# Patient Record
Sex: MALE | Race: WHITE | NOT HISPANIC OR LATINO
[De-identification: names, ages, dates, MRNs, and addresses within clinical notes are randomized per-mention and may not be internally consistent; named-entity substitution may affect disease eponyms.]

---

## 2020-10-20 ENCOUNTER — APPOINTMENT (OUTPATIENT)
Dept: UROLOGY | Facility: CLINIC | Age: 60
End: 2020-10-20

## 2020-10-20 PROBLEM — Z00.00 ENCOUNTER FOR PREVENTIVE HEALTH EXAMINATION: Status: ACTIVE | Noted: 2020-10-20

## 2020-10-21 ENCOUNTER — APPOINTMENT (OUTPATIENT)
Dept: UROLOGY | Facility: CLINIC | Age: 60
End: 2020-10-21
Payer: COMMERCIAL

## 2020-10-21 VITALS — HEIGHT: 68 IN | TEMPERATURE: 97.7 F | WEIGHT: 215 LBS | BODY MASS INDEX: 32.58 KG/M2

## 2020-10-21 DIAGNOSIS — Z78.9 OTHER SPECIFIED HEALTH STATUS: ICD-10-CM

## 2020-10-21 PROCEDURE — 99205 OFFICE O/P NEW HI 60 MIN: CPT

## 2020-10-21 PROCEDURE — 99072 ADDL SUPL MATRL&STAF TM PHE: CPT

## 2020-10-21 RX ORDER — ASPIRIN 325 MG/1
TABLET, FILM COATED ORAL
Refills: 0 | Status: ACTIVE | COMMUNITY

## 2020-10-21 NOTE — ASSESSMENT
[FreeTextEntry1] : Very pleasant 60 year old gentleman who presents for evaluation of large left kidney stone\par -CT images from 2019 as of anteroposteriorly reviewed with the patient\par -UA and urine culture\par -CT stone hunt\par -Fu in 2 weeks\par -We had an extensive discussion regarding potential complications from a large left kidney stone, including renal failure, recurrent urinary tract infections,.  Patient reports that he is still very reluctant to undergo evaluation and treatment for this kidney stone, however he reports that he will do so.  We had an extensive discussion guarding the importance of follow-up.  Patient verbalized understanding and reports that he will follow-up as advised\par -We briefly discussed treatment options for a very large left kidney stone with hydronephrosis.  We will further discuss this after CT scan

## 2020-10-21 NOTE — REVIEW OF SYSTEMS
[Heart Rate Is Fast] : fast heart rate [see HPI] : see HPI [both] : pain during and after intercourse [denies] : denies pain with orgasm [base] : pain in base of penis [Negative] : Heme/Lymph

## 2020-10-21 NOTE — HISTORY OF PRESENT ILLNESS
[None] : no symptoms [FreeTextEntry1] : Very pleasant 60 year old gentleman who presents for evaluation of large left kidney stone. One year ago he was febrile and underwent CT imaging, which showed a large left partial staghorn calculus with additional lower pole stones. Reports no prior history of kidney stones before this. Denies hematuria, dysuria, flank pain, suprapubic pain, fevers. Denies urinary urgency, frequency, hesitancy, straining, difficulty emptying. Denies history of nephrolithiasis, frequent UTIs,  malignancy. No known family history of nephrolithiasis or  malignancy.  No specific timing to his symptoms.  No aggravating or alleviating factors that he knows of.\par \par Patient reports that he presented for evaluation today at the urging of his wife.  He reports that he is very reluctant to see physicians and therefore did not seek evaluation for known large left kidney stone until now.

## 2020-10-26 DIAGNOSIS — N39.0 URINARY TRACT INFECTION, SITE NOT SPECIFIED: ICD-10-CM

## 2020-10-26 LAB
APPEARANCE: ABNORMAL
BACTERIA UR CULT: NORMAL
BACTERIA: NEGATIVE
BILIRUBIN URINE: NEGATIVE
BLOOD URINE: ABNORMAL
COLOR: YELLOW
GLUCOSE QUALITATIVE U: NEGATIVE
HYALINE CASTS: 0 /LPF
KETONES URINE: NEGATIVE
LEUKOCYTE ESTERASE URINE: ABNORMAL
MICROSCOPIC-UA: NORMAL
NITRITE URINE: NEGATIVE
PH URINE: 6.5
PROTEIN URINE: ABNORMAL
RED BLOOD CELLS URINE: 9 /HPF
SPECIFIC GRAVITY URINE: 1.02
SQUAMOUS EPITHELIAL CELLS: 1 /HPF
UROBILINOGEN URINE: NORMAL
WHITE BLOOD CELLS URINE: 528 /HPF

## 2020-10-26 RX ORDER — CIPROFLOXACIN HYDROCHLORIDE 500 MG/1
500 TABLET, FILM COATED ORAL TWICE DAILY
Qty: 14 | Refills: 0 | Status: DISCONTINUED | COMMUNITY
Start: 2020-10-26 | End: 2020-10-26

## 2020-10-26 RX ORDER — AMOXICILLIN AND CLAVULANATE POTASSIUM 875; 125 MG/1; MG/1
875-125 TABLET, COATED ORAL TWICE DAILY
Qty: 14 | Refills: 0 | Status: ACTIVE | COMMUNITY
Start: 2020-10-26 | End: 1900-01-01

## 2020-11-11 ENCOUNTER — NON-APPOINTMENT (OUTPATIENT)
Age: 60
End: 2020-11-11

## 2020-11-11 ENCOUNTER — APPOINTMENT (OUTPATIENT)
Dept: UROLOGY | Facility: CLINIC | Age: 60
End: 2020-11-11

## 2020-11-12 ENCOUNTER — APPOINTMENT (OUTPATIENT)
Dept: UROLOGY | Facility: CLINIC | Age: 60
End: 2020-11-12
Payer: COMMERCIAL

## 2020-11-12 VITALS
BODY MASS INDEX: 32.58 KG/M2 | DIASTOLIC BLOOD PRESSURE: 99 MMHG | TEMPERATURE: 98 F | SYSTOLIC BLOOD PRESSURE: 131 MMHG | WEIGHT: 215 LBS | HEIGHT: 68 IN | OXYGEN SATURATION: 98 % | HEART RATE: 122 BPM

## 2020-11-12 DIAGNOSIS — N20.0 CALCULUS OF KIDNEY: ICD-10-CM

## 2020-11-12 DIAGNOSIS — Z82.49 FAMILY HISTORY OF ISCHEMIC HEART DISEASE AND OTHER DISEASES OF THE CIRCULATORY SYSTEM: ICD-10-CM

## 2020-11-12 DIAGNOSIS — Z87.891 PERSONAL HISTORY OF NICOTINE DEPENDENCE: ICD-10-CM

## 2020-11-12 DIAGNOSIS — Z83.3 FAMILY HISTORY OF DIABETES MELLITUS: ICD-10-CM

## 2020-11-12 DIAGNOSIS — N13.30 UNSPECIFIED HYDRONEPHROSIS: ICD-10-CM

## 2020-11-12 PROCEDURE — 99072 ADDL SUPL MATRL&STAF TM PHE: CPT

## 2020-11-12 PROCEDURE — 99214 OFFICE O/P EST MOD 30 MIN: CPT

## 2020-11-12 NOTE — REVIEW OF SYSTEMS
[Urine Infection (bladder/kidney)] : bladder/kidney infection [Wake up at night to urinate  How many times?  ___] : wakes up to urinate [unfilled] times during the night [Negative] : Heme/Lymph

## 2020-11-12 NOTE — HISTORY OF PRESENT ILLNESS
[FreeTextEntry1] : 60 year old gentleman who presents for evaluation of large left kidney stone. One year ago he was febrile and underwent CT imaging, which showed a large left partial staghorn calculus with additional lower pole stones. Did not pursue treatment; returns after reimaging, seen by Dr. Jones, and referred for definitive stone management.\par \par No current pain, fever, hematuria.

## 2020-11-12 NOTE — PHYSICAL EXAM
[General Appearance - Well Developed] : well developed [General Appearance - Well Nourished] : well nourished [Normal Appearance] : normal appearance [Well Groomed] : well groomed [General Appearance - In No Acute Distress] : no acute distress [] : no respiratory distress [Respiration, Rhythm And Depth] : normal respiratory rhythm and effort [Exaggerated Use Of Accessory Muscles For Inspiration] : no accessory muscle use [Normal Station and Gait] : the gait and station were normal for the patient's age [No Focal Deficits] : no focal deficits [Oriented To Time, Place, And Person] : oriented to person, place, and time [Affect] : the affect was normal [Mood] : the mood was normal [Not Anxious] : not anxious

## 2020-11-26 ENCOUNTER — TRANSCRIPTION ENCOUNTER (OUTPATIENT)
Age: 60
End: 2020-11-26

## 2020-12-02 ENCOUNTER — OUTPATIENT (OUTPATIENT)
Dept: OUTPATIENT SERVICES | Facility: HOSPITAL | Age: 60
LOS: 1 days | End: 2020-12-02
Payer: COMMERCIAL

## 2020-12-02 VITALS
WEIGHT: 216.93 LBS | DIASTOLIC BLOOD PRESSURE: 86 MMHG | HEART RATE: 100 BPM | OXYGEN SATURATION: 100 % | HEIGHT: 68 IN | SYSTOLIC BLOOD PRESSURE: 128 MMHG | TEMPERATURE: 98 F | RESPIRATION RATE: 18 BRPM

## 2020-12-02 DIAGNOSIS — N20.0 CALCULUS OF KIDNEY: ICD-10-CM

## 2020-12-02 DIAGNOSIS — Z01.818 ENCOUNTER FOR OTHER PREPROCEDURAL EXAMINATION: ICD-10-CM

## 2020-12-02 LAB
ANION GAP SERPL CALC-SCNC: 16 MMOL/L — SIGNIFICANT CHANGE UP (ref 5–17)
BLD GP AB SCN SERPL QL: NEGATIVE — SIGNIFICANT CHANGE UP
BUN SERPL-MCNC: 13 MG/DL — SIGNIFICANT CHANGE UP (ref 7–23)
CALCIUM SERPL-MCNC: 9.9 MG/DL — SIGNIFICANT CHANGE UP (ref 8.4–10.5)
CHLORIDE SERPL-SCNC: 102 MMOL/L — SIGNIFICANT CHANGE UP (ref 96–108)
CO2 SERPL-SCNC: 19 MMOL/L — LOW (ref 22–31)
CREAT SERPL-MCNC: 0.93 MG/DL — SIGNIFICANT CHANGE UP (ref 0.5–1.3)
GLUCOSE SERPL-MCNC: 130 MG/DL — HIGH (ref 70–99)
HCT VFR BLD CALC: 50.6 % — HIGH (ref 39–50)
HGB BLD-MCNC: 17.8 G/DL — HIGH (ref 13–17)
MCHC RBC-ENTMCNC: 30.6 PG — SIGNIFICANT CHANGE UP (ref 27–34)
MCHC RBC-ENTMCNC: 35.2 GM/DL — SIGNIFICANT CHANGE UP (ref 32–36)
MCV RBC AUTO: 87.1 FL — SIGNIFICANT CHANGE UP (ref 80–100)
NRBC # BLD: 0 /100 WBCS — SIGNIFICANT CHANGE UP (ref 0–0)
PLATELET # BLD AUTO: 303 K/UL — SIGNIFICANT CHANGE UP (ref 150–400)
POTASSIUM SERPL-MCNC: 4 MMOL/L — SIGNIFICANT CHANGE UP (ref 3.5–5.3)
POTASSIUM SERPL-SCNC: 4 MMOL/L — SIGNIFICANT CHANGE UP (ref 3.5–5.3)
RBC # BLD: 5.81 M/UL — HIGH (ref 4.2–5.8)
RBC # FLD: 12.7 % — SIGNIFICANT CHANGE UP (ref 10.3–14.5)
RH IG SCN BLD-IMP: NEGATIVE — SIGNIFICANT CHANGE UP
SODIUM SERPL-SCNC: 137 MMOL/L — SIGNIFICANT CHANGE UP (ref 135–145)
WBC # BLD: 9.26 K/UL — SIGNIFICANT CHANGE UP (ref 3.8–10.5)
WBC # FLD AUTO: 9.26 K/UL — SIGNIFICANT CHANGE UP (ref 3.8–10.5)

## 2020-12-02 RX ORDER — CHLORHEXIDINE GLUCONATE 213 G/1000ML
1 SOLUTION TOPICAL ONCE
Refills: 0 | Status: DISCONTINUED | OUTPATIENT
Start: 2020-12-11 | End: 2020-12-11

## 2020-12-02 RX ORDER — SODIUM CHLORIDE 9 MG/ML
3 INJECTION INTRAMUSCULAR; INTRAVENOUS; SUBCUTANEOUS EVERY 8 HOURS
Refills: 0 | Status: DISCONTINUED | OUTPATIENT
Start: 2020-12-11 | End: 2020-12-11

## 2020-12-02 RX ORDER — CEFAZOLIN SODIUM 1 G
2000 VIAL (EA) INJECTION ONCE
Refills: 0 | Status: COMPLETED | OUTPATIENT
Start: 2020-12-11 | End: 2020-12-11

## 2020-12-02 RX ORDER — LIDOCAINE HCL 20 MG/ML
0.2 VIAL (ML) INJECTION ONCE
Refills: 0 | Status: DISCONTINUED | OUTPATIENT
Start: 2020-12-11 | End: 2020-12-11

## 2020-12-02 NOTE — H&P PST ADULT - NSICDXPROBLEM_GEN_ALL_CORE_FT
PROBLEM DIAGNOSES  Problem: Calculus of kidney  Assessment and Plan: cystoscopy  left retrograde  left percutaneous stone removal

## 2020-12-02 NOTE — H&P PST ADULT - HISTORY OF PRESENT ILLNESS
This s a 61 y/o male had pneumonia last year, a CT revealed + calculus of kidney , he presents today for left retrograde, left percutaneous stone removal  COVID 19 PCR to be done 12/8 at [facility] This s a 61 y/o male upon a work up for  pneumonia last year, a CT revealed + calculus of kidney , he presents today for left retrograde, left percutaneous stone removal  COVID 19 PCR to be done 12/8 at [facility]

## 2020-12-07 DIAGNOSIS — Z01.818 ENCOUNTER FOR OTHER PREPROCEDURAL EXAMINATION: ICD-10-CM

## 2020-12-07 RX ORDER — AMOXICILLIN AND CLAVULANATE POTASSIUM 875; 125 MG/1; MG/1
875-125 TABLET, COATED ORAL
Qty: 18 | Refills: 0 | Status: ACTIVE | COMMUNITY
Start: 2020-12-07 | End: 1900-01-01

## 2020-12-08 ENCOUNTER — APPOINTMENT (OUTPATIENT)
Dept: DISASTER EMERGENCY | Facility: CLINIC | Age: 60
End: 2020-12-08

## 2020-12-10 ENCOUNTER — TRANSCRIPTION ENCOUNTER (OUTPATIENT)
Age: 60
End: 2020-12-10

## 2020-12-10 LAB — SARS-COV-2 N GENE NPH QL NAA+PROBE: NOT DETECTED

## 2020-12-11 ENCOUNTER — RESULT REVIEW (OUTPATIENT)
Age: 60
End: 2020-12-11

## 2020-12-11 ENCOUNTER — APPOINTMENT (OUTPATIENT)
Dept: UROLOGY | Facility: HOSPITAL | Age: 60
End: 2020-12-11

## 2020-12-11 ENCOUNTER — INPATIENT (INPATIENT)
Facility: HOSPITAL | Age: 60
LOS: 3 days | Discharge: HOME | DRG: 659 | End: 2020-12-15
Attending: UROLOGY | Admitting: UROLOGY
Payer: COMMERCIAL

## 2020-12-11 VITALS
RESPIRATION RATE: 16 BRPM | HEIGHT: 68 IN | TEMPERATURE: 98 F | HEART RATE: 101 BPM | WEIGHT: 216.93 LBS | DIASTOLIC BLOOD PRESSURE: 87 MMHG | SYSTOLIC BLOOD PRESSURE: 117 MMHG

## 2020-12-11 DIAGNOSIS — Z01.818 ENCOUNTER FOR OTHER PREPROCEDURAL EXAMINATION: ICD-10-CM

## 2020-12-11 DIAGNOSIS — N20.0 CALCULUS OF KIDNEY: ICD-10-CM

## 2020-12-11 LAB
ANION GAP SERPL CALC-SCNC: 14 MMOL/L — SIGNIFICANT CHANGE UP (ref 5–17)
BASOPHILS # BLD AUTO: 0.02 K/UL — SIGNIFICANT CHANGE UP (ref 0–0.2)
BASOPHILS NFR BLD AUTO: 0.2 % — SIGNIFICANT CHANGE UP (ref 0–2)
BUN SERPL-MCNC: 11 MG/DL — SIGNIFICANT CHANGE UP (ref 7–23)
CALCIUM SERPL-MCNC: 8.8 MG/DL — SIGNIFICANT CHANGE UP (ref 8.4–10.5)
CHLORIDE SERPL-SCNC: 103 MMOL/L — SIGNIFICANT CHANGE UP (ref 96–108)
CO2 SERPL-SCNC: 19 MMOL/L — LOW (ref 22–31)
CREAT SERPL-MCNC: 0.97 MG/DL — SIGNIFICANT CHANGE UP (ref 0.5–1.3)
EOSINOPHIL # BLD AUTO: 0.04 K/UL — SIGNIFICANT CHANGE UP (ref 0–0.5)
EOSINOPHIL NFR BLD AUTO: 0.4 % — SIGNIFICANT CHANGE UP (ref 0–6)
GLUCOSE SERPL-MCNC: 169 MG/DL — HIGH (ref 70–99)
HCT VFR BLD CALC: 48.3 % — SIGNIFICANT CHANGE UP (ref 39–50)
HGB BLD-MCNC: 16.8 G/DL — SIGNIFICANT CHANGE UP (ref 13–17)
IMM GRANULOCYTES NFR BLD AUTO: 0.2 % — SIGNIFICANT CHANGE UP (ref 0–1.5)
LYMPHOCYTES # BLD AUTO: 1.16 K/UL — SIGNIFICANT CHANGE UP (ref 1–3.3)
LYMPHOCYTES # BLD AUTO: 12.5 % — LOW (ref 13–44)
MCHC RBC-ENTMCNC: 30.4 PG — SIGNIFICANT CHANGE UP (ref 27–34)
MCHC RBC-ENTMCNC: 34.8 GM/DL — SIGNIFICANT CHANGE UP (ref 32–36)
MCV RBC AUTO: 87.5 FL — SIGNIFICANT CHANGE UP (ref 80–100)
MONOCYTES # BLD AUTO: 0.12 K/UL — SIGNIFICANT CHANGE UP (ref 0–0.9)
MONOCYTES NFR BLD AUTO: 1.3 % — LOW (ref 2–14)
NEUTROPHILS # BLD AUTO: 7.95 K/UL — HIGH (ref 1.8–7.4)
NEUTROPHILS NFR BLD AUTO: 85.4 % — HIGH (ref 43–77)
NRBC # BLD: 0 /100 WBCS — SIGNIFICANT CHANGE UP (ref 0–0)
PLATELET # BLD AUTO: 369 K/UL — SIGNIFICANT CHANGE UP (ref 150–400)
POTASSIUM SERPL-MCNC: 4.2 MMOL/L — SIGNIFICANT CHANGE UP (ref 3.5–5.3)
POTASSIUM SERPL-SCNC: 4.2 MMOL/L — SIGNIFICANT CHANGE UP (ref 3.5–5.3)
RBC # BLD: 5.52 M/UL — SIGNIFICANT CHANGE UP (ref 4.2–5.8)
RBC # FLD: 12.6 % — SIGNIFICANT CHANGE UP (ref 10.3–14.5)
RH IG SCN BLD-IMP: NEGATIVE — SIGNIFICANT CHANGE UP
SODIUM SERPL-SCNC: 136 MMOL/L — SIGNIFICANT CHANGE UP (ref 135–145)
WBC # BLD: 9.31 K/UL — SIGNIFICANT CHANGE UP (ref 3.8–10.5)
WBC # FLD AUTO: 9.31 K/UL — SIGNIFICANT CHANGE UP (ref 3.8–10.5)

## 2020-12-11 PROCEDURE — 86901 BLOOD TYPING SEROLOGIC RH(D): CPT

## 2020-12-11 PROCEDURE — G0463: CPT

## 2020-12-11 PROCEDURE — 86900 BLOOD TYPING SEROLOGIC ABO: CPT

## 2020-12-11 PROCEDURE — 71045 X-RAY EXAM CHEST 1 VIEW: CPT | Mod: 26

## 2020-12-11 PROCEDURE — 80048 BASIC METABOLIC PNL TOTAL CA: CPT

## 2020-12-11 PROCEDURE — 76000 FLUOROSCOPY <1 HR PHYS/QHP: CPT

## 2020-12-11 PROCEDURE — 88300 SURGICAL PATH GROSS: CPT | Mod: 26

## 2020-12-11 PROCEDURE — 85027 COMPLETE CBC AUTOMATED: CPT

## 2020-12-11 PROCEDURE — 87086 URINE CULTURE/COLONY COUNT: CPT

## 2020-12-11 PROCEDURE — 87186 SC STD MICRODIL/AGAR DIL: CPT

## 2020-12-11 PROCEDURE — 86850 RBC ANTIBODY SCREEN: CPT

## 2020-12-11 RX ORDER — OXYCODONE HYDROCHLORIDE 5 MG/1
5 TABLET ORAL EVERY 6 HOURS
Refills: 0 | Status: DISCONTINUED | OUTPATIENT
Start: 2020-12-11 | End: 2020-12-15

## 2020-12-11 RX ORDER — CEFTRIAXONE 500 MG/1
1000 INJECTION, POWDER, FOR SOLUTION INTRAMUSCULAR; INTRAVENOUS EVERY 24 HOURS
Refills: 0 | Status: DISCONTINUED | OUTPATIENT
Start: 2020-12-11 | End: 2020-12-12

## 2020-12-11 RX ORDER — ONDANSETRON 8 MG/1
4 TABLET, FILM COATED ORAL ONCE
Refills: 0 | Status: COMPLETED | OUTPATIENT
Start: 2020-12-11 | End: 2020-12-11

## 2020-12-11 RX ORDER — LIDOCAINE 4 G/100G
1 CREAM TOPICAL EVERY 8 HOURS
Refills: 0 | Status: DISCONTINUED | OUTPATIENT
Start: 2020-12-11 | End: 2020-12-15

## 2020-12-11 RX ORDER — HEPARIN SODIUM 5000 [USP'U]/ML
5000 INJECTION INTRAVENOUS; SUBCUTANEOUS EVERY 8 HOURS
Refills: 0 | Status: DISCONTINUED | OUTPATIENT
Start: 2020-12-11 | End: 2020-12-15

## 2020-12-11 RX ORDER — SODIUM CHLORIDE 9 MG/ML
1000 INJECTION, SOLUTION INTRAVENOUS
Refills: 0 | Status: DISCONTINUED | OUTPATIENT
Start: 2020-12-11 | End: 2020-12-13

## 2020-12-11 RX ORDER — AMPICILLIN TRIHYDRATE 250 MG
500 CAPSULE ORAL EVERY 6 HOURS
Refills: 0 | Status: DISCONTINUED | OUTPATIENT
Start: 2020-12-11 | End: 2020-12-12

## 2020-12-11 RX ORDER — SENNA PLUS 8.6 MG/1
2 TABLET ORAL AT BEDTIME
Refills: 0 | Status: DISCONTINUED | OUTPATIENT
Start: 2020-12-11 | End: 2020-12-15

## 2020-12-11 RX ORDER — FENTANYL CITRATE 50 UG/ML
25 INJECTION INTRAVENOUS
Refills: 0 | Status: DISCONTINUED | OUTPATIENT
Start: 2020-12-11 | End: 2020-12-11

## 2020-12-11 RX ORDER — OXYCODONE HYDROCHLORIDE 5 MG/1
10 TABLET ORAL EVERY 6 HOURS
Refills: 0 | Status: DISCONTINUED | OUTPATIENT
Start: 2020-12-11 | End: 2020-12-15

## 2020-12-11 RX ORDER — ACETAMINOPHEN 500 MG
650 TABLET ORAL EVERY 6 HOURS
Refills: 0 | Status: DISCONTINUED | OUTPATIENT
Start: 2020-12-11 | End: 2020-12-15

## 2020-12-11 RX ORDER — FENTANYL CITRATE 50 UG/ML
50 INJECTION INTRAVENOUS ONCE
Refills: 0 | Status: DISCONTINUED | OUTPATIENT
Start: 2020-12-11 | End: 2020-12-11

## 2020-12-11 RX ADMIN — Medication 100 MILLIGRAM(S): at 22:48

## 2020-12-11 RX ADMIN — ONDANSETRON 4 MILLIGRAM(S): 8 TABLET, FILM COATED ORAL at 18:15

## 2020-12-11 RX ADMIN — HEPARIN SODIUM 5000 UNIT(S): 5000 INJECTION INTRAVENOUS; SUBCUTANEOUS at 21:56

## 2020-12-11 RX ADMIN — CEFTRIAXONE 100 MILLIGRAM(S): 500 INJECTION, POWDER, FOR SOLUTION INTRAMUSCULAR; INTRAVENOUS at 21:56

## 2020-12-11 RX ADMIN — SODIUM CHLORIDE 125 MILLILITER(S): 9 INJECTION, SOLUTION INTRAVENOUS at 18:58

## 2020-12-11 NOTE — PROGRESS NOTE ADULT - ASSESSMENT
A/P: 60y Male s/p L PCNL     f/u CXR  DVT prophylaxis/OOB  Incentive spirometry  Strict I&O's  Analgesia and antiemetics as needed  regular Diet  AM labs

## 2020-12-11 NOTE — PROGRESS NOTE ADULT - SUBJECTIVE AND OBJECTIVE BOX
Post op Check    Pt 61y/o M hx of kidney stone s/p L PCNL. seen and examined. c/o discomfort from kim . Pain is controlled. Denies SOB/CP/N/V. post op lab stable.    Vital Signs Last 24 Hrs  T(C): 36.9 (11 Dec 2020 20:55), Max: 36.9 (11 Dec 2020 12:00)  T(F): 98.4 (11 Dec 2020 20:55), Max: 98.4 (11 Dec 2020 12:00)  HR: 102 (11 Dec 2020 20:55) (88 - 103)  BP: 116/79 (11 Dec 2020 20:55) (115/72 - 135/73)  BP(mean): 87 (11 Dec 2020 19:30) (87 - 103)  RR: 18 (11 Dec 2020 20:55) (11 - 18)  SpO2: 95% (11 Dec 2020 20:55) (95% - 100%)    I&O's Summary    11 Dec 2020 07:01  -  11 Dec 2020 20:58  --------------------------------------------------------  IN: 125 mL / OUT: 550 mL / NET: -425 mL        Physical Exam  Gen: NAD, A&Ox3  Pulm: No respiratory distress, no subcostal retractions  CV: RRR, no JVD  Abd: Soft, NT, ND  Back: L NT in place, draining light red urine  : kim in place, draining yellow urine                           16.8   9.31  )-----------( 369      ( 11 Dec 2020 18:31 )             48.3       12-11    136  |  103  |  11  ----------------------------<  169<H>  4.2   |  19<L>  |  0.97    Ca    8.8      11 Dec 2020 18:31

## 2020-12-12 LAB
ANION GAP SERPL CALC-SCNC: 15 MMOL/L — SIGNIFICANT CHANGE UP (ref 5–17)
BUN SERPL-MCNC: 15 MG/DL — SIGNIFICANT CHANGE UP (ref 7–23)
CALCIUM SERPL-MCNC: 9 MG/DL — SIGNIFICANT CHANGE UP (ref 8.4–10.5)
CHLORIDE SERPL-SCNC: 101 MMOL/L — SIGNIFICANT CHANGE UP (ref 96–108)
CO2 SERPL-SCNC: 20 MMOL/L — LOW (ref 22–31)
CREAT SERPL-MCNC: 0.96 MG/DL — SIGNIFICANT CHANGE UP (ref 0.5–1.3)
GLUCOSE SERPL-MCNC: 151 MG/DL — HIGH (ref 70–99)
HCT VFR BLD CALC: 43.2 % — SIGNIFICANT CHANGE UP (ref 39–50)
HGB BLD-MCNC: 14.9 G/DL — SIGNIFICANT CHANGE UP (ref 13–17)
MCHC RBC-ENTMCNC: 30.3 PG — SIGNIFICANT CHANGE UP (ref 27–34)
MCHC RBC-ENTMCNC: 34.5 GM/DL — SIGNIFICANT CHANGE UP (ref 32–36)
MCV RBC AUTO: 88 FL — SIGNIFICANT CHANGE UP (ref 80–100)
NRBC # BLD: 0 /100 WBCS — SIGNIFICANT CHANGE UP (ref 0–0)
PLATELET # BLD AUTO: 334 K/UL — SIGNIFICANT CHANGE UP (ref 150–400)
POTASSIUM SERPL-MCNC: 4.3 MMOL/L — SIGNIFICANT CHANGE UP (ref 3.5–5.3)
POTASSIUM SERPL-SCNC: 4.3 MMOL/L — SIGNIFICANT CHANGE UP (ref 3.5–5.3)
RBC # BLD: 4.91 M/UL — SIGNIFICANT CHANGE UP (ref 4.2–5.8)
RBC # FLD: 12.8 % — SIGNIFICANT CHANGE UP (ref 10.3–14.5)
SODIUM SERPL-SCNC: 136 MMOL/L — SIGNIFICANT CHANGE UP (ref 135–145)
WBC # BLD: 12.25 K/UL — HIGH (ref 3.8–10.5)
WBC # FLD AUTO: 12.25 K/UL — HIGH (ref 3.8–10.5)

## 2020-12-12 PROCEDURE — 74176 CT ABD & PELVIS W/O CONTRAST: CPT | Mod: 26

## 2020-12-12 PROCEDURE — 93010 ELECTROCARDIOGRAM REPORT: CPT

## 2020-12-12 PROCEDURE — 99024 POSTOP FOLLOW-UP VISIT: CPT

## 2020-12-12 RX ORDER — PIPERACILLIN AND TAZOBACTAM 4; .5 G/20ML; G/20ML
3.38 INJECTION, POWDER, LYOPHILIZED, FOR SOLUTION INTRAVENOUS ONCE
Refills: 0 | Status: COMPLETED | OUTPATIENT
Start: 2020-12-12 | End: 2020-12-12

## 2020-12-12 RX ORDER — PIPERACILLIN AND TAZOBACTAM 4; .5 G/20ML; G/20ML
3.38 INJECTION, POWDER, LYOPHILIZED, FOR SOLUTION INTRAVENOUS EVERY 8 HOURS
Refills: 0 | Status: COMPLETED | OUTPATIENT
Start: 2020-12-12 | End: 2020-12-15

## 2020-12-12 RX ADMIN — Medication 650 MILLIGRAM(S): at 02:43

## 2020-12-12 RX ADMIN — Medication 104 MILLIGRAM(S): at 05:32

## 2020-12-12 RX ADMIN — Medication 104 MILLIGRAM(S): at 00:45

## 2020-12-12 RX ADMIN — Medication 650 MILLIGRAM(S): at 02:13

## 2020-12-12 RX ADMIN — PIPERACILLIN AND TAZOBACTAM 200 GRAM(S): 4; .5 INJECTION, POWDER, LYOPHILIZED, FOR SOLUTION INTRAVENOUS at 14:41

## 2020-12-12 RX ADMIN — HEPARIN SODIUM 5000 UNIT(S): 5000 INJECTION INTRAVENOUS; SUBCUTANEOUS at 21:06

## 2020-12-12 RX ADMIN — HEPARIN SODIUM 5000 UNIT(S): 5000 INJECTION INTRAVENOUS; SUBCUTANEOUS at 05:32

## 2020-12-12 RX ADMIN — SODIUM CHLORIDE 125 MILLILITER(S): 9 INJECTION, SOLUTION INTRAVENOUS at 14:41

## 2020-12-12 RX ADMIN — HEPARIN SODIUM 5000 UNIT(S): 5000 INJECTION INTRAVENOUS; SUBCUTANEOUS at 14:42

## 2020-12-12 RX ADMIN — Medication 650 MILLIGRAM(S): at 23:39

## 2020-12-12 RX ADMIN — Medication 650 MILLIGRAM(S): at 17:13

## 2020-12-12 RX ADMIN — PIPERACILLIN AND TAZOBACTAM 25 GRAM(S): 4; .5 INJECTION, POWDER, LYOPHILIZED, FOR SOLUTION INTRAVENOUS at 21:06

## 2020-12-12 RX ADMIN — SODIUM CHLORIDE 125 MILLILITER(S): 9 INJECTION, SOLUTION INTRAVENOUS at 22:56

## 2020-12-12 RX ADMIN — Medication 650 MILLIGRAM(S): at 10:54

## 2020-12-12 NOTE — PROGRESS NOTE ADULT - ASSESSMENT
60 year old male s/p R PCNL, with fevers on POD#1    -f/u urine, blood, stone, kidney cultures  -continue antibiotics  -AM labs  -monitor I's and O's  -analgesia as needed  -AM CT scan  -trend fevers  -OOB/DVT prophylaxis

## 2020-12-12 NOTE — PROGRESS NOTE ADULT - SUBJECTIVE AND OBJECTIVE BOX
The patient was seen and examined at bedside.  Denies complaints of chest pain, shortness of breath, nausea, acute pain.  Pt with fevers up to 102.1F this AM.    T(C): 38.9 (12-12-20 @ 07:04), Max: 38.9 (12-12-20 @ 07:04)  HR: 123 (12-12-20 @ 05:37) (88 - 123)  BP: 104/61 (12-12-20 @ 05:37) (103/70 - 135/73)  RR: 18 (12-12-20 @ 05:37) (11 - 18)  SpO2: 95% (12-12-20 @ 05:37) (94% - 100%)  Wt(kg): --    Physical Exam:    General: NAD, A+Ox3  Abdomen: soft, non-tender, non-distended  Back: dressing with serosanguinous drainage and was changed      12-11 @ 07:01  -  12-12 @ 07:00  --------------------------------------------------------  IN: 305 mL / OUT: 1415 mL / NET: -1110 mL      F - 375cc    left NT - 490cc blood tinged

## 2020-12-12 NOTE — PROVIDER CONTACT NOTE (OTHER) - ASSESSMENT
Pt resting in bed, denies having chills. Pt with temp of 101.3 Pt resting in bed, denies having chills. Pt with temp of 102.8

## 2020-12-13 LAB
-  AMPICILLIN: SIGNIFICANT CHANGE UP
-  AMPICILLIN: SIGNIFICANT CHANGE UP
-  CIPROFLOXACIN: SIGNIFICANT CHANGE UP
-  CIPROFLOXACIN: SIGNIFICANT CHANGE UP
-  LEVOFLOXACIN: SIGNIFICANT CHANGE UP
-  LEVOFLOXACIN: SIGNIFICANT CHANGE UP
-  TETRACYCLINE: SIGNIFICANT CHANGE UP
-  TETRACYCLINE: SIGNIFICANT CHANGE UP
-  VANCOMYCIN: SIGNIFICANT CHANGE UP
-  VANCOMYCIN: SIGNIFICANT CHANGE UP
ANION GAP SERPL CALC-SCNC: 12 MMOL/L — SIGNIFICANT CHANGE UP (ref 5–17)
BUN SERPL-MCNC: 9 MG/DL — SIGNIFICANT CHANGE UP (ref 7–23)
CALCIUM SERPL-MCNC: 8.8 MG/DL — SIGNIFICANT CHANGE UP (ref 8.4–10.5)
CHLORIDE SERPL-SCNC: 102 MMOL/L — SIGNIFICANT CHANGE UP (ref 96–108)
CO2 SERPL-SCNC: 20 MMOL/L — LOW (ref 22–31)
CREAT SERPL-MCNC: 0.93 MG/DL — SIGNIFICANT CHANGE UP (ref 0.5–1.3)
CULTURE RESULTS: SIGNIFICANT CHANGE UP
CULTURE RESULTS: SIGNIFICANT CHANGE UP
GLUCOSE SERPL-MCNC: 99 MG/DL — SIGNIFICANT CHANGE UP (ref 70–99)
HCT VFR BLD CALC: 43.5 % — SIGNIFICANT CHANGE UP (ref 39–50)
HGB BLD-MCNC: 15 G/DL — SIGNIFICANT CHANGE UP (ref 13–17)
MCHC RBC-ENTMCNC: 29.8 PG — SIGNIFICANT CHANGE UP (ref 27–34)
MCHC RBC-ENTMCNC: 34.5 GM/DL — SIGNIFICANT CHANGE UP (ref 32–36)
MCV RBC AUTO: 86.3 FL — SIGNIFICANT CHANGE UP (ref 80–100)
METHOD TYPE: SIGNIFICANT CHANGE UP
METHOD TYPE: SIGNIFICANT CHANGE UP
NRBC # BLD: 0 /100 WBCS — SIGNIFICANT CHANGE UP (ref 0–0)
ORGANISM # SPEC MICROSCOPIC CNT: SIGNIFICANT CHANGE UP
PLATELET # BLD AUTO: 253 K/UL — SIGNIFICANT CHANGE UP (ref 150–400)
POTASSIUM SERPL-MCNC: 3.9 MMOL/L — SIGNIFICANT CHANGE UP (ref 3.5–5.3)
POTASSIUM SERPL-SCNC: 3.9 MMOL/L — SIGNIFICANT CHANGE UP (ref 3.5–5.3)
RBC # BLD: 5.04 M/UL — SIGNIFICANT CHANGE UP (ref 4.2–5.8)
RBC # FLD: 12.7 % — SIGNIFICANT CHANGE UP (ref 10.3–14.5)
SODIUM SERPL-SCNC: 134 MMOL/L — LOW (ref 135–145)
SPECIMEN SOURCE: SIGNIFICANT CHANGE UP
SPECIMEN SOURCE: SIGNIFICANT CHANGE UP
WBC # BLD: 8.99 K/UL — SIGNIFICANT CHANGE UP (ref 3.8–10.5)
WBC # FLD AUTO: 8.99 K/UL — SIGNIFICANT CHANGE UP (ref 3.8–10.5)

## 2020-12-13 RX ORDER — VANCOMYCIN HCL 1 G
1750 VIAL (EA) INTRAVENOUS EVERY 12 HOURS
Refills: 0 | Status: DISCONTINUED | OUTPATIENT
Start: 2020-12-13 | End: 2020-12-15

## 2020-12-13 RX ADMIN — PIPERACILLIN AND TAZOBACTAM 25 GRAM(S): 4; .5 INJECTION, POWDER, LYOPHILIZED, FOR SOLUTION INTRAVENOUS at 13:30

## 2020-12-13 RX ADMIN — SODIUM CHLORIDE 125 MILLILITER(S): 9 INJECTION, SOLUTION INTRAVENOUS at 13:30

## 2020-12-13 RX ADMIN — Medication 650 MILLIGRAM(S): at 16:47

## 2020-12-13 RX ADMIN — Medication 650 MILLIGRAM(S): at 17:17

## 2020-12-13 RX ADMIN — HEPARIN SODIUM 5000 UNIT(S): 5000 INJECTION INTRAVENOUS; SUBCUTANEOUS at 23:06

## 2020-12-13 RX ADMIN — PIPERACILLIN AND TAZOBACTAM 25 GRAM(S): 4; .5 INJECTION, POWDER, LYOPHILIZED, FOR SOLUTION INTRAVENOUS at 23:05

## 2020-12-13 RX ADMIN — Medication 250 MILLIGRAM(S): at 18:39

## 2020-12-13 RX ADMIN — Medication 650 MILLIGRAM(S): at 06:15

## 2020-12-13 RX ADMIN — PIPERACILLIN AND TAZOBACTAM 25 GRAM(S): 4; .5 INJECTION, POWDER, LYOPHILIZED, FOR SOLUTION INTRAVENOUS at 05:01

## 2020-12-13 RX ADMIN — HEPARIN SODIUM 5000 UNIT(S): 5000 INJECTION INTRAVENOUS; SUBCUTANEOUS at 05:01

## 2020-12-13 RX ADMIN — Medication 650 MILLIGRAM(S): at 00:09

## 2020-12-13 RX ADMIN — SODIUM CHLORIDE 125 MILLILITER(S): 9 INJECTION, SOLUTION INTRAVENOUS at 05:50

## 2020-12-13 RX ADMIN — Medication 650 MILLIGRAM(S): at 05:45

## 2020-12-13 RX ADMIN — HEPARIN SODIUM 5000 UNIT(S): 5000 INJECTION INTRAVENOUS; SUBCUTANEOUS at 13:30

## 2020-12-13 NOTE — PROGRESS NOTE ADULT - ASSESSMENT
60 year old male s/p R PCNL, with fevers on POD#1, afebrile since 1650 yesterday    -f/u urine, blood, stone, kidney cultures  -continue antibiotics  -AM labs  -monitor I's and O's  -analgesia as needed  -trend fevers  -OOB/DVT prophylaxis

## 2020-12-13 NOTE — PATIENT PROFILE ADULT - NSASFALLNEEDSASSIST_GEN_A_NUR
The [facility] 
yes

## 2020-12-13 NOTE — PROGRESS NOTE ADULT - SUBJECTIVE AND OBJECTIVE BOX
Subjective  Pt seen and examined, no overnight events, pt feels well, denies f/c/n/v.     Objective    Vital signs  T(F): , Max: 102.8 (12-12-20 @ 16:46)  HR: 102 (12-13-20 @ 05:28)  BP: 138/86 (12-13-20 @ 05:28)  SpO2: 92% (12-13-20 @ 05:28)  Wt(kg): --    Output     OUT:    Indwelling Catheter - Urethral (mL): 2675 mL    Nephrostomy Tube (mL): 2800 mL    Voided (mL): 0 mL  Total OUT: 5475 mL    Total NET: -5475 mL          Gen: NAD  Abd: SNN  : kim clear yellow, NT pink     Labs      12-13 @ 07:01    WBC 8.99  / Hct 43.5  / SCr --       12-13 @ 07:00    WBC --    / Hct --    / SCr 0.93     Specimen Source: Kidney Kidney (12.12.20 @ 01:00)    Culture Results:   Moderate Gram Positive Cocci in Pairs and Chains (12.12.20 @ 01:00)        Imaging  < from: CT Abdomen and Pelvis No Cont (12.12.20 @ 11:08) >    EXAM:  CT ABDOMEN AND PELVIS                            PROCEDURE DATE:  12/12/2020            INTERPRETATION:  CLINICAL INFORMATION: Status post left PCNL. Assess for residual stones.    COMPARISON: None.    PROCEDURE:  CT of the Abdomen and Pelvis was performed without intravenous contrast.  Intravenous contrast: None.  Oral contrast: None.  Sagittal and coronal reformats were performed.    FINDINGS:  LOWER CHEST: Bibasilar atelectasis.    LIVER: Within normal limits.  BILE DUCTS: Normal caliber.  GALLBLADDER: Within normal limits.  SPLEEN: Within normal limits.  PANCREAS: Within normal limits.  ADRENALS: Within normal limits.  KIDNEYS/URETERS: Punctate nonobstructing right renal calculus (3, 71). No right hydronephrosis. Left nephroureterostomy tube with distal tip in the distal left ureter. Mild left hydronephrosis. Lleft perinephric fat stranding, with small amount of high attenuation paranephric and perinephric fluid consistent with hemorrhage. No residual left renal calculus. Multiple foci of air within the left collecting system.    BLADDER: Collapsed with indwelling Kim balloon catheter. Foci of air, likely secondary to recent instrumentation.  REPRODUCTIVE ORGANS: Prostate within normal limits.    BOWEL: No bowel obstruction. Appendix is normal.  PERITONEUM: No ascites.  VESSELS: Within normal limits.  RETROPERITONEUM/LYMPH NODES: No lymphadenopathy.  ABDOMINAL WALL: Fat-containing ventral hernia.  BONES: Degenerative changes. Grade 2 anterolisthesis of L5 on S1.    IMPRESSION:  Left nephroureterostomy tube in place, as above.    Mild left hydronephrosis. No residual left renal calculus.    Left perinephric fat stranding with small amount of perinephric and paranephric hemorrhage.                YUMIKO MARISCAL MD; Resident Radiology  This document has been electronically signed.  LAKSHMI MAHER MD; Attending Radiologist  This document has been electronically signed. Dec 12 2020 12:20PM    < end of copied text >

## 2020-12-14 LAB
-  AMPICILLIN: SIGNIFICANT CHANGE UP
-  TETRACYCLINE: SIGNIFICANT CHANGE UP
-  VANCOMYCIN: SIGNIFICANT CHANGE UP
CULTURE RESULTS: SIGNIFICANT CHANGE UP
HCT VFR BLD CALC: 44.2 % — SIGNIFICANT CHANGE UP (ref 39–50)
HGB BLD-MCNC: 15.5 G/DL — SIGNIFICANT CHANGE UP (ref 13–17)
MCHC RBC-ENTMCNC: 30.2 PG — SIGNIFICANT CHANGE UP (ref 27–34)
MCHC RBC-ENTMCNC: 35.1 GM/DL — SIGNIFICANT CHANGE UP (ref 32–36)
MCV RBC AUTO: 86 FL — SIGNIFICANT CHANGE UP (ref 80–100)
METHOD TYPE: SIGNIFICANT CHANGE UP
NRBC # BLD: 0 /100 WBCS — SIGNIFICANT CHANGE UP (ref 0–0)
ORGANISM # SPEC MICROSCOPIC CNT: SIGNIFICANT CHANGE UP
ORGANISM # SPEC MICROSCOPIC CNT: SIGNIFICANT CHANGE UP
PLATELET # BLD AUTO: 269 K/UL — SIGNIFICANT CHANGE UP (ref 150–400)
RBC # BLD: 5.14 M/UL — SIGNIFICANT CHANGE UP (ref 4.2–5.8)
RBC # FLD: 12.8 % — SIGNIFICANT CHANGE UP (ref 10.3–14.5)
SPECIMEN SOURCE: SIGNIFICANT CHANGE UP
WBC # BLD: 7.8 K/UL — SIGNIFICANT CHANGE UP (ref 3.8–10.5)
WBC # FLD AUTO: 7.8 K/UL — SIGNIFICANT CHANGE UP (ref 3.8–10.5)

## 2020-12-14 PROCEDURE — 71045 X-RAY EXAM CHEST 1 VIEW: CPT | Mod: 26

## 2020-12-14 PROCEDURE — 99024 POSTOP FOLLOW-UP VISIT: CPT

## 2020-12-14 RX ADMIN — PIPERACILLIN AND TAZOBACTAM 25 GRAM(S): 4; .5 INJECTION, POWDER, LYOPHILIZED, FOR SOLUTION INTRAVENOUS at 13:45

## 2020-12-14 RX ADMIN — PIPERACILLIN AND TAZOBACTAM 25 GRAM(S): 4; .5 INJECTION, POWDER, LYOPHILIZED, FOR SOLUTION INTRAVENOUS at 06:20

## 2020-12-14 RX ADMIN — HEPARIN SODIUM 5000 UNIT(S): 5000 INJECTION INTRAVENOUS; SUBCUTANEOUS at 21:19

## 2020-12-14 RX ADMIN — Medication 650 MILLIGRAM(S): at 18:38

## 2020-12-14 RX ADMIN — Medication 650 MILLIGRAM(S): at 11:29

## 2020-12-14 RX ADMIN — Medication 650 MILLIGRAM(S): at 18:08

## 2020-12-14 RX ADMIN — Medication 650 MILLIGRAM(S): at 11:59

## 2020-12-14 RX ADMIN — HEPARIN SODIUM 5000 UNIT(S): 5000 INJECTION INTRAVENOUS; SUBCUTANEOUS at 13:45

## 2020-12-14 RX ADMIN — Medication 250 MILLIGRAM(S): at 18:08

## 2020-12-14 RX ADMIN — Medication 250 MILLIGRAM(S): at 06:09

## 2020-12-14 RX ADMIN — HEPARIN SODIUM 5000 UNIT(S): 5000 INJECTION INTRAVENOUS; SUBCUTANEOUS at 06:20

## 2020-12-14 RX ADMIN — PIPERACILLIN AND TAZOBACTAM 25 GRAM(S): 4; .5 INJECTION, POWDER, LYOPHILIZED, FOR SOLUTION INTRAVENOUS at 21:19

## 2020-12-14 NOTE — PROGRESS NOTE ADULT - ASSESSMENT
60 year old male s/p R PCNL, with fevers on POD#3, last fever 102.5F 12/13    -f/u urine, blood, stone, kidney cultures  -continue antibiotics  -vanco trough tomorrow morning  -AM labs  -monitor I's and O's  -analgesia as needed  -trend fevers  -OOB/DVT prophylaxis   60 year old male s/p R PCNL, with fevers on POD#3, last fever 102.5F 12/13    -CXR this AM  -f/u urine, blood, stone, kidney cultures  -continue antibiotics  -vanco trough tomorrow morning  -AM labs  -monitor I's and O's  -analgesia as needed  -trend fevers  -OOB/DVT prophylaxis

## 2020-12-14 NOTE — PROGRESS NOTE ADULT - SUBJECTIVE AND OBJECTIVE BOX
The patient was seen and examined at bedside.  Denies complaints of chest pain, shortness of breath, nausea, acute pain.    T(C): 37.6 (12-14-20 @ 05:18), Max: 39.2 (12-13-20 @ 16:35)  HR: 92 (12-14-20 @ 05:18) (90 - 104)  BP: 138/94 (12-14-20 @ 05:18) (116/75 - 142/90)  RR: 18 (12-14-20 @ 05:18) (18 - 18)  SpO2: 95% (12-14-20 @ 05:18) (94% - 96%)  Wt(kg): --    Physical Exam:    General: NAD, A+Ox3  Abdomen: soft, non-tender, non-distended  Back: dressing with serous drainage and was changed.  No hematoma, ecchymosis, or swelling      12-13 @ 07:01  -  12-14 @ 07:00  --------------------------------------------------------  IN: 4288 mL / OUT: 6150 mL / NET: -1862 mL      F - 1100cc clear    L NT - 1050cc clear

## 2020-12-14 NOTE — PROVIDER CONTACT NOTE (OTHER) - ASSESSMENT
Pt refusing SCDs overnight. Pt educated on the importance of SCDs in the prevention of blood clots - patient understands and continues to refuse

## 2020-12-15 ENCOUNTER — TRANSCRIPTION ENCOUNTER (OUTPATIENT)
Age: 60
End: 2020-12-15

## 2020-12-15 VITALS
TEMPERATURE: 99 F | HEART RATE: 82 BPM | SYSTOLIC BLOOD PRESSURE: 116 MMHG | OXYGEN SATURATION: 96 % | RESPIRATION RATE: 18 BRPM | DIASTOLIC BLOOD PRESSURE: 83 MMHG

## 2020-12-15 LAB
SURGICAL PATHOLOGY STUDY: SIGNIFICANT CHANGE UP
VANCOMYCIN TROUGH SERPL-MCNC: 11.1 UG/ML — SIGNIFICANT CHANGE UP (ref 10–20)

## 2020-12-15 PROCEDURE — 99238 HOSP IP/OBS DSCHRG MGMT 30/<: CPT

## 2020-12-15 PROCEDURE — 71045 X-RAY EXAM CHEST 1 VIEW: CPT

## 2020-12-15 PROCEDURE — 88300 SURGICAL PATH GROSS: CPT

## 2020-12-15 PROCEDURE — C1769: CPT

## 2020-12-15 PROCEDURE — 87186 SC STD MICRODIL/AGAR DIL: CPT

## 2020-12-15 PROCEDURE — C1889: CPT

## 2020-12-15 PROCEDURE — 87070 CULTURE OTHR SPECIMN AEROBIC: CPT

## 2020-12-15 PROCEDURE — C1726: CPT

## 2020-12-15 PROCEDURE — 85025 COMPLETE CBC W/AUTO DIFF WBC: CPT

## 2020-12-15 PROCEDURE — C9399: CPT

## 2020-12-15 PROCEDURE — 87040 BLOOD CULTURE FOR BACTERIA: CPT

## 2020-12-15 PROCEDURE — 85027 COMPLETE CBC AUTOMATED: CPT

## 2020-12-15 PROCEDURE — 80202 ASSAY OF VANCOMYCIN: CPT

## 2020-12-15 PROCEDURE — 93005 ELECTROCARDIOGRAM TRACING: CPT

## 2020-12-15 PROCEDURE — 99024 POSTOP FOLLOW-UP VISIT: CPT

## 2020-12-15 PROCEDURE — 74176 CT ABD & PELVIS W/O CONTRAST: CPT

## 2020-12-15 PROCEDURE — 87086 URINE CULTURE/COLONY COUNT: CPT

## 2020-12-15 PROCEDURE — C1758: CPT

## 2020-12-15 PROCEDURE — 80048 BASIC METABOLIC PNL TOTAL CA: CPT

## 2020-12-15 PROCEDURE — 82365 CALCULUS SPECTROSCOPY: CPT

## 2020-12-15 PROCEDURE — C1887: CPT

## 2020-12-15 RX ORDER — ONDANSETRON 8 MG/1
4 TABLET, FILM COATED ORAL EVERY 6 HOURS
Refills: 0 | Status: DISCONTINUED | OUTPATIENT
Start: 2020-12-15 | End: 2020-12-15

## 2020-12-15 RX ORDER — SENNA PLUS 8.6 MG/1
2 TABLET ORAL
Qty: 0 | Refills: 0 | DISCHARGE
Start: 2020-12-15

## 2020-12-15 RX ORDER — OXYCODONE HYDROCHLORIDE 5 MG/1
1 TABLET ORAL
Qty: 10 | Refills: 0
Start: 2020-12-15

## 2020-12-15 RX ADMIN — OXYCODONE HYDROCHLORIDE 5 MILLIGRAM(S): 5 TABLET ORAL at 10:27

## 2020-12-15 RX ADMIN — Medication 250 MILLIGRAM(S): at 06:41

## 2020-12-15 RX ADMIN — OXYCODONE HYDROCHLORIDE 5 MILLIGRAM(S): 5 TABLET ORAL at 10:53

## 2020-12-15 RX ADMIN — PIPERACILLIN AND TAZOBACTAM 25 GRAM(S): 4; .5 INJECTION, POWDER, LYOPHILIZED, FOR SOLUTION INTRAVENOUS at 06:28

## 2020-12-15 RX ADMIN — HEPARIN SODIUM 5000 UNIT(S): 5000 INJECTION INTRAVENOUS; SUBCUTANEOUS at 13:14

## 2020-12-15 RX ADMIN — HEPARIN SODIUM 5000 UNIT(S): 5000 INJECTION INTRAVENOUS; SUBCUTANEOUS at 06:28

## 2020-12-15 RX ADMIN — Medication 650 MILLIGRAM(S): at 07:10

## 2020-12-15 RX ADMIN — ONDANSETRON 4 MILLIGRAM(S): 8 TABLET, FILM COATED ORAL at 00:42

## 2020-12-15 RX ADMIN — PIPERACILLIN AND TAZOBACTAM 25 GRAM(S): 4; .5 INJECTION, POWDER, LYOPHILIZED, FOR SOLUTION INTRAVENOUS at 13:14

## 2020-12-15 RX ADMIN — Medication 650 MILLIGRAM(S): at 06:43

## 2020-12-15 NOTE — PROGRESS NOTE ADULT - SUBJECTIVE AND OBJECTIVE BOX
Subjective  feeling much improved over last few days; remains afebrile > 24 hrs   Objective    Vital signs  T(F): , Max: 99.1 (12-15-20 @ 00:53)  HR: 79 (12-15-20 @ 05:40)  BP: 124/85 (12-15-20 @ 05:40)  SpO2: 95% (12-15-20 @ 05:40)  Wt(kg): --    Output     12-14 @ 07:01  -  12-15 @ 07:00  --------------------------------------------------------  IN: 2340 mL / OUT: 5325 mL / NET: -2985 mL        Gen awake alert nad axox3  Abd obese soft ntnd   Back tube in place no hematoma/ ecchymosis     urine yellow     Labs      12-14 @ 08:02    WBC 7.80  / Hct 44.2  / SCr --         Urine Cx: Culture - Blood (12.12.20 @ 10:52)    Specimen Source: .Blood Blood    Culture Results:   No growth to date.    Culture - Other (12.12.20 @ 01:15)    -  Ampicillin: S <=2 Predicts results to ampicillin/sulbactam, amoxacillin-clavulanate and  piperacillin-tazobactam.    -  Tetra/Doxy: R >8    -  Vancomycin: S 2    Specimen Source: Skin left kidney stone culture    Culture Results:   Rare Enterococcus faecalis    Organism Identification: Enterococcus faecalis    Organism: Enterococcus faecalis    Method Type: OMAR    Imaging  < from: CT Abdomen and Pelvis No Cont (12.12.20 @ 11:08) >  FINDINGS:  LOWER CHEST: Bibasilar atelectasis.    LIVER: Within normal limits.  BILE DUCTS: Normal caliber.  GALLBLADDER: Within normal limits.  SPLEEN: Within normal limits.  PANCREAS: Within normal limits.  ADRENALS: Within normal limits.  KIDNEYS/URETERS: Punctate nonobstructing right renal calculus (3, 71). No right hydronephrosis. Left nephroureterostomy tube with distal tip in the distal left ureter. Mild left hydronephrosis. Lleft perinephric fat stranding, with small amount of high attenuation paranephric and perinephric fluid consistent with hemorrhage. No residual left renal calculus. Multiple foci of air within the left collecting system.    BLADDER: Collapsed with indwelling Arizmendi balloon catheter. Foci of air, likely secondary to recent instrumentation.  REPRODUCTIVE ORGANS: Prostate within normal limits.    BOWEL: No bowel obstruction. Appendix is normal.  PERITONEUM: No ascites.  VESSELS: Within normal limits.  RETROPERITONEUM/LYMPH NODES: No lymphadenopathy.  ABDOMINAL WALL: Fat-containing ventral hernia.  BONES: Degenerative changes. Grade 2 anterolisthesis of L5 on S1.    IMPRESSION:  Left nephroureterostomy tube in place, as above.    Mild left hydronephrosis. No residual left renal calculus.    Left perinephric fat stranding with small amount of perinephric and paranephric hemorrhage.      < end of copied text >

## 2020-12-15 NOTE — DISCHARGE NOTE PROVIDER - CARE PROVIDERS DIRECT ADDRESSES
[email or web address]

## 2020-12-15 NOTE — PROVIDER CONTACT NOTE (OTHER) - ACTION/TREATMENT ORDERED:
AS per PA will look into it
Tylenol and CBC
EKG
MD Roque aware, as per md, give po PRN tylenol for temp. no further intervention required, will continue to monitor.
MD Roque aware, as per md, give po PRN tylenol for temp. no further intervention required, will continue to monitor.
No intervention required. Cont to monitor
None at this time
Pending IVP Zofran order. Will continue to monitor.

## 2020-12-15 NOTE — PROVIDER CONTACT NOTE (OTHER) - RECOMMENDATIONS
Tylenol
EKG MD Colon aware
None at this time
PA aware
PRN PO Tylenol
PRN PO Tylenol
TRINI Flor notified.

## 2020-12-15 NOTE — DISCHARGE NOTE PROVIDER - HOSPITAL COURSE
Pt is a 61 yo m without past medical hx arrived at [facility] on 12/11/2020 for a left perc nephrolithotomy. Please see operative report  for further details.  postop the pt developed fevers. Stone cx grew enterococcus. His CT showed no stone burden. On POD he remained afebrile for 24 hrs and his   tov was successful. NT was successfully removed and he was dcd home with 10 days of augmentin Pt is a 61 yo m without past medical hx arrived at [facility] on 12/11/2020 for a left perc nephrolithotomy. Please see operative report  for further details.  postop the pt developed fevers. Stone cx grew enterococcus. His CT showed no stone burden. On POD he remained afebrile for 24 hrs and his   tov was successful. NT was successfully removed and he was dcd home with 10 days of augmentin     post discharge addendum:    pt suffered sepsis related to procedure post operatively.  he also had bibasilar atelectasis that was not clinically significant

## 2020-12-15 NOTE — PROGRESS NOTE ADULT - ASSESSMENT
60 year old male s/p R PCNL, with fevers on POD#3, last fever 102.5F 12/13    - tov today  - will remove nt today   -f/u urine, blood, stone, kidney cultures  -continue antibiotics  -vanco trough tomorrow morning  -AM labs  -monitor I's and O's  -analgesia as needed  -trend fevers  -OOB/DVT prophylaxis

## 2020-12-15 NOTE — PROVIDER CONTACT NOTE (OTHER) - ASSESSMENT
Pt c/o nausea which he believes to be r/t to the Zosyn infusing. Denies other c/o pain or discomfort at this time. Most recent VSS.

## 2020-12-15 NOTE — DISCHARGE NOTE PROVIDER - CARE PROVIDER_API CALL
Hoenig, David M  UROLOGY  [facility] Dept of Urology, [address] [place] [address]  Phone: [phone]  Fax: [phone]  Follow Up Time:

## 2020-12-15 NOTE — PROVIDER CONTACT NOTE (OTHER) - BACKGROUND
L nephrostomy tube placed
12/11 renal calculi
12/11 renal calculi
12/11/20 Renal calculi. Left nephrostomy tube placed
Patient received from PACU/ Percutaneous nephrostomy tube placed
Pt s/p cystoscopy w/ percutaneous nephrolithotomy on 12/11.
s/p Left nephrostomy tube placement on 12/11

## 2020-12-15 NOTE — DISCHARGE NOTE PROVIDER - NSDCCPCAREPLAN_GEN_ALL_CORE_FT
PRINCIPAL DISCHARGE DIAGNOSIS  Diagnosis: Left nephrolithiasis  Assessment and Plan of Treatment: Call the office if you experience fever, chills, uncontrolled pain, the inability to tolerate liquids, or the urine does not flow   to promote wound healing do not take a bath, continue to walk frequently, return to daily living activities slowly, no heavy lifting greater than 10lbs for 4-6 weeks, follow up Dr Hoenig in two weeks   please finish your course of antibiotics as prescribed

## 2020-12-15 NOTE — DISCHARGE NOTE NURSING/CASE MANAGEMENT/SOCIAL WORK - PATIENT PORTAL LINK FT
You can access the FollowMyHealth Patient Portal offered by Health system by registering at the following website: [email or web address]. By joining [employer]’s FollowMyHealth portal, you will also be able to view your health information using other applications (apps) compatible with our system.

## 2020-12-15 NOTE — DISCHARGE NOTE PROVIDER - NSDCMRMEDTOKEN_GEN_ALL_CORE_FT
amoxicillin-clavulanate 875 mg-125 mg oral tablet: 1 tab(s) orally every 12 hours  oxyCODONE 5 mg oral tablet: 1 tab(s) orally every 6 hours, As needed, Moderate Pain (4 - 6) MDD:4  senna oral tablet: 2 tab(s) orally once a day (at bedtime), As needed, Constipation

## 2020-12-15 NOTE — PROVIDER CONTACT NOTE (OTHER) - SITUATION
Pt running tachycardic overnight MD aware. AM vitals show Temp 100.6  
Pt c/o nausea.
Pt refusing SCDs overnight
Pt with 101.3 temp.
Pt with 102.8 temp. Pt received IV abx as prescribed.
T 102.5/

## 2020-12-17 PROBLEM — E66.9 OBESITY, UNSPECIFIED: Chronic | Status: ACTIVE | Noted: 2020-12-02

## 2020-12-17 LAB
CULTURE RESULTS: SIGNIFICANT CHANGE UP
CULTURE RESULTS: SIGNIFICANT CHANGE UP
NIDUS STONE QN: SIGNIFICANT CHANGE UP
SPECIMEN SOURCE: SIGNIFICANT CHANGE UP
SPECIMEN SOURCE: SIGNIFICANT CHANGE UP

## 2020-12-23 PROBLEM — N39.0 ACUTE UTI: Status: RESOLVED | Noted: 2020-10-26 | Resolved: 2020-12-23

## 2021-01-01 ENCOUNTER — APPOINTMENT (OUTPATIENT)
Dept: UROLOGY | Facility: CLINIC | Age: 61
End: 2021-01-01

## 2021-01-01 ENCOUNTER — NON-APPOINTMENT (OUTPATIENT)
Age: 61
End: 2021-01-01

## 2021-01-01 ENCOUNTER — APPOINTMENT (OUTPATIENT)
Dept: UROLOGY | Facility: CLINIC | Age: 61
End: 2021-01-01
Payer: COMMERCIAL

## 2021-01-01 ENCOUNTER — INPATIENT (INPATIENT)
Facility: HOSPITAL | Age: 61
LOS: 23 days | End: 2022-01-21
Attending: INTERNAL MEDICINE | Admitting: GENERAL PRACTICE
Payer: COMMERCIAL

## 2021-01-01 VITALS
RESPIRATION RATE: 24 BRPM | HEIGHT: 68 IN | SYSTOLIC BLOOD PRESSURE: 117 MMHG | TEMPERATURE: 99 F | OXYGEN SATURATION: 85 % | DIASTOLIC BLOOD PRESSURE: 67 MMHG | HEART RATE: 93 BPM

## 2021-01-01 DIAGNOSIS — J96.01 ACUTE RESPIRATORY FAILURE WITH HYPOXIA: ICD-10-CM

## 2021-01-01 DIAGNOSIS — B34.9 VIRAL INFECTION, UNSPECIFIED: ICD-10-CM

## 2021-01-01 DIAGNOSIS — A41.9 SEPSIS, UNSPECIFIED ORGANISM: ICD-10-CM

## 2021-01-01 DIAGNOSIS — E83.50 UNSPECIFIED DISORDER OF CALCIUM METABOLISM: ICD-10-CM

## 2021-01-01 DIAGNOSIS — U07.1 COVID-19: ICD-10-CM

## 2021-01-01 DIAGNOSIS — Z87.442 PERSONAL HISTORY OF URINARY CALCULI: ICD-10-CM

## 2021-01-01 DIAGNOSIS — R82.994 HYPERCALCIURIA: ICD-10-CM

## 2021-01-01 LAB
A1C WITH ESTIMATED AVERAGE GLUCOSE RESULT: 6.4 % — HIGH (ref 4–5.6)
ALBUMIN SERPL ELPH-MCNC: 3.2 G/DL — LOW (ref 3.3–5)
ALBUMIN SERPL ELPH-MCNC: 3.3 G/DL — SIGNIFICANT CHANGE UP (ref 3.3–5)
ALBUMIN SERPL ELPH-MCNC: 3.4 G/DL — SIGNIFICANT CHANGE UP (ref 3.3–5)
ALBUMIN SERPL ELPH-MCNC: 3.5 G/DL — SIGNIFICANT CHANGE UP (ref 3.3–5)
ALP SERPL-CCNC: 47 U/L — SIGNIFICANT CHANGE UP (ref 40–120)
ALP SERPL-CCNC: 49 U/L — SIGNIFICANT CHANGE UP (ref 40–120)
ALP SERPL-CCNC: 51 U/L — SIGNIFICANT CHANGE UP (ref 40–120)
ALP SERPL-CCNC: 52 U/L — SIGNIFICANT CHANGE UP (ref 40–120)
ALT FLD-CCNC: 44 U/L — HIGH (ref 4–41)
ALT FLD-CCNC: 59 U/L — HIGH (ref 4–41)
ALT FLD-CCNC: 62 U/L — HIGH (ref 4–41)
ALT FLD-CCNC: 71 U/L — HIGH (ref 4–41)
ANION GAP SERPL CALC-SCNC: 12 MMOL/L — SIGNIFICANT CHANGE UP (ref 7–14)
ANION GAP SERPL CALC-SCNC: 13 MMOL/L — SIGNIFICANT CHANGE UP (ref 7–14)
AST SERPL-CCNC: 73 U/L — HIGH (ref 4–40)
AST SERPL-CCNC: 84 U/L — HIGH (ref 4–40)
AST SERPL-CCNC: 91 U/L — HIGH (ref 4–40)
AST SERPL-CCNC: 95 U/L — HIGH (ref 4–40)
B PERT DNA SPEC QL NAA+PROBE: SIGNIFICANT CHANGE UP
B PERT+PARAPERT DNA PNL SPEC NAA+PROBE: SIGNIFICANT CHANGE UP
BASE EXCESS BLDV CALC-SCNC: 1.6 MMOL/L — SIGNIFICANT CHANGE UP (ref -2–3)
BASOPHILS # BLD AUTO: 0.01 K/UL — SIGNIFICANT CHANGE UP (ref 0–0.2)
BASOPHILS # BLD AUTO: 0.01 K/UL — SIGNIFICANT CHANGE UP (ref 0–0.2)
BASOPHILS NFR BLD AUTO: 0.2 % — SIGNIFICANT CHANGE UP (ref 0–2)
BASOPHILS NFR BLD AUTO: 0.2 % — SIGNIFICANT CHANGE UP (ref 0–2)
BILIRUB DIRECT SERPL-MCNC: <0.2 MG/DL — SIGNIFICANT CHANGE UP (ref 0–0.3)
BILIRUB DIRECT SERPL-MCNC: <0.2 MG/DL — SIGNIFICANT CHANGE UP (ref 0–0.3)
BILIRUB INDIRECT FLD-MCNC: >0.2 MG/DL — SIGNIFICANT CHANGE UP (ref 0–1)
BILIRUB INDIRECT FLD-MCNC: >0.2 MG/DL — SIGNIFICANT CHANGE UP (ref 0–1)
BILIRUB SERPL-MCNC: 0.4 MG/DL — SIGNIFICANT CHANGE UP (ref 0.2–1.2)
BILIRUB SERPL-MCNC: 0.5 MG/DL — SIGNIFICANT CHANGE UP (ref 0.2–1.2)
BLOOD GAS VENOUS COMPREHENSIVE RESULT: SIGNIFICANT CHANGE UP
BORDETELLA PARAPERTUSSIS (RAPRVP): SIGNIFICANT CHANGE UP
BUN SERPL-MCNC: 11 MG/DL — SIGNIFICANT CHANGE UP (ref 7–23)
BUN SERPL-MCNC: 16 MG/DL — SIGNIFICANT CHANGE UP (ref 7–23)
BUN SERPL-MCNC: 19 MG/DL — SIGNIFICANT CHANGE UP (ref 7–23)
BUN SERPL-MCNC: 21 MG/DL — SIGNIFICANT CHANGE UP (ref 7–23)
C PNEUM DNA SPEC QL NAA+PROBE: SIGNIFICANT CHANGE UP
CALCIUM SERPL-MCNC: 8.7 MG/DL — SIGNIFICANT CHANGE UP (ref 8.4–10.5)
CALCIUM SERPL-MCNC: 9 MG/DL — SIGNIFICANT CHANGE UP (ref 8.4–10.5)
CALCIUM SERPL-MCNC: 9.1 MG/DL — SIGNIFICANT CHANGE UP (ref 8.4–10.5)
CALCIUM SERPL-MCNC: 9.3 MG/DL — SIGNIFICANT CHANGE UP (ref 8.4–10.5)
CHLORIDE BLDV-SCNC: 95 MMOL/L — LOW (ref 96–108)
CHLORIDE SERPL-SCNC: 100 MMOL/L — SIGNIFICANT CHANGE UP (ref 98–107)
CHLORIDE SERPL-SCNC: 101 MMOL/L — SIGNIFICANT CHANGE UP (ref 98–107)
CHLORIDE SERPL-SCNC: 93 MMOL/L — LOW (ref 98–107)
CHLORIDE SERPL-SCNC: 98 MMOL/L — SIGNIFICANT CHANGE UP (ref 98–107)
CHOLEST SERPL-MCNC: 119 MG/DL — SIGNIFICANT CHANGE UP
CO2 BLDV-SCNC: 27.9 MMOL/L — HIGH (ref 22–26)
CO2 SERPL-SCNC: 22 MMOL/L — SIGNIFICANT CHANGE UP (ref 22–31)
CO2 SERPL-SCNC: 22 MMOL/L — SIGNIFICANT CHANGE UP (ref 22–31)
CO2 SERPL-SCNC: 23 MMOL/L — SIGNIFICANT CHANGE UP (ref 22–31)
CO2 SERPL-SCNC: 23 MMOL/L — SIGNIFICANT CHANGE UP (ref 22–31)
CREAT SERPL-MCNC: 0.77 MG/DL — SIGNIFICANT CHANGE UP (ref 0.5–1.3)
CREAT SERPL-MCNC: 0.78 MG/DL — SIGNIFICANT CHANGE UP (ref 0.5–1.3)
CREAT SERPL-MCNC: 0.79 MG/DL — SIGNIFICANT CHANGE UP (ref 0.5–1.3)
CREAT SERPL-MCNC: 0.81 MG/DL — SIGNIFICANT CHANGE UP (ref 0.5–1.3)
CRP SERPL-MCNC: 31.8 MG/L — HIGH
CRP SERPL-MCNC: 82.2 MG/L — HIGH
D DIMER BLD IA.RAPID-MCNC: 235 NG/ML DDU — HIGH
D DIMER BLD IA.RAPID-MCNC: 331 NG/ML DDU — HIGH
EOSINOPHIL # BLD AUTO: 0 K/UL — SIGNIFICANT CHANGE UP (ref 0–0.5)
EOSINOPHIL # BLD AUTO: 0 K/UL — SIGNIFICANT CHANGE UP (ref 0–0.5)
EOSINOPHIL NFR BLD AUTO: 0 % — SIGNIFICANT CHANGE UP (ref 0–6)
EOSINOPHIL NFR BLD AUTO: 0 % — SIGNIFICANT CHANGE UP (ref 0–6)
ESTIMATED AVERAGE GLUCOSE: 137 — SIGNIFICANT CHANGE UP
FERRITIN SERPL-MCNC: 885 NG/ML — HIGH (ref 30–400)
FLUAV SUBTYP SPEC NAA+PROBE: SIGNIFICANT CHANGE UP
FLUBV RNA SPEC QL NAA+PROBE: SIGNIFICANT CHANGE UP
GAS PNL BLDV: 126 MMOL/L — LOW (ref 136–145)
GLUCOSE BLDV-MCNC: 148 MG/DL — HIGH (ref 70–99)
GLUCOSE SERPL-MCNC: 116 MG/DL — HIGH (ref 70–99)
GLUCOSE SERPL-MCNC: 118 MG/DL — HIGH (ref 70–99)
GLUCOSE SERPL-MCNC: 121 MG/DL — HIGH (ref 70–99)
GLUCOSE SERPL-MCNC: 132 MG/DL — HIGH (ref 70–99)
HADV DNA SPEC QL NAA+PROBE: SIGNIFICANT CHANGE UP
HCO3 BLDV-SCNC: 27 MMOL/L — SIGNIFICANT CHANGE UP (ref 22–29)
HCOV 229E RNA SPEC QL NAA+PROBE: SIGNIFICANT CHANGE UP
HCOV HKU1 RNA SPEC QL NAA+PROBE: SIGNIFICANT CHANGE UP
HCOV NL63 RNA SPEC QL NAA+PROBE: SIGNIFICANT CHANGE UP
HCOV OC43 RNA SPEC QL NAA+PROBE: SIGNIFICANT CHANGE UP
HCT VFR BLD CALC: 48.5 % — SIGNIFICANT CHANGE UP (ref 39–50)
HCT VFR BLD CALC: 49.3 % — SIGNIFICANT CHANGE UP (ref 39–50)
HCT VFR BLD CALC: 49.8 % — SIGNIFICANT CHANGE UP (ref 39–50)
HCT VFR BLD CALC: 53.7 % — HIGH (ref 39–50)
HCT VFR BLDA CALC: 51 % — SIGNIFICANT CHANGE UP (ref 39–51)
HCV AB S/CO SERPL IA: 0.13 S/CO — SIGNIFICANT CHANGE UP (ref 0–0.99)
HCV AB SERPL-IMP: SIGNIFICANT CHANGE UP
HDLC SERPL-MCNC: 19 MG/DL — LOW
HGB BLD CALC-MCNC: 17 G/DL — SIGNIFICANT CHANGE UP (ref 13–17)
HGB BLD-MCNC: 16.8 G/DL — SIGNIFICANT CHANGE UP (ref 13–17)
HGB BLD-MCNC: 17.1 G/DL — HIGH (ref 13–17)
HGB BLD-MCNC: 17.1 G/DL — HIGH (ref 13–17)
HGB BLD-MCNC: 18.1 G/DL — HIGH (ref 13–17)
HMPV RNA SPEC QL NAA+PROBE: SIGNIFICANT CHANGE UP
HPIV1 RNA SPEC QL NAA+PROBE: SIGNIFICANT CHANGE UP
HPIV2 RNA SPEC QL NAA+PROBE: SIGNIFICANT CHANGE UP
HPIV3 RNA SPEC QL NAA+PROBE: SIGNIFICANT CHANGE UP
HPIV4 RNA SPEC QL NAA+PROBE: SIGNIFICANT CHANGE UP
IANC: 3.97 K/UL — SIGNIFICANT CHANGE UP (ref 1.5–8.5)
IANC: 4.5 K/UL — SIGNIFICANT CHANGE UP (ref 1.5–8.5)
IMM GRANULOCYTES NFR BLD AUTO: 0.8 % — SIGNIFICANT CHANGE UP (ref 0–1.5)
IMM GRANULOCYTES NFR BLD AUTO: 0.9 % — SIGNIFICANT CHANGE UP (ref 0–1.5)
LACTATE BLDV-MCNC: 2.1 MMOL/L — HIGH (ref 0.5–2)
LIPID PNL WITH DIRECT LDL SERPL: 75 MG/DL — SIGNIFICANT CHANGE UP
LYMPHOCYTES # BLD AUTO: 0.5 K/UL — LOW (ref 1–3.3)
LYMPHOCYTES # BLD AUTO: 0.55 K/UL — LOW (ref 1–3.3)
LYMPHOCYTES # BLD AUTO: 10.3 % — LOW (ref 13–44)
LYMPHOCYTES # BLD AUTO: 9.8 % — LOW (ref 13–44)
M PNEUMO DNA SPEC QL NAA+PROBE: SIGNIFICANT CHANGE UP
MAGNESIUM SERPL-MCNC: 2.1 MG/DL — SIGNIFICANT CHANGE UP (ref 1.6–2.6)
MAGNESIUM SERPL-MCNC: 2.2 MG/DL — SIGNIFICANT CHANGE UP (ref 1.6–2.6)
MCHC RBC-ENTMCNC: 29.7 PG — SIGNIFICANT CHANGE UP (ref 27–34)
MCHC RBC-ENTMCNC: 29.9 PG — SIGNIFICANT CHANGE UP (ref 27–34)
MCHC RBC-ENTMCNC: 29.9 PG — SIGNIFICANT CHANGE UP (ref 27–34)
MCHC RBC-ENTMCNC: 30.1 PG — SIGNIFICANT CHANGE UP (ref 27–34)
MCHC RBC-ENTMCNC: 33.7 GM/DL — SIGNIFICANT CHANGE UP (ref 32–36)
MCHC RBC-ENTMCNC: 34.3 GM/DL — SIGNIFICANT CHANGE UP (ref 32–36)
MCHC RBC-ENTMCNC: 34.6 GM/DL — SIGNIFICANT CHANGE UP (ref 32–36)
MCHC RBC-ENTMCNC: 34.7 GM/DL — SIGNIFICANT CHANGE UP (ref 32–36)
MCV RBC AUTO: 85.6 FL — SIGNIFICANT CHANGE UP (ref 80–100)
MCV RBC AUTO: 86.5 FL — SIGNIFICANT CHANGE UP (ref 80–100)
MCV RBC AUTO: 87.2 FL — SIGNIFICANT CHANGE UP (ref 80–100)
MCV RBC AUTO: 89.4 FL — SIGNIFICANT CHANGE UP (ref 80–100)
MONOCYTES # BLD AUTO: 0.32 K/UL — SIGNIFICANT CHANGE UP (ref 0–0.9)
MONOCYTES # BLD AUTO: 0.48 K/UL — SIGNIFICANT CHANGE UP (ref 0–0.9)
MONOCYTES NFR BLD AUTO: 6.6 % — SIGNIFICANT CHANGE UP (ref 2–14)
MONOCYTES NFR BLD AUTO: 8.6 % — SIGNIFICANT CHANGE UP (ref 2–14)
NEUTROPHILS # BLD AUTO: 3.97 K/UL — SIGNIFICANT CHANGE UP (ref 1.8–7.4)
NEUTROPHILS # BLD AUTO: 4.5 K/UL — SIGNIFICANT CHANGE UP (ref 1.8–7.4)
NEUTROPHILS NFR BLD AUTO: 80.5 % — HIGH (ref 43–77)
NEUTROPHILS NFR BLD AUTO: 82.1 % — HIGH (ref 43–77)
NON HDL CHOLESTEROL: 100 MG/DL — SIGNIFICANT CHANGE UP
NRBC # BLD: 0 /100 WBCS — SIGNIFICANT CHANGE UP
NRBC # FLD: 0 K/UL — SIGNIFICANT CHANGE UP
PCO2 BLDV: 42 MMHG — SIGNIFICANT CHANGE UP (ref 42–55)
PH BLDV: 7.41 — SIGNIFICANT CHANGE UP (ref 7.32–7.43)
PHOSPHATE SERPL-MCNC: 2.9 MG/DL — SIGNIFICANT CHANGE UP (ref 2.5–4.5)
PHOSPHATE SERPL-MCNC: 3.4 MG/DL — SIGNIFICANT CHANGE UP (ref 2.5–4.5)
PLATELET # BLD AUTO: 188 K/UL — SIGNIFICANT CHANGE UP (ref 150–400)
PLATELET # BLD AUTO: 217 K/UL — SIGNIFICANT CHANGE UP (ref 150–400)
PLATELET # BLD AUTO: 300 K/UL — SIGNIFICANT CHANGE UP (ref 150–400)
PLATELET # BLD AUTO: 341 K/UL — SIGNIFICANT CHANGE UP (ref 150–400)
PO2 BLDV: 38 MMHG — SIGNIFICANT CHANGE UP
POTASSIUM BLDV-SCNC: 4.2 MMOL/L — SIGNIFICANT CHANGE UP (ref 3.5–5.1)
POTASSIUM SERPL-MCNC: 4.2 MMOL/L — SIGNIFICANT CHANGE UP (ref 3.5–5.3)
POTASSIUM SERPL-MCNC: 4.4 MMOL/L — SIGNIFICANT CHANGE UP (ref 3.5–5.3)
POTASSIUM SERPL-MCNC: 4.7 MMOL/L — SIGNIFICANT CHANGE UP (ref 3.5–5.3)
POTASSIUM SERPL-MCNC: 5.1 MMOL/L — SIGNIFICANT CHANGE UP (ref 3.5–5.3)
POTASSIUM SERPL-SCNC: 4.2 MMOL/L — SIGNIFICANT CHANGE UP (ref 3.5–5.3)
POTASSIUM SERPL-SCNC: 4.4 MMOL/L — SIGNIFICANT CHANGE UP (ref 3.5–5.3)
POTASSIUM SERPL-SCNC: 4.7 MMOL/L — SIGNIFICANT CHANGE UP (ref 3.5–5.3)
POTASSIUM SERPL-SCNC: 5.1 MMOL/L — SIGNIFICANT CHANGE UP (ref 3.5–5.3)
PROCALCITONIN SERPL-MCNC: 0.11 NG/ML — HIGH (ref 0.02–0.1)
PROCALCITONIN SERPL-MCNC: 0.16 NG/ML — HIGH (ref 0.02–0.1)
PROT SERPL-MCNC: 6.6 G/DL — SIGNIFICANT CHANGE UP (ref 6–8.3)
PROT SERPL-MCNC: 6.6 G/DL — SIGNIFICANT CHANGE UP (ref 6–8.3)
PROT SERPL-MCNC: 6.9 G/DL — SIGNIFICANT CHANGE UP (ref 6–8.3)
PROT SERPL-MCNC: 7.2 G/DL — SIGNIFICANT CHANGE UP (ref 6–8.3)
RAPID RVP RESULT: DETECTED
RBC # BLD: 5.61 M/UL — SIGNIFICANT CHANGE UP (ref 4.2–5.8)
RBC # BLD: 5.71 M/UL — SIGNIFICANT CHANGE UP (ref 4.2–5.8)
RBC # BLD: 5.76 M/UL — SIGNIFICANT CHANGE UP (ref 4.2–5.8)
RBC # BLD: 6.01 M/UL — HIGH (ref 4.2–5.8)
RBC # FLD: 12.9 % — SIGNIFICANT CHANGE UP (ref 10.3–14.5)
RBC # FLD: 13 % — SIGNIFICANT CHANGE UP (ref 10.3–14.5)
RBC # FLD: 13.3 % — SIGNIFICANT CHANGE UP (ref 10.3–14.5)
RBC # FLD: 13.4 % — SIGNIFICANT CHANGE UP (ref 10.3–14.5)
RSV RNA SPEC QL NAA+PROBE: SIGNIFICANT CHANGE UP
RV+EV RNA SPEC QL NAA+PROBE: SIGNIFICANT CHANGE UP
SAO2 % BLDV: 68.3 % — SIGNIFICANT CHANGE UP
SARS-COV-2 RNA SPEC QL NAA+PROBE: DETECTED
SODIUM SERPL-SCNC: 127 MMOL/L — LOW (ref 135–145)
SODIUM SERPL-SCNC: 133 MMOL/L — LOW (ref 135–145)
SODIUM SERPL-SCNC: 135 MMOL/L — SIGNIFICANT CHANGE UP (ref 135–145)
SODIUM SERPL-SCNC: 136 MMOL/L — SIGNIFICANT CHANGE UP (ref 135–145)
TRIGL SERPL-MCNC: 126 MG/DL — SIGNIFICANT CHANGE UP
WBC # BLD: 4.84 K/UL — SIGNIFICANT CHANGE UP (ref 3.8–10.5)
WBC # BLD: 5.59 K/UL — SIGNIFICANT CHANGE UP (ref 3.8–10.5)
WBC # BLD: 9.71 K/UL — SIGNIFICANT CHANGE UP (ref 3.8–10.5)
WBC # BLD: 9.84 K/UL — SIGNIFICANT CHANGE UP (ref 3.8–10.5)
WBC # FLD AUTO: 4.84 K/UL — SIGNIFICANT CHANGE UP (ref 3.8–10.5)
WBC # FLD AUTO: 5.59 K/UL — SIGNIFICANT CHANGE UP (ref 3.8–10.5)
WBC # FLD AUTO: 9.71 K/UL — SIGNIFICANT CHANGE UP (ref 3.8–10.5)
WBC # FLD AUTO: 9.84 K/UL — SIGNIFICANT CHANGE UP (ref 3.8–10.5)

## 2021-01-01 PROCEDURE — 99285 EMERGENCY DEPT VISIT HI MDM: CPT

## 2021-01-01 PROCEDURE — 99072 ADDL SUPL MATRL&STAF TM PHE: CPT

## 2021-01-01 PROCEDURE — 76775 US EXAM ABDO BACK WALL LIM: CPT

## 2021-01-01 PROCEDURE — 71045 X-RAY EXAM CHEST 1 VIEW: CPT | Mod: 26

## 2021-01-01 PROCEDURE — 99214 OFFICE O/P EST MOD 30 MIN: CPT | Mod: 24,25

## 2021-01-01 RX ORDER — FENOFIBRATE,MICRONIZED 130 MG
145 CAPSULE ORAL AT BEDTIME
Refills: 0 | Status: DISCONTINUED | OUTPATIENT
Start: 2021-01-01 | End: 2022-01-01

## 2021-01-01 RX ORDER — CHOLECALCIFEROL (VITAMIN D3) 125 MCG
2000 CAPSULE ORAL DAILY
Refills: 0 | Status: DISCONTINUED | OUTPATIENT
Start: 2021-01-01 | End: 2022-01-01

## 2021-01-01 RX ORDER — REMDESIVIR 5 MG/ML
200 INJECTION INTRAVENOUS EVERY 24 HOURS
Refills: 0 | Status: COMPLETED | OUTPATIENT
Start: 2021-01-01 | End: 2021-01-01

## 2021-01-01 RX ORDER — REMDESIVIR 5 MG/ML
INJECTION INTRAVENOUS
Refills: 0 | Status: COMPLETED | OUTPATIENT
Start: 2021-01-01 | End: 2022-01-01

## 2021-01-01 RX ORDER — DEXAMETHASONE 0.5 MG/5ML
6 ELIXIR ORAL DAILY
Refills: 0 | Status: DISCONTINUED | OUTPATIENT
Start: 2021-01-01 | End: 2022-01-01

## 2021-01-01 RX ORDER — ZINC SULFATE TAB 220 MG (50 MG ZINC EQUIVALENT) 220 (50 ZN) MG
220 TAB ORAL DAILY
Refills: 0 | Status: COMPLETED | OUTPATIENT
Start: 2021-01-01 | End: 2021-01-01

## 2021-01-01 RX ORDER — IBUPROFEN 200 MG
600 TABLET ORAL ONCE
Refills: 0 | Status: COMPLETED | OUTPATIENT
Start: 2021-01-01 | End: 2021-01-01

## 2021-01-01 RX ORDER — ACETAMINOPHEN 500 MG
975 TABLET ORAL ONCE
Refills: 0 | Status: COMPLETED | OUTPATIENT
Start: 2021-01-01 | End: 2021-01-01

## 2021-01-01 RX ORDER — ACETAMINOPHEN 500 MG
650 TABLET ORAL EVERY 6 HOURS
Refills: 0 | Status: DISCONTINUED | OUTPATIENT
Start: 2021-01-01 | End: 2022-01-01

## 2021-01-01 RX ORDER — DEXAMETHASONE 0.5 MG/5ML
6 ELIXIR ORAL ONCE
Refills: 0 | Status: COMPLETED | OUTPATIENT
Start: 2021-01-01 | End: 2021-01-01

## 2021-01-01 RX ORDER — REMDESIVIR 5 MG/ML
100 INJECTION INTRAVENOUS EVERY 24 HOURS
Refills: 0 | Status: COMPLETED | OUTPATIENT
Start: 2021-01-01 | End: 2022-01-01

## 2021-01-01 RX ORDER — FAMOTIDINE 10 MG/ML
20 INJECTION INTRAVENOUS
Refills: 0 | Status: DISCONTINUED | OUTPATIENT
Start: 2021-01-01 | End: 2022-01-01

## 2021-01-01 RX ORDER — ENOXAPARIN SODIUM 100 MG/ML
40 INJECTION SUBCUTANEOUS EVERY 12 HOURS
Refills: 0 | Status: DISCONTINUED | OUTPATIENT
Start: 2021-01-01 | End: 2022-01-01

## 2021-01-01 RX ORDER — ALBUTEROL 90 UG/1
2 AEROSOL, METERED ORAL EVERY 6 HOURS
Refills: 0 | Status: DISCONTINUED | OUTPATIENT
Start: 2021-01-01 | End: 2022-01-01

## 2021-01-01 RX ORDER — ONDANSETRON 8 MG/1
4 TABLET, FILM COATED ORAL EVERY 8 HOURS
Refills: 0 | Status: DISCONTINUED | OUTPATIENT
Start: 2021-01-01 | End: 2022-01-01

## 2021-01-01 RX ORDER — ASCORBIC ACID 60 MG
500 TABLET,CHEWABLE ORAL
Refills: 0 | Status: COMPLETED | OUTPATIENT
Start: 2021-01-01 | End: 2022-01-01

## 2021-01-01 RX ORDER — LANOLIN ALCOHOL/MO/W.PET/CERES
3 CREAM (GRAM) TOPICAL AT BEDTIME
Refills: 0 | Status: DISCONTINUED | OUTPATIENT
Start: 2021-01-01 | End: 2022-01-01

## 2021-01-01 RX ORDER — COLCHICINE 0.6 MG
1.2 TABLET ORAL ONCE
Refills: 0 | Status: COMPLETED | OUTPATIENT
Start: 2021-01-01 | End: 2021-01-01

## 2021-01-01 RX ADMIN — Medication 1 TABLET(S): at 12:08

## 2021-01-01 RX ADMIN — Medication 600 MILLIGRAM(S): at 10:28

## 2021-01-01 RX ADMIN — ENOXAPARIN SODIUM 40 MILLIGRAM(S): 100 INJECTION SUBCUTANEOUS at 17:22

## 2021-01-01 RX ADMIN — Medication 500 MILLIGRAM(S): at 23:35

## 2021-01-01 RX ADMIN — Medication 1.2 MILLIGRAM(S): at 19:12

## 2021-01-01 RX ADMIN — FAMOTIDINE 20 MILLIGRAM(S): 10 INJECTION INTRAVENOUS at 18:45

## 2021-01-01 RX ADMIN — ENOXAPARIN SODIUM 40 MILLIGRAM(S): 100 INJECTION SUBCUTANEOUS at 05:38

## 2021-01-01 RX ADMIN — Medication 600 MILLIGRAM(S): at 09:17

## 2021-01-01 RX ADMIN — Medication 500 MILLIGRAM(S): at 05:33

## 2021-01-01 RX ADMIN — Medication 6 MILLIGRAM(S): at 05:34

## 2021-01-01 RX ADMIN — Medication 2000 UNIT(S): at 11:57

## 2021-01-01 RX ADMIN — FAMOTIDINE 20 MILLIGRAM(S): 10 INJECTION INTRAVENOUS at 05:34

## 2021-01-01 RX ADMIN — Medication 500 MILLIGRAM(S): at 18:21

## 2021-01-01 RX ADMIN — FAMOTIDINE 20 MILLIGRAM(S): 10 INJECTION INTRAVENOUS at 05:49

## 2021-01-01 RX ADMIN — ENOXAPARIN SODIUM 40 MILLIGRAM(S): 100 INJECTION SUBCUTANEOUS at 05:33

## 2021-01-01 RX ADMIN — ZINC SULFATE TAB 220 MG (50 MG ZINC EQUIVALENT) 220 MILLIGRAM(S): 220 (50 ZN) TAB at 13:10

## 2021-01-01 RX ADMIN — Medication 6 MILLIGRAM(S): at 05:49

## 2021-01-01 RX ADMIN — Medication 1 TABLET(S): at 11:55

## 2021-01-01 RX ADMIN — Medication 2000 UNIT(S): at 13:10

## 2021-01-01 RX ADMIN — ZINC SULFATE TAB 220 MG (50 MG ZINC EQUIVALENT) 220 MILLIGRAM(S): 220 (50 ZN) TAB at 11:57

## 2021-01-01 RX ADMIN — Medication 145 MILLIGRAM(S): at 23:09

## 2021-01-01 RX ADMIN — Medication 1 TABLET(S): at 11:57

## 2021-01-01 RX ADMIN — Medication 6 MILLIGRAM(S): at 07:38

## 2021-01-01 RX ADMIN — Medication 500 MILLIGRAM(S): at 05:49

## 2021-01-01 RX ADMIN — Medication 975 MILLIGRAM(S): at 09:00

## 2021-01-01 RX ADMIN — Medication 2000 UNIT(S): at 12:08

## 2021-01-01 RX ADMIN — FAMOTIDINE 20 MILLIGRAM(S): 10 INJECTION INTRAVENOUS at 05:37

## 2021-01-01 RX ADMIN — REMDESIVIR 500 MILLIGRAM(S): 5 INJECTION INTRAVENOUS at 18:45

## 2021-01-01 RX ADMIN — Medication 145 MILLIGRAM(S): at 21:41

## 2021-01-01 RX ADMIN — Medication 500 MILLIGRAM(S): at 05:37

## 2021-01-01 RX ADMIN — ZINC SULFATE TAB 220 MG (50 MG ZINC EQUIVALENT) 220 MILLIGRAM(S): 220 (50 ZN) TAB at 17:02

## 2021-01-01 RX ADMIN — Medication 975 MILLIGRAM(S): at 07:37

## 2021-01-01 RX ADMIN — Medication 500 MILLIGRAM(S): at 17:22

## 2021-01-01 RX ADMIN — Medication 2000 UNIT(S): at 11:55

## 2021-01-01 RX ADMIN — REMDESIVIR 500 MILLIGRAM(S): 5 INJECTION INTRAVENOUS at 17:13

## 2021-01-01 RX ADMIN — ENOXAPARIN SODIUM 40 MILLIGRAM(S): 100 INJECTION SUBCUTANEOUS at 18:21

## 2021-01-01 RX ADMIN — Medication 145 MILLIGRAM(S): at 21:45

## 2021-01-01 RX ADMIN — ENOXAPARIN SODIUM 40 MILLIGRAM(S): 100 INJECTION SUBCUTANEOUS at 18:46

## 2021-01-01 RX ADMIN — REMDESIVIR 500 MILLIGRAM(S): 5 INJECTION INTRAVENOUS at 18:13

## 2021-01-01 RX ADMIN — Medication 500 MILLIGRAM(S): at 12:14

## 2021-01-01 RX ADMIN — Medication 1 TABLET(S): at 13:11

## 2021-01-01 RX ADMIN — ZINC SULFATE TAB 220 MG (50 MG ZINC EQUIVALENT) 220 MILLIGRAM(S): 220 (50 ZN) TAB at 11:55

## 2021-01-01 RX ADMIN — FAMOTIDINE 20 MILLIGRAM(S): 10 INJECTION INTRAVENOUS at 17:22

## 2021-01-01 RX ADMIN — Medication 6 MILLIGRAM(S): at 05:38

## 2021-01-01 RX ADMIN — Medication 650 MILLIGRAM(S): at 08:06

## 2021-01-01 RX ADMIN — ENOXAPARIN SODIUM 40 MILLIGRAM(S): 100 INJECTION SUBCUTANEOUS at 17:01

## 2021-01-01 RX ADMIN — FAMOTIDINE 20 MILLIGRAM(S): 10 INJECTION INTRAVENOUS at 18:21

## 2021-01-01 RX ADMIN — FAMOTIDINE 20 MILLIGRAM(S): 10 INJECTION INTRAVENOUS at 17:02

## 2021-01-01 RX ADMIN — ENOXAPARIN SODIUM 40 MILLIGRAM(S): 100 INJECTION SUBCUTANEOUS at 05:49

## 2021-01-01 RX ADMIN — Medication 650 MILLIGRAM(S): at 07:36

## 2021-01-01 RX ADMIN — REMDESIVIR 500 MILLIGRAM(S): 5 INJECTION INTRAVENOUS at 19:07

## 2021-01-01 RX ADMIN — Medication 500 MILLIGRAM(S): at 18:45

## 2021-01-07 ENCOUNTER — APPOINTMENT (OUTPATIENT)
Dept: UROLOGY | Facility: CLINIC | Age: 61
End: 2021-01-07
Payer: COMMERCIAL

## 2021-01-07 VITALS — TEMPERATURE: 96.8 F

## 2021-01-07 PROCEDURE — 99214 OFFICE O/P EST MOD 30 MIN: CPT | Mod: 24

## 2021-01-07 PROCEDURE — 99072 ADDL SUPL MATRL&STAF TM PHE: CPT

## 2021-01-07 NOTE — PHYSICAL EXAM
[General Appearance - Well Developed] : well developed [General Appearance - Well Nourished] : well nourished [Normal Appearance] : normal appearance [Well Groomed] : well groomed [General Appearance - In No Acute Distress] : no acute distress [Abdomen Soft] : soft [Abdomen Tenderness] : non-tender [Costovertebral Angle Tenderness] : no ~M costovertebral angle tenderness [FreeTextEntry1] : well healed left pcnl site, residual small suture material (pt states he'll remove it [Edema] : no peripheral edema [] : no respiratory distress [Respiration, Rhythm And Depth] : normal respiratory rhythm and effort [Exaggerated Use Of Accessory Muscles For Inspiration] : no accessory muscle use [Oriented To Time, Place, And Person] : oriented to person, place, and time [Affect] : the affect was normal [Mood] : the mood was normal [Not Anxious] : not anxious [Normal Station and Gait] : the gait and station were normal for the patient's age [No Focal Deficits] : no focal deficits

## 2021-01-07 NOTE — HISTORY OF PRESENT ILLNESS
[FreeTextEntry1] : Pt returns for metabolic evaluation and prevention of future stone disease following recent surgery for stone.  At issue today is review of the stone analysis, risk factors for future stone episodes and establishing whether they have pure dietary, metabolic, or mixed causes for their stone risks which is unrelated to the surgical management of their stone disease.\par \par They underwent left PCNL on 12/11/20.\par \par Stone analysis: 100% Calcium oxalate monohydrate\par \par Had NU removed post op.\par \par \par

## 2021-03-11 PROBLEM — Z87.442 HISTORY OF KIDNEY STONES: Status: ACTIVE | Noted: 2021-01-07

## 2021-03-11 PROBLEM — R82.994 HYPERCALCIURIA: Status: ACTIVE | Noted: 2021-01-01

## 2021-03-11 PROBLEM — E83.50 DISORDER OF CALCIUM METABOLISM: Noted: 2021-01-07

## 2021-03-11 NOTE — HISTORY OF PRESENT ILLNESS
[FreeTextEntry1] : Pt returns for metabolic evaluation and prevention of future stone disease following recent surgery for stone.  At issue today is review of the stone analysis, risk factors for future stone episodes and establishing whether they have pure dietary, metabolic, or mixed causes for their stone risks which is unrelated to the surgical management of their stone disease.\par \par They underwent left PCNL on 12/11/20.\par \par Stone analysis: 100% Calcium oxalate monohydrate\par \par 24 hr urine for review\par renal US \par \par

## 2021-03-11 NOTE — ASSESSMENT
[FreeTextEntry1] : 24 hour urine reviewed- increase volume, decrease animal flesh protein intake, and hypercalciuria eval (not yet done- pt prefers to do in a couple of weeks)\par \par Hypercalciuria on 24 hour urine.\par \par Recommend:\par \par 1. BMP\par 2. PTH\par 3. fasting urine calcium to creatinine ratio\par 4. will review by phone\par 5. RTC 6 months with renal US\par likely will do 24 hour urine repeat after any treatment for the hypercalciuria\par

## 2021-12-28 NOTE — H&P ADULT - NSRESEARCHGRANTASSESS_GEN_A_CORE
Thank you for allowing us to care for you here at clinic  You were seen for a post-hospital/Surgery visit  You are recovering well, your scar looks like its healing fine and does not look infected  Please continue your aspirin 81 mg once a day for Blood clot prophylaxis until you see Dr Crews    Please call if you any questions or concerns     Thank you     
<<--- Click to launch IMPROVE-DD VTE Assessment

## 2021-12-28 NOTE — ED ADULT NURSE NOTE - CHIEF COMPLAINT QUOTE
Pt c/o sob and fevers. O2 sat 85% on RA at home and TMAX 103F at home. In triage pt O2 85% on RA placed on 6L NC O2 sat 95%. Pt reports not vaccinated for covid. PMHX Obesity. Charge aware taken to rm #11. ~ Son Cade [phone]

## 2021-12-28 NOTE — ED PROVIDER NOTE - ATTENDING CONTRIBUTION TO CARE
Dr. Addison: 60 yo male with PMH, not vaccinated for Covid, in ED with 3-4 days of SOB, known Covid+ sick contact.  Pt found to be hypoxic to 92% on room air without exertion, improves to 99% with O2 4 liters.  On exam pt overall well appearing, but in mild respiratory distress, heart RRR, lungs with crackles at left base, abd NTND, extremities without swelling, strength 5/5 in all extremities and skin without rash. Dr. Addison: 60 yo male with PMH nephrolithiasis, not vaccinated for Covid, in ED with 3-4 days of SOB, known Covid+ sick contact.  Pt found to be hypoxic to 92% on room air without exertion, improves to 99% with O2 4 liters.  On exam pt overall well appearing, but in mild respiratory distress, heart RRR, lungs with crackles at left base, abd NTND, extremities without swelling, strength 5/5 in all extremities and skin without rash.

## 2021-12-28 NOTE — PATIENT PROFILE ADULT - FALL HARM RISK - RISK INTERVENTIONS

## 2021-12-28 NOTE — ED ADULT TRIAGE NOTE - CHIEF COMPLAINT QUOTE
Pt c/o sob and fevers. O2 sat 85% on RA at home and TMAX 103F at home. In triage pt O2 85% on RA placed on 6L NC O2 sat 95%. Charge aware. Pt reports not vaccinated for covid. PMHX Obesity. Pt c/o sob and fevers. O2 sat 85% on RA at home and TMAX 103F at home. In triage pt O2 85% on RA placed on 6L NC O2 sat 95%. Charge aware. Pt reports not vaccinated for covid. PMHX Obesity.  Son Cade [phone] Pt c/o sob and fevers. O2 sat 85% on RA at home and TMAX 103F at home. In triage pt O2 85% on RA placed on 6L NC O2 sat 95%. Pt reports not vaccinated for covid. PMHX Obesity. Charge aware taken to rm #11. ~ Son Cade [phone]

## 2021-12-28 NOTE — ED PROVIDER NOTE - OBJECTIVE STATEMENT
61 year old male with history of kidney stone presenting with fever, cough, SOB x 2-3d. States having positive sick contact in son, is currently NOT vaccinated. Fever to 103, taking mucinex. Noticed worsening cough/SOB in past day, "couldn't take it anymore", so presenting to ED. Per son, patient was 84% on pulse ox at home. Denies N/V/D, abdominal pain, chest pain, LE swelling. 61 year old male with history of kidney stone presenting with fever, cough, SOB x 2-3d. States having positive sick contact in son, is currently NOT vaccinated. Fever to 103, taking mucinex. Noticed worsening cough/SOB in past day, "couldn't take it anymore", so presenting to ED. Per son, patient was 84% on pulse ox at home. Denies N/V/D, abdominal pain, chest pain, LE swelling.    PMD: Dr. Monzon

## 2021-12-28 NOTE — H&P ADULT - NSHPLABSRESULTS_GEN_ALL_CORE
^^^ Inflammatory markers :  ^^^  C R P :          82.2 (12-28-21)  <<--  P C T :         0.16 (12-28-21) <<--    Ferritin :         885 (12-28-21) <<--    D-Dimer :          331 (12-28-21) <<--

## 2021-12-28 NOTE — H&P ADULT - PROBLEM SELECTOR PLAN 1
treat underlying condition  antipyretics as needed  nutrition / hydration ( pt starting to gain back appetite)   IVH as needed   supportive care   monitor

## 2021-12-28 NOTE — ED PROVIDER NOTE - PHYSICAL EXAMINATION
Gen - NAD; nontoxic; A+Ox3   HEENT - NCAT, EOMI, moist mucous membranes  Neck - supple  Resp - crackles b/l  CV -  RRR, no m/r/g  Abd - soft, NT, ND; no guarding or rebound  MSK - 5/5 strength and FROM b/l UE and LE  Extrem - no LE edema/erythema/tenderness  Neuro - no focal motor or sensation deficits

## 2021-12-28 NOTE — ED PROVIDER NOTE - CLINICAL SUMMARY MEDICAL DECISION MAKING FREE TEXT BOX
61 year old male with history of kidney stone presenting with fever, cough, SOB x 2-3d. Appears nontoxic here but hypoxic requiring 4L NC, o2 sat stable now 95%. Will require admission. Labs, covid inflam markers, CXR, decadron.

## 2021-12-28 NOTE — H&P ADULT - HISTORY OF PRESENT ILLNESS
>>>>>>Medical Attending Initial / Admission note<<<<<<  -------------------------------------------------------------------------------  CHIEF COMPLAINT & HISTORY OF PRESENT ILLNESS:      Pt is a 61 year old man with history of kidney stone presenting with fever, cough, SOB x 2-3d.   States having positive sick contact in son with COVID-19, is currently NOT vaccinated.   Fever to 103 at home, taking mucinex. Noticed worsening cough/SOB in past day, "couldn't take it anymore", so presenting to ED.   reportedly at home O2 sat was 84%.   Denies N/V/D, abdominal pain, chest pain, LE swelling.  elevated inflammatory markers  COVID pending : assumed positive     --------------------------------------------------------------------------------  PAST MEDICAL HISTORY:    kidney stone     --------------------------------------------------------------------------------  PAST SURGICAL HISTORY:    kidney stone Sx    --------------------------------------------------------------------------------  FAMILY HISTORY:    Father : Leukemia  otherwise negative     --------------------------------------------------------------------------------  SOCIAL HISTORY:  Alcohol: socially   Smoking: past smoker, Quit > 20 years ago  recently retired      --------------------------------------------------------------------------------  ALLERGIES:    [As cris, reviewed]    --------------------------------------------------------------------------------  MEDICATIONS:   [As cris, reviewed]    --------------------------------------------------------------------------------  REVIEW OF SYSTEM:    GEN: + fever, no chills, no pain, generalized weakness   RESP: + SOB > better with O2 , some cough, no sputum  CVS: no chest pain, no palpitations, no edema  GI: no abdominal pain, no nausea, no vomiting, no constipation, no diarrhea  : no dysuria, no frequency, no hematuria  Neuro: no headache, no dizziness  PSYCH: no anxiety, no depression  Derm : no itching, no rash    --------------------------------------------------------------------------------  VITAL SIGNS:     Height (cm): 172.7  Vital Signs Last 24 HrsT(C): 38.1 (12-28-21 @ 08:45)  T(F): 100.5 (12-28-21 @ 08:45), Max: 100.6 (12-28-21 @ 07:22)  HR: 93 (12-28-21 @ 08:45) (93 - 100)  BP: 100/68 (12-28-21 @ 08:45)  RR: 23 (12-28-21 @ 08:45) (23 - 24)  SpO2: 96% (12-28-21 @ 08:45) (85% - 96%)      --------------------------------------------------------------------------------  PHYSICAL EXAM:    GEN: A&O X 3 , NAD , comfortable, pleasant, calm  HEENT: NCAT, PERRL, MMM, hearing intact  Neck: supple , no JVD  CVS: S1S2 , regular , No M/R/G appreciated  PULM: CTA B/L,  limited exam due to body habitus.   ABD.: soft. non tender, non distended,  bowel sounds present, abd obesity   Extrem: intact pulses , no edema   Derm: No rash , no ecchymoses  PSYCH : normal mood,  no delusion not anxious    --------------------------------------------------------------------------------  LAB AND IMAGING:   [As cris, reviewed]    --------------------------------------------------------------------------------  ASSESSMENT AND PLAN:   [As bellow]    --------------------  Case discussed with pt..   ___________________________  H. ABNER Knowles.  Pager: [phone]

## 2021-12-28 NOTE — ED ADULT NURSE NOTE - OBJECTIVE STATEMENT
DARY RN: Pt received to room 11 A&Ox4, ambulatory at baseline. Pt denies PMHx. Pt brought directly from triage area for low O2 sat. Pt 85% on RA, placed on 6LNC with resolve to 95%. Pt appears tachypneic at the present. Pt states he is not vaccinated against COVID, states son is COVID+ at home. Endorsing weakness, fevers (tmax 103), chills, cough, SOB. Pt denies CP, dizziness, HA. 18G IV placed to RAC and labs drawn as ordered. NSR on cardiac monitor. Satting WNL with supplemental O2. MD at bedside, evaluation in progress. Endorsed to primary RN.

## 2021-12-28 NOTE — H&P ADULT - ASSESSMENT
sepsis  hypoxia 61 year old man with history of kidney stone presenting with fever, cough, SOB x 2-3d.   States having positive sick contact in son with COVID-19  Fever to 103 at home, taking mucinex. Noticed worsening cough/SOB in past day, "couldn't take it anymore", so presenting to ED.   reportedly at home O2 sat was 84%.   elevated inflammatory markers  COVID pending : assumed positive

## 2021-12-28 NOTE — H&P ADULT - PROBLEM SELECTOR PLAN 2
Acute hypoxemic respiratory failure due to COVID-19 with Viral syndrome.  Remdesivir per hospital protocol  Decadron per hospital protocol  trend inflammatory markers   O2 and wean as able  supportive care  nutrition, hydration  MVI, Vitamin C,D, Zinc, melatonin  deep breathing  OOB to chair  DVT prophylaxis   Pepcid, fenofibrate Acute hypoxemic respiratory failure due to COVID-19 with Viral syndrome.  Remdesivir per hospital protocol  Decadron per hospital protocol  trend inflammatory markers   O2 and wean as able  supportive care  nutrition, hydration  MVI, Vitamin C,D, Zinc, melatonin  deep breathing discussed > IS  OOB to chair  DVT prophylaxis   Pepcid, fenofibrate

## 2021-12-29 NOTE — CHART NOTE - NSCHARTNOTEFT_GEN_A_CORE
Notified by RN patient oxygen decreased to 85% on 5L.  Informed to increase oxygen to 6L and monitor oxygenation.   Went to see patient bedside patient is not in distress denies CP, SOB , or palpitation.    Addendum  Notified by RN patient oxygen dropped to 88% on 6L, patient was placed on non-breather with improvement to94%.  Saw patient bedside was on nasal sating at 93%. Denies SOB, CP, Palpitation.  Will continue to monitor. Late documentation :    Notified by RN patient oxygen decreased to 85% on 5L.  Informed to increase oxygen to 6L and monitor oxygenation.   Went to see patient bedside patient is not in distress denies CP, SOB , or palpitation.    Addendum  Notified by RN patient oxygen dropped to 88% on 6L, patient was placed on non-breather with improvement to94%.  Saw patient bedside was on nasal sating at 93%. Denies SOB, CP, Palpitation.  Will continue to monitor.

## 2021-12-29 NOTE — CONSULT NOTE ADULT - SUBJECTIVE AND OBJECTIVE BOX
CHIEF COMPLAINT: sob    HISTORY OF PRESENT ILLNESS:  61 year old man with history of kidney stone presenting with fever, cough, SOB x 2-3d.   States having positive sick contact in son with COVID-19, is currently NOT vaccinated.   Fever to 103 at home, taking mucinex. Noticed worsening cough/SOB in past day, "couldn't take it anymore", so presenting to ED.   reportedly at home O2 sat was 84%.   Denies N/V/D, abdominal pain, chest pain, LE swelling.  elevated inflammatory markers  COVID  positive       Allergies    Cipro (Rash)    Intolerances    eggs (Diarrhea)  	    MEDICATIONS:  enoxaparin Injectable 40 milliGRAM(s) SubCutaneous every 12 hours    remdesivir  IVPB   IV Intermittent   remdesivir  IVPB 100 milliGRAM(s) IV Intermittent every 24 hours    benzonatate 100 milliGRAM(s) Oral three times a day PRN    acetaminophen     Tablet .. 650 milliGRAM(s) Oral every 6 hours PRN  melatonin 3 milliGRAM(s) Oral at bedtime PRN  ondansetron Injectable 4 milliGRAM(s) IV Push every 8 hours PRN    aluminum hydroxide/magnesium hydroxide/simethicone Suspension 30 milliLiter(s) Oral every 4 hours PRN  famotidine    Tablet 20 milliGRAM(s) Oral two times a day    dexAMETHasone     Tablet 6 milliGRAM(s) Oral daily  fenofibrate Tablet 145 milliGRAM(s) Oral at bedtime    ascorbic acid 500 milliGRAM(s) Oral two times a day  cholecalciferol 2000 Unit(s) Oral daily  multivitamin 1 Tablet(s) Oral daily  zinc sulfate 220 milliGRAM(s) Oral daily      PAST MEDICAL & SURGICAL HISTORY:  Obese    No significant past surgical history        FAMILY HISTORY:      SOCIAL HISTORY:    non smoker. indep in adl    REVIEW OF SYSTEMS:  See HPI, otherwise complete 10 point review of systems negative    [ ] All others negative	    PHYSICAL EXAM:  T(C): 37.9 (12-29-21 @ 05:47), Max: 38.1 (12-28-21 @ 08:45)  HR: 89 (12-29-21 @ 05:47) (85 - 93)  BP: 139/75 (12-29-21 @ 05:47) (100/68 - 139/75)  RR: 18 (12-29-21 @ 05:47) (18 - 23)  SpO2: 90% (12-29-21 @ 05:47) (90% - 96%)  Wt(kg): --  I&O's Summary    28 Dec 2021 07:01  -  29 Dec 2021 07:00  --------------------------------------------------------  IN: 0 mL / OUT: 800 mL / NET: -800 mL        Appearance: No Acute Distress	  deferred due to covid    Laboratory Data:	 	    CBC Full  -  ( 29 Dec 2021 07:07 )  WBC Count : 5.59 K/uL  Hemoglobin : 17.1 g/dL  Hematocrit : 49.8 %  Platelet Count - Automated : 217 K/uL  Mean Cell Volume : 87.2 fL  Mean Cell Hemoglobin : 29.9 pg  Mean Cell Hemoglobin Concentration : 34.3 gm/dL  Auto Neutrophil # : 4.50 K/uL  Auto Lymphocyte # : 0.55 K/uL  Auto Monocyte # : 0.48 K/uL  Auto Eosinophil # : 0.00 K/uL  Auto Basophil # : 0.01 K/uL  Auto Neutrophil % : 80.5 %  Auto Lymphocyte % : 9.8 %  Auto Monocyte % : 8.6 %  Auto Eosinophil % : 0.0 %  Auto Basophil % : 0.2 %    12-29    x   |  x   |  x   ----------------------------<  x   x    |  x   |  0.79  12-28    127<L>  |  93<L>  |  11  ----------------------------<  132<H>  4.2   |  22  |  0.77    Ca    8.7      28 Dec 2021 07:58    TPro  7.2  /  Alb  3.5  /  TBili  0.4  /  DBili  <0.2  /  AST  95<H>  /  ALT  62<H>  /  AlkPhos  52  12-29  TPro  6.6  /  Alb  3.3  /  TBili  0.5  /  DBili  x   /  AST  73<H>  /  ALT  44<H>  /  AlkPhos  47  12-28      proBNP:   Lipid Profile:   HgA1c:   TSH:       CARDIAC MARKERS:            Interpretation of Telemetry: 	    ECG:  	  RADIOLOGY:  OTHER: 	    PREVIOUS DIAGNOSTIC TESTING:    [ ] Echocardiogram:  [ ] Catheterization:  [ ] Stress Test:  	    Assessment:  hypoxic resp failure  covid pneumonia  hx of nephrolithiasis  hypoantremia    Recs:  cv stable  no e/o chf or ischemia. f/u ekg  cw volume resuscitation   tx of covid pna per med  trend inflammatory markers  wean o2 as able  dvt ppx      Greater than 60 minutes spent on total encounter; more than 50% of the visit was spent counseling and/or coordinating care by the attending physician.   	  Igor Medrano MD   Cardiovascular Diseases  [phone]

## 2021-12-29 NOTE — PROGRESS NOTE ADULT - ASSESSMENT
_________________________________________________________________________________________  ========>>  M E D I C A L   A T T E N D I N G    F O L L O W  U P  N O T E  <<=========  -----------------------------------------------------------------------------------------------------    - Patient seen and examined by me earlier today.   - In summary,  ALEXX BROWN is a 61y year old man admitted with SOB, fever..   - Patient today overall doing fairly, comfortable on O2, eating well     ==================>> REVIEW OF SYSTEM <<=================    GEN: no fever, no chills, no pain  RESP: no SOB at rest , no cough, no sputum  CVS: no chest pain, no palpitations, no edema  GI: no abdominal pain, no nausea, no constipation, no diarrhea  : no dysuria, no frequency, no hematuria  Neuro: no headache, no dizziness  Derm : no itching, no rash    ==================>> PHYSICAL EXAM <<=================    GEN: A&O X 3 , NAD , comfortable, pleasant, calm   HEENT: NCAT, PERRL, MMM, hearing intact  Neck: supple , no JVD appreciated  CVS: S1S2 , regular , No M/R/G appreciated  PULM: CTA B/L,  no W/R/R appreciated  ABD.: soft. non tender, non distended,  bowel sounds present  Extrem: intact pulses , no edema   PSYCH : normal mood,  not anxious                            ( Note Written / Date of service :  12-29-21 )    ==================>> MEDICATIONS <<====================    MEDICATIONS  (STANDING):  ascorbic acid 500 milliGRAM(s) Oral two times a day  cholecalciferol 2000 Unit(s) Oral daily  dexAMETHasone     Tablet 6 milliGRAM(s) Oral daily  enoxaparin Injectable 40 milliGRAM(s) SubCutaneous every 12 hours  famotidine    Tablet 20 milliGRAM(s) Oral two times a day  fenofibrate Tablet 145 milliGRAM(s) Oral at bedtime  multivitamin 1 Tablet(s) Oral daily  remdesivir  IVPB   IV Intermittent   remdesivir  IVPB 100 milliGRAM(s) IV Intermittent every 24 hours  zinc sulfate 220 milliGRAM(s) Oral daily    MEDICATIONS  (PRN):  acetaminophen     Tablet .. 650 milliGRAM(s) Oral every 6 hours PRN Temp greater or equal to 38C (100.4F), Mild Pain (1 - 3)  aluminum hydroxide/magnesium hydroxide/simethicone Suspension 30 milliLiter(s) Oral every 4 hours PRN Dyspepsia  benzonatate 100 milliGRAM(s) Oral three times a day PRN Cough  melatonin 3 milliGRAM(s) Oral at bedtime PRN Insomnia  ondansetron Injectable 4 milliGRAM(s) IV Push every 8 hours PRN Nausea and/or Vomiting    ___________  Active diet:  Diet, Regular  ___________________    ==================>> VITAL SIGNS <<==================    T(C): 37.2 (12-29-21 @ 13:20), Max: 37.9 (12-29-21 @ 05:47)  HR: 84 (12-29-21 @ 13:20) (83 - 89)  BP: 128/75 (12-29-21 @ 13:20) (102/55 - 139/75)  BP(mean): --  RR: 18 (12-29-21 @ 13:20) (18 - 20)  SpO2: 93% (12-29-21 @ 13:20) (90% - 93%)       I&O's Summary    28 Dec 2021 07:01  -  29 Dec 2021 07:00  --------------------------------------------------------  IN: 0 mL / OUT: 800 mL / NET: -800 mL         ==================>> LAB AND IMAGING <<==================                        17.1   5.59  )-----------( 217      ( 29 Dec 2021 07:07 )             49.8        12-29    133<L>  |  98  |  16  ----------------------------<  121<H>  4.4   |  23  |  0.78    Ca    9.3      29 Dec 2021 07:07    TPro  6.9  /  Alb  3.4  /  TBili  0.4  /  DBili  <0.2  /  AST  91<H>  /  ALT  59<H>  /  AlkPhos  49  12-29    Lipid profile:  (12-29-21)     Total: 119     LDL  : (p)     HDL  :19     TG   :126     HgA1C:   (12-29-21)          (12-29-21)      6.4    _______________________  C U L T U R E S :    SARS-CoV-2: Detected (12-28-21 @ 08:05)      ^^^ Inflammatory markers :  ^^^  C R P :          82.2 (12-28-21)  <<--  P C T :         0.16 (12-28-21) <<--    Ferritin :         885 (12-28-21) <<--    D-Dimer :          331 (12-28-21) <<--  ___________________________________________________________________________________  ===============>>  A S S E S S M E N T   A N D   P L A N <<===============  ------------------------------------------------------------------------------------------      · Assessment      61 year old man with history of kidney stone presenting with fever, cough, SOB x 2-3d.   States having positive sick contact in son with COVID-19  Fever to 103 at home, taking mucinex. Noticed worsening cough/SOB in past day, "couldn't take it anymore", so presenting to ED.   reportedly at home O2 sat was 84%.   elevated inflammatory markers  COVID pending : assumed positive     Problem/Plan - 1:  ·  Problem: Acute sepsis.   ·  Plan: treat underlying condition  antipyretics as needed  nutrition / hydration ( pt starting to gain back appetite)   IVH as needed   supportive care   monitor.    Problem/Plan - 2:  ·  Problem: 2019 novel coronavirus disease (COVID-19).   ·  Plan: Acute hypoxemic respiratory failure due to COVID-19 with Viral syndrome.  Remdesivir per hospital protocol  Decadron per hospital protocol  trend inflammatory markers   O2 and wean as able  supportive care  nutrition, hydration  MVI, Vitamin C,D, Zinc, melatonin  deep breathing discussed > IS  OOB to chair  DVT prophylaxis   Pepcid, fenofibrate.    Problem/Plan - 3:  ·  Problem: Acute hypoxemic respiratory failure.   ·  Plan: O2 and wean off as able    goal O2 sat 90-96%    can go home with 2-3 Lpm O2  treat underlying condition as above  monitor  inhalers as needed.    Problem/Plan - 4:  ·  Problem: Viral syndrome.   ·  Plan: supportive care  nutrition / hydration  treat underlying condition.    -GI/DVT Prophylaxis per protocol.    --------------------------------------------  Case discussed with pt  Education given on findings and plan of care  ___________________________  H. ABNER Knowles.  Pager: [phone]

## 2021-12-30 NOTE — PROGRESS NOTE ADULT - ASSESSMENT
_________________________________________________________________________________________  ========>>  M E D I C A L   A T T E N D I N G    F O L L O W  U P  N O T E  <<=========  -----------------------------------------------------------------------------------------------------    - Patient seen and examined by me earlier today.   - In summary,  ALEXX BROWN is a 61y year old man admitted with SOB, fever..   - Patient today overall doing fairly, comfortable on O2, eating well     pt on and off nonrebreather and NC >> though the pulse Oximeter was not reading well and after manipulation sowed a better sat.. ( d/w RN)     ==================>> REVIEW OF SYSTEM <<=================    GEN: no fever, no chills, no pain  RESP: no SOB at rest , no cough, no sputum  CVS: no chest pain, no palpitations, no edema  GI: no abdominal pain, no nausea, no constipation, no diarrhea  : no dysuria, no frequency, no hematuria  Neuro: no headache, no dizziness  Derm : no itching, no rash    ==================>> PHYSICAL EXAM <<=================    GEN: A&O X 3 , NAD , comfortable, pleasant, calm   HEENT: NCAT, PERRL, MMM, hearing intact  Neck: supple , no JVD appreciated  CVS: S1S2 , regular , No M/R/G appreciated  PULM: CTA B/L,  no W/R/R appreciated  ABD.: soft. non tender, non distended,  bowel sounds present  Extrem: intact pulses , no edema   PSYCH : normal mood,  not anxious                               ( Note written / Date of service 12-30-21 )    ==================>> MEDICATIONS <<====================    ascorbic acid 500 milliGRAM(s) Oral two times a day  cholecalciferol 2000 Unit(s) Oral daily  dexAMETHasone     Tablet 6 milliGRAM(s) Oral daily  enoxaparin Injectable 40 milliGRAM(s) SubCutaneous every 12 hours  famotidine    Tablet 20 milliGRAM(s) Oral two times a day  fenofibrate Tablet 145 milliGRAM(s) Oral at bedtime  multivitamin 1 Tablet(s) Oral daily  remdesivir  IVPB   IV Intermittent   remdesivir  IVPB 100 milliGRAM(s) IV Intermittent every 24 hours  zinc sulfate 220 milliGRAM(s) Oral daily    MEDICATIONS  (PRN):  acetaminophen     Tablet .. 650 milliGRAM(s) Oral every 6 hours PRN Temp greater or equal to 38C (100.4F), Mild Pain (1 - 3)  ALBUTerol    90 MICROgram(s) HFA Inhaler 2 Puff(s) Inhalation every 6 hours PRN Shortness of Breath and/or Wheezing  aluminum hydroxide/magnesium hydroxide/simethicone Suspension 30 milliLiter(s) Oral every 4 hours PRN Dyspepsia  benzonatate 100 milliGRAM(s) Oral three times a day PRN Cough  melatonin 3 milliGRAM(s) Oral at bedtime PRN Insomnia  ondansetron Injectable 4 milliGRAM(s) IV Push every 8 hours PRN Nausea and/or Vomiting    ___________  Active diet:  Diet, Regular  ___________________    ==================>> VITAL SIGNS <<==================    Height (cm): 172.7  Weight (kg): 117.027  BMI (kg/m2): 39.2  Vital Signs Last 24 HrsT(C): 36.5 (12-30-21 @ 12:24)  T(F): 97.7 (12-30-21 @ 12:24), Max: 98.8 (12-30-21 @ 00:15)  HR: 81 (12-30-21 @ 12:24) (81 - 83)  BP: 117/82 (12-30-21 @ 12:24)  RR: 18 (12-30-21 @ 12:24) (18 - 18)  SpO2: 90% (12-30-21 @ 12:24) (90% - 94%)         ==================>> LAB AND IMAGING <<==================                        17.1   9.71  )-----------( 300      ( 30 Dec 2021 07:28 )             49.3        12-30    135  |  100  |  19  ----------------------------<  118<H>  5.1   |  22  |  0.78    Ca    9.0      30 Dec 2021 07:28  Phos  3.4     12-30  Mg     2.10     12-30    TPro  6.6  /  Alb  3.2<L>  /  TBili  0.4  /  DBili  <0.2  /  AST  84<H>  /  ALT  71<H>  /  AlkPhos  51  12-30    WBC count:   9.71 <<== ,  5.59 <<== ,  4.84 <<==   Hemoglobin:   17.1 <<==,  17.1 <<==,  16.8 <<==  platelets:  300 <==, 217 <==, 188 <==    Creatinine:  0.78  <<==, 0.78  <<==, 0.77  <<==  Sodium:   135  <==, 133  <==, 127  <==       AST:          84 <== , 91 <== , 73 <==      ALT:        71  <== , 59  <== , 44  <==      AP:        51  <=, 49  <=, 47  <=     Bili:        0.4  <=, 0.4  <=, 0.5  <=      ^^^ Inflammatory markers :  ^^^  C R P :          31.8 (12-30-21)  <<--, 82.2 (12-28-21)  <<--  P C T :         0.11 (12-30-21) <<--, 0.16 (12-28-21) <<--    Ferritin :         885 (12-28-21) <<--    D-Dimer :          235 (12-30-21) <<--, 331 (12-28-21) <<--    ___________________________________________________________________________________  ===============>>  A S S E S S M E N T   A N D   P L A N <<===============  ------------------------------------------------------------------------------------------    · Assessment	  61 year old man with history of kidney stone presenting with fever, cough, SOB x 2-3d.   States having positive sick contact in son with COVID-19  Fever to 103 at home, taking mucinex. Noticed worsening cough/SOB in past day, "couldn't take it anymore", so presenting to ED.   reportedly at home O2 sat was 84%.   elevated inflammatory markers  COVID pending : assumed positive     Problem/Plan - 1:  ·  Problem: Acute sepsis. > resolving   ·  Plan: treat underlying condition  antipyretics as needed  nutrition / hydration   IVH as needed   supportive care   monitor.    Problem/Plan - 2:  ·  Problem: 2019 novel coronavirus disease (COVID-19).   ·  Plan: Acute hypoxemic respiratory failure due to COVID-19 with Viral syndrome.  Remdesivir per hospital protocol  Decadron per hospital protocol  trend inflammatory markers   O2 and wean as able  supportive care  nutrition, hydration  MVI, Vitamin C,D, Zinc, melatonin  deep breathing discussed > IS > reminded  OOB to chair > reminded   DVT prophylaxis   Pepcid, fenofibrate.    Problem/Plan - 3:  ·  Problem: Acute hypoxemic respiratory failure.   ·  Plan: O2 and wean off as able    goal O2 sat 90-96%    can go home with 2-3 Lpm O2  treat underlying condition as above  monitor  inhalers as needed.    Problem/Plan - 4:  ·  Problem: Viral syndrome.   ·  Plan: supportive care  nutrition / hydration  treat underlying condition.    -GI/DVT Prophylaxis per protocol.    --------------------------------------------  Case discussed with pt  Education given on findings and plan of care  ___________________________  H. ABNER Knowles.  Pager: [phone]

## 2021-12-30 NOTE — CHART NOTE - NSCHARTNOTEFT_GEN_A_CORE
Patient intermittently desatted during day and NC increased to 6L. Patient was transitioned from NC 6L to NRB at change of shift. Provider not notified. Uncertain if patient desatted or to what extent. Patient currently on NRB 15L satting well and comfortable. Will wean as tolerated. Will continue to monitor. Patient intermittently desatted during day and NC increased to 6L. On chart review, patient noted to be on NRB. Patient was transitioned from NC 6L to NRB at change of shift. Provider not notified. Patient currently on NRB 15L satting well and comfortable. Will wean as tolerated. Will continue to monitor.

## 2021-12-31 NOTE — PROGRESS NOTE ADULT - ASSESSMENT
_________________________________________________________________________________________  ========>>  M E D I C A L   A T T E N D I N G    F O L L O W  U P  N O T E  <<=========  -----------------------------------------------------------------------------------------------------    - Patient seen and examined by me earlier today.   - In summary,  ALEXX BROWN is a 61y year old man admitted with SOB, fever..   - Patient today overall doing fairly, with increased O2 requirements : on nonrebreather mask all day but not keeping it on all the time with hypoxemia on tele >> placed on High flow NC    ==================>> REVIEW OF SYSTEM <<=================    GEN: no fever, no chills, no pain  RESP: no SOB at rest , no cough, no sputum  CVS: no chest pain, no palpitations, no edema  GI: no abdominal pain, no nausea, no constipation, no diarrhea  : no dysuria, no frequency, no hematuria  Neuro: no headache, no dizziness  Derm : no itching, no rash    ==================>> PHYSICAL EXAM <<=================    GEN: A&O X 3 , NAD , comfortable, pleasant, calm in bed >> encouraged to sit up ( RN to bring a chair in the room)   HEENT: NCAT, PERRL, MMM, hearing intact  Neck: supple , no JVD appreciated  CVS: S1S2 , regular , No M/R/G appreciated  PULM: CTA B/L,  no W/R/R appreciated  ABD.: soft. non tender, non distended,  bowel sounds present, abd obesity   Extrem: intact pulses , no edema   PSYCH : normal mood,  not anxious                               ( Note written / Date of service 12-31-21 )    ==================>> MEDICATIONS <<====================    ascorbic acid 500 milliGRAM(s) Oral two times a day  cholecalciferol 2000 Unit(s) Oral daily  dexAMETHasone     Tablet 6 milliGRAM(s) Oral daily  enoxaparin Injectable 40 milliGRAM(s) SubCutaneous every 12 hours  famotidine    Tablet 20 milliGRAM(s) Oral two times a day  fenofibrate Tablet 145 milliGRAM(s) Oral at bedtime  multivitamin 1 Tablet(s) Oral daily  remdesivir  IVPB   IV Intermittent   remdesivir  IVPB 100 milliGRAM(s) IV Intermittent every 24 hours    MEDICATIONS  (PRN):  acetaminophen     Tablet .. 650 milliGRAM(s) Oral every 6 hours PRN Temp greater or equal to 38C (100.4F), Mild Pain (1 - 3)  ALBUTerol    90 MICROgram(s) HFA Inhaler 2 Puff(s) Inhalation every 6 hours PRN Shortness of Breath and/or Wheezing  aluminum hydroxide/magnesium hydroxide/simethicone Suspension 30 milliLiter(s) Oral every 4 hours PRN Dyspepsia  benzonatate 100 milliGRAM(s) Oral three times a day PRN Cough  melatonin 3 milliGRAM(s) Oral at bedtime PRN Insomnia  ondansetron Injectable 4 milliGRAM(s) IV Push every 8 hours PRN Nausea and/or Vomiting    ___________  Active diet:  Diet, Regular  ___________________    ==================>> VITAL SIGNS <<==================    Weight (kg): 117.027  Vital Signs Last 24 HrsT(C): 36.6 (12-31-21 @ 14:13)  T(F): 97.8 (12-31-21 @ 14:13), Max: 98.7 (12-31-21 @ 09:49)  HR: 76 (12-31-21 @ 15:30) (76 - 91)  BP: 112/57 (12-31-21 @ 14:13)  RR: 22 (12-31-21 @ 15:30) (18 - 22)  SpO2: 94% (12-31-21 @ 15:30) (87% - 96%)         ==================>> LAB AND IMAGING <<==================                        18.1   9.84  )-----------( 341      ( 31 Dec 2021 07:19 )             53.7        12-31    136  |  101  |  21  ----------------------------<  116<H>  4.7   |  23  |  0.81    Ca    9.1      31 Dec 2021 07:19  Phos  2.9     12-31  Mg     2.20     12-31    TPro  6.6  /  Alb  3.2<L>  /  TBili  0.4  /  DBili  <0.2  /  AST  84<H>  /  ALT  71<H>  /  AlkPhos  51  12-30    WBC count:   9.84 <<== ,  9.71 <<== ,  5.59 <<== ,  4.84 <<==   Hemoglobin:   18.1 <<==,  17.1 <<==,  17.1 <<==,  16.8 <<==  platelets:  341 <==, 300 <==, 217 <==, 188 <==    Creatinine:  0.81  <<==, 0.78  <<==, 0.78  <<==, 0.77  <<==  Sodium:   136  <==, 135  <==, 133  <==, 127  <==       AST:          84 <== , 91 <== , 73 <==      ALT:        71  <== , 59  <== , 44  <==      AP:        51  <=, 49  <=, 47  <=     Bili:        0.4  <=, 0.4  <=, 0.5  <=    ^^^ Inflammatory markers :  ^^^  C R P :          31.8 (12-30-21)  <<--, 82.2 (12-28-21)  <<--  P C T :         0.11 (12-30-21) <<--, 0.16 (12-28-21) <<--    Ferritin :         885 (12-28-21) <<--    D-Dimer :          235 (12-30-21) <<--, 331 (12-28-21) <<--    ___________________________________________________________________________________  ===============>>  A S S E S S M E N T   A N D   P L A N <<===============  ------------------------------------------------------------------------------------------    · Assessment	  61 year old man with history of kidney stone presenting with fever, cough, SOB x 2-3d.   States having positive sick contact in son with COVID-19  Fever to 103 at home, taking mucinex. Noticed worsening cough/SOB in past day, "couldn't take it anymore", so presenting to ED.       Problem/Plan - 1:  ·  Problem: Acute sepsis. > resolving   ·  Plan: treat underlying condition  antipyretics as needed  nutrition / hydration   IVH as needed   supportive care   monitor.    Problem/Plan - 2:  ·  Problem: 2019 novel coronavirus disease (COVID-19).   ·  Plan: Acute hypoxemic respiratory failure due to COVID-19 with Viral syndrome.  Remdesivir per hospital protocol  Decadron per hospital protocol  trend inflammatory markers   O2 and wean as able  supportive care  nutrition, hydration  MVI, Vitamin C,D, Zinc, melatonin  deep breathing discussed > IS > reminded  OOB to chair > reminded   DVT prophylaxis   Pepcid, fenofibrate.    Problem/Plan - 3:  ·  Problem: Acute hypoxemic respiratory failure.   ·  Plan: O2 and wean off as able    goal O2 sat 90-96%  treat underlying condition as above  monitor and wean down as able (hope to prevent intubation )   inhalers as needed.    Problem/Plan - 4:  ·  Problem: Viral syndrome.   ·  Plan: supportive care  nutrition / hydration  treat underlying condition.    Problem/Plan - 5:  ·  Problem: obesit and likely ERICK    discussed with pt re weight loss and sleep study > obtaining and using a CPAP machine pos discharge    -GI/DVT Prophylaxis per protocol.    --------------------------------------------  Case discussed with pt, RN, PA.   Education given on findings and plan of care  ___________________________  H. ABNER Knowles.  Pager: [phone]

## 2021-12-31 NOTE — CHART NOTE - NSCHARTNOTEFT_GEN_A_CORE
Called by RN for hypoxia to 88% on NC. Patient transitioned back to NRB and saturations now improved into 90s. Will monitor closely, low likelihood that patient will be stable for discharge today.    Reed Young PA-C  Medicine [facility], pgr [phone]  Available on [employer] Teams

## 2022-01-01 LAB
ALBUMIN SERPL ELPH-MCNC: 1.8 G/DL — LOW (ref 3.3–5)
ALBUMIN SERPL ELPH-MCNC: 1.9 G/DL — LOW (ref 3.3–5)
ALBUMIN SERPL ELPH-MCNC: 2 G/DL — LOW (ref 3.3–5)
ALBUMIN SERPL ELPH-MCNC: 2.1 G/DL — LOW (ref 3.3–5)
ALBUMIN SERPL ELPH-MCNC: 2.2 G/DL — LOW (ref 3.3–5)
ALP SERPL-CCNC: 62 U/L — SIGNIFICANT CHANGE UP (ref 40–120)
ALP SERPL-CCNC: 64 U/L — SIGNIFICANT CHANGE UP (ref 40–120)
ALP SERPL-CCNC: 65 U/L — SIGNIFICANT CHANGE UP (ref 40–120)
ALP SERPL-CCNC: 70 U/L — SIGNIFICANT CHANGE UP (ref 40–120)
ALP SERPL-CCNC: 70 U/L — SIGNIFICANT CHANGE UP (ref 40–120)
ALP SERPL-CCNC: 73 U/L — SIGNIFICANT CHANGE UP (ref 40–120)
ALP SERPL-CCNC: 74 U/L — SIGNIFICANT CHANGE UP (ref 40–120)
ALP SERPL-CCNC: 75 U/L — SIGNIFICANT CHANGE UP (ref 40–120)
ALP SERPL-CCNC: 77 U/L — SIGNIFICANT CHANGE UP (ref 40–120)
ALP SERPL-CCNC: 78 U/L — SIGNIFICANT CHANGE UP (ref 40–120)
ALP SERPL-CCNC: 78 U/L — SIGNIFICANT CHANGE UP (ref 40–120)
ALP SERPL-CCNC: 81 U/L — SIGNIFICANT CHANGE UP (ref 40–120)
ALP SERPL-CCNC: 90 U/L — SIGNIFICANT CHANGE UP (ref 40–120)
ALP SERPL-CCNC: 93 U/L — SIGNIFICANT CHANGE UP (ref 40–120)
ALP SERPL-CCNC: 99 U/L — SIGNIFICANT CHANGE UP (ref 40–120)
ALT FLD-CCNC: 100 U/L — HIGH (ref 4–41)
ALT FLD-CCNC: 116 U/L — HIGH (ref 4–41)
ALT FLD-CCNC: 28 U/L — SIGNIFICANT CHANGE UP (ref 4–41)
ALT FLD-CCNC: 29 U/L — SIGNIFICANT CHANGE UP (ref 4–41)
ALT FLD-CCNC: 29 U/L — SIGNIFICANT CHANGE UP (ref 4–41)
ALT FLD-CCNC: 30 U/L — SIGNIFICANT CHANGE UP (ref 4–41)
ALT FLD-CCNC: 39 U/L — SIGNIFICANT CHANGE UP (ref 4–41)
ALT FLD-CCNC: 40 U/L — SIGNIFICANT CHANGE UP (ref 4–41)
ALT FLD-CCNC: 44 U/L — HIGH (ref 4–41)
ALT FLD-CCNC: 45 U/L — HIGH (ref 4–41)
ALT FLD-CCNC: 53 U/L — HIGH (ref 4–41)
ALT FLD-CCNC: 63 U/L — HIGH (ref 4–41)
ALT FLD-CCNC: 76 U/L — HIGH (ref 4–41)
ALT FLD-CCNC: 88 U/L — HIGH (ref 4–41)
ALT FLD-CCNC: 91 U/L — HIGH (ref 4–41)
ANION GAP SERPL CALC-SCNC: 11 MMOL/L — SIGNIFICANT CHANGE UP (ref 7–14)
ANION GAP SERPL CALC-SCNC: 11 MMOL/L — SIGNIFICANT CHANGE UP (ref 7–14)
ANION GAP SERPL CALC-SCNC: 12 MMOL/L — SIGNIFICANT CHANGE UP (ref 7–14)
ANION GAP SERPL CALC-SCNC: 13 MMOL/L — SIGNIFICANT CHANGE UP (ref 7–14)
ANION GAP SERPL CALC-SCNC: 14 MMOL/L — SIGNIFICANT CHANGE UP (ref 7–14)
ANION GAP SERPL CALC-SCNC: 15 MMOL/L — HIGH (ref 7–14)
ANION GAP SERPL CALC-SCNC: 16 MMOL/L — HIGH (ref 7–14)
ANION GAP SERPL CALC-SCNC: 17 MMOL/L — HIGH (ref 7–14)
ANION GAP SERPL CALC-SCNC: 17 MMOL/L — HIGH (ref 7–14)
ANION GAP SERPL CALC-SCNC: 19 MMOL/L — HIGH (ref 7–14)
ANION GAP SERPL CALC-SCNC: 8 MMOL/L — SIGNIFICANT CHANGE UP (ref 7–14)
ANION GAP SERPL CALC-SCNC: 9 MMOL/L — SIGNIFICANT CHANGE UP (ref 7–14)
APPEARANCE UR: ABNORMAL
APPEARANCE UR: ABNORMAL
APTT BLD: 29.3 SEC — SIGNIFICANT CHANGE UP (ref 27–36.3)
APTT BLD: 46.6 SEC — HIGH (ref 27–36.3)
APTT BLD: 56.2 SEC — HIGH (ref 27–36.3)
APTT BLD: 59 SEC — HIGH (ref 27–36.3)
APTT BLD: 62.6 SEC — HIGH (ref 27–36.3)
APTT BLD: 66.3 SEC — HIGH (ref 27–36.3)
APTT BLD: 73.5 SEC — HIGH (ref 27–36.3)
APTT BLD: 78.2 SEC — HIGH (ref 27–36.3)
APTT BLD: 79.4 SEC — HIGH (ref 27–36.3)
APTT BLD: 88.6 SEC — HIGH (ref 27–36.3)
APTT BLD: 94.2 SEC — HIGH (ref 27–36.3)
APTT BLD: 98 SEC — HIGH (ref 27–36.3)
AST SERPL-CCNC: 109 U/L — HIGH (ref 4–40)
AST SERPL-CCNC: 135 U/L — HIGH (ref 4–40)
AST SERPL-CCNC: 176 U/L — HIGH (ref 4–40)
AST SERPL-CCNC: 182 U/L — HIGH (ref 4–40)
AST SERPL-CCNC: 185 U/L — HIGH (ref 4–40)
AST SERPL-CCNC: 34 U/L — SIGNIFICANT CHANGE UP (ref 4–40)
AST SERPL-CCNC: 40 U/L — SIGNIFICANT CHANGE UP (ref 4–40)
AST SERPL-CCNC: 47 U/L — HIGH (ref 4–40)
AST SERPL-CCNC: 48 U/L — HIGH (ref 4–40)
AST SERPL-CCNC: 49 U/L — HIGH (ref 4–40)
AST SERPL-CCNC: 51 U/L — HIGH (ref 4–40)
AST SERPL-CCNC: 54 U/L — HIGH (ref 4–40)
AST SERPL-CCNC: 56 U/L — HIGH (ref 4–40)
AST SERPL-CCNC: 60 U/L — HIGH (ref 4–40)
AST SERPL-CCNC: 67 U/L — HIGH (ref 4–40)
AST SERPL-CCNC: 72 U/L — HIGH (ref 4–40)
AST SERPL-CCNC: 81 U/L — HIGH (ref 4–40)
BACTERIA # UR AUTO: ABNORMAL
BACTERIA # UR AUTO: ABNORMAL
BASE EXCESS BLDA CALC-SCNC: -10.8 MMOL/L — LOW (ref -2–3)
BASE EXCESS BLDA CALC-SCNC: -7.8 MMOL/L — LOW (ref -2–3)
BASE EXCESS BLDA CALC-SCNC: -9.2 MMOL/L — LOW (ref -2–3)
BASE EXCESS BLDA CALC-SCNC: -9.5 MMOL/L — LOW (ref -2–3)
BASE EXCESS BLDV CALC-SCNC: 1.1 MMOL/L — SIGNIFICANT CHANGE UP (ref -2–3)
BASE EXCESS BLDV CALC-SCNC: 1.2 MMOL/L — SIGNIFICANT CHANGE UP (ref -2–3)
BASOPHILS # BLD AUTO: 0 K/UL — SIGNIFICANT CHANGE UP (ref 0–0.2)
BASOPHILS # BLD AUTO: 0 K/UL — SIGNIFICANT CHANGE UP (ref 0–0.2)
BASOPHILS # BLD AUTO: 0.01 K/UL — SIGNIFICANT CHANGE UP (ref 0–0.2)
BASOPHILS # BLD AUTO: 0.02 K/UL — SIGNIFICANT CHANGE UP (ref 0–0.2)
BASOPHILS # BLD AUTO: 0.03 K/UL — SIGNIFICANT CHANGE UP (ref 0–0.2)
BASOPHILS # BLD AUTO: 0.04 K/UL — SIGNIFICANT CHANGE UP (ref 0–0.2)
BASOPHILS # BLD AUTO: 0.05 K/UL — SIGNIFICANT CHANGE UP (ref 0–0.2)
BASOPHILS # BLD AUTO: 0.05 K/UL — SIGNIFICANT CHANGE UP (ref 0–0.2)
BASOPHILS # BLD AUTO: 0.06 K/UL — SIGNIFICANT CHANGE UP (ref 0–0.2)
BASOPHILS # BLD AUTO: 0.06 K/UL — SIGNIFICANT CHANGE UP (ref 0–0.2)
BASOPHILS # BLD AUTO: 0.07 K/UL — SIGNIFICANT CHANGE UP (ref 0–0.2)
BASOPHILS # BLD AUTO: 0.09 K/UL — SIGNIFICANT CHANGE UP (ref 0–0.2)
BASOPHILS # BLD AUTO: 0.09 K/UL — SIGNIFICANT CHANGE UP (ref 0–0.2)
BASOPHILS # BLD AUTO: 0.15 K/UL — SIGNIFICANT CHANGE UP (ref 0–0.2)
BASOPHILS # BLD AUTO: 0.2 K/UL — SIGNIFICANT CHANGE UP (ref 0–0.2)
BASOPHILS NFR BLD AUTO: 0 % — SIGNIFICANT CHANGE UP (ref 0–2)
BASOPHILS NFR BLD AUTO: 0 % — SIGNIFICANT CHANGE UP (ref 0–2)
BASOPHILS NFR BLD AUTO: 0.1 % — SIGNIFICANT CHANGE UP (ref 0–2)
BASOPHILS NFR BLD AUTO: 0.1 % — SIGNIFICANT CHANGE UP (ref 0–2)
BASOPHILS NFR BLD AUTO: 0.2 % — SIGNIFICANT CHANGE UP (ref 0–2)
BASOPHILS NFR BLD AUTO: 0.3 % — SIGNIFICANT CHANGE UP (ref 0–2)
BASOPHILS NFR BLD AUTO: 0.6 % — SIGNIFICANT CHANGE UP (ref 0–2)
BASOPHILS NFR BLD AUTO: 0.9 % — SIGNIFICANT CHANGE UP (ref 0–2)
BASOPHILS NFR BLD AUTO: 1 % — SIGNIFICANT CHANGE UP (ref 0–2)
BILIRUB SERPL-MCNC: 0.6 MG/DL — SIGNIFICANT CHANGE UP (ref 0.2–1.2)
BILIRUB SERPL-MCNC: 0.6 MG/DL — SIGNIFICANT CHANGE UP (ref 0.2–1.2)
BILIRUB SERPL-MCNC: 0.7 MG/DL — SIGNIFICANT CHANGE UP (ref 0.2–1.2)
BILIRUB SERPL-MCNC: 0.7 MG/DL — SIGNIFICANT CHANGE UP (ref 0.2–1.2)
BILIRUB SERPL-MCNC: 0.8 MG/DL — SIGNIFICANT CHANGE UP (ref 0.2–1.2)
BILIRUB SERPL-MCNC: 0.9 MG/DL — SIGNIFICANT CHANGE UP (ref 0.2–1.2)
BILIRUB SERPL-MCNC: 0.9 MG/DL — SIGNIFICANT CHANGE UP (ref 0.2–1.2)
BILIRUB SERPL-MCNC: 1.1 MG/DL — SIGNIFICANT CHANGE UP (ref 0.2–1.2)
BILIRUB SERPL-MCNC: 1.2 MG/DL — SIGNIFICANT CHANGE UP (ref 0.2–1.2)
BILIRUB SERPL-MCNC: 1.3 MG/DL — HIGH (ref 0.2–1.2)
BILIRUB SERPL-MCNC: 1.4 MG/DL — HIGH (ref 0.2–1.2)
BILIRUB SERPL-MCNC: 1.4 MG/DL — HIGH (ref 0.2–1.2)
BILIRUB SERPL-MCNC: 1.5 MG/DL — HIGH (ref 0.2–1.2)
BILIRUB SERPL-MCNC: 1.6 MG/DL — HIGH (ref 0.2–1.2)
BILIRUB UR-MCNC: NEGATIVE — SIGNIFICANT CHANGE UP
BILIRUB UR-MCNC: NEGATIVE — SIGNIFICANT CHANGE UP
BLOOD GAS ARTERIAL - LYTES,HGB,ICA,LACT RESULT: SIGNIFICANT CHANGE UP
BLOOD GAS ARTERIAL - LYTES,HGB,ICA,LACT RESULT: SIGNIFICANT CHANGE UP
BLOOD GAS ARTERIAL COMPREHENSIVE RESULT: SIGNIFICANT CHANGE UP
BUN SERPL-MCNC: 14 MG/DL — SIGNIFICANT CHANGE UP (ref 7–23)
BUN SERPL-MCNC: 17 MG/DL — SIGNIFICANT CHANGE UP (ref 7–23)
BUN SERPL-MCNC: 18 MG/DL — SIGNIFICANT CHANGE UP (ref 7–23)
BUN SERPL-MCNC: 19 MG/DL — SIGNIFICANT CHANGE UP (ref 7–23)
BUN SERPL-MCNC: 19 MG/DL — SIGNIFICANT CHANGE UP (ref 7–23)
BUN SERPL-MCNC: 22 MG/DL — SIGNIFICANT CHANGE UP (ref 7–23)
BUN SERPL-MCNC: 25 MG/DL — HIGH (ref 7–23)
BUN SERPL-MCNC: 26 MG/DL — HIGH (ref 7–23)
BUN SERPL-MCNC: 27 MG/DL — HIGH (ref 7–23)
BUN SERPL-MCNC: 28 MG/DL — HIGH (ref 7–23)
BUN SERPL-MCNC: 31 MG/DL — HIGH (ref 7–23)
BUN SERPL-MCNC: 36 MG/DL — HIGH (ref 7–23)
BUN SERPL-MCNC: 36 MG/DL — HIGH (ref 7–23)
BUN SERPL-MCNC: 37 MG/DL — HIGH (ref 7–23)
BUN SERPL-MCNC: 37 MG/DL — HIGH (ref 7–23)
BUN SERPL-MCNC: 38 MG/DL — HIGH (ref 7–23)
BUN SERPL-MCNC: 38 MG/DL — HIGH (ref 7–23)
BUN SERPL-MCNC: 39 MG/DL — HIGH (ref 7–23)
BUN SERPL-MCNC: 40 MG/DL — HIGH (ref 7–23)
BUN SERPL-MCNC: 40 MG/DL — HIGH (ref 7–23)
BUN SERPL-MCNC: 41 MG/DL — HIGH (ref 7–23)
BUN SERPL-MCNC: 42 MG/DL — HIGH (ref 7–23)
BUN SERPL-MCNC: 42 MG/DL — HIGH (ref 7–23)
BUN SERPL-MCNC: 43 MG/DL — HIGH (ref 7–23)
BUN SERPL-MCNC: 44 MG/DL — HIGH (ref 7–23)
BUN SERPL-MCNC: 45 MG/DL — HIGH (ref 7–23)
BUN SERPL-MCNC: 45 MG/DL — HIGH (ref 7–23)
BUN SERPL-MCNC: 46 MG/DL — HIGH (ref 7–23)
CA-I BLD-SCNC: 1.12 MMOL/L — LOW (ref 1.15–1.29)
CA-I SERPL-SCNC: 1.22 MMOL/L — SIGNIFICANT CHANGE UP (ref 1.15–1.33)
CALCIUM SERPL-MCNC: 7.4 MG/DL — LOW (ref 8.4–10.5)
CALCIUM SERPL-MCNC: 7.4 MG/DL — LOW (ref 8.4–10.5)
CALCIUM SERPL-MCNC: 7.5 MG/DL — LOW (ref 8.4–10.5)
CALCIUM SERPL-MCNC: 7.6 MG/DL — LOW (ref 8.4–10.5)
CALCIUM SERPL-MCNC: 7.7 MG/DL — LOW (ref 8.4–10.5)
CALCIUM SERPL-MCNC: 7.8 MG/DL — LOW (ref 8.4–10.5)
CALCIUM SERPL-MCNC: 7.8 MG/DL — LOW (ref 8.4–10.5)
CALCIUM SERPL-MCNC: 7.9 MG/DL — LOW (ref 8.4–10.5)
CALCIUM SERPL-MCNC: 8 MG/DL — LOW (ref 8.4–10.5)
CALCIUM SERPL-MCNC: 8.1 MG/DL — LOW (ref 8.4–10.5)
CALCIUM SERPL-MCNC: 8.2 MG/DL — LOW (ref 8.4–10.5)
CALCIUM SERPL-MCNC: 8.3 MG/DL — LOW (ref 8.4–10.5)
CALCIUM SERPL-MCNC: 8.3 MG/DL — LOW (ref 8.4–10.5)
CALCIUM SERPL-MCNC: 8.6 MG/DL — SIGNIFICANT CHANGE UP (ref 8.4–10.5)
CALCIUM SERPL-MCNC: 8.6 MG/DL — SIGNIFICANT CHANGE UP (ref 8.4–10.5)
CALCIUM SERPL-MCNC: 8.7 MG/DL — SIGNIFICANT CHANGE UP (ref 8.4–10.5)
CALCIUM SERPL-MCNC: 8.8 MG/DL — SIGNIFICANT CHANGE UP (ref 8.4–10.5)
CALCIUM SERPL-MCNC: 8.8 MG/DL — SIGNIFICANT CHANGE UP (ref 8.4–10.5)
CALCIUM SERPL-MCNC: 8.9 MG/DL — SIGNIFICANT CHANGE UP (ref 8.4–10.5)
CALCIUM SERPL-MCNC: 8.9 MG/DL — SIGNIFICANT CHANGE UP (ref 8.4–10.5)
CALCIUM SERPL-MCNC: 9 MG/DL — SIGNIFICANT CHANGE UP (ref 8.4–10.5)
CALCIUM SERPL-MCNC: 9.3 MG/DL — SIGNIFICANT CHANGE UP (ref 8.4–10.5)
CALCIUM SERPL-MCNC: 9.3 MG/DL — SIGNIFICANT CHANGE UP (ref 8.4–10.5)
CHLORIDE BLDV-SCNC: 98 MMOL/L — SIGNIFICANT CHANGE UP (ref 96–108)
CHLORIDE SERPL-SCNC: 100 MMOL/L — SIGNIFICANT CHANGE UP (ref 98–107)
CHLORIDE SERPL-SCNC: 101 MMOL/L — SIGNIFICANT CHANGE UP (ref 98–107)
CHLORIDE SERPL-SCNC: 86 MMOL/L — LOW (ref 98–107)
CHLORIDE SERPL-SCNC: 86 MMOL/L — LOW (ref 98–107)
CHLORIDE SERPL-SCNC: 87 MMOL/L — LOW (ref 98–107)
CHLORIDE SERPL-SCNC: 87 MMOL/L — LOW (ref 98–107)
CHLORIDE SERPL-SCNC: 88 MMOL/L — LOW (ref 98–107)
CHLORIDE SERPL-SCNC: 89 MMOL/L — LOW (ref 98–107)
CHLORIDE SERPL-SCNC: 90 MMOL/L — LOW (ref 98–107)
CHLORIDE SERPL-SCNC: 92 MMOL/L — LOW (ref 98–107)
CHLORIDE SERPL-SCNC: 93 MMOL/L — LOW (ref 98–107)
CHLORIDE SERPL-SCNC: 93 MMOL/L — LOW (ref 98–107)
CHLORIDE SERPL-SCNC: 95 MMOL/L — LOW (ref 98–107)
CHLORIDE SERPL-SCNC: 95 MMOL/L — LOW (ref 98–107)
CHLORIDE SERPL-SCNC: 96 MMOL/L — LOW (ref 98–107)
CHLORIDE SERPL-SCNC: 97 MMOL/L — LOW (ref 98–107)
CHLORIDE SERPL-SCNC: 97 MMOL/L — LOW (ref 98–107)
CHLORIDE SERPL-SCNC: 98 MMOL/L — SIGNIFICANT CHANGE UP (ref 98–107)
CHLORIDE SERPL-SCNC: 99 MMOL/L — SIGNIFICANT CHANGE UP (ref 98–107)
CK SERPL-CCNC: 1890 U/L — HIGH (ref 30–200)
CO2 BLDA-SCNC: 21 MMOL/L — SIGNIFICANT CHANGE UP (ref 19–24)
CO2 BLDA-SCNC: 21 MMOL/L — SIGNIFICANT CHANGE UP (ref 19–24)
CO2 BLDA-SCNC: 22 MMOL/L — SIGNIFICANT CHANGE UP (ref 19–24)
CO2 BLDA-SCNC: 22 MMOL/L — SIGNIFICANT CHANGE UP (ref 19–24)
CO2 BLDV-SCNC: 26.2 MMOL/L — HIGH (ref 22–26)
CO2 BLDV-SCNC: 26.2 MMOL/L — HIGH (ref 22–26)
CO2 SERPL-SCNC: 16 MMOL/L — LOW (ref 22–31)
CO2 SERPL-SCNC: 17 MMOL/L — LOW (ref 22–31)
CO2 SERPL-SCNC: 17 MMOL/L — LOW (ref 22–31)
CO2 SERPL-SCNC: 18 MMOL/L — LOW (ref 22–31)
CO2 SERPL-SCNC: 19 MMOL/L — LOW (ref 22–31)
CO2 SERPL-SCNC: 20 MMOL/L — LOW (ref 22–31)
CO2 SERPL-SCNC: 21 MMOL/L — LOW (ref 22–31)
CO2 SERPL-SCNC: 22 MMOL/L — SIGNIFICANT CHANGE UP (ref 22–31)
CO2 SERPL-SCNC: 23 MMOL/L — SIGNIFICANT CHANGE UP (ref 22–31)
CO2 SERPL-SCNC: 25 MMOL/L — SIGNIFICANT CHANGE UP (ref 22–31)
CO2 SERPL-SCNC: 27 MMOL/L — SIGNIFICANT CHANGE UP (ref 22–31)
COLOR SPEC: YELLOW — SIGNIFICANT CHANGE UP
COLOR SPEC: YELLOW — SIGNIFICANT CHANGE UP
CREAT SERPL-MCNC: 0.68 MG/DL — SIGNIFICANT CHANGE UP (ref 0.5–1.3)
CREAT SERPL-MCNC: 0.69 MG/DL — SIGNIFICANT CHANGE UP (ref 0.5–1.3)
CREAT SERPL-MCNC: 0.71 MG/DL — SIGNIFICANT CHANGE UP (ref 0.5–1.3)
CREAT SERPL-MCNC: 0.72 MG/DL — SIGNIFICANT CHANGE UP (ref 0.5–1.3)
CREAT SERPL-MCNC: 0.72 MG/DL — SIGNIFICANT CHANGE UP (ref 0.5–1.3)
CREAT SERPL-MCNC: 0.76 MG/DL — SIGNIFICANT CHANGE UP (ref 0.5–1.3)
CREAT SERPL-MCNC: 0.82 MG/DL — SIGNIFICANT CHANGE UP (ref 0.5–1.3)
CREAT SERPL-MCNC: 0.83 MG/DL — SIGNIFICANT CHANGE UP (ref 0.5–1.3)
CREAT SERPL-MCNC: 1.43 MG/DL — HIGH (ref 0.5–1.3)
CREAT SERPL-MCNC: 1.46 MG/DL — HIGH (ref 0.5–1.3)
CREAT SERPL-MCNC: 1.66 MG/DL — HIGH (ref 0.5–1.3)
CREAT SERPL-MCNC: 1.73 MG/DL — HIGH (ref 0.5–1.3)
CREAT SERPL-MCNC: 1.77 MG/DL — HIGH (ref 0.5–1.3)
CREAT SERPL-MCNC: 1.8 MG/DL — HIGH (ref 0.5–1.3)
CREAT SERPL-MCNC: 1.94 MG/DL — HIGH (ref 0.5–1.3)
CREAT SERPL-MCNC: 2.02 MG/DL — HIGH (ref 0.5–1.3)
CREAT SERPL-MCNC: 2.03 MG/DL — HIGH (ref 0.5–1.3)
CREAT SERPL-MCNC: 2.1 MG/DL — HIGH (ref 0.5–1.3)
CREAT SERPL-MCNC: 2.15 MG/DL — HIGH (ref 0.5–1.3)
CREAT SERPL-MCNC: 2.16 MG/DL — HIGH (ref 0.5–1.3)
CREAT SERPL-MCNC: 2.2 MG/DL — HIGH (ref 0.5–1.3)
CREAT SERPL-MCNC: 2.22 MG/DL — HIGH (ref 0.5–1.3)
CREAT SERPL-MCNC: 2.26 MG/DL — HIGH (ref 0.5–1.3)
CREAT SERPL-MCNC: 2.33 MG/DL — HIGH (ref 0.5–1.3)
CREAT SERPL-MCNC: 2.39 MG/DL — HIGH (ref 0.5–1.3)
CREAT SERPL-MCNC: 2.41 MG/DL — HIGH (ref 0.5–1.3)
CREAT SERPL-MCNC: 2.56 MG/DL — HIGH (ref 0.5–1.3)
CREAT SERPL-MCNC: 2.59 MG/DL — HIGH (ref 0.5–1.3)
CREAT SERPL-MCNC: 2.61 MG/DL — HIGH (ref 0.5–1.3)
CREAT SERPL-MCNC: 2.62 MG/DL — HIGH (ref 0.5–1.3)
CREAT SERPL-MCNC: 2.7 MG/DL — HIGH (ref 0.5–1.3)
CREAT SERPL-MCNC: 2.92 MG/DL — HIGH (ref 0.5–1.3)
CRP SERPL-MCNC: 116.3 MG/L — HIGH
CRP SERPL-MCNC: 131.9 MG/L — HIGH
CRP SERPL-MCNC: 96.4 MG/L — HIGH
CULTURE RESULTS: SIGNIFICANT CHANGE UP
D DIMER BLD IA.RAPID-MCNC: 1106 NG/ML DDU — HIGH
D DIMER BLD IA.RAPID-MCNC: 1179 NG/ML DDU — HIGH
D DIMER BLD IA.RAPID-MCNC: 1926 NG/ML DDU — HIGH
D DIMER BLD IA.RAPID-MCNC: 2670 NG/ML DDU — HIGH
D DIMER BLD IA.RAPID-MCNC: 3073 NG/ML DDU — HIGH
D DIMER BLD IA.RAPID-MCNC: 322 NG/ML DDU — HIGH
D DIMER BLD IA.RAPID-MCNC: 3352 NG/ML DDU — HIGH
D DIMER BLD IA.RAPID-MCNC: 429 NG/ML DDU — HIGH
D DIMER BLD IA.RAPID-MCNC: 788 NG/ML DDU — HIGH
DIFF PNL FLD: ABNORMAL
DIFF PNL FLD: ABNORMAL
EOSINOPHIL # BLD AUTO: 0 K/UL — SIGNIFICANT CHANGE UP (ref 0–0.5)
EOSINOPHIL # BLD AUTO: 0.01 K/UL — SIGNIFICANT CHANGE UP (ref 0–0.5)
EOSINOPHIL # BLD AUTO: 0.01 K/UL — SIGNIFICANT CHANGE UP (ref 0–0.5)
EOSINOPHIL # BLD AUTO: 0.02 K/UL — SIGNIFICANT CHANGE UP (ref 0–0.5)
EOSINOPHIL # BLD AUTO: 0.04 K/UL — SIGNIFICANT CHANGE UP (ref 0–0.5)
EOSINOPHIL # BLD AUTO: 0.04 K/UL — SIGNIFICANT CHANGE UP (ref 0–0.5)
EOSINOPHIL # BLD AUTO: 0.05 K/UL — SIGNIFICANT CHANGE UP (ref 0–0.5)
EOSINOPHIL # BLD AUTO: 0.06 K/UL — SIGNIFICANT CHANGE UP (ref 0–0.5)
EOSINOPHIL # BLD AUTO: 0.09 K/UL — SIGNIFICANT CHANGE UP (ref 0–0.5)
EOSINOPHIL # BLD AUTO: 0.1 K/UL — SIGNIFICANT CHANGE UP (ref 0–0.5)
EOSINOPHIL # BLD AUTO: 0.14 K/UL — SIGNIFICANT CHANGE UP (ref 0–0.5)
EOSINOPHIL # BLD AUTO: 0.41 K/UL — SIGNIFICANT CHANGE UP (ref 0–0.5)
EOSINOPHIL # BLD AUTO: 0.48 K/UL — SIGNIFICANT CHANGE UP (ref 0–0.5)
EOSINOPHIL # BLD AUTO: 0.67 K/UL — HIGH (ref 0–0.5)
EOSINOPHIL # BLD AUTO: 1.02 K/UL — HIGH (ref 0–0.5)
EOSINOPHIL # BLD AUTO: 1.59 K/UL — HIGH (ref 0–0.5)
EOSINOPHIL # BLD AUTO: 1.67 K/UL — HIGH (ref 0–0.5)
EOSINOPHIL # BLD AUTO: 2.24 K/UL — HIGH (ref 0–0.5)
EOSINOPHIL # BLD AUTO: 2.31 K/UL — HIGH (ref 0–0.5)
EOSINOPHIL # BLD AUTO: 2.39 K/UL — HIGH (ref 0–0.5)
EOSINOPHIL NFR BLD AUTO: 0 % — SIGNIFICANT CHANGE UP (ref 0–6)
EOSINOPHIL NFR BLD AUTO: 0.1 % — SIGNIFICANT CHANGE UP (ref 0–6)
EOSINOPHIL NFR BLD AUTO: 0.1 % — SIGNIFICANT CHANGE UP (ref 0–6)
EOSINOPHIL NFR BLD AUTO: 0.3 % — SIGNIFICANT CHANGE UP (ref 0–6)
EOSINOPHIL NFR BLD AUTO: 0.4 % — SIGNIFICANT CHANGE UP (ref 0–6)
EOSINOPHIL NFR BLD AUTO: 0.9 % — SIGNIFICANT CHANGE UP (ref 0–6)
EOSINOPHIL NFR BLD AUTO: 0.9 % — SIGNIFICANT CHANGE UP (ref 0–6)
EOSINOPHIL NFR BLD AUTO: 1.4 % — SIGNIFICANT CHANGE UP (ref 0–6)
EOSINOPHIL NFR BLD AUTO: 10.2 % — HIGH (ref 0–6)
EOSINOPHIL NFR BLD AUTO: 10.5 % — HIGH (ref 0–6)
EOSINOPHIL NFR BLD AUTO: 2.7 % — SIGNIFICANT CHANGE UP (ref 0–6)
EOSINOPHIL NFR BLD AUTO: 3.1 % — SIGNIFICANT CHANGE UP (ref 0–6)
EOSINOPHIL NFR BLD AUTO: 4.1 % — SIGNIFICANT CHANGE UP (ref 0–6)
EOSINOPHIL NFR BLD AUTO: 5.8 % — SIGNIFICANT CHANGE UP (ref 0–6)
EOSINOPHIL NFR BLD AUTO: 6 % — SIGNIFICANT CHANGE UP (ref 0–6)
EOSINOPHIL NFR BLD AUTO: 6.7 % — HIGH (ref 0–6)
EOSINOPHIL NFR BLD AUTO: 8.7 % — HIGH (ref 0–6)
EPI CELLS # UR: 5 /HPF — SIGNIFICANT CHANGE UP (ref 0–5)
EPI CELLS # UR: 5 /HPF — SIGNIFICANT CHANGE UP (ref 0–5)
FERRITIN SERPL-MCNC: 1058 NG/ML — HIGH (ref 30–400)
FERRITIN SERPL-MCNC: 1107 NG/ML — HIGH (ref 30–400)
FERRITIN SERPL-MCNC: 890 NG/ML — HIGH (ref 30–400)
GAS PNL BLDV: 128 MMOL/L — LOW (ref 136–145)
GAS PNL BLDV: SIGNIFICANT CHANGE UP
GLUCOSE BLDC GLUCOMTR-MCNC: 113 MG/DL — HIGH (ref 70–99)
GLUCOSE BLDC GLUCOMTR-MCNC: 131 MG/DL — HIGH (ref 70–99)
GLUCOSE BLDC GLUCOMTR-MCNC: 214 MG/DL — HIGH (ref 70–99)
GLUCOSE BLDC GLUCOMTR-MCNC: 91 MG/DL — SIGNIFICANT CHANGE UP (ref 70–99)
GLUCOSE BLDV-MCNC: 165 MG/DL — HIGH (ref 70–99)
GLUCOSE SERPL-MCNC: 101 MG/DL — HIGH (ref 70–99)
GLUCOSE SERPL-MCNC: 106 MG/DL — HIGH (ref 70–99)
GLUCOSE SERPL-MCNC: 107 MG/DL — HIGH (ref 70–99)
GLUCOSE SERPL-MCNC: 108 MG/DL — HIGH (ref 70–99)
GLUCOSE SERPL-MCNC: 109 MG/DL — HIGH (ref 70–99)
GLUCOSE SERPL-MCNC: 113 MG/DL — HIGH (ref 70–99)
GLUCOSE SERPL-MCNC: 115 MG/DL — HIGH (ref 70–99)
GLUCOSE SERPL-MCNC: 116 MG/DL — HIGH (ref 70–99)
GLUCOSE SERPL-MCNC: 116 MG/DL — HIGH (ref 70–99)
GLUCOSE SERPL-MCNC: 118 MG/DL — HIGH (ref 70–99)
GLUCOSE SERPL-MCNC: 123 MG/DL — HIGH (ref 70–99)
GLUCOSE SERPL-MCNC: 123 MG/DL — HIGH (ref 70–99)
GLUCOSE SERPL-MCNC: 124 MG/DL — HIGH (ref 70–99)
GLUCOSE SERPL-MCNC: 125 MG/DL — HIGH (ref 70–99)
GLUCOSE SERPL-MCNC: 125 MG/DL — HIGH (ref 70–99)
GLUCOSE SERPL-MCNC: 126 MG/DL — HIGH (ref 70–99)
GLUCOSE SERPL-MCNC: 126 MG/DL — HIGH (ref 70–99)
GLUCOSE SERPL-MCNC: 127 MG/DL — HIGH (ref 70–99)
GLUCOSE SERPL-MCNC: 131 MG/DL — HIGH (ref 70–99)
GLUCOSE SERPL-MCNC: 132 MG/DL — HIGH (ref 70–99)
GLUCOSE SERPL-MCNC: 133 MG/DL — HIGH (ref 70–99)
GLUCOSE SERPL-MCNC: 134 MG/DL — HIGH (ref 70–99)
GLUCOSE SERPL-MCNC: 136 MG/DL — HIGH (ref 70–99)
GLUCOSE SERPL-MCNC: 136 MG/DL — HIGH (ref 70–99)
GLUCOSE SERPL-MCNC: 140 MG/DL — HIGH (ref 70–99)
GLUCOSE SERPL-MCNC: 141 MG/DL — HIGH (ref 70–99)
GLUCOSE SERPL-MCNC: 141 MG/DL — HIGH (ref 70–99)
GLUCOSE SERPL-MCNC: 145 MG/DL — HIGH (ref 70–99)
GLUCOSE SERPL-MCNC: 146 MG/DL — HIGH (ref 70–99)
GLUCOSE SERPL-MCNC: 151 MG/DL — HIGH (ref 70–99)
GLUCOSE SERPL-MCNC: 160 MG/DL — HIGH (ref 70–99)
GLUCOSE SERPL-MCNC: 95 MG/DL — SIGNIFICANT CHANGE UP (ref 70–99)
GLUCOSE UR QL: NEGATIVE — SIGNIFICANT CHANGE UP
GLUCOSE UR QL: NEGATIVE — SIGNIFICANT CHANGE UP
GRAM STN FLD: SIGNIFICANT CHANGE UP
GRAM STN FLD: SIGNIFICANT CHANGE UP
GRAN CASTS # UR COMP ASSIST: 2 /LPF — HIGH
HCO3 BLDA-SCNC: 20 MMOL/L — LOW (ref 21–28)
HCO3 BLDV-SCNC: 25 MMOL/L — SIGNIFICANT CHANGE UP (ref 22–29)
HCO3 BLDV-SCNC: 25 MMOL/L — SIGNIFICANT CHANGE UP (ref 22–29)
HCT VFR BLD CALC: 35.7 % — LOW (ref 39–50)
HCT VFR BLD CALC: 36.5 % — LOW (ref 39–50)
HCT VFR BLD CALC: 36.7 % — LOW (ref 39–50)
HCT VFR BLD CALC: 38.3 % — LOW (ref 39–50)
HCT VFR BLD CALC: 38.7 % — LOW (ref 39–50)
HCT VFR BLD CALC: 39.2 % — SIGNIFICANT CHANGE UP (ref 39–50)
HCT VFR BLD CALC: 39.8 % — SIGNIFICANT CHANGE UP (ref 39–50)
HCT VFR BLD CALC: 39.9 % — SIGNIFICANT CHANGE UP (ref 39–50)
HCT VFR BLD CALC: 40 % — SIGNIFICANT CHANGE UP (ref 39–50)
HCT VFR BLD CALC: 40.7 % — SIGNIFICANT CHANGE UP (ref 39–50)
HCT VFR BLD CALC: 40.9 % — SIGNIFICANT CHANGE UP (ref 39–50)
HCT VFR BLD CALC: 44.8 % — SIGNIFICANT CHANGE UP (ref 39–50)
HCT VFR BLD CALC: 45.3 % — SIGNIFICANT CHANGE UP (ref 39–50)
HCT VFR BLD CALC: 46.7 % — SIGNIFICANT CHANGE UP (ref 39–50)
HCT VFR BLD CALC: 47.5 % — SIGNIFICANT CHANGE UP (ref 39–50)
HCT VFR BLD CALC: 47.8 % — SIGNIFICANT CHANGE UP (ref 39–50)
HCT VFR BLD CALC: 48.2 % — SIGNIFICANT CHANGE UP (ref 39–50)
HCT VFR BLD CALC: 49 % — SIGNIFICANT CHANGE UP (ref 39–50)
HCT VFR BLD CALC: 49.8 % — SIGNIFICANT CHANGE UP (ref 39–50)
HCT VFR BLD CALC: 51 % — HIGH (ref 39–50)
HCT VFR BLD CALC: 51.6 % — HIGH (ref 39–50)
HCT VFR BLD CALC: 53.5 % — HIGH (ref 39–50)
HCT VFR BLD CALC: 53.7 % — HIGH (ref 39–50)
HCT VFR BLD CALC: 55.1 % — HIGH (ref 39–50)
HCT VFR BLDA CALC: 54 % — HIGH (ref 39–51)
HGB BLD CALC-MCNC: 18 G/DL — HIGH (ref 13–17)
HGB BLD-MCNC: 11.9 G/DL — LOW (ref 13–17)
HGB BLD-MCNC: 12.1 G/DL — LOW (ref 13–17)
HGB BLD-MCNC: 12.1 G/DL — LOW (ref 13–17)
HGB BLD-MCNC: 12.2 G/DL — LOW (ref 13–17)
HGB BLD-MCNC: 13 G/DL — SIGNIFICANT CHANGE UP (ref 13–17)
HGB BLD-MCNC: 13.1 G/DL — SIGNIFICANT CHANGE UP (ref 13–17)
HGB BLD-MCNC: 13.2 G/DL — SIGNIFICANT CHANGE UP (ref 13–17)
HGB BLD-MCNC: 13.3 G/DL — SIGNIFICANT CHANGE UP (ref 13–17)
HGB BLD-MCNC: 13.4 G/DL — SIGNIFICANT CHANGE UP (ref 13–17)
HGB BLD-MCNC: 14 G/DL — SIGNIFICANT CHANGE UP (ref 13–17)
HGB BLD-MCNC: 14.7 G/DL — SIGNIFICANT CHANGE UP (ref 13–17)
HGB BLD-MCNC: 15.5 G/DL — SIGNIFICANT CHANGE UP (ref 13–17)
HGB BLD-MCNC: 16.1 G/DL — SIGNIFICANT CHANGE UP (ref 13–17)
HGB BLD-MCNC: 16.3 G/DL — SIGNIFICANT CHANGE UP (ref 13–17)
HGB BLD-MCNC: 16.7 G/DL — SIGNIFICANT CHANGE UP (ref 13–17)
HGB BLD-MCNC: 17.7 G/DL — HIGH (ref 13–17)
HGB BLD-MCNC: 17.9 G/DL — HIGH (ref 13–17)
HGB BLD-MCNC: 18.5 G/DL — HIGH (ref 13–17)
HGB BLD-MCNC: 18.8 G/DL — HIGH (ref 13–17)
HYALINE CASTS # UR AUTO: 10 /LPF — SIGNIFICANT CHANGE UP (ref 0–7)
HYALINE CASTS # UR AUTO: 6 /LPF — SIGNIFICANT CHANGE UP (ref 0–7)
IANC: 10.86 K/UL — HIGH (ref 1.5–8.5)
IANC: 11.02 K/UL — HIGH (ref 1.5–8.5)
IANC: 11.86 K/UL — HIGH (ref 1.5–8.5)
IANC: 12.13 K/UL — HIGH (ref 1.5–8.5)
IANC: 12.13 K/UL — HIGH (ref 1.5–8.5)
IANC: 12.37 K/UL — HIGH (ref 1.5–8.5)
IANC: 13.95 K/UL — HIGH (ref 1.5–8.5)
IANC: 14.11 K/UL — HIGH (ref 1.5–8.5)
IANC: 15.09 K/UL — HIGH (ref 1.5–8.5)
IANC: 15.63 K/UL — HIGH (ref 1.5–8.5)
IANC: 15.8 K/UL — HIGH (ref 1.5–8.5)
IANC: 17.48 K/UL — HIGH (ref 1.5–8.5)
IANC: 17.77 K/UL — HIGH (ref 1.5–8.5)
IANC: 18.27 K/UL — HIGH (ref 1.5–8.5)
IANC: 19.77 K/UL — HIGH (ref 1.5–8.5)
IANC: 23.32 K/UL — HIGH (ref 1.5–8.5)
IANC: 25.71 K/UL — HIGH (ref 1.5–8.5)
IANC: 6.44 K/UL — SIGNIFICANT CHANGE UP (ref 1.5–8.5)
IANC: 7.88 K/UL — SIGNIFICANT CHANGE UP (ref 1.5–8.5)
IANC: 8.7 K/UL — HIGH (ref 1.5–8.5)
IANC: 8.79 K/UL — HIGH (ref 1.5–8.5)
IANC: 9.53 K/UL — HIGH (ref 1.5–8.5)
IMM GRANULOCYTES NFR BLD AUTO: 0.8 % — SIGNIFICANT CHANGE UP (ref 0–1.5)
IMM GRANULOCYTES NFR BLD AUTO: 0.9 % — SIGNIFICANT CHANGE UP (ref 0–1.5)
IMM GRANULOCYTES NFR BLD AUTO: 0.9 % — SIGNIFICANT CHANGE UP (ref 0–1.5)
IMM GRANULOCYTES NFR BLD AUTO: 1 % — SIGNIFICANT CHANGE UP (ref 0–1.5)
IMM GRANULOCYTES NFR BLD AUTO: 1.1 % — SIGNIFICANT CHANGE UP (ref 0–1.5)
IMM GRANULOCYTES NFR BLD AUTO: 1.1 % — SIGNIFICANT CHANGE UP (ref 0–1.5)
IMM GRANULOCYTES NFR BLD AUTO: 1.2 % — SIGNIFICANT CHANGE UP (ref 0–1.5)
IMM GRANULOCYTES NFR BLD AUTO: 1.3 % — SIGNIFICANT CHANGE UP (ref 0–1.5)
IMM GRANULOCYTES NFR BLD AUTO: 1.4 % — SIGNIFICANT CHANGE UP (ref 0–1.5)
IMM GRANULOCYTES NFR BLD AUTO: 1.4 % — SIGNIFICANT CHANGE UP (ref 0–1.5)
IMM GRANULOCYTES NFR BLD AUTO: 10.4 % — HIGH (ref 0–1.5)
IMM GRANULOCYTES NFR BLD AUTO: 11.7 % — HIGH (ref 0–1.5)
IMM GRANULOCYTES NFR BLD AUTO: 13.2 % — HIGH (ref 0–1.5)
IMM GRANULOCYTES NFR BLD AUTO: 16.6 % — HIGH (ref 0–1.5)
IMM GRANULOCYTES NFR BLD AUTO: 16.8 % — HIGH (ref 0–1.5)
IMM GRANULOCYTES NFR BLD AUTO: 16.9 % — HIGH (ref 0–1.5)
IMM GRANULOCYTES NFR BLD AUTO: 2.4 % — HIGH (ref 0–1.5)
IMM GRANULOCYTES NFR BLD AUTO: 3.5 % — HIGH (ref 0–1.5)
IMM GRANULOCYTES NFR BLD AUTO: 7 % — HIGH (ref 0–1.5)
INR BLD: 1.08 RATIO — SIGNIFICANT CHANGE UP (ref 0.88–1.16)
INR BLD: 1.1 RATIO — SIGNIFICANT CHANGE UP (ref 0.88–1.16)
INR BLD: 1.12 RATIO — SIGNIFICANT CHANGE UP (ref 0.88–1.16)
INR BLD: 1.13 RATIO — SIGNIFICANT CHANGE UP (ref 0.88–1.16)
INR BLD: 1.13 RATIO — SIGNIFICANT CHANGE UP (ref 0.88–1.16)
INR BLD: 1.14 RATIO — SIGNIFICANT CHANGE UP (ref 0.88–1.16)
INR BLD: 1.16 RATIO — SIGNIFICANT CHANGE UP (ref 0.88–1.16)
INR BLD: 1.19 RATIO — HIGH (ref 0.88–1.16)
KETONES UR-MCNC: NEGATIVE — SIGNIFICANT CHANGE UP
KETONES UR-MCNC: NEGATIVE — SIGNIFICANT CHANGE UP
LACTATE BLDV-MCNC: 1.6 MMOL/L — SIGNIFICANT CHANGE UP (ref 0.5–2)
LDH SERPL L TO P-CCNC: 421 U/L — HIGH (ref 135–225)
LDH SERPL L TO P-CCNC: 528 U/L — HIGH (ref 135–225)
LDH SERPL L TO P-CCNC: 593 U/L — HIGH (ref 135–225)
LEUKOCYTE ESTERASE UR-ACNC: ABNORMAL
LEUKOCYTE ESTERASE UR-ACNC: NEGATIVE — SIGNIFICANT CHANGE UP
LYMPHOCYTES # BLD AUTO: 0.28 K/UL — LOW (ref 1–3.3)
LYMPHOCYTES # BLD AUTO: 0.32 K/UL — LOW (ref 1–3.3)
LYMPHOCYTES # BLD AUTO: 0.32 K/UL — LOW (ref 1–3.3)
LYMPHOCYTES # BLD AUTO: 0.33 K/UL — LOW (ref 1–3.3)
LYMPHOCYTES # BLD AUTO: 0.38 K/UL — LOW (ref 1–3.3)
LYMPHOCYTES # BLD AUTO: 0.41 K/UL — LOW (ref 1–3.3)
LYMPHOCYTES # BLD AUTO: 0.43 K/UL — LOW (ref 1–3.3)
LYMPHOCYTES # BLD AUTO: 0.47 K/UL — LOW (ref 1–3.3)
LYMPHOCYTES # BLD AUTO: 0.57 K/UL — LOW (ref 1–3.3)
LYMPHOCYTES # BLD AUTO: 0.63 K/UL — LOW (ref 1–3.3)
LYMPHOCYTES # BLD AUTO: 0.66 K/UL — LOW (ref 1–3.3)
LYMPHOCYTES # BLD AUTO: 0.66 K/UL — LOW (ref 1–3.3)
LYMPHOCYTES # BLD AUTO: 0.72 K/UL — LOW (ref 1–3.3)
LYMPHOCYTES # BLD AUTO: 0.77 K/UL — LOW (ref 1–3.3)
LYMPHOCYTES # BLD AUTO: 0.78 K/UL — LOW (ref 1–3.3)
LYMPHOCYTES # BLD AUTO: 0.91 K/UL — LOW (ref 1–3.3)
LYMPHOCYTES # BLD AUTO: 1 K/UL — SIGNIFICANT CHANGE UP (ref 1–3.3)
LYMPHOCYTES # BLD AUTO: 1.02 K/UL — SIGNIFICANT CHANGE UP (ref 1–3.3)
LYMPHOCYTES # BLD AUTO: 1.13 K/UL — SIGNIFICANT CHANGE UP (ref 1–3.3)
LYMPHOCYTES # BLD AUTO: 1.2 K/UL — SIGNIFICANT CHANGE UP (ref 1–3.3)
LYMPHOCYTES # BLD AUTO: 1.21 K/UL — SIGNIFICANT CHANGE UP (ref 1–3.3)
LYMPHOCYTES # BLD AUTO: 1.27 K/UL — SIGNIFICANT CHANGE UP (ref 1–3.3)
LYMPHOCYTES # BLD AUTO: 1.8 % — LOW (ref 13–44)
LYMPHOCYTES # BLD AUTO: 1.8 % — LOW (ref 13–44)
LYMPHOCYTES # BLD AUTO: 2.4 % — LOW (ref 13–44)
LYMPHOCYTES # BLD AUTO: 3.1 % — LOW (ref 13–44)
LYMPHOCYTES # BLD AUTO: 3.2 % — LOW (ref 13–44)
LYMPHOCYTES # BLD AUTO: 3.3 % — LOW (ref 13–44)
LYMPHOCYTES # BLD AUTO: 3.3 % — LOW (ref 13–44)
LYMPHOCYTES # BLD AUTO: 3.6 % — LOW (ref 13–44)
LYMPHOCYTES # BLD AUTO: 3.6 % — LOW (ref 13–44)
LYMPHOCYTES # BLD AUTO: 3.8 % — LOW (ref 13–44)
LYMPHOCYTES # BLD AUTO: 3.9 % — LOW (ref 13–44)
LYMPHOCYTES # BLD AUTO: 4.1 % — LOW (ref 13–44)
LYMPHOCYTES # BLD AUTO: 4.1 % — LOW (ref 13–44)
LYMPHOCYTES # BLD AUTO: 4.4 % — LOW (ref 13–44)
LYMPHOCYTES # BLD AUTO: 4.4 % — LOW (ref 13–44)
LYMPHOCYTES # BLD AUTO: 4.5 % — LOW (ref 13–44)
LYMPHOCYTES # BLD AUTO: 4.7 % — LOW (ref 13–44)
LYMPHOCYTES # BLD AUTO: 5 % — LOW (ref 13–44)
LYMPHOCYTES # BLD AUTO: 5.3 % — LOW (ref 13–44)
LYMPHOCYTES # BLD AUTO: 5.3 % — LOW (ref 13–44)
LYMPHOCYTES # BLD AUTO: 6.7 % — LOW (ref 13–44)
LYMPHOCYTES # BLD AUTO: 7.3 % — LOW (ref 13–44)
MAGNESIUM SERPL-MCNC: 1.6 MG/DL — SIGNIFICANT CHANGE UP (ref 1.6–2.6)
MAGNESIUM SERPL-MCNC: 1.6 MG/DL — SIGNIFICANT CHANGE UP (ref 1.6–2.6)
MAGNESIUM SERPL-MCNC: 1.8 MG/DL — SIGNIFICANT CHANGE UP (ref 1.6–2.6)
MAGNESIUM SERPL-MCNC: 1.9 MG/DL — SIGNIFICANT CHANGE UP (ref 1.6–2.6)
MAGNESIUM SERPL-MCNC: 2 MG/DL — SIGNIFICANT CHANGE UP (ref 1.6–2.6)
MAGNESIUM SERPL-MCNC: 2.1 MG/DL — SIGNIFICANT CHANGE UP (ref 1.6–2.6)
MAGNESIUM SERPL-MCNC: 2.2 MG/DL — SIGNIFICANT CHANGE UP (ref 1.6–2.6)
MAGNESIUM SERPL-MCNC: 2.2 MG/DL — SIGNIFICANT CHANGE UP (ref 1.6–2.6)
MAGNESIUM SERPL-MCNC: 2.3 MG/DL — SIGNIFICANT CHANGE UP (ref 1.6–2.6)
MAGNESIUM SERPL-MCNC: 2.5 MG/DL — SIGNIFICANT CHANGE UP (ref 1.6–2.6)
MCHC RBC-ENTMCNC: 28.8 PG — SIGNIFICANT CHANGE UP (ref 27–34)
MCHC RBC-ENTMCNC: 29 PG — SIGNIFICANT CHANGE UP (ref 27–34)
MCHC RBC-ENTMCNC: 29.1 PG — SIGNIFICANT CHANGE UP (ref 27–34)
MCHC RBC-ENTMCNC: 29.1 PG — SIGNIFICANT CHANGE UP (ref 27–34)
MCHC RBC-ENTMCNC: 29.2 PG — SIGNIFICANT CHANGE UP (ref 27–34)
MCHC RBC-ENTMCNC: 29.3 PG — SIGNIFICANT CHANGE UP (ref 27–34)
MCHC RBC-ENTMCNC: 29.3 PG — SIGNIFICANT CHANGE UP (ref 27–34)
MCHC RBC-ENTMCNC: 29.4 PG — SIGNIFICANT CHANGE UP (ref 27–34)
MCHC RBC-ENTMCNC: 29.5 PG — SIGNIFICANT CHANGE UP (ref 27–34)
MCHC RBC-ENTMCNC: 29.6 PG — SIGNIFICANT CHANGE UP (ref 27–34)
MCHC RBC-ENTMCNC: 29.7 PG — SIGNIFICANT CHANGE UP (ref 27–34)
MCHC RBC-ENTMCNC: 29.8 PG — SIGNIFICANT CHANGE UP (ref 27–34)
MCHC RBC-ENTMCNC: 29.8 PG — SIGNIFICANT CHANGE UP (ref 27–34)
MCHC RBC-ENTMCNC: 29.9 PG — SIGNIFICANT CHANGE UP (ref 27–34)
MCHC RBC-ENTMCNC: 29.9 PG — SIGNIFICANT CHANGE UP (ref 27–34)
MCHC RBC-ENTMCNC: 30.9 GM/DL — LOW (ref 32–36)
MCHC RBC-ENTMCNC: 31.5 GM/DL — LOW (ref 32–36)
MCHC RBC-ENTMCNC: 31.8 GM/DL — LOW (ref 32–36)
MCHC RBC-ENTMCNC: 32.3 GM/DL — SIGNIFICANT CHANGE UP (ref 32–36)
MCHC RBC-ENTMCNC: 32.4 GM/DL — SIGNIFICANT CHANGE UP (ref 32–36)
MCHC RBC-ENTMCNC: 32.7 GM/DL — SIGNIFICANT CHANGE UP (ref 32–36)
MCHC RBC-ENTMCNC: 32.7 GM/DL — SIGNIFICANT CHANGE UP (ref 32–36)
MCHC RBC-ENTMCNC: 32.8 GM/DL — SIGNIFICANT CHANGE UP (ref 32–36)
MCHC RBC-ENTMCNC: 32.8 GM/DL — SIGNIFICANT CHANGE UP (ref 32–36)
MCHC RBC-ENTMCNC: 33 GM/DL — SIGNIFICANT CHANGE UP (ref 32–36)
MCHC RBC-ENTMCNC: 33.2 GM/DL — SIGNIFICANT CHANGE UP (ref 32–36)
MCHC RBC-ENTMCNC: 33.3 GM/DL — SIGNIFICANT CHANGE UP (ref 32–36)
MCHC RBC-ENTMCNC: 33.4 GM/DL — SIGNIFICANT CHANGE UP (ref 32–36)
MCHC RBC-ENTMCNC: 33.6 GM/DL — SIGNIFICANT CHANGE UP (ref 32–36)
MCHC RBC-ENTMCNC: 33.9 GM/DL — SIGNIFICANT CHANGE UP (ref 32–36)
MCHC RBC-ENTMCNC: 33.9 GM/DL — SIGNIFICANT CHANGE UP (ref 32–36)
MCHC RBC-ENTMCNC: 34.1 GM/DL — SIGNIFICANT CHANGE UP (ref 32–36)
MCHC RBC-ENTMCNC: 34.3 GM/DL — SIGNIFICANT CHANGE UP (ref 32–36)
MCHC RBC-ENTMCNC: 34.5 GM/DL — SIGNIFICANT CHANGE UP (ref 32–36)
MCHC RBC-ENTMCNC: 34.6 GM/DL — SIGNIFICANT CHANGE UP (ref 32–36)
MCV RBC AUTO: 85.1 FL — SIGNIFICANT CHANGE UP (ref 80–100)
MCV RBC AUTO: 85.2 FL — SIGNIFICANT CHANGE UP (ref 80–100)
MCV RBC AUTO: 85.9 FL — SIGNIFICANT CHANGE UP (ref 80–100)
MCV RBC AUTO: 86.2 FL — SIGNIFICANT CHANGE UP (ref 80–100)
MCV RBC AUTO: 86.6 FL — SIGNIFICANT CHANGE UP (ref 80–100)
MCV RBC AUTO: 86.7 FL — SIGNIFICANT CHANGE UP (ref 80–100)
MCV RBC AUTO: 87.8 FL — SIGNIFICANT CHANGE UP (ref 80–100)
MCV RBC AUTO: 87.8 FL — SIGNIFICANT CHANGE UP (ref 80–100)
MCV RBC AUTO: 88.1 FL — SIGNIFICANT CHANGE UP (ref 80–100)
MCV RBC AUTO: 88.5 FL — SIGNIFICANT CHANGE UP (ref 80–100)
MCV RBC AUTO: 88.6 FL — SIGNIFICANT CHANGE UP (ref 80–100)
MCV RBC AUTO: 89.2 FL — SIGNIFICANT CHANGE UP (ref 80–100)
MCV RBC AUTO: 89.4 FL — SIGNIFICANT CHANGE UP (ref 80–100)
MCV RBC AUTO: 89.8 FL — SIGNIFICANT CHANGE UP (ref 80–100)
MCV RBC AUTO: 90.1 FL — SIGNIFICANT CHANGE UP (ref 80–100)
MCV RBC AUTO: 90.4 FL — SIGNIFICANT CHANGE UP (ref 80–100)
MCV RBC AUTO: 90.7 FL — SIGNIFICANT CHANGE UP (ref 80–100)
MCV RBC AUTO: 90.8 FL — SIGNIFICANT CHANGE UP (ref 80–100)
MCV RBC AUTO: 91.1 FL — SIGNIFICANT CHANGE UP (ref 80–100)
MCV RBC AUTO: 91.9 FL — SIGNIFICANT CHANGE UP (ref 80–100)
MCV RBC AUTO: 93 FL — SIGNIFICANT CHANGE UP (ref 80–100)
MCV RBC AUTO: 95.2 FL — SIGNIFICANT CHANGE UP (ref 80–100)
MONOCYTES # BLD AUTO: 0.27 K/UL — SIGNIFICANT CHANGE UP (ref 0–0.9)
MONOCYTES # BLD AUTO: 0.31 K/UL — SIGNIFICANT CHANGE UP (ref 0–0.9)
MONOCYTES # BLD AUTO: 0.37 K/UL — SIGNIFICANT CHANGE UP (ref 0–0.9)
MONOCYTES # BLD AUTO: 0.5 K/UL — SIGNIFICANT CHANGE UP (ref 0–0.9)
MONOCYTES # BLD AUTO: 0.5 K/UL — SIGNIFICANT CHANGE UP (ref 0–0.9)
MONOCYTES # BLD AUTO: 0.56 K/UL — SIGNIFICANT CHANGE UP (ref 0–0.9)
MONOCYTES # BLD AUTO: 0.6 K/UL — SIGNIFICANT CHANGE UP (ref 0–0.9)
MONOCYTES # BLD AUTO: 0.64 K/UL — SIGNIFICANT CHANGE UP (ref 0–0.9)
MONOCYTES # BLD AUTO: 0.73 K/UL — SIGNIFICANT CHANGE UP (ref 0–0.9)
MONOCYTES # BLD AUTO: 0.81 K/UL — SIGNIFICANT CHANGE UP (ref 0–0.9)
MONOCYTES # BLD AUTO: 0.82 K/UL — SIGNIFICANT CHANGE UP (ref 0–0.9)
MONOCYTES # BLD AUTO: 0.86 K/UL — SIGNIFICANT CHANGE UP (ref 0–0.9)
MONOCYTES # BLD AUTO: 0.89 K/UL — SIGNIFICANT CHANGE UP (ref 0–0.9)
MONOCYTES # BLD AUTO: 0.99 K/UL — HIGH (ref 0–0.9)
MONOCYTES # BLD AUTO: 1.04 K/UL — HIGH (ref 0–0.9)
MONOCYTES # BLD AUTO: 1.05 K/UL — HIGH (ref 0–0.9)
MONOCYTES # BLD AUTO: 1.06 K/UL — HIGH (ref 0–0.9)
MONOCYTES # BLD AUTO: 1.1 K/UL — HIGH (ref 0–0.9)
MONOCYTES # BLD AUTO: 1.25 K/UL — HIGH (ref 0–0.9)
MONOCYTES # BLD AUTO: 1.37 K/UL — HIGH (ref 0–0.9)
MONOCYTES # BLD AUTO: 1.4 K/UL — HIGH (ref 0–0.9)
MONOCYTES # BLD AUTO: 1.61 K/UL — HIGH (ref 0–0.9)
MONOCYTES NFR BLD AUTO: 1.8 % — LOW (ref 2–14)
MONOCYTES NFR BLD AUTO: 3.2 % — SIGNIFICANT CHANGE UP (ref 2–14)
MONOCYTES NFR BLD AUTO: 4.2 % — SIGNIFICANT CHANGE UP (ref 2–14)
MONOCYTES NFR BLD AUTO: 4.4 % — SIGNIFICANT CHANGE UP (ref 2–14)
MONOCYTES NFR BLD AUTO: 4.4 % — SIGNIFICANT CHANGE UP (ref 2–14)
MONOCYTES NFR BLD AUTO: 4.6 % — SIGNIFICANT CHANGE UP (ref 2–14)
MONOCYTES NFR BLD AUTO: 4.7 % — SIGNIFICANT CHANGE UP (ref 2–14)
MONOCYTES NFR BLD AUTO: 4.7 % — SIGNIFICANT CHANGE UP (ref 2–14)
MONOCYTES NFR BLD AUTO: 4.8 % — SIGNIFICANT CHANGE UP (ref 2–14)
MONOCYTES NFR BLD AUTO: 5 % — SIGNIFICANT CHANGE UP (ref 2–14)
MONOCYTES NFR BLD AUTO: 5 % — SIGNIFICANT CHANGE UP (ref 2–14)
MONOCYTES NFR BLD AUTO: 5.2 % — SIGNIFICANT CHANGE UP (ref 2–14)
MONOCYTES NFR BLD AUTO: 5.2 % — SIGNIFICANT CHANGE UP (ref 2–14)
MONOCYTES NFR BLD AUTO: 5.3 % — SIGNIFICANT CHANGE UP (ref 2–14)
MONOCYTES NFR BLD AUTO: 5.3 % — SIGNIFICANT CHANGE UP (ref 2–14)
MONOCYTES NFR BLD AUTO: 5.4 % — SIGNIFICANT CHANGE UP (ref 2–14)
MONOCYTES NFR BLD AUTO: 5.4 % — SIGNIFICANT CHANGE UP (ref 2–14)
MONOCYTES NFR BLD AUTO: 5.6 % — SIGNIFICANT CHANGE UP (ref 2–14)
MONOCYTES NFR BLD AUTO: 6.3 % — SIGNIFICANT CHANGE UP (ref 2–14)
MONOCYTES NFR BLD AUTO: 6.5 % — SIGNIFICANT CHANGE UP (ref 2–14)
MRSA PCR RESULT.: SIGNIFICANT CHANGE UP
NEUTROPHILS # BLD AUTO: 10.86 K/UL — HIGH (ref 1.8–7.4)
NEUTROPHILS # BLD AUTO: 11.02 K/UL — HIGH (ref 1.8–7.4)
NEUTROPHILS # BLD AUTO: 11.86 K/UL — HIGH (ref 1.8–7.4)
NEUTROPHILS # BLD AUTO: 12.13 K/UL — HIGH (ref 1.8–7.4)
NEUTROPHILS # BLD AUTO: 12.37 K/UL — HIGH (ref 1.8–7.4)
NEUTROPHILS # BLD AUTO: 13.48 K/UL — HIGH (ref 1.8–7.4)
NEUTROPHILS # BLD AUTO: 13.95 K/UL — HIGH (ref 1.8–7.4)
NEUTROPHILS # BLD AUTO: 14.11 K/UL — HIGH (ref 1.8–7.4)
NEUTROPHILS # BLD AUTO: 15.63 K/UL — HIGH (ref 1.8–7.4)
NEUTROPHILS # BLD AUTO: 15.8 K/UL — HIGH (ref 1.8–7.4)
NEUTROPHILS # BLD AUTO: 17 K/UL — HIGH (ref 1.8–7.4)
NEUTROPHILS # BLD AUTO: 17.48 K/UL — HIGH (ref 1.8–7.4)
NEUTROPHILS # BLD AUTO: 17.77 K/UL — HIGH (ref 1.8–7.4)
NEUTROPHILS # BLD AUTO: 18.27 K/UL — HIGH (ref 1.8–7.4)
NEUTROPHILS # BLD AUTO: 19.77 K/UL — HIGH (ref 1.8–7.4)
NEUTROPHILS # BLD AUTO: 23.89 K/UL — HIGH (ref 1.8–7.4)
NEUTROPHILS # BLD AUTO: 25.71 K/UL — HIGH (ref 1.8–7.4)
NEUTROPHILS # BLD AUTO: 6.44 K/UL — SIGNIFICANT CHANGE UP (ref 1.8–7.4)
NEUTROPHILS # BLD AUTO: 7.88 K/UL — HIGH (ref 1.8–7.4)
NEUTROPHILS # BLD AUTO: 8.7 K/UL — HIGH (ref 1.8–7.4)
NEUTROPHILS # BLD AUTO: 8.79 K/UL — HIGH (ref 1.8–7.4)
NEUTROPHILS # BLD AUTO: 9.53 K/UL — HIGH (ref 1.8–7.4)
NEUTROPHILS NFR BLD AUTO: 65 % — SIGNIFICANT CHANGE UP (ref 43–77)
NEUTROPHILS NFR BLD AUTO: 66.5 % — SIGNIFICANT CHANGE UP (ref 43–77)
NEUTROPHILS NFR BLD AUTO: 68.2 % — SIGNIFICANT CHANGE UP (ref 43–77)
NEUTROPHILS NFR BLD AUTO: 68.6 % — SIGNIFICANT CHANGE UP (ref 43–77)
NEUTROPHILS NFR BLD AUTO: 69 % — SIGNIFICANT CHANGE UP (ref 43–77)
NEUTROPHILS NFR BLD AUTO: 70.7 % — SIGNIFICANT CHANGE UP (ref 43–77)
NEUTROPHILS NFR BLD AUTO: 73.5 % — SIGNIFICANT CHANGE UP (ref 43–77)
NEUTROPHILS NFR BLD AUTO: 78.7 % — HIGH (ref 43–77)
NEUTROPHILS NFR BLD AUTO: 85.3 % — HIGH (ref 43–77)
NEUTROPHILS NFR BLD AUTO: 86.9 % — HIGH (ref 43–77)
NEUTROPHILS NFR BLD AUTO: 87.8 % — HIGH (ref 43–77)
NEUTROPHILS NFR BLD AUTO: 87.8 % — HIGH (ref 43–77)
NEUTROPHILS NFR BLD AUTO: 88.4 % — HIGH (ref 43–77)
NEUTROPHILS NFR BLD AUTO: 88.7 % — HIGH (ref 43–77)
NEUTROPHILS NFR BLD AUTO: 89.3 % — HIGH (ref 43–77)
NEUTROPHILS NFR BLD AUTO: 89.4 % — HIGH (ref 43–77)
NEUTROPHILS NFR BLD AUTO: 89.8 % — HIGH (ref 43–77)
NEUTROPHILS NFR BLD AUTO: 89.9 % — HIGH (ref 43–77)
NEUTROPHILS NFR BLD AUTO: 90.1 % — HIGH (ref 43–77)
NEUTROPHILS NFR BLD AUTO: 91 % — HIGH (ref 43–77)
NEUTROPHILS NFR BLD AUTO: 91.3 % — HIGH (ref 43–77)
NEUTROPHILS NFR BLD AUTO: 92 % — HIGH (ref 43–77)
NITRITE UR-MCNC: NEGATIVE — SIGNIFICANT CHANGE UP
NITRITE UR-MCNC: NEGATIVE — SIGNIFICANT CHANGE UP
NRBC # BLD: 0 /100 WBCS — SIGNIFICANT CHANGE UP
NRBC # BLD: 3 /100 WBCS — SIGNIFICANT CHANGE UP
NRBC # FLD: 0 K/UL — SIGNIFICANT CHANGE UP
NRBC # FLD: 0.04 K/UL — HIGH
NRBC # FLD: 0.07 K/UL — HIGH
NRBC # FLD: 0.1 K/UL — HIGH
NRBC # FLD: 0.2 K/UL — HIGH
NRBC # FLD: 0.93 K/UL — HIGH
NT-PROBNP SERPL-SCNC: 226 PG/ML — SIGNIFICANT CHANGE UP
OSMOLALITY SERPL: 290 MOSM/KG — SIGNIFICANT CHANGE UP (ref 275–295)
OSMOLALITY UR: 278 MOSM/KG — SIGNIFICANT CHANGE UP (ref 50–1200)
OSMOLALITY UR: 295 MOSM/KG — SIGNIFICANT CHANGE UP (ref 50–1200)
PCO2 BLDA: 49 MMHG — HIGH (ref 35–48)
PCO2 BLDA: 54 MMHG — HIGH (ref 35–48)
PCO2 BLDA: 55 MMHG — HIGH (ref 35–48)
PCO2 BLDA: 68 MMHG — HIGH (ref 35–48)
PCO2 BLDV: 37 MMHG — LOW (ref 42–55)
PCO2 BLDV: 37 MMHG — LOW (ref 42–55)
PH BLDA: 7.08 — CRITICAL LOW (ref 7.35–7.45)
PH BLDA: 7.16 — CRITICAL LOW (ref 7.35–7.45)
PH BLDA: 7.17 — CRITICAL LOW (ref 7.35–7.45)
PH BLDA: 7.22 — LOW (ref 7.35–7.45)
PH BLDV: 7.44 — HIGH (ref 7.32–7.43)
PH BLDV: 7.44 — HIGH (ref 7.32–7.43)
PH UR: 5 — SIGNIFICANT CHANGE UP (ref 5–8)
PH UR: 6.5 — SIGNIFICANT CHANGE UP (ref 5–8)
PHOSPHATE SERPL-MCNC: 1.6 MG/DL — LOW (ref 2.5–4.5)
PHOSPHATE SERPL-MCNC: 1.9 MG/DL — LOW (ref 2.5–4.5)
PHOSPHATE SERPL-MCNC: 2.2 MG/DL — LOW (ref 2.5–4.5)
PHOSPHATE SERPL-MCNC: 2.2 MG/DL — LOW (ref 2.5–4.5)
PHOSPHATE SERPL-MCNC: 2.3 MG/DL — LOW (ref 2.5–4.5)
PHOSPHATE SERPL-MCNC: 2.3 MG/DL — LOW (ref 2.5–4.5)
PHOSPHATE SERPL-MCNC: 2.4 MG/DL — LOW (ref 2.5–4.5)
PHOSPHATE SERPL-MCNC: 2.4 MG/DL — LOW (ref 2.5–4.5)
PHOSPHATE SERPL-MCNC: 2.9 MG/DL — SIGNIFICANT CHANGE UP (ref 2.5–4.5)
PHOSPHATE SERPL-MCNC: 3.2 MG/DL — SIGNIFICANT CHANGE UP (ref 2.5–4.5)
PHOSPHATE SERPL-MCNC: 3.6 MG/DL — SIGNIFICANT CHANGE UP (ref 2.5–4.5)
PHOSPHATE SERPL-MCNC: 3.8 MG/DL — SIGNIFICANT CHANGE UP (ref 2.5–4.5)
PHOSPHATE SERPL-MCNC: 4 MG/DL — SIGNIFICANT CHANGE UP (ref 2.5–4.5)
PHOSPHATE SERPL-MCNC: 4.4 MG/DL — SIGNIFICANT CHANGE UP (ref 2.5–4.5)
PHOSPHATE SERPL-MCNC: 4.5 MG/DL — SIGNIFICANT CHANGE UP (ref 2.5–4.5)
PHOSPHATE SERPL-MCNC: 4.6 MG/DL — HIGH (ref 2.5–4.5)
PHOSPHATE SERPL-MCNC: 4.9 MG/DL — HIGH (ref 2.5–4.5)
PHOSPHATE SERPL-MCNC: 5.1 MG/DL — HIGH (ref 2.5–4.5)
PHOSPHATE SERPL-MCNC: 5.2 MG/DL — HIGH (ref 2.5–4.5)
PHOSPHATE SERPL-MCNC: 5.4 MG/DL — HIGH (ref 2.5–4.5)
PHOSPHATE SERPL-MCNC: 5.4 MG/DL — HIGH (ref 2.5–4.5)
PHOSPHATE SERPL-MCNC: 5.8 MG/DL — HIGH (ref 2.5–4.5)
PHOSPHATE SERPL-MCNC: 8 MG/DL — HIGH (ref 2.5–4.5)
PLATELET # BLD AUTO: 263 K/UL — SIGNIFICANT CHANGE UP (ref 150–400)
PLATELET # BLD AUTO: 264 K/UL — SIGNIFICANT CHANGE UP (ref 150–400)
PLATELET # BLD AUTO: 269 K/UL — SIGNIFICANT CHANGE UP (ref 150–400)
PLATELET # BLD AUTO: 282 K/UL — SIGNIFICANT CHANGE UP (ref 150–400)
PLATELET # BLD AUTO: 321 K/UL — SIGNIFICANT CHANGE UP (ref 150–400)
PLATELET # BLD AUTO: 325 K/UL — SIGNIFICANT CHANGE UP (ref 150–400)
PLATELET # BLD AUTO: 334 K/UL — SIGNIFICANT CHANGE UP (ref 150–400)
PLATELET # BLD AUTO: 334 K/UL — SIGNIFICANT CHANGE UP (ref 150–400)
PLATELET # BLD AUTO: 385 K/UL — SIGNIFICANT CHANGE UP (ref 150–400)
PLATELET # BLD AUTO: 403 K/UL — HIGH (ref 150–400)
PLATELET # BLD AUTO: 407 K/UL — HIGH (ref 150–400)
PLATELET # BLD AUTO: 412 K/UL — HIGH (ref 150–400)
PLATELET # BLD AUTO: 420 K/UL — HIGH (ref 150–400)
PLATELET # BLD AUTO: 439 K/UL — HIGH (ref 150–400)
PLATELET # BLD AUTO: 445 K/UL — HIGH (ref 150–400)
PLATELET # BLD AUTO: 446 K/UL — HIGH (ref 150–400)
PLATELET # BLD AUTO: 462 K/UL — HIGH (ref 150–400)
PLATELET # BLD AUTO: 467 K/UL — HIGH (ref 150–400)
PLATELET # BLD AUTO: 470 K/UL — HIGH (ref 150–400)
PLATELET # BLD AUTO: 523 K/UL — HIGH (ref 150–400)
PLATELET # BLD AUTO: 560 K/UL — HIGH (ref 150–400)
PLATELET # BLD AUTO: 567 K/UL — HIGH (ref 150–400)
PLATELET # BLD AUTO: 573 K/UL — HIGH (ref 150–400)
PLATELET # BLD AUTO: 581 K/UL — HIGH (ref 150–400)
PO2 BLDA: 36 MMHG — CRITICAL LOW (ref 83–108)
PO2 BLDA: 48 MMHG — CRITICAL LOW (ref 83–108)
PO2 BLDA: 52 MMHG — LOW (ref 83–108)
PO2 BLDA: 65 MMHG — LOW (ref 83–108)
PO2 BLDV: 54 MMHG — SIGNIFICANT CHANGE UP
PO2 BLDV: 75 MMHG — SIGNIFICANT CHANGE UP
POTASSIUM BLDV-SCNC: 4.7 MMOL/L — SIGNIFICANT CHANGE UP (ref 3.5–5.1)
POTASSIUM SERPL-MCNC: 3.5 MMOL/L — SIGNIFICANT CHANGE UP (ref 3.5–5.3)
POTASSIUM SERPL-MCNC: 3.6 MMOL/L — SIGNIFICANT CHANGE UP (ref 3.5–5.3)
POTASSIUM SERPL-MCNC: 3.6 MMOL/L — SIGNIFICANT CHANGE UP (ref 3.5–5.3)
POTASSIUM SERPL-MCNC: 3.7 MMOL/L — SIGNIFICANT CHANGE UP (ref 3.5–5.3)
POTASSIUM SERPL-MCNC: 3.8 MMOL/L — SIGNIFICANT CHANGE UP (ref 3.5–5.3)
POTASSIUM SERPL-MCNC: 3.9 MMOL/L — SIGNIFICANT CHANGE UP (ref 3.5–5.3)
POTASSIUM SERPL-MCNC: 4 MMOL/L — SIGNIFICANT CHANGE UP (ref 3.5–5.3)
POTASSIUM SERPL-MCNC: 4 MMOL/L — SIGNIFICANT CHANGE UP (ref 3.5–5.3)
POTASSIUM SERPL-MCNC: 4.1 MMOL/L — SIGNIFICANT CHANGE UP (ref 3.5–5.3)
POTASSIUM SERPL-MCNC: 4.2 MMOL/L — SIGNIFICANT CHANGE UP (ref 3.5–5.3)
POTASSIUM SERPL-MCNC: 4.4 MMOL/L — SIGNIFICANT CHANGE UP (ref 3.5–5.3)
POTASSIUM SERPL-MCNC: 4.5 MMOL/L — SIGNIFICANT CHANGE UP (ref 3.5–5.3)
POTASSIUM SERPL-MCNC: 4.5 MMOL/L — SIGNIFICANT CHANGE UP (ref 3.5–5.3)
POTASSIUM SERPL-MCNC: 4.6 MMOL/L — SIGNIFICANT CHANGE UP (ref 3.5–5.3)
POTASSIUM SERPL-MCNC: 4.6 MMOL/L — SIGNIFICANT CHANGE UP (ref 3.5–5.3)
POTASSIUM SERPL-MCNC: 4.7 MMOL/L — SIGNIFICANT CHANGE UP (ref 3.5–5.3)
POTASSIUM SERPL-MCNC: 4.7 MMOL/L — SIGNIFICANT CHANGE UP (ref 3.5–5.3)
POTASSIUM SERPL-MCNC: 4.8 MMOL/L — SIGNIFICANT CHANGE UP (ref 3.5–5.3)
POTASSIUM SERPL-MCNC: 4.9 MMOL/L — SIGNIFICANT CHANGE UP (ref 3.5–5.3)
POTASSIUM SERPL-MCNC: 4.9 MMOL/L — SIGNIFICANT CHANGE UP (ref 3.5–5.3)
POTASSIUM SERPL-MCNC: 5 MMOL/L — SIGNIFICANT CHANGE UP (ref 3.5–5.3)
POTASSIUM SERPL-MCNC: 5.1 MMOL/L — SIGNIFICANT CHANGE UP (ref 3.5–5.3)
POTASSIUM SERPL-MCNC: 5.2 MMOL/L — SIGNIFICANT CHANGE UP (ref 3.5–5.3)
POTASSIUM SERPL-MCNC: 5.2 MMOL/L — SIGNIFICANT CHANGE UP (ref 3.5–5.3)
POTASSIUM SERPL-MCNC: 5.5 MMOL/L — HIGH (ref 3.5–5.3)
POTASSIUM SERPL-SCNC: 3.5 MMOL/L — SIGNIFICANT CHANGE UP (ref 3.5–5.3)
POTASSIUM SERPL-SCNC: 3.6 MMOL/L — SIGNIFICANT CHANGE UP (ref 3.5–5.3)
POTASSIUM SERPL-SCNC: 3.6 MMOL/L — SIGNIFICANT CHANGE UP (ref 3.5–5.3)
POTASSIUM SERPL-SCNC: 3.7 MMOL/L — SIGNIFICANT CHANGE UP (ref 3.5–5.3)
POTASSIUM SERPL-SCNC: 3.8 MMOL/L — SIGNIFICANT CHANGE UP (ref 3.5–5.3)
POTASSIUM SERPL-SCNC: 3.9 MMOL/L — SIGNIFICANT CHANGE UP (ref 3.5–5.3)
POTASSIUM SERPL-SCNC: 4 MMOL/L — SIGNIFICANT CHANGE UP (ref 3.5–5.3)
POTASSIUM SERPL-SCNC: 4 MMOL/L — SIGNIFICANT CHANGE UP (ref 3.5–5.3)
POTASSIUM SERPL-SCNC: 4.1 MMOL/L — SIGNIFICANT CHANGE UP (ref 3.5–5.3)
POTASSIUM SERPL-SCNC: 4.2 MMOL/L — SIGNIFICANT CHANGE UP (ref 3.5–5.3)
POTASSIUM SERPL-SCNC: 4.4 MMOL/L — SIGNIFICANT CHANGE UP (ref 3.5–5.3)
POTASSIUM SERPL-SCNC: 4.5 MMOL/L — SIGNIFICANT CHANGE UP (ref 3.5–5.3)
POTASSIUM SERPL-SCNC: 4.5 MMOL/L — SIGNIFICANT CHANGE UP (ref 3.5–5.3)
POTASSIUM SERPL-SCNC: 4.6 MMOL/L — SIGNIFICANT CHANGE UP (ref 3.5–5.3)
POTASSIUM SERPL-SCNC: 4.6 MMOL/L — SIGNIFICANT CHANGE UP (ref 3.5–5.3)
POTASSIUM SERPL-SCNC: 4.7 MMOL/L — SIGNIFICANT CHANGE UP (ref 3.5–5.3)
POTASSIUM SERPL-SCNC: 4.7 MMOL/L — SIGNIFICANT CHANGE UP (ref 3.5–5.3)
POTASSIUM SERPL-SCNC: 4.8 MMOL/L — SIGNIFICANT CHANGE UP (ref 3.5–5.3)
POTASSIUM SERPL-SCNC: 4.9 MMOL/L — SIGNIFICANT CHANGE UP (ref 3.5–5.3)
POTASSIUM SERPL-SCNC: 4.9 MMOL/L — SIGNIFICANT CHANGE UP (ref 3.5–5.3)
POTASSIUM SERPL-SCNC: 5 MMOL/L — SIGNIFICANT CHANGE UP (ref 3.5–5.3)
POTASSIUM SERPL-SCNC: 5.1 MMOL/L — SIGNIFICANT CHANGE UP (ref 3.5–5.3)
POTASSIUM SERPL-SCNC: 5.2 MMOL/L — SIGNIFICANT CHANGE UP (ref 3.5–5.3)
POTASSIUM SERPL-SCNC: 5.2 MMOL/L — SIGNIFICANT CHANGE UP (ref 3.5–5.3)
POTASSIUM SERPL-SCNC: 5.5 MMOL/L — HIGH (ref 3.5–5.3)
POTASSIUM UR-SCNC: 25.2 MMOL/L — SIGNIFICANT CHANGE UP
PROCALCITONIN SERPL-MCNC: 0.13 NG/ML — HIGH (ref 0.02–0.1)
PROCALCITONIN SERPL-MCNC: 0.15 NG/ML — HIGH (ref 0.02–0.1)
PROCALCITONIN SERPL-MCNC: 1.43 NG/ML — HIGH (ref 0.02–0.1)
PROT SERPL-MCNC: 5.5 G/DL — LOW (ref 6–8.3)
PROT SERPL-MCNC: 5.5 G/DL — LOW (ref 6–8.3)
PROT SERPL-MCNC: 5.6 G/DL — LOW (ref 6–8.3)
PROT SERPL-MCNC: 5.6 G/DL — LOW (ref 6–8.3)
PROT SERPL-MCNC: 5.7 G/DL — LOW (ref 6–8.3)
PROT SERPL-MCNC: 5.8 G/DL — LOW (ref 6–8.3)
PROT SERPL-MCNC: 5.9 G/DL — LOW (ref 6–8.3)
PROT SERPL-MCNC: 5.9 G/DL — LOW (ref 6–8.3)
PROT SERPL-MCNC: 6 G/DL — SIGNIFICANT CHANGE UP (ref 6–8.3)
PROT SERPL-MCNC: 6.1 G/DL — SIGNIFICANT CHANGE UP (ref 6–8.3)
PROT SERPL-MCNC: 6.2 G/DL — SIGNIFICANT CHANGE UP (ref 6–8.3)
PROT SERPL-MCNC: 6.4 G/DL — SIGNIFICANT CHANGE UP (ref 6–8.3)
PROT UR-MCNC: ABNORMAL
PROT UR-MCNC: ABNORMAL
PROTHROM AB SERPL-ACNC: 12.3 SEC — SIGNIFICANT CHANGE UP (ref 10.6–13.6)
PROTHROM AB SERPL-ACNC: 12.5 SEC — SIGNIFICANT CHANGE UP (ref 10.6–13.6)
PROTHROM AB SERPL-ACNC: 12.8 SEC — SIGNIFICANT CHANGE UP (ref 10.6–13.6)
PROTHROM AB SERPL-ACNC: 12.9 SEC — SIGNIFICANT CHANGE UP (ref 10.6–13.6)
PROTHROM AB SERPL-ACNC: 13.1 SEC — SIGNIFICANT CHANGE UP (ref 10.6–13.6)
PROTHROM AB SERPL-ACNC: 13.5 SEC — SIGNIFICANT CHANGE UP (ref 10.6–13.6)
RBC # BLD: 4.05 M/UL — LOW (ref 4.2–5.8)
RBC # BLD: 4.06 M/UL — LOW (ref 4.2–5.8)
RBC # BLD: 4.09 M/UL — LOW (ref 4.2–5.8)
RBC # BLD: 4.16 M/UL — LOW (ref 4.2–5.8)
RBC # BLD: 4.42 M/UL — SIGNIFICANT CHANGE UP (ref 4.2–5.8)
RBC # BLD: 4.45 M/UL — SIGNIFICANT CHANGE UP (ref 4.2–5.8)
RBC # BLD: 4.48 M/UL — SIGNIFICANT CHANGE UP (ref 4.2–5.8)
RBC # BLD: 4.52 M/UL — SIGNIFICANT CHANGE UP (ref 4.2–5.8)
RBC # BLD: 4.61 M/UL — SIGNIFICANT CHANGE UP (ref 4.2–5.8)
RBC # BLD: 4.76 M/UL — SIGNIFICANT CHANGE UP (ref 4.2–5.8)
RBC # BLD: 4.92 M/UL — SIGNIFICANT CHANGE UP (ref 4.2–5.8)
RBC # BLD: 5.27 M/UL — SIGNIFICANT CHANGE UP (ref 4.2–5.8)
RBC # BLD: 5.41 M/UL — SIGNIFICANT CHANGE UP (ref 4.2–5.8)
RBC # BLD: 5.48 M/UL — SIGNIFICANT CHANGE UP (ref 4.2–5.8)
RBC # BLD: 5.49 M/UL — SIGNIFICANT CHANGE UP (ref 4.2–5.8)
RBC # BLD: 5.53 M/UL — SIGNIFICANT CHANGE UP (ref 4.2–5.8)
RBC # BLD: 5.53 M/UL — SIGNIFICANT CHANGE UP (ref 4.2–5.8)
RBC # BLD: 5.79 M/UL — SIGNIFICANT CHANGE UP (ref 4.2–5.8)
RBC # BLD: 5.98 M/UL — HIGH (ref 4.2–5.8)
RBC # BLD: 6.06 M/UL — HIGH (ref 4.2–5.8)
RBC # BLD: 6.23 M/UL — HIGH (ref 4.2–5.8)
RBC # BLD: 6.39 M/UL — HIGH (ref 4.2–5.8)
RBC # FLD: 12.7 % — SIGNIFICANT CHANGE UP (ref 10.3–14.5)
RBC # FLD: 12.9 % — SIGNIFICANT CHANGE UP (ref 10.3–14.5)
RBC # FLD: 12.9 % — SIGNIFICANT CHANGE UP (ref 10.3–14.5)
RBC # FLD: 13 % — SIGNIFICANT CHANGE UP (ref 10.3–14.5)
RBC # FLD: 13.2 % — SIGNIFICANT CHANGE UP (ref 10.3–14.5)
RBC # FLD: 13.3 % — SIGNIFICANT CHANGE UP (ref 10.3–14.5)
RBC # FLD: 13.5 % — SIGNIFICANT CHANGE UP (ref 10.3–14.5)
RBC # FLD: 13.6 % — SIGNIFICANT CHANGE UP (ref 10.3–14.5)
RBC # FLD: 13.7 % — SIGNIFICANT CHANGE UP (ref 10.3–14.5)
RBC # FLD: 14.4 % — SIGNIFICANT CHANGE UP (ref 10.3–14.5)
RBC # FLD: 14.6 % — HIGH (ref 10.3–14.5)
RBC # FLD: 14.8 % — HIGH (ref 10.3–14.5)
RBC # FLD: 14.8 % — HIGH (ref 10.3–14.5)
RBC # FLD: 14.9 % — HIGH (ref 10.3–14.5)
RBC # FLD: 15.5 % — HIGH (ref 10.3–14.5)
RBC # FLD: 15.8 % — HIGH (ref 10.3–14.5)
RBC # FLD: 15.9 % — HIGH (ref 10.3–14.5)
RBC # FLD: 16.3 % — HIGH (ref 10.3–14.5)
RBC # FLD: 16.7 % — HIGH (ref 10.3–14.5)
RBC # FLD: 17 % — HIGH (ref 10.3–14.5)
RBC CASTS # UR COMP ASSIST: 6 /HPF — HIGH (ref 0–4)
RBC CASTS # UR COMP ASSIST: SIGNIFICANT CHANGE UP /HPF (ref 0–4)
S AUREUS DNA NOSE QL NAA+PROBE: SIGNIFICANT CHANGE UP
SAO2 % BLDA: 58.2 % — LOW (ref 94–98)
SAO2 % BLDA: 78.8 % — LOW (ref 94–98)
SAO2 % BLDA: 80.7 % — LOW (ref 94–98)
SAO2 % BLDA: 92 % — LOW (ref 94–98)
SAO2 % BLDV: 87.4 % — SIGNIFICANT CHANGE UP
SAO2 % BLDV: 96.4 % — SIGNIFICANT CHANGE UP
SODIUM SERPL-SCNC: 120 MMOL/L — CRITICAL LOW (ref 135–145)
SODIUM SERPL-SCNC: 120 MMOL/L — CRITICAL LOW (ref 135–145)
SODIUM SERPL-SCNC: 121 MMOL/L — LOW (ref 135–145)
SODIUM SERPL-SCNC: 122 MMOL/L — LOW (ref 135–145)
SODIUM SERPL-SCNC: 123 MMOL/L — LOW (ref 135–145)
SODIUM SERPL-SCNC: 123 MMOL/L — LOW (ref 135–145)
SODIUM SERPL-SCNC: 126 MMOL/L — LOW (ref 135–145)
SODIUM SERPL-SCNC: 126 MMOL/L — LOW (ref 135–145)
SODIUM SERPL-SCNC: 128 MMOL/L — LOW (ref 135–145)
SODIUM SERPL-SCNC: 128 MMOL/L — LOW (ref 135–145)
SODIUM SERPL-SCNC: 129 MMOL/L — LOW (ref 135–145)
SODIUM SERPL-SCNC: 129 MMOL/L — LOW (ref 135–145)
SODIUM SERPL-SCNC: 130 MMOL/L — LOW (ref 135–145)
SODIUM SERPL-SCNC: 131 MMOL/L — LOW (ref 135–145)
SODIUM SERPL-SCNC: 131 MMOL/L — LOW (ref 135–145)
SODIUM SERPL-SCNC: 132 MMOL/L — LOW (ref 135–145)
SODIUM SERPL-SCNC: 133 MMOL/L — LOW (ref 135–145)
SODIUM SERPL-SCNC: 134 MMOL/L — LOW (ref 135–145)
SODIUM SERPL-SCNC: 136 MMOL/L — SIGNIFICANT CHANGE UP (ref 135–145)
SODIUM UR-SCNC: 35 MMOL/L — SIGNIFICANT CHANGE UP
SP GR SPEC: 1.01 — SIGNIFICANT CHANGE UP (ref 1–1.05)
SP GR SPEC: 1.02 — SIGNIFICANT CHANGE UP (ref 1–1.05)
SPECIMEN SOURCE: SIGNIFICANT CHANGE UP
TRIGL SERPL-MCNC: 395 MG/DL — HIGH
UROBILINOGEN FLD QL: ABNORMAL
UROBILINOGEN FLD QL: SIGNIFICANT CHANGE UP
UUN UR-MCNC: 253 MG/DL — SIGNIFICANT CHANGE UP
VANCOMYCIN TROUGH SERPL-MCNC: 5.2 UG/ML — LOW (ref 10–20)
WBC # BLD: 10.6 K/UL — HIGH (ref 3.8–10.5)
WBC # BLD: 11.9 K/UL — HIGH (ref 3.8–10.5)
WBC # BLD: 13.65 K/UL — HIGH (ref 3.8–10.5)
WBC # BLD: 14.78 K/UL — HIGH (ref 3.8–10.5)
WBC # BLD: 15.09 K/UL — HIGH (ref 3.8–10.5)
WBC # BLD: 15.69 K/UL — HIGH (ref 3.8–10.5)
WBC # BLD: 15.7 K/UL — HIGH (ref 3.8–10.5)
WBC # BLD: 16.33 K/UL — HIGH (ref 3.8–10.5)
WBC # BLD: 16.49 K/UL — HIGH (ref 3.8–10.5)
WBC # BLD: 16.94 K/UL — HIGH (ref 3.8–10.5)
WBC # BLD: 17.27 K/UL — HIGH (ref 3.8–10.5)
WBC # BLD: 19.55 K/UL — HIGH (ref 3.8–10.5)
WBC # BLD: 19.73 K/UL — HIGH (ref 3.8–10.5)
WBC # BLD: 22.64 K/UL — HIGH (ref 3.8–10.5)
WBC # BLD: 22.69 K/UL — HIGH (ref 3.8–10.5)
WBC # BLD: 23.79 K/UL — HIGH (ref 3.8–10.5)
WBC # BLD: 25.86 K/UL — HIGH (ref 3.8–10.5)
WBC # BLD: 26 K/UL — HIGH (ref 3.8–10.5)
WBC # BLD: 28.99 K/UL — HIGH (ref 3.8–10.5)
WBC # BLD: 29.01 K/UL — HIGH (ref 3.8–10.5)
WBC # BLD: 7.33 K/UL — SIGNIFICANT CHANGE UP (ref 3.8–10.5)
WBC # BLD: 8.92 K/UL — SIGNIFICANT CHANGE UP (ref 3.8–10.5)
WBC # BLD: 9.56 K/UL — SIGNIFICANT CHANGE UP (ref 3.8–10.5)
WBC # BLD: 9.91 K/UL — SIGNIFICANT CHANGE UP (ref 3.8–10.5)
WBC # FLD AUTO: 10.6 K/UL — HIGH (ref 3.8–10.5)
WBC # FLD AUTO: 11.9 K/UL — HIGH (ref 3.8–10.5)
WBC # FLD AUTO: 13.65 K/UL — HIGH (ref 3.8–10.5)
WBC # FLD AUTO: 14.78 K/UL — HIGH (ref 3.8–10.5)
WBC # FLD AUTO: 15.09 K/UL — HIGH (ref 3.8–10.5)
WBC # FLD AUTO: 15.69 K/UL — HIGH (ref 3.8–10.5)
WBC # FLD AUTO: 15.7 K/UL — HIGH (ref 3.8–10.5)
WBC # FLD AUTO: 16.33 K/UL — HIGH (ref 3.8–10.5)
WBC # FLD AUTO: 16.49 K/UL — HIGH (ref 3.8–10.5)
WBC # FLD AUTO: 16.94 K/UL — HIGH (ref 3.8–10.5)
WBC # FLD AUTO: 17.27 K/UL — HIGH (ref 3.8–10.5)
WBC # FLD AUTO: 19.55 K/UL — HIGH (ref 3.8–10.5)
WBC # FLD AUTO: 19.73 K/UL — HIGH (ref 3.8–10.5)
WBC # FLD AUTO: 22.64 K/UL — HIGH (ref 3.8–10.5)
WBC # FLD AUTO: 22.69 K/UL — HIGH (ref 3.8–10.5)
WBC # FLD AUTO: 23.79 K/UL — HIGH (ref 3.8–10.5)
WBC # FLD AUTO: 25.86 K/UL — HIGH (ref 3.8–10.5)
WBC # FLD AUTO: 26 K/UL — HIGH (ref 3.8–10.5)
WBC # FLD AUTO: 28.99 K/UL — HIGH (ref 3.8–10.5)
WBC # FLD AUTO: 29.01 K/UL — HIGH (ref 3.8–10.5)
WBC # FLD AUTO: 7.33 K/UL — SIGNIFICANT CHANGE UP (ref 3.8–10.5)
WBC # FLD AUTO: 8.92 K/UL — SIGNIFICANT CHANGE UP (ref 3.8–10.5)
WBC # FLD AUTO: 9.56 K/UL — SIGNIFICANT CHANGE UP (ref 3.8–10.5)
WBC # FLD AUTO: 9.91 K/UL — SIGNIFICANT CHANGE UP (ref 3.8–10.5)
WBC UR QL: 7 /HPF — HIGH (ref 0–5)
WBC UR QL: SIGNIFICANT CHANGE UP /HPF (ref 0–5)

## 2022-01-01 PROCEDURE — 99291 CRITICAL CARE FIRST HOUR: CPT

## 2022-01-01 PROCEDURE — 76604 US EXAM CHEST: CPT | Mod: 26,GC

## 2022-01-01 PROCEDURE — 71045 X-RAY EXAM CHEST 1 VIEW: CPT | Mod: 26

## 2022-01-01 PROCEDURE — 99223 1ST HOSP IP/OBS HIGH 75: CPT | Mod: GC

## 2022-01-01 PROCEDURE — 74019 RADEX ABDOMEN 2 VIEWS: CPT | Mod: 26

## 2022-01-01 PROCEDURE — 93308 TTE F-UP OR LMTD: CPT | Mod: 26,GC

## 2022-01-01 PROCEDURE — 36556 INSERT NON-TUNNEL CV CATH: CPT

## 2022-01-01 PROCEDURE — 32551 INSERTION OF CHEST TUBE: CPT

## 2022-01-01 PROCEDURE — 99291 CRITICAL CARE FIRST HOUR: CPT | Mod: 25

## 2022-01-01 PROCEDURE — 71045 X-RAY EXAM CHEST 1 VIEW: CPT | Mod: 26,77

## 2022-01-01 PROCEDURE — 36620 INSERTION CATHETER ARTERY: CPT

## 2022-01-01 PROCEDURE — 71045 X-RAY EXAM CHEST 1 VIEW: CPT | Mod: 26,76

## 2022-01-01 PROCEDURE — 74018 RADEX ABDOMEN 1 VIEW: CPT | Mod: 26

## 2022-01-01 PROCEDURE — 99222 1ST HOSP IP/OBS MODERATE 55: CPT | Mod: GC

## 2022-01-01 PROCEDURE — 99255 IP/OBS CONSLTJ NEW/EST HI 80: CPT

## 2022-01-01 RX ORDER — KETOROLAC TROMETHAMINE 30 MG/ML
30 SYRINGE (ML) INJECTION ONCE
Refills: 0 | Status: DISCONTINUED | OUTPATIENT
Start: 2022-01-01 | End: 2022-01-01

## 2022-01-01 RX ORDER — TOLVAPTAN 15 MG/1
15 TABLET ORAL ONCE
Refills: 0 | Status: COMPLETED | OUTPATIENT
Start: 2022-01-01 | End: 2022-01-01

## 2022-01-01 RX ORDER — PANTOPRAZOLE SODIUM 20 MG/1
40 TABLET, DELAYED RELEASE ORAL DAILY
Refills: 0 | Status: DISCONTINUED | OUTPATIENT
Start: 2022-01-01 | End: 2022-01-01

## 2022-01-01 RX ORDER — ALPRAZOLAM 0.25 MG
0.25 TABLET ORAL ONCE
Refills: 0 | Status: DISCONTINUED | OUTPATIENT
Start: 2022-01-01 | End: 2022-01-01

## 2022-01-01 RX ORDER — ACETAMINOPHEN 500 MG
1000 TABLET ORAL ONCE
Refills: 0 | Status: COMPLETED | OUTPATIENT
Start: 2022-01-01 | End: 2022-01-01

## 2022-01-01 RX ORDER — MIDAZOLAM HYDROCHLORIDE 1 MG/ML
4 INJECTION, SOLUTION INTRAMUSCULAR; INTRAVENOUS ONCE
Refills: 0 | Status: DISCONTINUED | OUTPATIENT
Start: 2022-01-01 | End: 2022-01-01

## 2022-01-01 RX ORDER — SODIUM BICARBONATE 1 MEQ/ML
50 SYRINGE (ML) INTRAVENOUS ONCE
Refills: 0 | Status: COMPLETED | OUTPATIENT
Start: 2022-01-01 | End: 2022-01-01

## 2022-01-01 RX ORDER — METHYLNALTREXONE BROMIDE 12 MG/.6ML
12 INJECTION, SOLUTION SUBCUTANEOUS ONCE
Refills: 0 | Status: COMPLETED | OUTPATIENT
Start: 2022-01-01 | End: 2022-01-01

## 2022-01-01 RX ORDER — POTASSIUM CHLORIDE 20 MEQ
20 PACKET (EA) ORAL ONCE
Refills: 0 | Status: COMPLETED | OUTPATIENT
Start: 2022-01-01 | End: 2022-01-01

## 2022-01-01 RX ORDER — POTASSIUM CHLORIDE 20 MEQ
40 PACKET (EA) ORAL ONCE
Refills: 0 | Status: COMPLETED | OUTPATIENT
Start: 2022-01-01 | End: 2022-01-01

## 2022-01-01 RX ORDER — NOREPINEPHRINE BITARTRATE/D5W 8 MG/250ML
0.05 PLASTIC BAG, INJECTION (ML) INTRAVENOUS
Qty: 16 | Refills: 0 | Status: DISCONTINUED | OUTPATIENT
Start: 2022-01-01 | End: 2022-01-01

## 2022-01-01 RX ORDER — FAMOTIDINE 10 MG/ML
20 INJECTION INTRAVENOUS
Refills: 0 | Status: DISCONTINUED | OUTPATIENT
Start: 2022-01-01 | End: 2022-01-01

## 2022-01-01 RX ORDER — SODIUM,POTASSIUM PHOSPHATES 278-250MG
1 POWDER IN PACKET (EA) ORAL
Refills: 0 | Status: DISCONTINUED | OUTPATIENT
Start: 2022-01-01 | End: 2022-01-01

## 2022-01-01 RX ORDER — FENTANYL CITRATE 50 UG/ML
0.5 INJECTION INTRAVENOUS
Qty: 2500 | Refills: 0 | Status: DISCONTINUED | OUTPATIENT
Start: 2022-01-01 | End: 2022-01-01

## 2022-01-01 RX ORDER — CISATRACURIUM BESYLATE 2 MG/ML
3 INJECTION INTRAVENOUS
Qty: 200 | Refills: 0 | Status: DISCONTINUED | OUTPATIENT
Start: 2022-01-01 | End: 2022-01-01

## 2022-01-01 RX ORDER — BUMETANIDE 0.25 MG/ML
2 INJECTION INTRAMUSCULAR; INTRAVENOUS ONCE
Refills: 0 | Status: COMPLETED | OUTPATIENT
Start: 2022-01-01 | End: 2022-01-01

## 2022-01-01 RX ORDER — ROCURONIUM BROMIDE 10 MG/ML
100 VIAL (ML) INTRAVENOUS ONCE
Refills: 0 | Status: COMPLETED | OUTPATIENT
Start: 2022-01-01 | End: 2022-01-01

## 2022-01-01 RX ORDER — MAGNESIUM SULFATE 500 MG/ML
2 VIAL (ML) INJECTION ONCE
Refills: 0 | Status: COMPLETED | OUTPATIENT
Start: 2022-01-01 | End: 2022-01-01

## 2022-01-01 RX ORDER — MAGNESIUM SULFATE 500 MG/ML
1 VIAL (ML) INJECTION ONCE
Refills: 0 | Status: COMPLETED | OUTPATIENT
Start: 2022-01-01 | End: 2022-01-01

## 2022-01-01 RX ORDER — AMIODARONE HYDROCHLORIDE 400 MG/1
0.5 TABLET ORAL
Qty: 450 | Refills: 0 | Status: COMPLETED | OUTPATIENT
Start: 2022-01-01 | End: 2022-01-01

## 2022-01-01 RX ORDER — FENTANYL CITRATE 50 UG/ML
100 INJECTION INTRAVENOUS ONCE
Refills: 0 | Status: DISCONTINUED | OUTPATIENT
Start: 2022-01-01 | End: 2022-01-01

## 2022-01-01 RX ORDER — PROPOFOL 10 MG/ML
10 INJECTION, EMULSION INTRAVENOUS
Qty: 1000 | Refills: 0 | Status: DISCONTINUED | OUTPATIENT
Start: 2022-01-01 | End: 2022-01-01

## 2022-01-01 RX ORDER — AMIODARONE HYDROCHLORIDE 400 MG/1
150 TABLET ORAL ONCE
Refills: 0 | Status: COMPLETED | OUTPATIENT
Start: 2022-01-01 | End: 2022-01-01

## 2022-01-01 RX ORDER — CHLORHEXIDINE GLUCONATE 213 G/1000ML
1 SOLUTION TOPICAL
Refills: 0 | Status: DISCONTINUED | OUTPATIENT
Start: 2022-01-01 | End: 2022-01-01

## 2022-01-01 RX ORDER — HEPARIN SODIUM 5000 [USP'U]/ML
5000 INJECTION INTRAVENOUS; SUBCUTANEOUS EVERY 8 HOURS
Refills: 0 | Status: DISCONTINUED | OUTPATIENT
Start: 2022-01-01 | End: 2022-01-01

## 2022-01-01 RX ORDER — MIDAZOLAM HYDROCHLORIDE 1 MG/ML
6 INJECTION, SOLUTION INTRAMUSCULAR; INTRAVENOUS ONCE
Refills: 0 | Status: DISCONTINUED | OUTPATIENT
Start: 2022-01-01 | End: 2022-01-01

## 2022-01-01 RX ORDER — ALBUMIN HUMAN 25 %
50 VIAL (ML) INTRAVENOUS ONCE
Refills: 0 | Status: COMPLETED | OUTPATIENT
Start: 2022-01-01 | End: 2022-01-01

## 2022-01-01 RX ORDER — ENOXAPARIN SODIUM 100 MG/ML
40 INJECTION SUBCUTANEOUS EVERY 12 HOURS
Refills: 0 | Status: DISCONTINUED | OUTPATIENT
Start: 2022-01-01 | End: 2022-01-01

## 2022-01-01 RX ORDER — CHLORHEXIDINE GLUCONATE 213 G/1000ML
15 SOLUTION TOPICAL EVERY 12 HOURS
Refills: 0 | Status: DISCONTINUED | OUTPATIENT
Start: 2022-01-01 | End: 2022-01-01

## 2022-01-01 RX ORDER — POTASSIUM PHOSPHATE, MONOBASIC POTASSIUM PHOSPHATE, DIBASIC 236; 224 MG/ML; MG/ML
15 INJECTION, SOLUTION INTRAVENOUS ONCE
Refills: 0 | Status: COMPLETED | OUTPATIENT
Start: 2022-01-01 | End: 2022-01-01

## 2022-01-01 RX ORDER — CISATRACURIUM BESYLATE 2 MG/ML
20 INJECTION INTRAVENOUS ONCE
Refills: 0 | Status: COMPLETED | OUTPATIENT
Start: 2022-01-01 | End: 2022-01-01

## 2022-01-01 RX ORDER — PIPERACILLIN AND TAZOBACTAM 4; .5 G/20ML; G/20ML
3.38 INJECTION, POWDER, LYOPHILIZED, FOR SOLUTION INTRAVENOUS ONCE
Refills: 0 | Status: COMPLETED | OUTPATIENT
Start: 2022-01-01 | End: 2022-01-01

## 2022-01-01 RX ORDER — SENNA PLUS 8.6 MG/1
2 TABLET ORAL AT BEDTIME
Refills: 0 | Status: DISCONTINUED | OUTPATIENT
Start: 2022-01-01 | End: 2022-01-01

## 2022-01-01 RX ORDER — BUMETANIDE 0.25 MG/ML
4 INJECTION INTRAMUSCULAR; INTRAVENOUS ONCE
Refills: 0 | Status: COMPLETED | OUTPATIENT
Start: 2022-01-01 | End: 2022-01-01

## 2022-01-01 RX ORDER — SODIUM CHLORIDE 9 MG/ML
1000 INJECTION, SOLUTION INTRAVENOUS
Refills: 0 | Status: DISCONTINUED | OUTPATIENT
Start: 2022-01-01 | End: 2022-01-01

## 2022-01-01 RX ORDER — NOREPINEPHRINE BITARTRATE/D5W 8 MG/250ML
0.05 PLASTIC BAG, INJECTION (ML) INTRAVENOUS
Qty: 32 | Refills: 0 | Status: DISCONTINUED | OUTPATIENT
Start: 2022-01-01 | End: 2022-01-01

## 2022-01-01 RX ORDER — ALBUMIN HUMAN 25 %
50 VIAL (ML) INTRAVENOUS EVERY 6 HOURS
Refills: 0 | Status: COMPLETED | OUTPATIENT
Start: 2022-01-01 | End: 2022-01-01

## 2022-01-01 RX ORDER — AMIODARONE HYDROCHLORIDE 400 MG/1
200 TABLET ORAL EVERY 24 HOURS
Refills: 0 | Status: DISCONTINUED | OUTPATIENT
Start: 2022-01-01 | End: 2022-01-01

## 2022-01-01 RX ORDER — DEXAMETHASONE 0.5 MG/5ML
6 ELIXIR ORAL DAILY
Refills: 0 | Status: COMPLETED | OUTPATIENT
Start: 2022-01-01 | End: 2022-01-01

## 2022-01-01 RX ORDER — POTASSIUM CHLORIDE 20 MEQ
20 PACKET (EA) ORAL ONCE
Refills: 0 | Status: DISCONTINUED | OUTPATIENT
Start: 2022-01-01 | End: 2022-01-01

## 2022-01-01 RX ORDER — BUMETANIDE 0.25 MG/ML
1 INJECTION INTRAMUSCULAR; INTRAVENOUS
Qty: 20 | Refills: 0 | Status: DISCONTINUED | OUTPATIENT
Start: 2022-01-01 | End: 2022-01-01

## 2022-01-01 RX ORDER — FUROSEMIDE 40 MG
20 TABLET ORAL DAILY
Refills: 0 | Status: DISCONTINUED | OUTPATIENT
Start: 2022-01-01 | End: 2022-01-01

## 2022-01-01 RX ORDER — BUMETANIDE 0.25 MG/ML
2 INJECTION INTRAMUSCULAR; INTRAVENOUS ONCE
Refills: 0 | Status: DISCONTINUED | OUTPATIENT
Start: 2022-01-01 | End: 2022-01-01

## 2022-01-01 RX ORDER — ALBUTEROL 90 UG/1
2 AEROSOL, METERED ORAL EVERY 6 HOURS
Refills: 0 | Status: DISCONTINUED | OUTPATIENT
Start: 2022-01-01 | End: 2022-01-01

## 2022-01-01 RX ORDER — HEPARIN SODIUM 5000 [USP'U]/ML
INJECTION INTRAVENOUS; SUBCUTANEOUS
Qty: 25000 | Refills: 0 | Status: DISCONTINUED | OUTPATIENT
Start: 2022-01-01 | End: 2022-01-01

## 2022-01-01 RX ORDER — POLYETHYLENE GLYCOL 3350 17 G/17G
17 POWDER, FOR SOLUTION ORAL
Refills: 0 | Status: DISCONTINUED | OUTPATIENT
Start: 2022-01-01 | End: 2022-01-01

## 2022-01-01 RX ORDER — VANCOMYCIN HCL 1 G
1000 VIAL (EA) INTRAVENOUS ONCE
Refills: 0 | Status: COMPLETED | OUTPATIENT
Start: 2022-01-01 | End: 2022-01-01

## 2022-01-01 RX ORDER — POTASSIUM CHLORIDE 20 MEQ
40 PACKET (EA) ORAL ONCE
Refills: 0 | Status: DISCONTINUED | OUTPATIENT
Start: 2022-01-01 | End: 2022-01-01

## 2022-01-01 RX ORDER — PIPERACILLIN AND TAZOBACTAM 4; .5 G/20ML; G/20ML
3.38 INJECTION, POWDER, LYOPHILIZED, FOR SOLUTION INTRAVENOUS EVERY 8 HOURS
Refills: 0 | Status: DISCONTINUED | OUTPATIENT
Start: 2022-01-01 | End: 2022-01-01

## 2022-01-01 RX ORDER — CONIVAPTAN HYDROCHLORIDE 20 MG/100ML
40 INJECTION, SOLUTION INTRAVENOUS
Qty: 20 | Refills: 0 | Status: COMPLETED | OUTPATIENT
Start: 2022-01-01 | End: 2022-01-01

## 2022-01-01 RX ORDER — SODIUM CHLORIDE 5 G/100ML
500 INJECTION, SOLUTION INTRAVENOUS
Refills: 0 | Status: DISCONTINUED | OUTPATIENT
Start: 2022-01-01 | End: 2022-01-01

## 2022-01-01 RX ORDER — SODIUM,POTASSIUM PHOSPHATES 278-250MG
1 POWDER IN PACKET (EA) ORAL ONCE
Refills: 0 | Status: DISCONTINUED | OUTPATIENT
Start: 2022-01-01 | End: 2022-01-01

## 2022-01-01 RX ORDER — POTASSIUM CHLORIDE 20 MEQ
20 PACKET (EA) ORAL
Refills: 0 | Status: COMPLETED | OUTPATIENT
Start: 2022-01-01 | End: 2022-01-01

## 2022-01-01 RX ORDER — POTASSIUM CHLORIDE 20 MEQ
10 PACKET (EA) ORAL
Refills: 0 | Status: DISCONTINUED | OUTPATIENT
Start: 2022-01-01 | End: 2022-01-01

## 2022-01-01 RX ORDER — AMIODARONE HYDROCHLORIDE 400 MG/1
150 TABLET ORAL ONCE
Refills: 0 | Status: DISCONTINUED | OUTPATIENT
Start: 2022-01-01 | End: 2022-01-01

## 2022-01-01 RX ORDER — METOPROLOL TARTRATE 50 MG
2.5 TABLET ORAL
Refills: 0 | Status: DISCONTINUED | OUTPATIENT
Start: 2022-01-01 | End: 2022-01-01

## 2022-01-01 RX ORDER — METOPROLOL TARTRATE 50 MG
5 TABLET ORAL ONCE
Refills: 0 | Status: COMPLETED | OUTPATIENT
Start: 2022-01-01 | End: 2022-01-01

## 2022-01-01 RX ORDER — SODIUM,POTASSIUM PHOSPHATES 278-250MG
1 POWDER IN PACKET (EA) ORAL THREE TIMES A DAY
Refills: 0 | Status: COMPLETED | OUTPATIENT
Start: 2022-01-01 | End: 2022-01-01

## 2022-01-01 RX ORDER — AMIODARONE HYDROCHLORIDE 400 MG/1
1 TABLET ORAL
Qty: 450 | Refills: 0 | Status: DISCONTINUED | OUTPATIENT
Start: 2022-01-01 | End: 2022-01-01

## 2022-01-01 RX ORDER — FAMOTIDINE 10 MG/ML
20 INJECTION INTRAVENOUS EVERY 12 HOURS
Refills: 0 | Status: DISCONTINUED | OUTPATIENT
Start: 2022-01-01 | End: 2022-01-01

## 2022-01-01 RX ORDER — PHENYLEPHRINE HYDROCHLORIDE 10 MG/ML
0.1 INJECTION INTRAVENOUS
Qty: 160 | Refills: 0 | Status: DISCONTINUED | OUTPATIENT
Start: 2022-01-01 | End: 2022-01-01

## 2022-01-01 RX ORDER — FUROSEMIDE 40 MG
40 TABLET ORAL ONCE
Refills: 0 | Status: COMPLETED | OUTPATIENT
Start: 2022-01-01 | End: 2022-01-01

## 2022-01-01 RX ORDER — POLYETHYLENE GLYCOL 3350 17 G/17G
17 POWDER, FOR SOLUTION ORAL DAILY
Refills: 0 | Status: DISCONTINUED | OUTPATIENT
Start: 2022-01-01 | End: 2022-01-01

## 2022-01-01 RX ORDER — VASOPRESSIN 20 [USP'U]/ML
0.04 INJECTION INTRAVENOUS
Qty: 50 | Refills: 0 | Status: DISCONTINUED | OUTPATIENT
Start: 2022-01-01 | End: 2022-01-01

## 2022-01-01 RX ORDER — MORPHINE SULFATE 50 MG/1
1 CAPSULE, EXTENDED RELEASE ORAL ONCE
Refills: 0 | Status: COMPLETED | OUTPATIENT
Start: 2022-01-01 | End: 2022-01-01

## 2022-01-01 RX ORDER — DEXTROSE 50 % IN WATER 50 %
50 SYRINGE (ML) INTRAVENOUS ONCE
Refills: 0 | Status: COMPLETED | OUTPATIENT
Start: 2022-01-01 | End: 2022-01-01

## 2022-01-01 RX ORDER — COLCHICINE 0.6 MG
0.6 TABLET ORAL DAILY
Refills: 0 | Status: DISCONTINUED | OUTPATIENT
Start: 2022-01-01 | End: 2022-01-01

## 2022-01-01 RX ORDER — SODIUM CHLORIDE 9 MG/ML
500 INJECTION, SOLUTION INTRAVENOUS ONCE
Refills: 0 | Status: COMPLETED | OUTPATIENT
Start: 2022-01-01 | End: 2022-01-01

## 2022-01-01 RX ORDER — HEPARIN SODIUM 5000 [USP'U]/ML
9500 INJECTION INTRAVENOUS; SUBCUTANEOUS EVERY 6 HOURS
Refills: 0 | Status: DISCONTINUED | OUTPATIENT
Start: 2022-01-01 | End: 2022-01-01

## 2022-01-01 RX ORDER — SODIUM BICARBONATE 1 MEQ/ML
50 SYRINGE (ML) INTRAVENOUS ONCE
Refills: 0 | Status: DISCONTINUED | OUTPATIENT
Start: 2022-01-01 | End: 2022-01-01

## 2022-01-01 RX ORDER — HEPARIN SODIUM 5000 [USP'U]/ML
4500 INJECTION INTRAVENOUS; SUBCUTANEOUS EVERY 6 HOURS
Refills: 0 | Status: DISCONTINUED | OUTPATIENT
Start: 2022-01-01 | End: 2022-01-01

## 2022-01-01 RX ORDER — CONIVAPTAN HYDROCHLORIDE 20 MG/100ML
0.83 INJECTION, SOLUTION INTRAVENOUS
Qty: 20 | Refills: 0 | Status: DISCONTINUED | OUTPATIENT
Start: 2022-01-01 | End: 2022-01-01

## 2022-01-01 RX ORDER — MORPHINE SULFATE 50 MG/1
1 CAPSULE, EXTENDED RELEASE ORAL ONCE
Refills: 0 | Status: DISCONTINUED | OUTPATIENT
Start: 2022-01-01 | End: 2022-01-01

## 2022-01-01 RX ORDER — SODIUM,POTASSIUM PHOSPHATES 278-250MG
1 POWDER IN PACKET (EA) ORAL
Refills: 0 | Status: COMPLETED | OUTPATIENT
Start: 2022-01-01 | End: 2022-01-01

## 2022-01-01 RX ORDER — ACETAMINOPHEN 500 MG
650 TABLET ORAL ONCE
Refills: 0 | Status: COMPLETED | OUTPATIENT
Start: 2022-01-01 | End: 2022-01-01

## 2022-01-01 RX ADMIN — ALBUTEROL 2 PUFF(S): 90 AEROSOL, METERED ORAL at 21:05

## 2022-01-01 RX ADMIN — Medication 1 APPLICATION(S): at 12:00

## 2022-01-01 RX ADMIN — CISATRACURIUM BESYLATE 21.1 MICROGRAM(S)/KG/MIN: 2 INJECTION INTRAVENOUS at 21:20

## 2022-01-01 RX ADMIN — SENNA PLUS 2 TABLET(S): 8.6 TABLET ORAL at 21:00

## 2022-01-01 RX ADMIN — FAMOTIDINE 20 MILLIGRAM(S): 10 INJECTION INTRAVENOUS at 19:59

## 2022-01-01 RX ADMIN — CHLORHEXIDINE GLUCONATE 15 MILLILITER(S): 213 SOLUTION TOPICAL at 17:42

## 2022-01-01 RX ADMIN — PIPERACILLIN AND TAZOBACTAM 25 GRAM(S): 4; .5 INJECTION, POWDER, LYOPHILIZED, FOR SOLUTION INTRAVENOUS at 14:20

## 2022-01-01 RX ADMIN — PROPOFOL 7.02 MICROGRAM(S)/KG/MIN: 10 INJECTION, EMULSION INTRAVENOUS at 08:00

## 2022-01-01 RX ADMIN — ALBUTEROL 2 PUFF(S): 90 AEROSOL, METERED ORAL at 03:06

## 2022-01-01 RX ADMIN — PIPERACILLIN AND TAZOBACTAM 25 GRAM(S): 4; .5 INJECTION, POWDER, LYOPHILIZED, FOR SOLUTION INTRAVENOUS at 13:27

## 2022-01-01 RX ADMIN — BUMETANIDE 2 MILLIGRAM(S): 0.25 INJECTION INTRAMUSCULAR; INTRAVENOUS at 18:36

## 2022-01-01 RX ADMIN — CISATRACURIUM BESYLATE 21.1 MICROGRAM(S)/KG/MIN: 2 INJECTION INTRAVENOUS at 12:10

## 2022-01-01 RX ADMIN — Medication 0.25 MILLIGRAM(S): at 23:28

## 2022-01-01 RX ADMIN — Medication 2.5 MILLIGRAM(S): at 05:24

## 2022-01-01 RX ADMIN — CHLORHEXIDINE GLUCONATE 15 MILLILITER(S): 213 SOLUTION TOPICAL at 05:14

## 2022-01-01 RX ADMIN — SODIUM CHLORIDE 125 MILLILITER(S): 9 INJECTION, SOLUTION INTRAVENOUS at 16:32

## 2022-01-01 RX ADMIN — PROPOFOL 7.02 MICROGRAM(S)/KG/MIN: 10 INJECTION, EMULSION INTRAVENOUS at 15:54

## 2022-01-01 RX ADMIN — PROPOFOL 7.02 MICROGRAM(S)/KG/MIN: 10 INJECTION, EMULSION INTRAVENOUS at 20:59

## 2022-01-01 RX ADMIN — Medication 20 MILLIEQUIVALENT(S): at 03:40

## 2022-01-01 RX ADMIN — CHLORHEXIDINE GLUCONATE 15 MILLILITER(S): 213 SOLUTION TOPICAL at 19:37

## 2022-01-01 RX ADMIN — Medication 1000 MILLIGRAM(S): at 16:48

## 2022-01-01 RX ADMIN — REMDESIVIR 500 MILLIGRAM(S): 5 INJECTION INTRAVENOUS at 18:24

## 2022-01-01 RX ADMIN — Medication 1 APPLICATION(S): at 14:30

## 2022-01-01 RX ADMIN — CHLORHEXIDINE GLUCONATE 15 MILLILITER(S): 213 SOLUTION TOPICAL at 05:11

## 2022-01-01 RX ADMIN — FAMOTIDINE 20 MILLIGRAM(S): 10 INJECTION INTRAVENOUS at 18:24

## 2022-01-01 RX ADMIN — ALBUTEROL 2 PUFF(S): 90 AEROSOL, METERED ORAL at 02:44

## 2022-01-01 RX ADMIN — Medication 145 MILLIGRAM(S): at 22:29

## 2022-01-01 RX ADMIN — Medication 400 MILLIGRAM(S): at 02:57

## 2022-01-01 RX ADMIN — ALBUTEROL 2 PUFF(S): 90 AEROSOL, METERED ORAL at 21:39

## 2022-01-01 RX ADMIN — ENOXAPARIN SODIUM 40 MILLIGRAM(S): 100 INJECTION SUBCUTANEOUS at 06:50

## 2022-01-01 RX ADMIN — BUMETANIDE 2 MILLIGRAM(S): 0.25 INJECTION INTRAMUSCULAR; INTRAVENOUS at 09:38

## 2022-01-01 RX ADMIN — Medication 1 APPLICATION(S): at 19:38

## 2022-01-01 RX ADMIN — CISATRACURIUM BESYLATE 21.1 MICROGRAM(S)/KG/MIN: 2 INJECTION INTRAVENOUS at 19:20

## 2022-01-01 RX ADMIN — BUMETANIDE 2 MILLIGRAM(S): 0.25 INJECTION INTRAMUSCULAR; INTRAVENOUS at 03:22

## 2022-01-01 RX ADMIN — CHLORHEXIDINE GLUCONATE 1 APPLICATION(S): 213 SOLUTION TOPICAL at 08:08

## 2022-01-01 RX ADMIN — Medication 400 MILLIGRAM(S): at 22:51

## 2022-01-01 RX ADMIN — PHENYLEPHRINE HYDROCHLORIDE 2.19 MICROGRAM(S)/KG/MIN: 10 INJECTION INTRAVENOUS at 14:49

## 2022-01-01 RX ADMIN — CHLORHEXIDINE GLUCONATE 1 APPLICATION(S): 213 SOLUTION TOPICAL at 07:38

## 2022-01-01 RX ADMIN — CISATRACURIUM BESYLATE 21.1 MICROGRAM(S)/KG/MIN: 2 INJECTION INTRAVENOUS at 08:03

## 2022-01-01 RX ADMIN — ALBUTEROL 2 PUFF(S): 90 AEROSOL, METERED ORAL at 22:40

## 2022-01-01 RX ADMIN — Medication 6 MILLIGRAM(S): at 05:50

## 2022-01-01 RX ADMIN — Medication 1 TABLET(S): at 11:28

## 2022-01-01 RX ADMIN — ALBUTEROL 2 PUFF(S): 90 AEROSOL, METERED ORAL at 15:45

## 2022-01-01 RX ADMIN — Medication 25 GRAM(S): at 05:22

## 2022-01-01 RX ADMIN — Medication 85 MILLIMOLE(S): at 16:32

## 2022-01-01 RX ADMIN — AMIODARONE HYDROCHLORIDE 200 MILLIGRAM(S): 400 TABLET ORAL at 05:14

## 2022-01-01 RX ADMIN — Medication 2000 UNIT(S): at 11:28

## 2022-01-01 RX ADMIN — Medication 25 GRAM(S): at 05:14

## 2022-01-01 RX ADMIN — CISATRACURIUM BESYLATE 20 MILLIGRAM(S): 2 INJECTION INTRAVENOUS at 16:20

## 2022-01-01 RX ADMIN — ENOXAPARIN SODIUM 40 MILLIGRAM(S): 100 INJECTION SUBCUTANEOUS at 05:16

## 2022-01-01 RX ADMIN — Medication 1000 MILLIGRAM(S): at 18:27

## 2022-01-01 RX ADMIN — BUMETANIDE 5 MG/HR: 0.25 INJECTION INTRAMUSCULAR; INTRAVENOUS at 19:06

## 2022-01-01 RX ADMIN — FAMOTIDINE 20 MILLIGRAM(S): 10 INJECTION INTRAVENOUS at 18:22

## 2022-01-01 RX ADMIN — ENOXAPARIN SODIUM 40 MILLIGRAM(S): 100 INJECTION SUBCUTANEOUS at 18:13

## 2022-01-01 RX ADMIN — AMIODARONE HYDROCHLORIDE 200 MILLIGRAM(S): 400 TABLET ORAL at 05:02

## 2022-01-01 RX ADMIN — AMIODARONE HYDROCHLORIDE 150 MILLIGRAM(S): 400 TABLET ORAL at 16:30

## 2022-01-01 RX ADMIN — ALBUTEROL 2 PUFF(S): 90 AEROSOL, METERED ORAL at 10:46

## 2022-01-01 RX ADMIN — CHLORHEXIDINE GLUCONATE 1 APPLICATION(S): 213 SOLUTION TOPICAL at 05:43

## 2022-01-01 RX ADMIN — Medication 5.49 MICROGRAM(S)/KG/MIN: at 13:03

## 2022-01-01 RX ADMIN — POLYETHYLENE GLYCOL 3350 17 GRAM(S): 17 POWDER, FOR SOLUTION ORAL at 12:11

## 2022-01-01 RX ADMIN — ALBUTEROL 2 PUFF(S): 90 AEROSOL, METERED ORAL at 09:20

## 2022-01-01 RX ADMIN — FENTANYL CITRATE 5.85 MICROGRAM(S)/KG/HR: 50 INJECTION INTRAVENOUS at 19:20

## 2022-01-01 RX ADMIN — FENTANYL CITRATE 5.85 MICROGRAM(S)/KG/HR: 50 INJECTION INTRAVENOUS at 07:36

## 2022-01-01 RX ADMIN — FENTANYL CITRATE 5.85 MICROGRAM(S)/KG/HR: 50 INJECTION INTRAVENOUS at 09:19

## 2022-01-01 RX ADMIN — POLYETHYLENE GLYCOL 3350 17 GRAM(S): 17 POWDER, FOR SOLUTION ORAL at 17:36

## 2022-01-01 RX ADMIN — ALBUTEROL 2 PUFF(S): 90 AEROSOL, METERED ORAL at 11:19

## 2022-01-01 RX ADMIN — FENTANYL CITRATE 5.85 MICROGRAM(S)/KG/HR: 50 INJECTION INTRAVENOUS at 08:23

## 2022-01-01 RX ADMIN — ALBUTEROL 2 PUFF(S): 90 AEROSOL, METERED ORAL at 08:21

## 2022-01-01 RX ADMIN — HEPARIN SODIUM 5000 UNIT(S): 5000 INJECTION INTRAVENOUS; SUBCUTANEOUS at 14:09

## 2022-01-01 RX ADMIN — PIPERACILLIN AND TAZOBACTAM 25 GRAM(S): 4; .5 INJECTION, POWDER, LYOPHILIZED, FOR SOLUTION INTRAVENOUS at 06:00

## 2022-01-01 RX ADMIN — Medication 1 APPLICATION(S): at 12:05

## 2022-01-01 RX ADMIN — Medication 0.25 MILLIGRAM(S): at 02:14

## 2022-01-01 RX ADMIN — PIPERACILLIN AND TAZOBACTAM 25 GRAM(S): 4; .5 INJECTION, POWDER, LYOPHILIZED, FOR SOLUTION INTRAVENOUS at 21:21

## 2022-01-01 RX ADMIN — PANTOPRAZOLE SODIUM 40 MILLIGRAM(S): 20 TABLET, DELAYED RELEASE ORAL at 12:45

## 2022-01-01 RX ADMIN — SODIUM CHLORIDE 125 MILLILITER(S): 9 INJECTION, SOLUTION INTRAVENOUS at 21:42

## 2022-01-01 RX ADMIN — PROPOFOL 7.02 MICROGRAM(S)/KG/MIN: 10 INJECTION, EMULSION INTRAVENOUS at 17:38

## 2022-01-01 RX ADMIN — CHLORHEXIDINE GLUCONATE 15 MILLILITER(S): 213 SOLUTION TOPICAL at 05:07

## 2022-01-01 RX ADMIN — PIPERACILLIN AND TAZOBACTAM 25 GRAM(S): 4; .5 INJECTION, POWDER, LYOPHILIZED, FOR SOLUTION INTRAVENOUS at 13:55

## 2022-01-01 RX ADMIN — CONIVAPTAN HYDROCHLORIDE 4.17 MG/HR: 20 INJECTION, SOLUTION INTRAVENOUS at 15:11

## 2022-01-01 RX ADMIN — ALBUTEROL 2 PUFF(S): 90 AEROSOL, METERED ORAL at 03:50

## 2022-01-01 RX ADMIN — VASOPRESSIN 2.4 UNIT(S)/MIN: 20 INJECTION INTRAVENOUS at 09:03

## 2022-01-01 RX ADMIN — AMIODARONE HYDROCHLORIDE 200 MILLIGRAM(S): 400 TABLET ORAL at 06:00

## 2022-01-01 RX ADMIN — Medication 50 MILLILITER(S): at 11:48

## 2022-01-01 RX ADMIN — Medication 5.49 MICROGRAM(S)/KG/MIN: at 11:19

## 2022-01-01 RX ADMIN — Medication 1 APPLICATION(S): at 11:16

## 2022-01-01 RX ADMIN — PIPERACILLIN AND TAZOBACTAM 25 GRAM(S): 4; .5 INJECTION, POWDER, LYOPHILIZED, FOR SOLUTION INTRAVENOUS at 21:31

## 2022-01-01 RX ADMIN — PANTOPRAZOLE SODIUM 40 MILLIGRAM(S): 20 TABLET, DELAYED RELEASE ORAL at 11:50

## 2022-01-01 RX ADMIN — PROPOFOL 7.02 MICROGRAM(S)/KG/MIN: 10 INJECTION, EMULSION INTRAVENOUS at 18:39

## 2022-01-01 RX ADMIN — FAMOTIDINE 20 MILLIGRAM(S): 10 INJECTION INTRAVENOUS at 05:13

## 2022-01-01 RX ADMIN — Medication 30 MILLIGRAM(S): at 16:25

## 2022-01-01 RX ADMIN — CISATRACURIUM BESYLATE 21.1 MICROGRAM(S)/KG/MIN: 2 INJECTION INTRAVENOUS at 07:55

## 2022-01-01 RX ADMIN — BUMETANIDE 2 MILLIGRAM(S): 0.25 INJECTION INTRAMUSCULAR; INTRAVENOUS at 22:03

## 2022-01-01 RX ADMIN — FAMOTIDINE 20 MILLIGRAM(S): 10 INJECTION INTRAVENOUS at 17:39

## 2022-01-01 RX ADMIN — POLYETHYLENE GLYCOL 3350 17 GRAM(S): 17 POWDER, FOR SOLUTION ORAL at 06:19

## 2022-01-01 RX ADMIN — ALBUTEROL 2 PUFF(S): 90 AEROSOL, METERED ORAL at 10:36

## 2022-01-01 RX ADMIN — POTASSIUM PHOSPHATE, MONOBASIC POTASSIUM PHOSPHATE, DIBASIC 62.5 MILLIMOLE(S): 236; 224 INJECTION, SOLUTION INTRAVENOUS at 14:50

## 2022-01-01 RX ADMIN — SODIUM CHLORIDE 125 MILLILITER(S): 9 INJECTION, SOLUTION INTRAVENOUS at 18:14

## 2022-01-01 RX ADMIN — PROPOFOL 7.02 MICROGRAM(S)/KG/MIN: 10 INJECTION, EMULSION INTRAVENOUS at 20:01

## 2022-01-01 RX ADMIN — CISATRACURIUM BESYLATE 21.1 MICROGRAM(S)/KG/MIN: 2 INJECTION INTRAVENOUS at 07:36

## 2022-01-01 RX ADMIN — AMIODARONE HYDROCHLORIDE 16.7 MG/MIN: 400 TABLET ORAL at 12:10

## 2022-01-01 RX ADMIN — CHLORHEXIDINE GLUCONATE 15 MILLILITER(S): 213 SOLUTION TOPICAL at 05:05

## 2022-01-01 RX ADMIN — Medication 100 GRAM(S): at 09:09

## 2022-01-01 RX ADMIN — PHENYLEPHRINE HYDROCHLORIDE 2.19 MICROGRAM(S)/KG/MIN: 10 INJECTION INTRAVENOUS at 20:58

## 2022-01-01 RX ADMIN — SENNA PLUS 2 TABLET(S): 8.6 TABLET ORAL at 21:21

## 2022-01-01 RX ADMIN — Medication 40 MILLIEQUIVALENT(S): at 05:22

## 2022-01-01 RX ADMIN — ALBUTEROL 2 PUFF(S): 90 AEROSOL, METERED ORAL at 16:09

## 2022-01-01 RX ADMIN — Medication 85 MILLIMOLE(S): at 16:25

## 2022-01-01 RX ADMIN — FENTANYL CITRATE 5.85 MICROGRAM(S)/KG/HR: 50 INJECTION INTRAVENOUS at 13:25

## 2022-01-01 RX ADMIN — CISATRACURIUM BESYLATE 21.1 MICROGRAM(S)/KG/MIN: 2 INJECTION INTRAVENOUS at 10:25

## 2022-01-01 RX ADMIN — FENTANYL CITRATE 5.85 MICROGRAM(S)/KG/HR: 50 INJECTION INTRAVENOUS at 16:59

## 2022-01-01 RX ADMIN — Medication 6 MILLIGRAM(S): at 05:34

## 2022-01-01 RX ADMIN — PIPERACILLIN AND TAZOBACTAM 25 GRAM(S): 4; .5 INJECTION, POWDER, LYOPHILIZED, FOR SOLUTION INTRAVENOUS at 05:00

## 2022-01-01 RX ADMIN — Medication 50 MILLIEQUIVALENT(S): at 03:22

## 2022-01-01 RX ADMIN — CHLORHEXIDINE GLUCONATE 15 MILLILITER(S): 213 SOLUTION TOPICAL at 05:43

## 2022-01-01 RX ADMIN — HEPARIN SODIUM 4500 UNIT(S): 5000 INJECTION INTRAVENOUS; SUBCUTANEOUS at 05:22

## 2022-01-01 RX ADMIN — Medication 50 MILLILITER(S): at 04:33

## 2022-01-01 RX ADMIN — CHLORHEXIDINE GLUCONATE 15 MILLILITER(S): 213 SOLUTION TOPICAL at 17:36

## 2022-01-01 RX ADMIN — FENTANYL CITRATE 5.85 MICROGRAM(S)/KG/HR: 50 INJECTION INTRAVENOUS at 07:53

## 2022-01-01 RX ADMIN — CONIVAPTAN HYDROCHLORIDE 4.17 MG/HR: 20 INJECTION, SOLUTION INTRAVENOUS at 07:08

## 2022-01-01 RX ADMIN — FENTANYL CITRATE 1.17 MICROGRAM(S)/KG/HR: 50 INJECTION INTRAVENOUS at 19:06

## 2022-01-01 RX ADMIN — PIPERACILLIN AND TAZOBACTAM 25 GRAM(S): 4; .5 INJECTION, POWDER, LYOPHILIZED, FOR SOLUTION INTRAVENOUS at 05:14

## 2022-01-01 RX ADMIN — Medication 1000 MILLIGRAM(S): at 08:00

## 2022-01-01 RX ADMIN — Medication 5.49 MICROGRAM(S)/KG/MIN: at 19:08

## 2022-01-01 RX ADMIN — ENOXAPARIN SODIUM 40 MILLIGRAM(S): 100 INJECTION SUBCUTANEOUS at 06:17

## 2022-01-01 RX ADMIN — AMIODARONE HYDROCHLORIDE 150 MILLIGRAM(S): 400 TABLET ORAL at 04:15

## 2022-01-01 RX ADMIN — CHLORHEXIDINE GLUCONATE 15 MILLILITER(S): 213 SOLUTION TOPICAL at 06:17

## 2022-01-01 RX ADMIN — BUMETANIDE 132 MILLIGRAM(S): 0.25 INJECTION INTRAMUSCULAR; INTRAVENOUS at 06:10

## 2022-01-01 RX ADMIN — ENOXAPARIN SODIUM 40 MILLIGRAM(S): 100 INJECTION SUBCUTANEOUS at 18:28

## 2022-01-01 RX ADMIN — ALBUTEROL 2 PUFF(S): 90 AEROSOL, METERED ORAL at 15:01

## 2022-01-01 RX ADMIN — VASOPRESSIN 2.4 UNIT(S)/MIN: 20 INJECTION INTRAVENOUS at 20:58

## 2022-01-01 RX ADMIN — AMIODARONE HYDROCHLORIDE 200 MILLIGRAM(S): 400 TABLET ORAL at 05:22

## 2022-01-01 RX ADMIN — CISATRACURIUM BESYLATE 21.1 MICROGRAM(S)/KG/MIN: 2 INJECTION INTRAVENOUS at 06:20

## 2022-01-01 RX ADMIN — Medication 6 MILLIGRAM(S): at 11:36

## 2022-01-01 RX ADMIN — METHYLNALTREXONE BROMIDE 12 MILLIGRAM(S): 12 INJECTION, SOLUTION SUBCUTANEOUS at 20:50

## 2022-01-01 RX ADMIN — Medication 30 MILLIGRAM(S): at 16:55

## 2022-01-01 RX ADMIN — ENOXAPARIN SODIUM 40 MILLIGRAM(S): 100 INJECTION SUBCUTANEOUS at 19:45

## 2022-01-01 RX ADMIN — PHENYLEPHRINE HYDROCHLORIDE 2.19 MICROGRAM(S)/KG/MIN: 10 INJECTION INTRAVENOUS at 08:23

## 2022-01-01 RX ADMIN — CHLORHEXIDINE GLUCONATE 1 APPLICATION(S): 213 SOLUTION TOPICAL at 08:47

## 2022-01-01 RX ADMIN — POLYETHYLENE GLYCOL 3350 17 GRAM(S): 17 POWDER, FOR SOLUTION ORAL at 11:44

## 2022-01-01 RX ADMIN — Medication 5.49 MICROGRAM(S)/KG/MIN: at 20:23

## 2022-01-01 RX ADMIN — BUMETANIDE 2 MILLIGRAM(S): 0.25 INJECTION INTRAMUSCULAR; INTRAVENOUS at 20:13

## 2022-01-01 RX ADMIN — Medication 400 MILLIGRAM(S): at 23:13

## 2022-01-01 RX ADMIN — BUMETANIDE 5 MG/HR: 0.25 INJECTION INTRAMUSCULAR; INTRAVENOUS at 07:10

## 2022-01-01 RX ADMIN — FENTANYL CITRATE 5.85 MICROGRAM(S)/KG/HR: 50 INJECTION INTRAVENOUS at 05:10

## 2022-01-01 RX ADMIN — CISATRACURIUM BESYLATE 21.1 MICROGRAM(S)/KG/MIN: 2 INJECTION INTRAVENOUS at 07:50

## 2022-01-01 RX ADMIN — PROPOFOL 7.02 MICROGRAM(S)/KG/MIN: 10 INJECTION, EMULSION INTRAVENOUS at 08:43

## 2022-01-01 RX ADMIN — ALBUTEROL 2 PUFF(S): 90 AEROSOL, METERED ORAL at 03:09

## 2022-01-01 RX ADMIN — CONIVAPTAN HYDROCHLORIDE 4.17 MG/HR: 20 INJECTION, SOLUTION INTRAVENOUS at 19:07

## 2022-01-01 RX ADMIN — ENOXAPARIN SODIUM 40 MILLIGRAM(S): 100 INJECTION SUBCUTANEOUS at 18:22

## 2022-01-01 RX ADMIN — CHLORHEXIDINE GLUCONATE 1 APPLICATION(S): 213 SOLUTION TOPICAL at 07:03

## 2022-01-01 RX ADMIN — PHENYLEPHRINE HYDROCHLORIDE 2.19 MICROGRAM(S)/KG/MIN: 10 INJECTION INTRAVENOUS at 08:44

## 2022-01-01 RX ADMIN — ENOXAPARIN SODIUM 40 MILLIGRAM(S): 100 INJECTION SUBCUTANEOUS at 05:43

## 2022-01-01 RX ADMIN — CISATRACURIUM BESYLATE 21.1 MICROGRAM(S)/KG/MIN: 2 INJECTION INTRAVENOUS at 19:09

## 2022-01-01 RX ADMIN — Medication 1 APPLICATION(S): at 11:13

## 2022-01-01 RX ADMIN — SODIUM CHLORIDE 500 MILLILITER(S): 9 INJECTION, SOLUTION INTRAVENOUS at 11:49

## 2022-01-01 RX ADMIN — ALBUTEROL 2 PUFF(S): 90 AEROSOL, METERED ORAL at 22:03

## 2022-01-01 RX ADMIN — CISATRACURIUM BESYLATE 21.1 MICROGRAM(S)/KG/MIN: 2 INJECTION INTRAVENOUS at 07:59

## 2022-01-01 RX ADMIN — PIPERACILLIN AND TAZOBACTAM 25 GRAM(S): 4; .5 INJECTION, POWDER, LYOPHILIZED, FOR SOLUTION INTRAVENOUS at 14:09

## 2022-01-01 RX ADMIN — SENNA PLUS 2 TABLET(S): 8.6 TABLET ORAL at 21:44

## 2022-01-01 RX ADMIN — CHLORHEXIDINE GLUCONATE 1 APPLICATION(S): 213 SOLUTION TOPICAL at 06:09

## 2022-01-01 RX ADMIN — CISATRACURIUM BESYLATE 21.1 MICROGRAM(S)/KG/MIN: 2 INJECTION INTRAVENOUS at 21:49

## 2022-01-01 RX ADMIN — AMIODARONE HYDROCHLORIDE 618 MILLIGRAM(S): 400 TABLET ORAL at 16:23

## 2022-01-01 RX ADMIN — AMIODARONE HYDROCHLORIDE 200 MILLIGRAM(S): 400 TABLET ORAL at 05:04

## 2022-01-01 RX ADMIN — Medication 100 MILLIEQUIVALENT(S): at 16:46

## 2022-01-01 RX ADMIN — ALBUTEROL 2 PUFF(S): 90 AEROSOL, METERED ORAL at 10:53

## 2022-01-01 RX ADMIN — PROPOFOL 7.02 MICROGRAM(S)/KG/MIN: 10 INJECTION, EMULSION INTRAVENOUS at 14:48

## 2022-01-01 RX ADMIN — SODIUM CHLORIDE 125 MILLILITER(S): 9 INJECTION, SOLUTION INTRAVENOUS at 05:16

## 2022-01-01 RX ADMIN — CISATRACURIUM BESYLATE 21.1 MICROGRAM(S)/KG/MIN: 2 INJECTION INTRAVENOUS at 20:02

## 2022-01-01 RX ADMIN — Medication 650 MILLIGRAM(S): at 05:33

## 2022-01-01 RX ADMIN — ENOXAPARIN SODIUM 40 MILLIGRAM(S): 100 INJECTION SUBCUTANEOUS at 05:50

## 2022-01-01 RX ADMIN — Medication 6 MILLIGRAM(S): at 05:36

## 2022-01-01 RX ADMIN — CHLORHEXIDINE GLUCONATE 15 MILLILITER(S): 213 SOLUTION TOPICAL at 17:41

## 2022-01-01 RX ADMIN — HEPARIN SODIUM 2400 UNIT(S)/HR: 5000 INJECTION INTRAVENOUS; SUBCUTANEOUS at 12:02

## 2022-01-01 RX ADMIN — ALBUTEROL 2 PUFF(S): 90 AEROSOL, METERED ORAL at 22:14

## 2022-01-01 RX ADMIN — Medication 5.49 MICROGRAM(S)/KG/MIN: at 07:11

## 2022-01-01 RX ADMIN — Medication 100 GRAM(S): at 09:29

## 2022-01-01 RX ADMIN — Medication 6 MILLIGRAM(S): at 05:16

## 2022-01-01 RX ADMIN — Medication 1 APPLICATION(S): at 11:44

## 2022-01-01 RX ADMIN — PIPERACILLIN AND TAZOBACTAM 200 GRAM(S): 4; .5 INJECTION, POWDER, LYOPHILIZED, FOR SOLUTION INTRAVENOUS at 14:01

## 2022-01-01 RX ADMIN — SODIUM CHLORIDE 125 MILLILITER(S): 9 INJECTION, SOLUTION INTRAVENOUS at 05:50

## 2022-01-01 RX ADMIN — VASOPRESSIN 2.4 UNIT(S)/MIN: 20 INJECTION INTRAVENOUS at 07:35

## 2022-01-01 RX ADMIN — FENTANYL CITRATE 5.85 MICROGRAM(S)/KG/HR: 50 INJECTION INTRAVENOUS at 20:58

## 2022-01-01 RX ADMIN — Medication 400 MILLIGRAM(S): at 03:57

## 2022-01-01 RX ADMIN — ALBUTEROL 2 PUFF(S): 90 AEROSOL, METERED ORAL at 22:15

## 2022-01-01 RX ADMIN — BUMETANIDE 2 MILLIGRAM(S): 0.25 INJECTION INTRAMUSCULAR; INTRAVENOUS at 23:12

## 2022-01-01 RX ADMIN — BUMETANIDE 2 MILLIGRAM(S): 0.25 INJECTION INTRAMUSCULAR; INTRAVENOUS at 12:38

## 2022-01-01 RX ADMIN — Medication 1 PACKET(S): at 05:30

## 2022-01-01 RX ADMIN — Medication 400 MILLIGRAM(S): at 06:56

## 2022-01-01 RX ADMIN — Medication 1 TABLET(S): at 14:11

## 2022-01-01 RX ADMIN — SENNA PLUS 2 TABLET(S): 8.6 TABLET ORAL at 22:58

## 2022-01-01 RX ADMIN — FAMOTIDINE 20 MILLIGRAM(S): 10 INJECTION INTRAVENOUS at 05:32

## 2022-01-01 RX ADMIN — Medication 5.49 MICROGRAM(S)/KG/MIN: at 19:32

## 2022-01-01 RX ADMIN — Medication 650 MILLIGRAM(S): at 16:00

## 2022-01-01 RX ADMIN — Medication 100 MILLIEQUIVALENT(S): at 09:29

## 2022-01-01 RX ADMIN — VASOPRESSIN 2.4 UNIT(S)/MIN: 20 INJECTION INTRAVENOUS at 12:11

## 2022-01-01 RX ADMIN — PHENYLEPHRINE HYDROCHLORIDE 2.19 MICROGRAM(S)/KG/MIN: 10 INJECTION INTRAVENOUS at 09:01

## 2022-01-01 RX ADMIN — ENOXAPARIN SODIUM 40 MILLIGRAM(S): 100 INJECTION SUBCUTANEOUS at 17:40

## 2022-01-01 RX ADMIN — Medication 1 DROP(S): at 22:56

## 2022-01-01 RX ADMIN — CHLORHEXIDINE GLUCONATE 1 APPLICATION(S): 213 SOLUTION TOPICAL at 12:09

## 2022-01-01 RX ADMIN — PIPERACILLIN AND TAZOBACTAM 25 GRAM(S): 4; .5 INJECTION, POWDER, LYOPHILIZED, FOR SOLUTION INTRAVENOUS at 22:15

## 2022-01-01 RX ADMIN — Medication 10 MILLIGRAM(S): at 14:25

## 2022-01-01 RX ADMIN — Medication 1 PACKET(S): at 17:40

## 2022-01-01 RX ADMIN — Medication 50 MILLILITER(S): at 23:38

## 2022-01-01 RX ADMIN — POLYETHYLENE GLYCOL 3350 17 GRAM(S): 17 POWDER, FOR SOLUTION ORAL at 17:15

## 2022-01-01 RX ADMIN — Medication 400 MILLIGRAM(S): at 05:41

## 2022-01-01 RX ADMIN — SENNA PLUS 2 TABLET(S): 8.6 TABLET ORAL at 22:23

## 2022-01-01 RX ADMIN — Medication 2000 UNIT(S): at 11:19

## 2022-01-01 RX ADMIN — AMIODARONE HYDROCHLORIDE 200 MILLIGRAM(S): 400 TABLET ORAL at 06:49

## 2022-01-01 RX ADMIN — VASOPRESSIN 2.4 UNIT(S)/MIN: 20 INJECTION INTRAVENOUS at 19:31

## 2022-01-01 RX ADMIN — VASOPRESSIN 2.4 UNIT(S)/MIN: 20 INJECTION INTRAVENOUS at 16:59

## 2022-01-01 RX ADMIN — ALBUTEROL 2 PUFF(S): 90 AEROSOL, METERED ORAL at 21:00

## 2022-01-01 RX ADMIN — Medication 145 MILLIGRAM(S): at 21:51

## 2022-01-01 RX ADMIN — ALBUTEROL 2 PUFF(S): 90 AEROSOL, METERED ORAL at 08:06

## 2022-01-01 RX ADMIN — CHLORHEXIDINE GLUCONATE 15 MILLILITER(S): 213 SOLUTION TOPICAL at 06:19

## 2022-01-01 RX ADMIN — FAMOTIDINE 20 MILLIGRAM(S): 10 INJECTION INTRAVENOUS at 05:17

## 2022-01-01 RX ADMIN — CHLORHEXIDINE GLUCONATE 15 MILLILITER(S): 213 SOLUTION TOPICAL at 18:26

## 2022-01-01 RX ADMIN — ALBUTEROL 2 PUFF(S): 90 AEROSOL, METERED ORAL at 22:26

## 2022-01-01 RX ADMIN — ALBUTEROL 2 PUFF(S): 90 AEROSOL, METERED ORAL at 08:45

## 2022-01-01 RX ADMIN — ALBUTEROL 2 PUFF(S): 90 AEROSOL, METERED ORAL at 03:18

## 2022-01-01 RX ADMIN — CISATRACURIUM BESYLATE 21.1 MICROGRAM(S)/KG/MIN: 2 INJECTION INTRAVENOUS at 22:03

## 2022-01-01 RX ADMIN — ALBUTEROL 2 PUFF(S): 90 AEROSOL, METERED ORAL at 22:17

## 2022-01-01 RX ADMIN — POLYETHYLENE GLYCOL 3350 17 GRAM(S): 17 POWDER, FOR SOLUTION ORAL at 11:37

## 2022-01-01 RX ADMIN — CISATRACURIUM BESYLATE 21.1 MICROGRAM(S)/KG/MIN: 2 INJECTION INTRAVENOUS at 08:45

## 2022-01-01 RX ADMIN — PANTOPRAZOLE SODIUM 40 MILLIGRAM(S): 20 TABLET, DELAYED RELEASE ORAL at 12:22

## 2022-01-01 RX ADMIN — PROPOFOL 7.02 MICROGRAM(S)/KG/MIN: 10 INJECTION, EMULSION INTRAVENOUS at 08:22

## 2022-01-01 RX ADMIN — PIPERACILLIN AND TAZOBACTAM 25 GRAM(S): 4; .5 INJECTION, POWDER, LYOPHILIZED, FOR SOLUTION INTRAVENOUS at 22:46

## 2022-01-01 RX ADMIN — FENTANYL CITRATE 1.17 MICROGRAM(S)/KG/HR: 50 INJECTION INTRAVENOUS at 07:59

## 2022-01-01 RX ADMIN — VASOPRESSIN 2.4 UNIT(S)/MIN: 20 INJECTION INTRAVENOUS at 07:10

## 2022-01-01 RX ADMIN — Medication 6 MILLIGRAM(S): at 05:13

## 2022-01-01 RX ADMIN — Medication 5.49 MICROGRAM(S)/KG/MIN: at 12:10

## 2022-01-01 RX ADMIN — PANTOPRAZOLE SODIUM 40 MILLIGRAM(S): 20 TABLET, DELAYED RELEASE ORAL at 16:26

## 2022-01-01 RX ADMIN — PHENYLEPHRINE HYDROCHLORIDE 2.19 MICROGRAM(S)/KG/MIN: 10 INJECTION INTRAVENOUS at 07:54

## 2022-01-01 RX ADMIN — BUMETANIDE 5 MG/HR: 0.25 INJECTION INTRAMUSCULAR; INTRAVENOUS at 08:00

## 2022-01-01 RX ADMIN — ALBUTEROL 2 PUFF(S): 90 AEROSOL, METERED ORAL at 03:15

## 2022-01-01 RX ADMIN — Medication 100 MILLIEQUIVALENT(S): at 10:32

## 2022-01-01 RX ADMIN — VASOPRESSIN 2.4 UNIT(S)/MIN: 20 INJECTION INTRAVENOUS at 08:44

## 2022-01-01 RX ADMIN — Medication 1 APPLICATION(S): at 12:11

## 2022-01-01 RX ADMIN — ALBUTEROL 2 PUFF(S): 90 AEROSOL, METERED ORAL at 15:09

## 2022-01-01 RX ADMIN — Medication 1 TABLET(S): at 12:27

## 2022-01-01 RX ADMIN — Medication 650 MILLIGRAM(S): at 06:25

## 2022-01-01 RX ADMIN — POLYETHYLENE GLYCOL 3350 17 GRAM(S): 17 POWDER, FOR SOLUTION ORAL at 12:38

## 2022-01-01 RX ADMIN — Medication 2000 UNIT(S): at 14:12

## 2022-01-01 RX ADMIN — FAMOTIDINE 20 MILLIGRAM(S): 10 INJECTION INTRAVENOUS at 17:55

## 2022-01-01 RX ADMIN — ENOXAPARIN SODIUM 40 MILLIGRAM(S): 100 INJECTION SUBCUTANEOUS at 17:56

## 2022-01-01 RX ADMIN — PROPOFOL 7.02 MICROGRAM(S)/KG/MIN: 10 INJECTION, EMULSION INTRAVENOUS at 19:05

## 2022-01-01 RX ADMIN — Medication 50 MILLIEQUIVALENT(S): at 23:30

## 2022-01-01 RX ADMIN — CHLORHEXIDINE GLUCONATE 1 APPLICATION(S): 213 SOLUTION TOPICAL at 06:12

## 2022-01-01 RX ADMIN — PIPERACILLIN AND TAZOBACTAM 25 GRAM(S): 4; .5 INJECTION, POWDER, LYOPHILIZED, FOR SOLUTION INTRAVENOUS at 05:23

## 2022-01-01 RX ADMIN — PROPOFOL 7.02 MICROGRAM(S)/KG/MIN: 10 INJECTION, EMULSION INTRAVENOUS at 22:56

## 2022-01-01 RX ADMIN — CISATRACURIUM BESYLATE 21.1 MICROGRAM(S)/KG/MIN: 2 INJECTION INTRAVENOUS at 22:56

## 2022-01-01 RX ADMIN — PROPOFOL 7.02 MICROGRAM(S)/KG/MIN: 10 INJECTION, EMULSION INTRAVENOUS at 09:01

## 2022-01-01 RX ADMIN — Medication 1 APPLICATION(S): at 12:28

## 2022-01-01 RX ADMIN — ENOXAPARIN SODIUM 40 MILLIGRAM(S): 100 INJECTION SUBCUTANEOUS at 18:24

## 2022-01-01 RX ADMIN — PANTOPRAZOLE SODIUM 40 MILLIGRAM(S): 20 TABLET, DELAYED RELEASE ORAL at 12:50

## 2022-01-01 RX ADMIN — Medication 100 MILLIEQUIVALENT(S): at 13:40

## 2022-01-01 RX ADMIN — ALBUTEROL 2 PUFF(S): 90 AEROSOL, METERED ORAL at 14:32

## 2022-01-01 RX ADMIN — Medication 400 MILLIGRAM(S): at 14:48

## 2022-01-01 RX ADMIN — CHLORHEXIDINE GLUCONATE 15 MILLILITER(S): 213 SOLUTION TOPICAL at 17:06

## 2022-01-01 RX ADMIN — MIDAZOLAM HYDROCHLORIDE 4 MILLIGRAM(S): 1 INJECTION, SOLUTION INTRAMUSCULAR; INTRAVENOUS at 15:17

## 2022-01-01 RX ADMIN — VASOPRESSIN 2.4 UNIT(S)/MIN: 20 INJECTION INTRAVENOUS at 07:58

## 2022-01-01 RX ADMIN — Medication 2000 UNIT(S): at 12:27

## 2022-01-01 RX ADMIN — CHLORHEXIDINE GLUCONATE 1 APPLICATION(S): 213 SOLUTION TOPICAL at 09:01

## 2022-01-01 RX ADMIN — Medication 50 MILLILITER(S): at 02:58

## 2022-01-01 RX ADMIN — PIPERACILLIN AND TAZOBACTAM 25 GRAM(S): 4; .5 INJECTION, POWDER, LYOPHILIZED, FOR SOLUTION INTRAVENOUS at 21:00

## 2022-01-01 RX ADMIN — Medication 5.49 MICROGRAM(S)/KG/MIN: at 19:07

## 2022-01-01 RX ADMIN — Medication 50 MILLIEQUIVALENT(S): at 06:33

## 2022-01-01 RX ADMIN — PANTOPRAZOLE SODIUM 40 MILLIGRAM(S): 20 TABLET, DELAYED RELEASE ORAL at 12:28

## 2022-01-01 RX ADMIN — CHLORHEXIDINE GLUCONATE 15 MILLILITER(S): 213 SOLUTION TOPICAL at 18:07

## 2022-01-01 RX ADMIN — AMIODARONE HYDROCHLORIDE 33.3 MG/MIN: 400 TABLET ORAL at 17:39

## 2022-01-01 RX ADMIN — AMIODARONE HYDROCHLORIDE 200 MILLIGRAM(S): 400 TABLET ORAL at 05:23

## 2022-01-01 RX ADMIN — CHLORHEXIDINE GLUCONATE 15 MILLILITER(S): 213 SOLUTION TOPICAL at 18:36

## 2022-01-01 RX ADMIN — HEPARIN SODIUM 2400 UNIT(S)/HR: 5000 INJECTION INTRAVENOUS; SUBCUTANEOUS at 05:25

## 2022-01-01 RX ADMIN — FENTANYL CITRATE 5.85 MICROGRAM(S)/KG/HR: 50 INJECTION INTRAVENOUS at 09:02

## 2022-01-01 RX ADMIN — Medication 400 MILLIGRAM(S): at 17:00

## 2022-01-01 RX ADMIN — SODIUM CHLORIDE 125 MILLILITER(S): 9 INJECTION, SOLUTION INTRAVENOUS at 14:46

## 2022-01-01 RX ADMIN — CISATRACURIUM BESYLATE 21.1 MICROGRAM(S)/KG/MIN: 2 INJECTION INTRAVENOUS at 15:13

## 2022-01-01 RX ADMIN — ALBUTEROL 2 PUFF(S): 90 AEROSOL, METERED ORAL at 04:08

## 2022-01-01 RX ADMIN — CHLORHEXIDINE GLUCONATE 15 MILLILITER(S): 213 SOLUTION TOPICAL at 05:37

## 2022-01-01 RX ADMIN — PIPERACILLIN AND TAZOBACTAM 25 GRAM(S): 4; .5 INJECTION, POWDER, LYOPHILIZED, FOR SOLUTION INTRAVENOUS at 05:36

## 2022-01-01 RX ADMIN — VASOPRESSIN 2.4 UNIT(S)/MIN: 20 INJECTION INTRAVENOUS at 19:20

## 2022-01-01 RX ADMIN — PHENYLEPHRINE HYDROCHLORIDE 2.19 MICROGRAM(S)/KG/MIN: 10 INJECTION INTRAVENOUS at 20:02

## 2022-01-01 RX ADMIN — PANTOPRAZOLE SODIUM 40 MILLIGRAM(S): 20 TABLET, DELAYED RELEASE ORAL at 12:24

## 2022-01-01 RX ADMIN — PANTOPRAZOLE SODIUM 40 MILLIGRAM(S): 20 TABLET, DELAYED RELEASE ORAL at 14:12

## 2022-01-01 RX ADMIN — PROPOFOL 7.02 MICROGRAM(S)/KG/MIN: 10 INJECTION, EMULSION INTRAVENOUS at 07:53

## 2022-01-01 RX ADMIN — SODIUM CHLORIDE 125 MILLILITER(S): 9 INJECTION, SOLUTION INTRAVENOUS at 22:29

## 2022-01-01 RX ADMIN — PROPOFOL 7.02 MICROGRAM(S)/KG/MIN: 10 INJECTION, EMULSION INTRAVENOUS at 07:35

## 2022-01-01 RX ADMIN — Medication 5.49 MICROGRAM(S)/KG/MIN: at 07:36

## 2022-01-01 RX ADMIN — ALBUTEROL 2 PUFF(S): 90 AEROSOL, METERED ORAL at 10:35

## 2022-01-01 RX ADMIN — BUMETANIDE 5 MG/HR: 0.25 INJECTION INTRAMUSCULAR; INTRAVENOUS at 19:08

## 2022-01-01 RX ADMIN — CHLORHEXIDINE GLUCONATE 1 APPLICATION(S): 213 SOLUTION TOPICAL at 09:18

## 2022-01-01 RX ADMIN — PROPOFOL 7.02 MICROGRAM(S)/KG/MIN: 10 INJECTION, EMULSION INTRAVENOUS at 19:07

## 2022-01-01 RX ADMIN — Medication 1000 MILLIGRAM(S): at 04:00

## 2022-01-01 RX ADMIN — VASOPRESSIN 2.4 UNIT(S)/MIN: 20 INJECTION INTRAVENOUS at 19:05

## 2022-01-01 RX ADMIN — BUMETANIDE 2 MILLIGRAM(S): 0.25 INJECTION INTRAMUSCULAR; INTRAVENOUS at 04:54

## 2022-01-01 RX ADMIN — PIPERACILLIN AND TAZOBACTAM 25 GRAM(S): 4; .5 INJECTION, POWDER, LYOPHILIZED, FOR SOLUTION INTRAVENOUS at 05:24

## 2022-01-01 RX ADMIN — Medication 100 GRAM(S): at 03:39

## 2022-01-01 RX ADMIN — ALBUTEROL 2 PUFF(S): 90 AEROSOL, METERED ORAL at 03:55

## 2022-01-01 RX ADMIN — CISATRACURIUM BESYLATE 21.1 MICROGRAM(S)/KG/MIN: 2 INJECTION INTRAVENOUS at 19:31

## 2022-01-01 RX ADMIN — Medication 400 MILLIGRAM(S): at 22:46

## 2022-01-01 RX ADMIN — HEPARIN SODIUM 2400 UNIT(S)/HR: 5000 INJECTION INTRAVENOUS; SUBCUTANEOUS at 06:10

## 2022-01-01 RX ADMIN — FENTANYL CITRATE 5.85 MICROGRAM(S)/KG/HR: 50 INJECTION INTRAVENOUS at 21:20

## 2022-01-01 RX ADMIN — VASOPRESSIN 2.4 UNIT(S)/MIN: 20 INJECTION INTRAVENOUS at 08:22

## 2022-01-01 RX ADMIN — PROPOFOL 7.02 MICROGRAM(S)/KG/MIN: 10 INJECTION, EMULSION INTRAVENOUS at 08:37

## 2022-01-01 RX ADMIN — Medication 5.49 MICROGRAM(S)/KG/MIN: at 09:02

## 2022-01-01 RX ADMIN — FENTANYL CITRATE 100 MICROGRAM(S): 50 INJECTION INTRAVENOUS at 13:30

## 2022-01-01 RX ADMIN — Medication 100 GRAM(S): at 08:37

## 2022-01-01 RX ADMIN — CHLORHEXIDINE GLUCONATE 15 MILLILITER(S): 213 SOLUTION TOPICAL at 17:39

## 2022-01-01 RX ADMIN — CISATRACURIUM BESYLATE 21.1 MICROGRAM(S)/KG/MIN: 2 INJECTION INTRAVENOUS at 20:58

## 2022-01-01 RX ADMIN — FAMOTIDINE 20 MILLIGRAM(S): 10 INJECTION INTRAVENOUS at 05:51

## 2022-01-01 RX ADMIN — ALBUTEROL 2 PUFF(S): 90 AEROSOL, METERED ORAL at 03:27

## 2022-01-01 RX ADMIN — PIPERACILLIN AND TAZOBACTAM 25 GRAM(S): 4; .5 INJECTION, POWDER, LYOPHILIZED, FOR SOLUTION INTRAVENOUS at 13:40

## 2022-01-01 RX ADMIN — PROPOFOL 7.02 MICROGRAM(S)/KG/MIN: 10 INJECTION, EMULSION INTRAVENOUS at 07:49

## 2022-01-01 RX ADMIN — CHLORHEXIDINE GLUCONATE 15 MILLILITER(S): 213 SOLUTION TOPICAL at 05:02

## 2022-01-01 RX ADMIN — Medication 500 MILLIGRAM(S): at 05:32

## 2022-01-01 RX ADMIN — FENTANYL CITRATE 5.85 MICROGRAM(S)/KG/HR: 50 INJECTION INTRAVENOUS at 19:31

## 2022-01-01 RX ADMIN — BUMETANIDE 2 MILLIGRAM(S): 0.25 INJECTION INTRAMUSCULAR; INTRAVENOUS at 13:30

## 2022-01-01 RX ADMIN — BUMETANIDE 2 MILLIGRAM(S): 0.25 INJECTION INTRAMUSCULAR; INTRAVENOUS at 17:57

## 2022-01-01 RX ADMIN — ENOXAPARIN SODIUM 40 MILLIGRAM(S): 100 INJECTION SUBCUTANEOUS at 05:30

## 2022-01-01 RX ADMIN — ENOXAPARIN SODIUM 40 MILLIGRAM(S): 100 INJECTION SUBCUTANEOUS at 05:58

## 2022-01-01 RX ADMIN — Medication 5 MILLIGRAM(S): at 01:07

## 2022-01-01 RX ADMIN — Medication 1000 MILLIGRAM(S): at 16:49

## 2022-01-01 RX ADMIN — PHENYLEPHRINE HYDROCHLORIDE 2.19 MICROGRAM(S)/KG/MIN: 10 INJECTION INTRAVENOUS at 22:04

## 2022-01-01 RX ADMIN — ENOXAPARIN SODIUM 40 MILLIGRAM(S): 100 INJECTION SUBCUTANEOUS at 17:07

## 2022-01-01 RX ADMIN — CISATRACURIUM BESYLATE 21.1 MICROGRAM(S)/KG/MIN: 2 INJECTION INTRAVENOUS at 16:59

## 2022-01-01 RX ADMIN — Medication 50 MILLILITER(S): at 08:55

## 2022-01-01 RX ADMIN — Medication 40 MILLIGRAM(S): at 11:50

## 2022-01-01 RX ADMIN — MIDAZOLAM HYDROCHLORIDE 6 MILLIGRAM(S): 1 INJECTION, SOLUTION INTRAMUSCULAR; INTRAVENOUS at 13:32

## 2022-01-01 RX ADMIN — ENOXAPARIN SODIUM 40 MILLIGRAM(S): 100 INJECTION SUBCUTANEOUS at 05:14

## 2022-01-01 RX ADMIN — Medication 1 PACKET(S): at 18:22

## 2022-01-01 RX ADMIN — ALBUTEROL 2 PUFF(S): 90 AEROSOL, METERED ORAL at 03:31

## 2022-01-01 RX ADMIN — ALBUTEROL 2 PUFF(S): 90 AEROSOL, METERED ORAL at 15:40

## 2022-01-01 RX ADMIN — FENTANYL CITRATE 5.85 MICROGRAM(S)/KG/HR: 50 INJECTION INTRAVENOUS at 20:02

## 2022-01-01 RX ADMIN — CHLORHEXIDINE GLUCONATE 1 APPLICATION(S): 213 SOLUTION TOPICAL at 06:18

## 2022-01-01 RX ADMIN — BUMETANIDE 132 MILLIGRAM(S): 0.25 INJECTION INTRAMUSCULAR; INTRAVENOUS at 21:46

## 2022-01-01 RX ADMIN — POLYETHYLENE GLYCOL 3350 17 GRAM(S): 17 POWDER, FOR SOLUTION ORAL at 05:02

## 2022-01-01 RX ADMIN — ENOXAPARIN SODIUM 40 MILLIGRAM(S): 100 INJECTION SUBCUTANEOUS at 05:36

## 2022-01-01 RX ADMIN — HEPARIN SODIUM 2400 UNIT(S)/HR: 5000 INJECTION INTRAVENOUS; SUBCUTANEOUS at 01:32

## 2022-01-01 RX ADMIN — PIPERACILLIN AND TAZOBACTAM 25 GRAM(S): 4; .5 INJECTION, POWDER, LYOPHILIZED, FOR SOLUTION INTRAVENOUS at 21:10

## 2022-01-01 RX ADMIN — Medication 400 MILLIGRAM(S): at 12:58

## 2022-01-01 RX ADMIN — PROPOFOL 7.02 MICROGRAM(S)/KG/MIN: 10 INJECTION, EMULSION INTRAVENOUS at 19:21

## 2022-01-01 RX ADMIN — FENTANYL CITRATE 5.85 MICROGRAM(S)/KG/HR: 50 INJECTION INTRAVENOUS at 12:34

## 2022-01-01 RX ADMIN — Medication 100 MILLIGRAM(S): at 18:24

## 2022-01-01 RX ADMIN — ALBUTEROL 2 PUFF(S): 90 AEROSOL, METERED ORAL at 23:14

## 2022-01-01 RX ADMIN — Medication 1 APPLICATION(S): at 11:37

## 2022-01-01 RX ADMIN — PIPERACILLIN AND TAZOBACTAM 25 GRAM(S): 4; .5 INJECTION, POWDER, LYOPHILIZED, FOR SOLUTION INTRAVENOUS at 13:46

## 2022-01-01 RX ADMIN — CHLORHEXIDINE GLUCONATE 15 MILLILITER(S): 213 SOLUTION TOPICAL at 06:00

## 2022-01-01 RX ADMIN — Medication 5.49 MICROGRAM(S)/KG/MIN: at 08:01

## 2022-01-01 RX ADMIN — ALBUTEROL 2 PUFF(S): 90 AEROSOL, METERED ORAL at 15:14

## 2022-01-01 RX ADMIN — PROPOFOL 7.02 MICROGRAM(S)/KG/MIN: 10 INJECTION, EMULSION INTRAVENOUS at 07:46

## 2022-01-01 RX ADMIN — Medication 400 MILLIGRAM(S): at 20:31

## 2022-01-01 RX ADMIN — VASOPRESSIN 2.4 UNIT(S)/MIN: 20 INJECTION INTRAVENOUS at 19:07

## 2022-01-01 RX ADMIN — CONIVAPTAN HYDROCHLORIDE 200 MG/HR: 20 INJECTION, SOLUTION INTRAVENOUS at 14:37

## 2022-01-01 RX ADMIN — PANTOPRAZOLE SODIUM 40 MILLIGRAM(S): 20 TABLET, DELAYED RELEASE ORAL at 11:19

## 2022-01-01 RX ADMIN — SENNA PLUS 2 TABLET(S): 8.6 TABLET ORAL at 21:32

## 2022-01-01 RX ADMIN — PIPERACILLIN AND TAZOBACTAM 25 GRAM(S): 4; .5 INJECTION, POWDER, LYOPHILIZED, FOR SOLUTION INTRAVENOUS at 13:16

## 2022-01-01 RX ADMIN — Medication 1000 MILLIGRAM(S): at 23:40

## 2022-01-01 RX ADMIN — TOLVAPTAN 15 MILLIGRAM(S): 15 TABLET ORAL at 21:44

## 2022-01-01 RX ADMIN — HEPARIN SODIUM 2400 UNIT(S)/HR: 5000 INJECTION INTRAVENOUS; SUBCUTANEOUS at 08:24

## 2022-01-01 RX ADMIN — PROPOFOL 7.02 MICROGRAM(S)/KG/MIN: 10 INJECTION, EMULSION INTRAVENOUS at 21:20

## 2022-01-01 RX ADMIN — Medication 30 MILLIGRAM(S): at 11:51

## 2022-01-01 RX ADMIN — SENNA PLUS 2 TABLET(S): 8.6 TABLET ORAL at 22:44

## 2022-01-01 RX ADMIN — CHLORHEXIDINE GLUCONATE 15 MILLILITER(S): 213 SOLUTION TOPICAL at 17:15

## 2022-01-01 RX ADMIN — BUMETANIDE 2 MILLIGRAM(S): 0.25 INJECTION INTRAMUSCULAR; INTRAVENOUS at 13:16

## 2022-01-01 RX ADMIN — VASOPRESSIN 2.4 UNIT(S)/MIN: 20 INJECTION INTRAVENOUS at 20:02

## 2022-01-01 RX ADMIN — POLYETHYLENE GLYCOL 3350 17 GRAM(S): 17 POWDER, FOR SOLUTION ORAL at 18:26

## 2022-01-01 RX ADMIN — Medication 1 APPLICATION(S): at 12:23

## 2022-01-01 RX ADMIN — Medication 650 MILLIGRAM(S): at 12:15

## 2022-01-01 RX ADMIN — CHLORHEXIDINE GLUCONATE 15 MILLILITER(S): 213 SOLUTION TOPICAL at 17:56

## 2022-01-01 RX ADMIN — ALBUTEROL 2 PUFF(S): 90 AEROSOL, METERED ORAL at 08:48

## 2022-01-01 RX ADMIN — Medication 50 MILLIEQUIVALENT(S): at 01:53

## 2022-01-01 RX ADMIN — HEPARIN SODIUM 2100 UNIT(S)/HR: 5000 INJECTION INTRAVENOUS; SUBCUTANEOUS at 22:26

## 2022-01-01 RX ADMIN — PROPOFOL 7.02 MICROGRAM(S)/KG/MIN: 10 INJECTION, EMULSION INTRAVENOUS at 13:25

## 2022-01-01 RX ADMIN — CISATRACURIUM BESYLATE 21.1 MICROGRAM(S)/KG/MIN: 2 INJECTION INTRAVENOUS at 19:06

## 2022-01-01 RX ADMIN — Medication 145 MILLIGRAM(S): at 21:45

## 2022-01-01 RX ADMIN — Medication 1 PACKET(S): at 21:51

## 2022-01-01 RX ADMIN — Medication 30 MILLIGRAM(S): at 19:33

## 2022-01-01 RX ADMIN — ALBUTEROL 2 PUFF(S): 90 AEROSOL, METERED ORAL at 03:40

## 2022-01-01 RX ADMIN — ENOXAPARIN SODIUM 40 MILLIGRAM(S): 100 INJECTION SUBCUTANEOUS at 05:02

## 2022-01-01 RX ADMIN — BUMETANIDE 5 MG/HR: 0.25 INJECTION INTRAMUSCULAR; INTRAVENOUS at 12:50

## 2022-01-01 RX ADMIN — PROPOFOL 7.02 MICROGRAM(S)/KG/MIN: 10 INJECTION, EMULSION INTRAVENOUS at 13:17

## 2022-01-01 RX ADMIN — FENTANYL CITRATE 5.85 MICROGRAM(S)/KG/HR: 50 INJECTION INTRAVENOUS at 19:08

## 2022-01-01 RX ADMIN — Medication 30 MILLIGRAM(S): at 11:36

## 2022-01-01 RX ADMIN — HEPARIN SODIUM 2400 UNIT(S)/HR: 5000 INJECTION INTRAVENOUS; SUBCUTANEOUS at 03:04

## 2022-01-01 RX ADMIN — FENTANYL CITRATE 5.85 MICROGRAM(S)/KG/HR: 50 INJECTION INTRAVENOUS at 21:47

## 2022-01-01 RX ADMIN — Medication 50 MILLILITER(S): at 17:07

## 2022-01-01 RX ADMIN — MIDAZOLAM HYDROCHLORIDE 4 MILLIGRAM(S): 1 INJECTION, SOLUTION INTRAMUSCULAR; INTRAVENOUS at 18:17

## 2022-01-01 RX ADMIN — PIPERACILLIN AND TAZOBACTAM 25 GRAM(S): 4; .5 INJECTION, POWDER, LYOPHILIZED, FOR SOLUTION INTRAVENOUS at 14:30

## 2022-01-01 RX ADMIN — FAMOTIDINE 20 MILLIGRAM(S): 10 INJECTION INTRAVENOUS at 05:36

## 2022-01-01 RX ADMIN — SENNA PLUS 2 TABLET(S): 8.6 TABLET ORAL at 21:31

## 2022-01-01 RX ADMIN — ALBUTEROL 2 PUFF(S): 90 AEROSOL, METERED ORAL at 22:10

## 2022-01-01 RX ADMIN — ENOXAPARIN SODIUM 40 MILLIGRAM(S): 100 INJECTION SUBCUTANEOUS at 18:54

## 2022-01-01 RX ADMIN — FENTANYL CITRATE 100 MICROGRAM(S): 50 INJECTION INTRAVENOUS at 18:40

## 2022-01-01 RX ADMIN — PROPOFOL 7.02 MICROGRAM(S)/KG/MIN: 10 INJECTION, EMULSION INTRAVENOUS at 10:25

## 2022-01-01 RX ADMIN — CHLORHEXIDINE GLUCONATE 15 MILLILITER(S): 213 SOLUTION TOPICAL at 05:24

## 2022-01-01 RX ADMIN — PROPOFOL 7.02 MICROGRAM(S)/KG/MIN: 10 INJECTION, EMULSION INTRAVENOUS at 07:10

## 2022-01-01 RX ADMIN — PIPERACILLIN AND TAZOBACTAM 25 GRAM(S): 4; .5 INJECTION, POWDER, LYOPHILIZED, FOR SOLUTION INTRAVENOUS at 22:11

## 2022-01-01 RX ADMIN — Medication 50 MILLIEQUIVALENT(S): at 23:38

## 2022-01-01 RX ADMIN — Medication 1 APPLICATION(S): at 12:25

## 2022-01-01 RX ADMIN — PROPOFOL 7.02 MICROGRAM(S)/KG/MIN: 10 INJECTION, EMULSION INTRAVENOUS at 11:16

## 2022-01-01 RX ADMIN — PIPERACILLIN AND TAZOBACTAM 25 GRAM(S): 4; .5 INJECTION, POWDER, LYOPHILIZED, FOR SOLUTION INTRAVENOUS at 21:32

## 2022-01-01 RX ADMIN — FENTANYL CITRATE 5.85 MICROGRAM(S)/KG/HR: 50 INJECTION INTRAVENOUS at 07:28

## 2022-01-01 RX ADMIN — ALBUTEROL 2 PUFF(S): 90 AEROSOL, METERED ORAL at 16:10

## 2022-01-01 RX ADMIN — CISATRACURIUM BESYLATE 21.1 MICROGRAM(S)/KG/MIN: 2 INJECTION INTRAVENOUS at 09:02

## 2022-01-01 RX ADMIN — PHENYLEPHRINE HYDROCHLORIDE 2.19 MICROGRAM(S)/KG/MIN: 10 INJECTION INTRAVENOUS at 07:51

## 2022-01-01 RX ADMIN — PROPOFOL 7.02 MICROGRAM(S)/KG/MIN: 10 INJECTION, EMULSION INTRAVENOUS at 19:32

## 2022-01-01 RX ADMIN — POLYETHYLENE GLYCOL 3350 17 GRAM(S): 17 POWDER, FOR SOLUTION ORAL at 05:11

## 2022-01-01 RX ADMIN — BUMETANIDE 2 MILLIGRAM(S): 0.25 INJECTION INTRAMUSCULAR; INTRAVENOUS at 02:38

## 2022-01-01 RX ADMIN — BUMETANIDE 2 MILLIGRAM(S): 0.25 INJECTION INTRAMUSCULAR; INTRAVENOUS at 05:23

## 2022-01-01 RX ADMIN — Medication 400 MILLIGRAM(S): at 06:15

## 2022-01-01 RX ADMIN — Medication 100 MILLIGRAM(S): at 13:30

## 2022-01-01 RX ADMIN — Medication 5.49 MICROGRAM(S)/KG/MIN: at 19:20

## 2022-01-01 RX ADMIN — Medication 250 MILLIGRAM(S): at 14:44

## 2022-01-01 RX ADMIN — FENTANYL CITRATE 5.85 MICROGRAM(S)/KG/HR: 50 INJECTION INTRAVENOUS at 08:44

## 2022-01-01 RX ADMIN — ALBUTEROL 2 PUFF(S): 90 AEROSOL, METERED ORAL at 15:58

## 2022-01-01 RX ADMIN — CISATRACURIUM BESYLATE 20 MILLIGRAM(S): 2 INJECTION INTRAVENOUS at 18:17

## 2022-01-01 RX ADMIN — PHENYLEPHRINE HYDROCHLORIDE 2.19 MICROGRAM(S)/KG/MIN: 10 INJECTION INTRAVENOUS at 21:20

## 2022-01-01 RX ADMIN — ALBUTEROL 2 PUFF(S): 90 AEROSOL, METERED ORAL at 15:08

## 2022-01-01 RX ADMIN — HEPARIN SODIUM 2400 UNIT(S)/HR: 5000 INJECTION INTRAVENOUS; SUBCUTANEOUS at 16:05

## 2022-01-01 RX ADMIN — FAMOTIDINE 20 MILLIGRAM(S): 10 INJECTION INTRAVENOUS at 05:30

## 2022-01-01 RX ADMIN — Medication 1000 MILLIGRAM(S): at 23:16

## 2022-01-01 RX ADMIN — VASOPRESSIN 2.4 UNIT(S)/MIN: 20 INJECTION INTRAVENOUS at 07:54

## 2022-01-01 RX ADMIN — ALBUTEROL 2 PUFF(S): 90 AEROSOL, METERED ORAL at 14:53

## 2022-01-01 RX ADMIN — Medication 100 MILLIEQUIVALENT(S): at 14:34

## 2022-01-01 RX ADMIN — CISATRACURIUM BESYLATE 21.1 MICROGRAM(S)/KG/MIN: 2 INJECTION INTRAVENOUS at 08:23

## 2022-01-01 RX ADMIN — ALBUTEROL 2 PUFF(S): 90 AEROSOL, METERED ORAL at 22:30

## 2022-01-01 RX ADMIN — Medication 40 MILLIEQUIVALENT(S): at 08:37

## 2022-01-01 RX ADMIN — Medication 1 APPLICATION(S): at 11:05

## 2022-01-01 RX ADMIN — Medication 1 TABLET(S): at 11:19

## 2022-01-01 RX ADMIN — PROPOFOL 7.02 MICROGRAM(S)/KG/MIN: 10 INJECTION, EMULSION INTRAVENOUS at 12:11

## 2022-01-01 RX ADMIN — HEPARIN SODIUM 2400 UNIT(S)/HR: 5000 INJECTION INTRAVENOUS; SUBCUTANEOUS at 19:10

## 2022-01-01 RX ADMIN — Medication 250 MILLIGRAM(S): at 17:55

## 2022-01-01 RX ADMIN — PROPOFOL 7.02 MICROGRAM(S)/KG/MIN: 10 INJECTION, EMULSION INTRAVENOUS at 22:05

## 2022-01-01 RX ADMIN — Medication 5.49 MICROGRAM(S)/KG/MIN: at 14:16

## 2022-01-01 RX ADMIN — CISATRACURIUM BESYLATE 21.1 MICROGRAM(S)/KG/MIN: 2 INJECTION INTRAVENOUS at 07:11

## 2022-01-01 RX ADMIN — FENTANYL CITRATE 5.85 MICROGRAM(S)/KG/HR: 50 INJECTION INTRAVENOUS at 07:11

## 2022-01-01 RX ADMIN — POLYETHYLENE GLYCOL 3350 17 GRAM(S): 17 POWDER, FOR SOLUTION ORAL at 05:04

## 2022-01-01 RX ADMIN — Medication 1000 MILLIGRAM(S): at 04:27

## 2022-01-01 RX ADMIN — Medication 1 APPLICATION(S): at 12:39

## 2022-01-01 RX ADMIN — PIPERACILLIN AND TAZOBACTAM 25 GRAM(S): 4; .5 INJECTION, POWDER, LYOPHILIZED, FOR SOLUTION INTRAVENOUS at 06:16

## 2022-01-01 RX ADMIN — ALBUTEROL 2 PUFF(S): 90 AEROSOL, METERED ORAL at 20:32

## 2022-01-01 RX ADMIN — ENOXAPARIN SODIUM 40 MILLIGRAM(S): 100 INJECTION SUBCUTANEOUS at 05:34

## 2022-01-01 RX ADMIN — FENTANYL CITRATE 5.85 MICROGRAM(S)/KG/HR: 50 INJECTION INTRAVENOUS at 22:04

## 2022-01-01 RX ADMIN — VASOPRESSIN 2.4 UNIT(S)/MIN: 20 INJECTION INTRAVENOUS at 21:20

## 2022-01-01 RX ADMIN — FENTANYL CITRATE 5.85 MICROGRAM(S)/KG/HR: 50 INJECTION INTRAVENOUS at 12:10

## 2022-01-01 RX ADMIN — CHLORHEXIDINE GLUCONATE 15 MILLILITER(S): 213 SOLUTION TOPICAL at 06:09

## 2022-01-01 RX ADMIN — CHLORHEXIDINE GLUCONATE 1 APPLICATION(S): 213 SOLUTION TOPICAL at 12:45

## 2022-01-01 RX ADMIN — FAMOTIDINE 20 MILLIGRAM(S): 10 INJECTION INTRAVENOUS at 05:58

## 2022-01-01 RX ADMIN — FENTANYL CITRATE 5.85 MICROGRAM(S)/KG/HR: 50 INJECTION INTRAVENOUS at 07:50

## 2022-01-01 RX ADMIN — ALBUTEROL 2 PUFF(S): 90 AEROSOL, METERED ORAL at 09:49

## 2022-01-01 RX ADMIN — AMIODARONE HYDROCHLORIDE 200 MILLIGRAM(S): 400 TABLET ORAL at 05:36

## 2022-01-01 NOTE — PROGRESS NOTE ADULT - ASSESSMENT
61 year old man with history of kidney stone presenting with fever, cough, SOB x 2-3d.   States having positive sick contact in son with COVID-19  Fever to 103 at home, taking mucinex. Noticed worsening cough/SOB in past day, "couldn't take it anymore", so presenting to ED.        Problem/Plan - 1:  ·  Problem: Acute sepsis. > resolving   ·  Plan: treat underlying condition  antipyretics as needed  nutrition / hydration   IVH as needed   supportive care   monitor.     Problem/Plan - 2:  ·  Problem: 2019 novel coronavirus disease (COVID-19).   ·  Plan: Acute hypoxemic respiratory failure due to COVID-19 with Viral syndrome.  Remdesivir per hospital protocol  Decadron per hospital protocol  trend inflammatory markers   O2 and wean as able  supportive care  nutrition, hydration  MVI, Vitamin C,D, Zinc, melatonin  deep breathing discussed > IS > reminded  OOB to chair > reminded   DVT prophylaxis   Pepcid, fenofibrate.     Problem/Plan - 3:  ·  Problem: Acute hypoxemic respiratory failure.   ·  Plan: O2 and wean off as able    goal O2 sat 90-96%  treat underlying condition as above  monitor and wean down as able (hope to prevent intubation )   inhalers as needed.  Would recommend pulmonary consultation      Problem/Plan - 4:  ·  Problem: Viral syndrome.   ·  Plan: supportive care  nutrition / hydration  treat underlying condition.     Problem/Plan - 5:  ·  Problem: obesit and likely ERICK    discussed with pt re weight loss and sleep study > obtaining and using a CPAP machine pos discharge    -GI/DVT Prophylaxis per protocol.

## 2022-01-01 NOTE — PROGRESS NOTE ADULT - SUBJECTIVE AND OBJECTIVE BOX
Date of Service  : 01-01-22 @ 16:16    INTERVAL HPI/OVERNIGHT EVENTS: I feel okay . By mistake food fell from table . I ate half.   Vital Signs Last 24 Hrs  T(C): 37.8 (01 Jan 2022 14:00), Max: 37.8 (01 Jan 2022 14:00)  T(F): 100 (01 Jan 2022 14:00), Max: 100 (01 Jan 2022 14:00)  HR: 91 (01 Jan 2022 14:00) (80 - 91)  BP: 118/78 (01 Jan 2022 14:00) (110/90 - 118/78)  BP(mean): --  RR: 18 (01 Jan 2022 14:00) (18 - 20)  SpO2: 92% (01 Jan 2022 14:00) (87% - 97%)  I&O's Summary    31 Dec 2021 07:01  -  01 Jan 2022 07:00  --------------------------------------------------------  IN: 0 mL / OUT: 1300 mL / NET: -1300 mL    01 Jan 2022 07:01  -  01 Jan 2022 16:16  --------------------------------------------------------  IN: 0 mL / OUT: 650 mL / NET: -650 mL      MEDICATIONS  (STANDING):  cholecalciferol 2000 Unit(s) Oral daily  dexAMETHasone     Tablet 6 milliGRAM(s) Oral daily  enoxaparin Injectable 40 milliGRAM(s) SubCutaneous every 12 hours  famotidine    Tablet 20 milliGRAM(s) Oral two times a day  fenofibrate Tablet 145 milliGRAM(s) Oral at bedtime  multivitamin 1 Tablet(s) Oral daily  remdesivir  IVPB   IV Intermittent   remdesivir  IVPB 100 milliGRAM(s) IV Intermittent every 24 hours    MEDICATIONS  (PRN):  acetaminophen     Tablet .. 650 milliGRAM(s) Oral every 6 hours PRN Temp greater or equal to 38C (100.4F), Mild Pain (1 - 3)  ALBUTerol    90 MICROgram(s) HFA Inhaler 2 Puff(s) Inhalation every 6 hours PRN Shortness of Breath and/or Wheezing  aluminum hydroxide/magnesium hydroxide/simethicone Suspension 30 milliLiter(s) Oral every 4 hours PRN Dyspepsia  benzonatate 100 milliGRAM(s) Oral three times a day PRN Cough  melatonin 3 milliGRAM(s) Oral at bedtime PRN Insomnia  ondansetron Injectable 4 milliGRAM(s) IV Push every 8 hours PRN Nausea and/or Vomiting    LABS:                        17.9   13.65 )-----------( 470      ( 01 Jan 2022 07:00 )             53.7     01-01    136  |  98  |  19  ----------------------------<  107<H>  4.6   |  25  |  0.83    Ca    9.3      01 Jan 2022 07:00  Phos  2.3     01-01  Mg     2.20     01-01          CAPILLARY BLOOD GLUCOSE              REVIEW OF SYSTEMS:  CONSTITUTIONAL: No fever, weight loss, or fatigue  EYES: No eye pain, visual disturbances, or discharge  ENMT:  No difficulty hearing, tinnitus, vertigo; No sinus or throat pain  NECK: No pain or stiffness  RESPIRATORY: No cough, wheezing, chills or hemoptysis; No shortness of breath  CARDIOVASCULAR: No chest pain, palpitations, dizziness, or leg swelling  GASTROINTESTINAL: No abdominal or epigastric pain. No nausea, vomiting, or hematemesis; No diarrhea or constipation. No melena or hematochezia.      Consultant(s) Notes Reviewed:  [x ] YES  [ ] NO    PHYSICAL EXAM:  GENERAL: NADHF Oxygen   HEAD:  Atraumatic, Normocephalic  EYES: EOMI, PERRLA, conjunctiva and sclera clear  ENMT: No tonsillar erythema, exudates, or enlargement; Moist mucous membranes, Good dentition, No lesions  NECK: Supple, No JVD, Normal thyroid  NERVOUS SYSTEM:  Alert & Oriented X3, No focal deficit   CHEST/LUNG: Good air entry bilateral with no  rales, rhonchi, wheezing, or rubs  HEART: Regular rate and rhythm; No murmurs, rubs, or gallops  ABDOMEN: Soft, Nontender, Nondistended; Bowel sounds present  EXTREMITIES:  2+ Peripheral Pulses, No clubbing, cyanosis, or edema  SKIN: No rashes or lesions    Care Discussed with Consultants/Other Providers [ x] YES  [ ] NO

## 2022-01-02 NOTE — PHYSICAL THERAPY INITIAL EVALUATION ADULT - GENERAL OBSERVATIONS, REHAB EVAL
Patient seen semi supine in bed, using HI FLOW and NRB, oxygen saturation at rest in bed displays 88%, in sitting decreased to 85%.

## 2022-01-02 NOTE — CONSULT NOTE ADULT - SUBJECTIVE AND OBJECTIVE BOX
CHIEF COMPLAINT: Fever, SOB    HPI: 61 year-old unvaccinated M with PMH kidney stone and obesity who presented to the hospital with cough, fever, and shortness of breath for 2-3 days, found to have COVID-19. MICU was consulted for worsening hypoxia requiring Bilevel. Patient seen and examined at bedside. Patient reports that he feels better now that he is wearing the mask. He denies chest pain and current fever.     PAST MEDICAL & SURGICAL HISTORY:  Obese    No significant past surgical history    FAMILY HISTORY: Noncontributory    SOCIAL HISTORY:  Smoking: [ ] Never Smoked [x] Former Smoker (__ packs x ___ years) [ ] Current Smoker  (__ packs x ___ years)  Substance Use: [x] Never Used [ ] Used ____  EtOH Use: Social  Marital Status: [ ] Single [x]  [ ]  [ ]   Sexual History:   Occupation: Retired   Recent Travel:  Country of Birth:  Advance Directives: Full code    Allergies    Cipro (Rash)    Intolerances    eggs (Diarrhea)      HOME MEDICATIONS:    REVIEW OF SYSTEMS:  Constitutional: [x ] negative [ ] fevers [ ] chills [ ] weight loss [ ] weight gain  HEENT: [x ] negative [ ] dry eyes [ ] eye irritation [ ] postnasal drip [ ] nasal congestion  CV: [x ] negative  [ ] chest pain [ ] orthopnea [ ] palpitations [ ] murmur  Resp: [x ] negative [ ] cough [ ] shortness of breath [ ] dyspnea [ ] wheezing [ ] sputum [ ] hemoptysis  GI: [x ] negative [ ] nausea [ ] vomiting [ ] diarrhea [ ] constipation [ ] abd pain [ ] dysphagia   : [x ] negative [ ] dysuria [ ] nocturia [ ] hematuria [ ] increased urinary frequency  Musculoskeletal: [x ] negative [ ] back pain [ ] myalgias [ ] arthralgias [ ] fracture  Skin: [x ] negative [ ] rash [ ] itch  Neurological: [x ] negative [ ] headache [ ] dizziness [ ] syncope [ ] weakness [ ] numbness  Psychiatric: [x ] negative [ ] anxiety [ ] depression  Endocrine: [x ] negative [ ] diabetes [ ] thyroid problem  Hematologic/Lymphatic: [x ] negative [ ] anemia [ ] bleeding problem  Allergic/Immunologic: [ x] negative [ ] itchy eyes [ ] nasal discharge [ ] hives [ ] angioedema  [x ] All other systems negative  [ ] Unable to assess ROS because ________    OBJECTIVE:  ICU Vital Signs Last 24 Hrs  T(C): 37.3 (02 Jan 2022 18:11), Max: 37.4 (02 Jan 2022 04:10)  T(F): 99.1 (02 Jan 2022 18:11), Max: 99.4 (02 Jan 2022 04:10)  HR: 99 (02 Jan 2022 18:11) (80 - 105)  BP: 136/82 (02 Jan 2022 18:11) (109/59 - 149/82)  BP(mean): --  ABP: --  ABP(mean): --  RR: 20 (02 Jan 2022 18:11) (18 - 20)  SpO2: 90% (02 Jan 2022 18:11) (88% - 93%)    01-01 @ 07:01  -  01-02 @ 07:00  --------------------------------------------------------  IN: 0 mL / OUT: 1100 mL / NET: -1100 mL    CAPILLARY BLOOD GLUCOSE    PHYSICAL EXAM:  General: Laying in bed, on Bilevel, tachypnic  HEENT: PERRL, NC/AT  Lymph Nodes: No LAD  Neck: Supple   Respiratory: Increased respiratory rate, clear anteriorly.  Cardiovascular: S1S2, RRR, no m/g/r  Abdomen: Soft, nontender  Extremities: No edema  Skin: Warm, no rashes  Neurological: AAOx3, no focal deficits appreciated  Psychiatry: Anxious    LINES: [facility]    HOSPITAL MEDICATIONS:  enoxaparin Injectable 40 milliGRAM(s) SubCutaneous every 12 hours        dexAMETHasone     Tablet 6 milliGRAM(s) Oral daily  fenofibrate Tablet 145 milliGRAM(s) Oral at bedtime    ALBUTerol    90 MICROgram(s) HFA Inhaler 2 Puff(s) Inhalation every 6 hours PRN  benzonatate 100 milliGRAM(s) Oral three times a day PRN    acetaminophen     Tablet .. 650 milliGRAM(s) Oral every 6 hours PRN  melatonin 3 milliGRAM(s) Oral at bedtime PRN  ondansetron Injectable 4 milliGRAM(s) IV Push every 8 hours PRN    aluminum hydroxide/magnesium hydroxide/simethicone Suspension 30 milliLiter(s) Oral every 4 hours PRN  famotidine    Tablet 20 milliGRAM(s) Oral two times a day        cholecalciferol 2000 Unit(s) Oral daily  multivitamin 1 Tablet(s) Oral daily            LABS:                        17.7   9.56  )-----------( 445      ( 02 Jan 2022 14:54 )             51.6     Hgb Trend: 17.7<--, 17.9<--, 18.1<--, 17.1<--, 17.1<--  01-02    132<L>  |  98  |  19  ----------------------------<  160<H>  4.8   |  22  |  0.68    Ca    8.9      02 Jan 2022 14:54  Phos  2.4     01-02  Mg     2.30     01-02      Creatinine Trend: 0.68<--, 0.83<--, 0.81<--, 0.78<--, 0.78<--, 0.77<--        Venous Blood Gas:  01-02 @ 14:54  7.44/37/54/25/87.4  VBG Lactate: 1.6      MICROBIOLOGY:     RADIOLOGY:  [x] Reviewed and interpreted by me  CXR 12/28: Lungs grossly clear (my read)    EKG: CHIEF COMPLAINT: Fever, SOB    HPI: 61 year-old unvaccinated M with PMH kidney stone and obesity who presented to the hospital with cough, fever, and shortness of breath for 2-3 days, found to have COVID-19. MICU was consulted for worsening hypoxia requiring Bilevel. Patient seen and examined at bedside. Patient reports that he feels better now that he is wearing the mask. He denies chest pain and current fever.     PAST MEDICAL & SURGICAL HISTORY:  Obese    No significant past surgical history    FAMILY HISTORY: Noncontributory in parents    SOCIAL HISTORY:  Smoking: [ ] Never Smoked [x] Former Smoker (__ packs x ___ years) [ ] Current Smoker  (__ packs x ___ years)  Substance Use: [x] Never Used [ ] Used ____  EtOH Use: Social  Marital Status: [ ] Single [x]  [ ]  [ ]   Sexual History:   Occupation: Retired   Recent Travel:  Country of Birth:  Advance Directives: Full code    Allergies    Cipro (Rash)    Intolerances    eggs (Diarrhea)      HOME MEDICATIONS:    REVIEW OF SYSTEMS:  Constitutional: [x ] negative [ ] fevers [ ] chills [ ] weight loss [ ] weight gain  HEENT: [x ] negative [ ] dry eyes [ ] eye irritation [ ] postnasal drip [ ] nasal congestion  CV: [x ] negative  [ ] chest pain [ ] orthopnea [ ] palpitations [ ] murmur  Resp: [x ] negative [ ] cough [ ] shortness of breath [ ] dyspnea [ ] wheezing [ ] sputum [ ] hemoptysis  GI: [x ] negative [ ] nausea [ ] vomiting [ ] diarrhea [ ] constipation [ ] abd pain [ ] dysphagia   : [x ] negative [ ] dysuria [ ] nocturia [ ] hematuria [ ] increased urinary frequency  Musculoskeletal: [x ] negative [ ] back pain [ ] myalgias [ ] arthralgias [ ] fracture  Skin: [x ] negative [ ] rash [ ] itch  Neurological: [x ] negative [ ] headache [ ] dizziness [ ] syncope [ ] weakness [ ] numbness  Psychiatric: [x ] negative [ ] anxiety [ ] depression  Endocrine: [x ] negative [ ] diabetes [ ] thyroid problem  Hematologic/Lymphatic: [x ] negative [ ] anemia [ ] bleeding problem  Allergic/Immunologic: [ x] negative [ ] itchy eyes [ ] nasal discharge [ ] hives [ ] angioedema  [x ] All other systems negative  [ ] Unable to assess ROS because ________  + sob/cough  + palpitations  + fevers and chills  - n/v/d/c  - dysuria  OBJECTIVE:  ICU Vital Signs Last 24 Hrs  T(C): 37.3 (02 Jan 2022 18:11), Max: 37.4 (02 Jan 2022 04:10)  T(F): 99.1 (02 Jan 2022 18:11), Max: 99.4 (02 Jan 2022 04:10)  HR: 99 (02 Jan 2022 18:11) (80 - 105)  BP: 136/82 (02 Jan 2022 18:11) (109/59 - 149/82)  BP(mean): --  ABP: --  ABP(mean): --  RR: 20 (02 Jan 2022 18:11) (18 - 20)  SpO2: 90% (02 Jan 2022 18:11) (88% - 93%)    01-01 @ 07:01  -  01-02 @ 07:00  --------------------------------------------------------  IN: 0 mL / OUT: 1100 mL / NET: -1100 mL    CAPILLARY BLOOD GLUCOSE    PHYSICAL EXAM:  General: Laying in bed, on Bilevel, tachypnic  HEENT: PERRL, NC/AT  Lymph Nodes: No LAD  Neck: Supple   Respiratory: Increased respiratory rate, clear anteriorly.  Cardiovascular: S1S2, RRR, no m/g/r  Abdomen: Soft, nontender  Extremities: No edema, no cyanosis  Skin: Warm, no rashes  Neurological: AAOx3, no focal deficits appreciated  Psychiatry: Anxious    LINES: [facility] [place]    HOSPITAL MEDICATIONS:  enoxaparin Injectable 40 milliGRAM(s) SubCutaneous every 12 hours        dexAMETHasone     Tablet 6 milliGRAM(s) Oral daily  fenofibrate Tablet 145 milliGRAM(s) Oral at bedtime    ALBUTerol    90 MICROgram(s) HFA Inhaler 2 Puff(s) Inhalation every 6 hours PRN  benzonatate 100 milliGRAM(s) Oral three times a day PRN    acetaminophen     Tablet .. 650 milliGRAM(s) Oral every 6 hours PRN  melatonin 3 milliGRAM(s) Oral at bedtime PRN  ondansetron Injectable 4 milliGRAM(s) IV Push every 8 hours PRN    aluminum hydroxide/magnesium hydroxide/simethicone Suspension 30 milliLiter(s) Oral every 4 hours PRN  famotidine    Tablet 20 milliGRAM(s) Oral two times a day        cholecalciferol 2000 Unit(s) Oral daily  multivitamin 1 Tablet(s) Oral daily            LABS:                        17.7   9.56  )-----------( 445      ( 02 Jan 2022 14:54 )             51.6     Hgb Trend: 17.7<--, 17.9<--, 18.1<--, 17.1<--, 17.1<--  01-02    132<L>  |  98  |  19  ----------------------------<  160<H>  4.8   |  22  |  0.68    Ca    8.9      02 Jan 2022 14:54  Phos  2.4     01-02  Mg     2.30     01-02      Creatinine Trend: 0.68<--, 0.83<--, 0.81<--, 0.78<--, 0.78<--, 0.77<--        Venous Blood Gas:  01-02 @ 14:54  7.44/37/54/25/87.4  VBG Lactate: 1.6      MICROBIOLOGY:     RADIOLOGY:  [x] Reviewed and interpreted by me  CXR 12/28: Lungs grossly clear (my read)    EKG:

## 2022-01-02 NOTE — CHART NOTE - NSCHARTNOTEFT_GEN_A_CORE
Patient with increased work of breathing on HFNC 100%, and 100% NRB, with O2 sat 84-88%. Discussed patient with Infectious Disease Dr Argueta who approved patient for Tocilizumab based on criteria, though patient declined. Discussed with Attending Physician Dr Knowles, patient transitioned to BiPAP with O2 sat 88~93%. Patient more comfortable. ICU notified of new BiPAP consult, will await recommendations.

## 2022-01-02 NOTE — CONSULT NOTE ADULT - TIME BILLING
reviewing chart and coordinating care with primary team/staff, as well as reviewing vitals, radiology, medication list, recent labs, and prior records.  d/w primary team

## 2022-01-02 NOTE — PROGRESS NOTE ADULT - ASSESSMENT
_________________________________________________________________________________________  ========>>  M E D I C A L   A T T E N D I N G    F O L L O W  U P  N O T E  <<=========  -----------------------------------------------------------------------------------------------------    - Patient seen and examined by me earlier today.   - In summary,  ALEXX BROWN is a 61y year old man admitted with SOB, fever..   - Patient today overall seems to be worsening with higher and higher O2 requirements ... : on nonrebreather mask AND High flow NC max      pt placed on Bipap a short time ago with some improvement > to monitor, as bellow..     ==================>> REVIEW OF SYSTEM <<=================    GEN: no fever, no chills, no pain  RESP: + some SOB  , no cough, no sputum  CVS: no chest pain, no palpitations, no edema  GI: no abdominal pain, no nausea, poor appetite   : no dysuria, no frequency  Neuro: no headache, no dizziness  Derm : no itching, no rash    ==================>> PHYSICAL EXAM <<=================    GEN: A&O X 2-3 , NAD , comfortable, calm in bed >> encouraged to sit up ( there is no chair in room !) ... pt appears less energetic today   HEENT: NCAT, PERRL, MMM, hearing intact  Neck: supple , no JVD appreciated  CVS: S1S2 , regular , No M/R/G appreciated  PULM: CTA B/L,  limited exam as not taking deep breaths   ABD.: soft. non tender, non distended,  bowel sounds present, abd obesity   Extrem: intact pulses , no edema   PSYCH : normal mood,  not anxious                             ( Note written / Date of service 01-02-22 )    ==================>> MEDICATIONS <<====================    cholecalciferol 2000 Unit(s) Oral daily  dexAMETHasone     Tablet 6 milliGRAM(s) Oral daily  enoxaparin Injectable 40 milliGRAM(s) SubCutaneous every 12 hours  famotidine    Tablet 20 milliGRAM(s) Oral two times a day  fenofibrate Tablet 145 milliGRAM(s) Oral at bedtime  multivitamin 1 Tablet(s) Oral daily    MEDICATIONS  (PRN):  acetaminophen     Tablet .. 650 milliGRAM(s) Oral every 6 hours PRN Temp greater or equal to 38C (100.4F), Mild Pain (1 - 3)  ALBUTerol    90 MICROgram(s) HFA Inhaler 2 Puff(s) Inhalation every 6 hours PRN Shortness of Breath and/or Wheezing  aluminum hydroxide/magnesium hydroxide/simethicone Suspension 30 milliLiter(s) Oral every 4 hours PRN Dyspepsia  benzonatate 100 milliGRAM(s) Oral three times a day PRN Cough  melatonin 3 milliGRAM(s) Oral at bedtime PRN Insomnia  ondansetron Injectable 4 milliGRAM(s) IV Push every 8 hours PRN Nausea and/or Vomiting    ___________  Active diet:  Diet, Regular  ___________________    ==================>> VITAL SIGNS <<==================    Vital Signs Last 24 HrsT(C): 37.2 (01-02-22 @ 09:53)  T(F): 99 (01-02-22 @ 09:53), Max: 99.4 (01-02-22 @ 04:10)  HR: 91 (01-02-22 @ 13:39) (80 - 105)  BP: 138/80 (01-02-22 @ 09:53)  RR: 20 (01-02-22 @ 11:29) (18 - 20)  SpO2: 90% (01-02-22 @ 13:39) (88% - 93%)         ==================>> LAB AND IMAGING <<==================                        17.9   13.65 )-----------( 470      ( 01 Jan 2022 07:00 )             53.7        01-01    136  |  98  |  19  ----------------------------<  107<H>  4.6   |  25  |  0.83    Ca    9.3      01 Jan 2022 07:00  Phos  2.3     01-01  Mg     2.20     01-01      WBC count:   13.65 <<== ,  9.84 <<== ,  9.71 <<== ,  5.59 <<==   Hemoglobin:   17.9 <<==,  18.1 <<==,  17.1 <<==,  17.1 <<==  platelets:  470 <==, 341 <==, 300 <==, 217 <==, 188 <==    Creatinine:  0.83  <<==, 0.81  <<==, 0.78  <<==, 0.78  <<==, 0.77  <<==  Sodium:   136  <==, 136  <==, 135  <==, 133  <==, 127  <==       AST:          84 <== , 91 <== , 73 <==      ALT:        71  <== , 59  <== , 44  <==      AP:        51  <=, 49  <=, 47  <=     Bili:        0.4  <=, 0.4  <=, 0.5  <=      ^^^ Inflammatory markers :  ^^^  C R P :          31.8 (12-30-21)  <<--, 82.2 (12-28-21)  <<--  P C T :         0.11 (12-30-21) <<--, 0.16 (12-28-21) <<--    Ferritin :         885 (12-28-21) <<--    D-Dimer :          235 (12-30-21) <<--, 331 (12-28-21) <<--  ___________________________________________________________________________________  ===============>>  A S S E S S M E N T   A N D   P L A N <<===============  ------------------------------------------------------------------------------------------    · Assessment	  61 year old man with history of kidney stone presenting with fever, cough, SOB x 2-3d.   States having positive sick contact in son with COVID-19  Fever to 103 at home, taking mucinex. Noticed worsening cough/SOB in past day, "couldn't take it anymore", so presenting to ED.       Problem/Plan - 1:  ·  Problem: sepsis. > resolved   ·  Plan: treat underlying condition  antipyretics as needed  nutrition / hydration as able   supportive care   monitor.    Problem/Plan - 2:  ·  Problem: Acute hypoxemic respiratory failure due to COVID-19 with Viral syndrome.  post Remdesivir per hospital protocol  Decadron per hospital protocol > finish   pt offered Toci and declined today   trend inflammatory markers   O2 and wean as able  supportive care  nutrition, hydration   vitamins / supplements   deep breathing / OOB to chair as able   DVT prophylaxis : lovenox 40 BID  Pepcid, fenofibrate.    MICU consult if not better / worsens...      pt's son and wife updated over the phone : all hope to avoid intubation but they understand, it may be needed     Problem/Plan - 3:  ·  Problem: Acute hypoxemic respiratory failure.   ·  Plan: O2 and wean off as able    goal O2 sat 90-96%  as above    Problem/Plan - 4:  ·  Problem: Viral syndrome.   ·  Plan: supportive care  nutrition / hydration  treat underlying condition.    Problem/Plan - 5:  ·  Problem: obesity and likely ERICK    discussed with pt re weight loss and sleep study > obtaining and using a CPAP machine pos discharge  on Bipap now     -GI/DVT Prophylaxis per protocol.    --------------------------------------------  Case discussed with pt, RN, NP, family  Education given on findings and plan of care    Today I had a prolonged conversation with the patient, ALEXX BROWN, his wife and son as well over the phone, re diagnosis, findings, and plan of care, options, alternatives, prognosis... .      they verbalized clear understanding, all questions answered.       patient seen according to [facility] cost code ( 04572 )       Additional time spent approx. 35 min.  ___________________________  H. ABNER Knowles.  Pager: [phone]

## 2022-01-02 NOTE — CONSULT NOTE ADULT - ASSESSMENT
61 year-old M with PMH kidney stone and obesity, unvaccinated, who presented to the hospital with 2-3 days of fever, cough, and shortness of breath, found to have COVID-19. MICU consulted for worsening hypoxic respiratory failure requiring NIPPV in a COVID-19 positive patient    Hypoxic Respiratory Failure  - Likely in the setting of COVID-19, patient now requiring Bilevel and saturating 88-90% on 100% FiO2  - Explained to patient that given his symptoms and oxygen requirements, the recommended course of action would be immediate intubation and mechanical ventilation  - Explained the risks/benefits of this, as well as the risks associated with remaining on Bilevel given his elevated respiratory rate and large tidal volumes  - Patient asked to call his wife and children, who adamantly did not want him to be placed on mechanical ventilation, at times becoming agitated and threatening "legal courses of action" if patient were placed on a ventilator. Also requesting transfer to another hospital  - Addressed code status given this discussion, patient and family still desire for full code and want the patient to be intubated "in an emergency"  - Reiterated our recommendation for early intubation and transfer to ICU, patient and family will continue to discuss  - If patient decompensates further, would reach out to family again. Our recommendation would be to call an RRT and have the patient intubated and transferred to MICU    Given patient and family's desire to remain on Bilevel and not be intubated at this time, patient is not a MICU candidate. Please re-consult if clinical status changes     Igor Alonzo, PGY-6  Pulmonary and Critical Care Medicine  [phone]

## 2022-01-02 NOTE — CONSULT NOTE ADULT - ATTENDING COMMENTS
61 M unvaccinated, hx kidney stone here with fevers and acute hypoxemic respiratory failure due to COVID19.    Pt very tachypneic on NIPPV, RR 30s, TV 800s-900s, MV anywhere from 15 to 30 lpm.  Appears in distress.  I spoke to him and his family on the phone at the bedside and explained risks/benefits of continuing to stay on nippv with this much distress and not getting intubated; family convinced him to not get intubated at this time, and they and him understand that the risk of waiting for intubation at this time include possible death.      # acute hypoxemic respiratory failure  # covid 19  # dyspnea  - agree with steroids  - check repeat CXR now  - check pro bnp  - consider toci  - recommend not staying on nippv for prolonged period of time    # tachycardia - related to hypoxemia, ntd at this time other than treat underlying hypoxemia

## 2022-01-03 NOTE — BH CONSULTATION LIAISON ASSESSMENT NOTE - CURRENT MEDICATION
MEDICATIONS  (STANDING):  cholecalciferol 2000 Unit(s) Oral daily  dexAMETHasone  Injectable 6 milliGRAM(s) IV Push daily  dextrose 5% + sodium chloride 0.45%. 1000 milliLiter(s) (125 mL/Hr) IV Continuous <Continuous>  enoxaparin Injectable 40 milliGRAM(s) SubCutaneous every 12 hours  famotidine    Tablet 20 milliGRAM(s) Oral two times a day  fenofibrate Tablet 145 milliGRAM(s) Oral at bedtime  multivitamin 1 Tablet(s) Oral daily    MEDICATIONS  (PRN):  acetaminophen     Tablet .. 650 milliGRAM(s) Oral every 6 hours PRN Temp greater or equal to 38C (100.4F), Mild Pain (1 - 3)  ALBUTerol    90 MICROgram(s) HFA Inhaler 2 Puff(s) Inhalation every 6 hours PRN Shortness of Breath and/or Wheezing  aluminum hydroxide/magnesium hydroxide/simethicone Suspension 30 milliLiter(s) Oral every 4 hours PRN Dyspepsia  benzonatate 100 milliGRAM(s) Oral three times a day PRN Cough  melatonin 3 milliGRAM(s) Oral at bedtime PRN Insomnia  ondansetron Injectable 4 milliGRAM(s) IV Push every 8 hours PRN Nausea and/or Vomiting

## 2022-01-03 NOTE — PROGRESS NOTE ADULT - ASSESSMENT
_________________________________________________________________________________________  ========>>  M E D I C A L   A T T E N D I N G    F O L L O W  U P  N O T E  <<=========  -----------------------------------------------------------------------------------------------------    - Patient seen and examined by me earlier today.   - In summary,  ALEXX BROWN is a 61y year old man admitted with SOB, fever..   - Patient has been on Bipap , asking for water ...       with PA at bedside, sat pt up, gave a drink of wate > change bed to chair position, pt reassured and guided as to Bipap use, hoping not to need to be intubated..          discussed with PA plan of care ...     ==================>> REVIEW OF SYSTEM <<=================    GEN: no fever, no chills, no pain  RESP: + some SOB  , no cough, no sputum  CVS: no chest pain, no palpitations, no edema  GI: no abdominal pain, no nausea, poor appetite   : no dysuria, no frequency  Neuro: no headache, no dizziness  Derm : no itching, no rash    ==================>> PHYSICAL EXAM <<=================    GEN: A&O X 2-3 , NAD , comfortable, calm in bed >> encouraged to sit up . pt appears less energetic today / somewhat frustrated but fatigued   HEENT: NCAT, PERRL, MMM, hearing intact  Neck: supple , no JVD appreciated  CVS: S1S2 , regular , No M/R/G appreciated  PULM: CTA B/L,  limited exam as not taking deep breaths   ABD.: soft. non tender, non distended,  bowel sounds present, abd obesity   Extrem: intact pulses , no edema   PSYCH : normal mood,  not anxious, as above                               ( Note written / Date of service 01-03-22 )    ==================>> MEDICATIONS <<====================    cholecalciferol 2000 Unit(s) Oral daily  dexAMETHasone  Injectable 6 milliGRAM(s) IV Push daily  dextrose 5% + sodium chloride 0.45%. 1000 milliLiter(s) IV Continuous <Continuous>  enoxaparin Injectable 40 milliGRAM(s) SubCutaneous every 12 hours  famotidine    Tablet 20 milliGRAM(s) Oral two times a day  fenofibrate Tablet 145 milliGRAM(s) Oral at bedtime  multivitamin 1 Tablet(s) Oral daily    MEDICATIONS  (PRN):  acetaminophen     Tablet .. 650 milliGRAM(s) Oral every 6 hours PRN Temp greater or equal to 38C (100.4F), Mild Pain (1 - 3)  ALBUTerol    90 MICROgram(s) HFA Inhaler 2 Puff(s) Inhalation every 6 hours PRN Shortness of Breath and/or Wheezing  aluminum hydroxide/magnesium hydroxide/simethicone Suspension 30 milliLiter(s) Oral every 4 hours PRN Dyspepsia  benzonatate 100 milliGRAM(s) Oral three times a day PRN Cough  melatonin 3 milliGRAM(s) Oral at bedtime PRN Insomnia  ondansetron Injectable 4 milliGRAM(s) IV Push every 8 hours PRN Nausea and/or Vomiting    ___________  Active diet:  Diet, Regular  ___________________    ==================>> VITAL SIGNS <<==================    Vital Signs Last 24 HrsT(C): 36.6   T(F): 97.9 Max: 99   HR: 97  (95 - 107)  BP: 110/78   RR: 17  (17 - 20)  SpO2: 96%  (93% - 99%)      POCT Blood Glucose.: 214 mg/dL (03 Jan 2022 13:11)     ==================>> LAB AND IMAGING <<==================                        18.8   11.90 )-----------( 403      ( 03 Jan 2022 06:40 )             55.1        01-03    132<L>  |  97<L>  |  22  ----------------------------<  131<H>  5.0   |  20<L>  |  0.72    Ca    9.3      03 Jan 2022 06:40  Phos  2.4     01-03  Mg     2.30     01-03    WBC count:   11.90 <<== ,  9.56 <<== ,  13.65 <<== ,  9.84 <<== ,  9.71 <<==   Hemoglobin:   18.8 <<==,  17.7 <<==,  17.9 <<==,  18.1 <<==,  17.1 <<==  platelets:  403 <==, 445 <==, 470 <==, 341 <==, 300 <==, 217 <==    Creatinine:  0.72  <<==, 0.68  <<==, 0.83  <<==, 0.81  <<==, 0.78  <<==, 0.78  <<==  Sodium:   132  <==, 132  <==, 136  <==, 136  <==, 135  <==, 133  <==       AST:          84 <== , 91 <== , 73 <==      ALT:        71  <== , 59  <== , 44  <==      AP:        51  <=, 49  <=, 47  <=     Bili:        0.4  <=, 0.4  <=, 0.5  <=    ^^^ Inflammatory markers :  ^^^  C R P :          96.4 (01-02-22)  <<--, 31.8 (12-30-21)  <<--, 82.2 (12-28-21)  <<--  P C T :         0.13 (01-02-22) <<--, 0.11 (12-30-21) <<--, 0.16 (12-28-21) <<--    Ferritin :         890 (01-02-22) <<--, 885 (12-28-21) <<--    D-Dimer :          322 (01-02-22) <<--, 235 (12-30-21) <<--, 331 (12-28-21) <<--    ___________________________________________________________________________________  ===============>>  A S S E S S M E N T   A N D   P L A N <<===============  ------------------------------------------------------------------------------------------    · Assessment	  61 year old man with history of kidney stone presenting with fever, cough, SOB x 2-3d.   States having positive sick contact in son with COVID-19  Fever to 103 at home, taking mucinex. Noticed worsening cough/SOB in past day, "couldn't take it anymore", so presenting to ED.       Problem/Plan - 1:  ·  Problem: sepsis. > resolved   ·  Plan: treat underlying condition  antipyretics as needed  nutrition / hydration as able   supportive care   monitor.    Problem/Plan - 2:  ·  Problem: Acute hypoxemic respiratory failure due to COVID-19 with Viral syndrome.  post Remdesivir per hospital protocol  Decadron per hospital protocol > finish   pt offered Toci and declined   trend inflammatory markers   O2 and wean as able  supportive care  nutrition, hydration   vitamins / supplements   deep breathing / OOB to chair as able   DVT prophylaxis : lovenox 40 BID  Pepcid, fenofibrate.    MICU consult noted      pt, son and wife updated all hope to avoid intubation but they understand, it may be needed     Problem/Plan - 3:  ·  Problem:  obesity and likely ERICK    discussed with pt re weight loss and sleep study > obtaining and using a CPAP machine pos discharge  on Bipap now     -GI/DVT Prophylaxis per protocol.    --------------------------------------------  Case discussed with pt, RN, PA  Education given on findings and plan of care    I already had a prolonged conversation with the patient, ALEXX BROWN, his wife and son as well over the phone, re diagnosis, findings, and plan of care, options, alternatives, prognosis... .      they verbalized clear understanding, all questions answered.        will further update as able        ___________________________  H. ABNER Knowles.  Pager: [phone]

## 2022-01-03 NOTE — BH CONSULTATION LIAISON ASSESSMENT NOTE - NSBHCHARTREVIEWVS_PSY_A_CORE FT
Vital Signs Last 24 Hrs  T(C): 36.6 (03 Jan 2022 15:30), Max: 37.3 (02 Jan 2022 18:11)  T(F): 97.9 (03 Jan 2022 15:30), Max: 99.1 (02 Jan 2022 18:11)  HR: 98 (03 Jan 2022 15:50) (95 - 107)  BP: 113/68 (03 Jan 2022 15:30) (111/77 - 136/82)  BP(mean): --  RR: 18 (03 Jan 2022 15:30) (18 - 20)  SpO2: 98% (03 Jan 2022 15:50) (90% - 99%)

## 2022-01-03 NOTE — BH CONSULTATION LIAISON ASSESSMENT NOTE - RISK ASSESSMENT
Patient w/ no psychiatric hx, prior hospitalizations/SA lives w/ family and has residential/financial stability - LOW risk

## 2022-01-03 NOTE — PROGRESS NOTE ADULT - SUBJECTIVE AND OBJECTIVE BOX
Cardiovascular Disease Progress Note    Overnight events: No acute events overnight.  breathing comfortable on bipap. feels parched. no cp  Otherwise review of systems negative    Objective Findings:  T(C): 37 (01-03-22 @ 06:50), Max: 37.3 (01-02-22 @ 18:11)  HR: 98 (01-03-22 @ 06:50) (82 - 107)  BP: 111/77 (01-03-22 @ 06:50) (111/77 - 138/80)  RR: 19 (01-03-22 @ 06:50) (19 - 20)  SpO2: 95% (01-03-22 @ 06:50) (88% - 99%)  Wt(kg): --  Daily     Daily       Physical Exam:  Gen: NAD  HEENT: EOMI  CV: RRR, normal S1 + S2, no m/r/g  Lungs: CTAB  Abd: soft, non-tender  Ext: No edema    Telemetry:    Laboratory Data:                        18.8   11.90 )-----------( 403      ( 03 Jan 2022 06:40 )             55.1     01-03    132<L>  |  97<L>  |  22  ----------------------------<  131<H>  5.0   |  20<L>  |  0.72    Ca    9.3      03 Jan 2022 06:40  Phos  2.4     01-03  Mg     2.30     01-03                Inpatient Medications:  MEDICATIONS  (STANDING):  cholecalciferol 2000 Unit(s) Oral daily  dexAMETHasone     Tablet 6 milliGRAM(s) Oral daily  dextrose 5% + sodium chloride 0.45%. 1000 milliLiter(s) (125 mL/Hr) IV Continuous <Continuous>  enoxaparin Injectable 40 milliGRAM(s) SubCutaneous every 12 hours  famotidine    Tablet 20 milliGRAM(s) Oral two times a day  fenofibrate Tablet 145 milliGRAM(s) Oral at bedtime  multivitamin 1 Tablet(s) Oral daily      Assessment:  hypoxic resp failure  covid pneumonia  hx of nephrolithiasis  hypoantremia    Recs:  cv stable  no e/o chf   cw volume resuscitation. ordered for d5 1/2 ns @ 125cc/hr --> adjust as needed   tx of covid pna per med  icu consult appreciated  trend inflammatory markers  wean bilevel as able  dvt ppx        Over 35 minutes spent on total encounter; more than 50% of the visit was spent counseling and/or coordinating care by the attending physician.      Igor Medrano MD   Cardiovascular Disease  [phone]

## 2022-01-03 NOTE — BH CONSULTATION LIAISON ASSESSMENT NOTE - NSBHCHARTREVIEWLAB_PSY_A_CORE FT
18.8   11.90 )-----------( 403      ( 03 Jan 2022 06:40 )             55.1   01-03    132<L>  |  97<L>  |  22  ----------------------------<  131<H>  5.0   |  20<L>  |  0.72    Ca    9.3      03 Jan 2022 06:40  Phos  2.4     01-03  Mg     2.30     01-03

## 2022-01-03 NOTE — RAPID RESPONSE TEAM SUMMARY - NSSITUATIONBACKGROUNDRRT_GEN_ALL_CORE
61 year old man with history of kidney stone presenting with fever, cough, SOB x 2-3d, found to be COVID pos, hypoxic, admitted for COVID PNA. RRT called for hypoxia. Pt taken BiPAP off. Pt desaturating to 85. Rest of vitals wnl. After discussion w/ primary team, pt agreed to put BiPAP back on. SpO2 improved to 93%. CXR ordered. Primary team to discuss GOC w/ patient regarding intubation.

## 2022-01-03 NOTE — BH CONSULTATION LIAISON ASSESSMENT NOTE - SUMMARY
61M , retired , domiciled w/ family no significant PMHx or PPHx/Hospitalizations/SA is presenting w/ fever, cough, SOB diagnosed w/ COVID placed on anti-virals, steroids, and supplemental O2 has progressively gotten worse requiring rapid response, HFNC, and now BiPAP seen by MICU. Psychiatry consulted as concern for suicidal statement when patient refused to be placed on ventilator if needed.    Patient seen lying down, AOx4, calm, cooperative, frustrated, fatigued, saturating well on BiPAP asking for water - Patient appeared emotionally labile easily agitated when bothered as he states he is tired and is also tearful stating he misses his  grandfather and his family and wants to go home. Patient clearly states he is not suicidal just rather would not want to be placed on a ventilator. He is hopeful to get better, however, very frustrated about the BiPAP and wants to just get better. Additionally, denies HI/AH/VH. No concern for underlying cognitive dysfunction, AMS, or irrational/psychotic behavior.      PLAN:  - Routine observation  - Offer emotional support and medical optimization  - Offer FaceTime w/ family  - SW consult for further support  - May provide patient w/ the following outpatient referrals upon DC: [facility] [phone] (Walk-in/appt M-F 9am-7pm); [phone] [place], [place]

## 2022-01-03 NOTE — CHART NOTE - NSCHARTNOTEFT_GEN_A_CORE
RRT called by RN as Pt removed BiPAP and is stating he "wants to die." Pt seen at bedside with NRB on O2 sat ~82-85%. Pt stating that he can not tolerate the BiPAP anymore and "has lived a good life." I spoke with the Pt about his family and that he has a wife and children who need him and that he has to continue to fight. He became tearful and shortly after agreed to replace the BiPAP, O2 sat improved to 93-94%. I asked Mr. Arenas if he wanted me to call his family at this time and he declined. After discussion at bedside with myself, MAR and clinical impact RN, Pt states that he would not want to be intubated if his oxygen level were to worsen while on BiPAP and he is currently A+O x 3. MAR suggested psychiatry consult. Discussed with Dr. Knowles who agrees, psychiatry consult called, maintain BiPAP at current level, if O2 remains at current level can consider decreasing Fio2, monitor continuous pulse Ox.

## 2022-01-03 NOTE — BH CONSULTATION LIAISON ASSESSMENT NOTE - HPI (INCLUDE ILLNESS QUALITY, SEVERITY, DURATION, TIMING, CONTEXT, MODIFYING FACTORS, ASSOCIATED SIGNS AND SYMPTOMS)
61M , retired , domiciled w/ family no significant PMHx or PPHx/Hospitalizations/SA is presenting w/ fever, cough, SOB diagnosed w/ COVID placed on anti-virals, steroids, and supplemental O2 has progressively gotten worse requiring rapid response, HFNC, and now BiPAP seen by MICU. Psychiatry consulted as concern for suicidal statement when patient refused to be placed on ventilator if needed.    Patient seen lying down, AOx4, calm, cooperative, frustrated, fatigued, saturating well on BiPAP asking for water - Patient appeared emotionally labile easily agitated when bothered as he states he is tired and is also tearful stating he misses his  grandfather and his family and wants to go home. Patient clearly states he is not suicidal just rather would not want to be placed on a ventilator. He is hopeful to get better, however, very frustrated about the BiPAP and wants to just get better. Additionally, denies HI/AH/VH.

## 2022-01-03 NOTE — BH CONSULTATION LIAISON ASSESSMENT NOTE - CASE SUMMARY
Chart reviewed. Pt discussed with NP. Assessment as above. No primary psychiatric concerns identified. Pt frustrated/irritable but clear in his refusal of intubation should it be clinically required. He appears to demonstrate a clear understanding of the risks/benefits of this decision, without evidence of irrational/psychotic thinking. At this time he demonstrates capacity to refuse intubation should it be presented as a clinical necessity; however, he may very well change his mind at the time of any clinical deterioration. May wish to get MOLST/code status revised if primary team prefers to have clarity on preferences.   Discussed case with primary team MD as well. Agree with above recs and assessment. Psychiatry will sign off but please call us as needed.

## 2022-01-04 NOTE — DIETITIAN INITIAL EVALUATION ADULT. - PERTINENT MEDS FT
MEDICATIONS  (STANDING):  cholecalciferol 2000 Unit(s) Oral daily  dexAMETHasone  Injectable 6 milliGRAM(s) IV Push daily  dextrose 5% + sodium chloride 0.45%. 1000 milliLiter(s) (125 mL/Hr) IV Continuous <Continuous>  enoxaparin Injectable 40 milliGRAM(s) SubCutaneous every 12 hours  famotidine Injectable 20 milliGRAM(s) IV Push every 12 hours  fenofibrate Tablet 145 milliGRAM(s) Oral at bedtime  multivitamin 1 Tablet(s) Oral daily  pantoprazole  Injectable 40 milliGRAM(s) IV Push daily

## 2022-01-04 NOTE — DIETITIAN INITIAL EVALUATION ADULT. - PROBLEM SELECTOR PLAN 2
Acute hypoxemic respiratory failure due to COVID-19 with Viral syndrome.  Remdesivir per hospital protocol  Decadron per hospital protocol  trend inflammatory markers   O2 and wean as able  supportive care  nutrition, hydration  MVI, Vitamin C,D, Zinc, melatonin  deep breathing discussed > IS  OOB to chair  DVT prophylaxis   Pepcid, fenofibrate

## 2022-01-04 NOTE — DIETITIAN INITIAL EVALUATION ADULT. - OTHER INFO
61 year old man admitted with Pneumonia due to COVID-19, fever, cough, SOB x 2-3d with history of kidney stone, Obesity.  Unable to obtain collateral at present from patient.  As per RN pt with low po intake due to on BIpap.  Rec supplement Ensure Enlive 2x daily (700 gian and 40 gm protein) to promote po intake.  Noted egg intolerance and kitchen aware.

## 2022-01-04 NOTE — PROGRESS NOTE ADULT - SUBJECTIVE AND OBJECTIVE BOX
Cardiovascular Disease Progress Note    Overnight events: No acute events overnight.  resp status remains stable on bipap  Otherwise review of systems negative    Objective Findings:  T(C): 36.3 (01-04-22 @ 05:18), Max: 36.6 (01-03-22 @ 15:30)  HR: 88 (01-04-22 @ 05:18) (88 - 98)  BP: 127/91 (01-04-22 @ 05:18) (110/78 - 127/91)  RR: 97 (01-04-22 @ 05:18) (17 - 97)  SpO2: 96% (01-03-22 @ 21:08) (96% - 98%)  Wt(kg): --  Daily     Daily       Physical Exam:  Gen: NAD  HEENT: EOMI  CV: RRR, normal S1 + S2, no m/r/g  Lungs: CTAB  Abd: soft, non-tender  Ext: No edema    Telemetry:    Laboratory Data:                        18.5   8.92  )-----------( 467      ( 04 Jan 2022 07:18 )             53.5     01-03    132<L>  |  97<L>  |  22  ----------------------------<  131<H>  5.0   |  20<L>  |  0.72    Ca    9.3      03 Jan 2022 06:40  Phos  2.4     01-03  Mg     2.30     01-03                Inpatient Medications:  MEDICATIONS  (STANDING):  cholecalciferol 2000 Unit(s) Oral daily  dexAMETHasone  Injectable 6 milliGRAM(s) IV Push daily  dextrose 5% + sodium chloride 0.45%. 1000 milliLiter(s) (125 mL/Hr) IV Continuous <Continuous>  enoxaparin Injectable 40 milliGRAM(s) SubCutaneous every 12 hours  famotidine    Tablet 20 milliGRAM(s) Oral two times a day  fenofibrate Tablet 145 milliGRAM(s) Oral at bedtime  multivitamin 1 Tablet(s) Oral daily      Assessment:  hypoxic resp failure  covid pneumonia  hx of nephrolithiasis  hypoantremia    Recs:  cv stable  no e/o chf   IV hydration. encourage PO intake when able  tx of covid pna per med  icu consult appreciated  trend inflammatory markers  wean bilevel as able  dvt ppx          Over 25 minutes spent on total encounter; more than 50% of the visit was spent counseling and/or coordinating care by the attending physician.      Igor Medrano MD   Cardiovascular Disease  [phone]

## 2022-01-04 NOTE — PROGRESS NOTE ADULT - CONVERSATION DETAILS
*** Advance directive /  goals of care discussion      I have had several long conversations / discussions with patient ( and family : wife / son / Dtr) about patient's overall diagnosis, expected prognosis, and potential complications.       Discussed treatment options, comfort care / hospice as appropriate, and all other potential options of care.         Discussed risks, benefits, and alternatives of treatment as well.          Opportunity given for and all questions answered.            Reviewed available advance directives as available > none     At this time patient to be  full code, with continuation of current medical therapy.        pt and family very strongly hopeful pt will not need intubation and leaning against it but also do understand that in a setting of urgency or pt fatigue , intubation may be his only chance of survival and are in agreement ..    Psych noted as pt has capacity to make his decisions still at this time      Goal is for pt to return > home and follow up as outpatient with current doctors      will continue to discuss GOC with pt and family and update plan as needed.     Patient's family have my contact information and will contact me with questions.     Estimated time spent on Goals of care: 20 min.

## 2022-01-04 NOTE — PROGRESS NOTE ADULT - ASSESSMENT
_________________________________________________________________________________________  ========>>  M E D I C A L   A T T E N D I N G    F O L L O W  U P  N O T E  <<=========  -----------------------------------------------------------------------------------------------------    - Patient seen and examined by me earlier today.   - In summary,  ALEXX BROWN is a 61y year old man admitted with SOB, fever..   - Patient has been on Bipap , asking for water ...       sat pt up >> became very dizzy " I sat up too fast", took a drink of water > change bed to chair position, pt educated and reassured and guided as to Bipap use, hoping not to need to be intubated still ( agree ) ..           pt saturating near 100% on 80% FIO2>>>> gradually decreased to 70 %, still saturating well >>> later this afternoon again pt required more O2 per PA              discussed with PA plan of care troughout the day ...    ==================>> REVIEW OF SYSTEM <<=================    GEN: no fever, no chills, no pain  RESP: + some SOB  , no cough, no sputum  CVS: no chest pain, no palpitations, no edema  GI: no abdominal pain, no nausea, poor appetite   : no dysuria, no frequency  Neuro: no headache, no dizziness  Derm : no itching, no rash    ==================>> PHYSICAL EXAM <<=================    GEN: A&O X 2-3 , NAD , comfortable, calm in bed >> encouraged to sit up . pt appears less energetic today / somewhat frustrated but fatigued   HEENT: NCAT, PERRL, MMM, hearing intact  Neck: supple , no JVD appreciated  CVS: S1S2 , regular , No M/R/G appreciated  PULM: CTA B/L,  limited exam as not taking deep breaths   ABD.: soft. non tender, non distended,  bowel sounds present, abd obesity   Extrem: intact pulses , no edema , ? Rt forearm IV infiltrated > RN  to eval   PSYCH : normal mood,  not anxious, somewhat frustrated at times                              ( Note written / Date of service 01-04-22 )    ==================>> MEDICATIONS <<====================    cholecalciferol 2000 Unit(s) Oral daily  dexAMETHasone  Injectable 6 milliGRAM(s) IV Push daily  dextrose 5% + sodium chloride 0.45%. 1000 milliLiter(s) IV Continuous <Continuous>  enoxaparin Injectable 40 milliGRAM(s) SubCutaneous every 12 hours  fenofibrate Tablet 145 milliGRAM(s) Oral at bedtime  multivitamin 1 Tablet(s) Oral daily  sodium phosphate IVPB 30 milliMole(s) IV Intermittent once    MEDICATIONS  (PRN):  acetaminophen     Tablet .. 650 milliGRAM(s) Oral every 6 hours PRN Temp greater or equal to 38C (100.4F), Mild Pain (1 - 3)  ALBUTerol    90 MICROgram(s) HFA Inhaler 2 Puff(s) Inhalation every 6 hours PRN Shortness of Breath and/or Wheezing  aluminum hydroxide/magnesium hydroxide/simethicone Suspension 30 milliLiter(s) Oral every 4 hours PRN Dyspepsia  benzonatate 100 milliGRAM(s) Oral three times a day PRN Cough  melatonin 3 milliGRAM(s) Oral at bedtime PRN Insomnia  ondansetron Injectable 4 milliGRAM(s) IV Push every 8 hours PRN Nausea and/or Vomiting    ___________  Active diet:  Diet, NPO  ___________________    ==================>> VITAL SIGNS <<==================    Vital Signs Last 24 HrsT(C): 36.6 (01-04-22 @ 09:56)  T(F): 97.9 (01-04-22 @ 09:56), Max: 97.9 (01-03-22 @ 21:08)  HR: 96 (01-04-22 @ 15:13) (88 - 99)  BP: 117/85 (01-04-22 @ 09:56)  RR: 20 (01-04-22 @ 09:56) (17 - 97)  SpO2: 95% (01-04-22 @ 15:13) (95% - 99%)       ==================>> LAB AND IMAGING <<==================                        18.5   8.92  )-----------( 467      ( 04 Jan 2022 07:18 )             53.5        01-04    133<L>  |  99  |  27<H>  ----------------------------<  140<H>  4.8   |  23  |  0.71    Ca    9.0      04 Jan 2022 07:18  Phos  2.2     01-04  Mg     2.50     01-04      WBC count:   8.92 <<== ,  11.90 <<== ,  9.56 <<== ,  13.65 <<== ,  9.84 <<==   Hemoglobin:   18.5 <<==,  18.8 <<==,  17.7 <<==,  17.9 <<==,  18.1 <<==  platelets:  467 <==, 403 <==, 445 <==, 470 <==, 341 <==, 300 <==    Creatinine:  0.71  <<==, 0.72  <<==, 0.68  <<==, 0.83  <<==, 0.81  <<==, 0.78  <<==  Sodium:   133  <==, 132  <==, 132  <==, 136  <==, 136  <==, 135  <==       AST:          84 <== , 91 <==      ALT:        71  <== , 59  <==      AP:        51  <=, 49  <=     Bili:        0.4  <=, 0.4  <=    ^^^ Inflammatory markers :  ^^^  C R P :          131.9 (01-04-22)  <<--, 96.4 (01-02-22)  <<--, 31.8 (12-30-21)  <<--, 82.2 (12-28-21)  <<--  P C T :         0.13 (01-02-22) <<--, 0.11 (12-30-21) <<--, 0.16 (12-28-21) <<--    Ferritin :         1058 (01-04-22) <<--, 890 (01-02-22) <<--, 885 (12-28-21) <<--    D-Dimer :          429 (01-04-22) <<--, 322 (01-02-22) <<--, 235 (12-30-21) <<--, 331 (12-28-21) <<--    ___________________________________________________________________________________  ===============>>  A S S E S S M E N T   A N D   P L A N <<===============  ------------------------------------------------------------------------------------------    · Assessment	  61 year old man with history of kidney stone presenting with fever, cough, SOB x 2-3d.   States having positive sick contact in son with COVID-19  Fever to 103 at home, taking mucinex. Noticed worsening cough/SOB in past day, "couldn't take it anymore", so presenting to ED.       Problem/Plan - 1:  ·  Problem: Acute hypoxemic respiratory failure due to COVID-19 with Viral syndrome.  post Remdesivir per hospital protocol  Decadron per hospital protocol > finish   pt offered Toci and declined   trend inflammatory markers   O2 and wean as able  supportive care  nutrition, hydration   vitamins / supplements   deep breathing / OOB to chair as able  >> pt educated/ encouraged as is very deconditioned   DVT prophylaxis : lovenox 40 BID  Continue Current medications otherwise and monitor.   wean down Bipap as able     Problem/Plan - 2:  ·  Problem:  obesity and likely ERICK    discussed with pt re weight loss and sleep study > obtaining and using a CPAP machine pos discharge  on Bipap now     -GI/DVT Prophylaxis per protocol.    --------------------------------------------  Case discussed with pt, PA, pt's family   Education given on findings and plan of care    I already had a prolonged conversation with the patient, ALEXX BROWN, his wife and son as well over the phone, re diagnosis, findings, and plan of care, options, alternatives, prognosis... .      they verbalized clear understanding, all questions answered.        will further update as able >> as above        ___________________________  HGarrett Knowles D.O.  Pager: [phone]

## 2022-01-04 NOTE — CHART NOTE - NSCHARTNOTEFT_GEN_A_CORE
Patient a/w AHRF in the s/o COVID 19. Has been on continuous BIPAP since 1/2 per chart review. RRT noted yesterday for hypoxia in s/o pulling off BIPAP mask. MICU consult noted from 1/2- intubation recommended but deferred d/t patient/family refusal. Patient seen this morning during rounds. Continued on BIPAP 12/7/100%. I spoke with patient about his overall clinical status. He reported his breathing felt the same as yesterday. He was slightly somnolent with eyes closed, but easily arousable and followed commands when requested ('open eyes'). He was oriented x3, able to tell me his name, place and year. He reported the year was 1940 but reported he was joking shortly after saying this and then told me the year was 2022. I asked the patient whether he would be amenable to intubation in order to preserve his life should his respiratory status worsen on BIPAP- he gave me 'two thumbs up' to this. I further asked if his answer to this question was a 'yes' to confirm, to which the patient nodded his head yes. At the time of evaluation, SpO2 was 100%, RR 24 Vt 600-700mL. I decreased his FiO2 to 90% then 80% and he maintained his saturation in the high 90s. Patient appeared comfortable on BIPAP with no increased WOB. Will continue to monitor for now with low threshold for MICU reconsult if deteriorates. Discussed with Attg Dr. Knowles- in agreement with plan.

## 2022-01-04 NOTE — DIETITIAN INITIAL EVALUATION ADULT. - PERTINENT LABORATORY DATA
01-04 Na 133 mmol/L<L> Glu 140 mg/dL<H> K+ 4.8 mmol/L Cr 0.71 mg/dL BUN 27 mg/dL<H> Phos 2.2 mg/dL<L>

## 2022-01-05 NOTE — PROGRESS NOTE ADULT - SUBJECTIVE AND OBJECTIVE BOX
Cardiovascular Disease Progress Note    Overnight events: No acute events overnight.  tolerating bipap. no new cardiac sx  Otherwise review of systems negative    Objective Findings:  T(C): 36.8 (01-05-22 @ 04:54), Max: 36.8 (01-04-22 @ 22:54)  HR: 76 (01-05-22 @ 07:31) (71 - 99)  BP: 121/89 (01-05-22 @ 04:54) (109/65 - 121/89)  RR: 20 (01-05-22 @ 04:54) (20 - 22)  SpO2: 96% (01-05-22 @ 07:31) (95% - 99%)  Wt(kg): --  Daily     Daily       Physical Exam:  Gen: NAD  HEENT: EOMI  CV: RRR, normal S1 + S2, no m/r/g  Lungs: CTAB  Abd: soft, non-tender  Ext: No edema    Telemetry:    Laboratory Data:                        18.5   8.92  )-----------( 467      ( 04 Jan 2022 07:18 )             53.5     01-04    133<L>  |  99  |  27<H>  ----------------------------<  140<H>  4.8   |  23  |  0.71    Ca    9.0      04 Jan 2022 07:18  Phos  2.2     01-04  Mg     2.50     01-04                Inpatient Medications:  MEDICATIONS  (STANDING):  acetaminophen   IVPB .. 1000 milliGRAM(s) IV Intermittent once  cholecalciferol 2000 Unit(s) Oral daily  dexAMETHasone  Injectable 6 milliGRAM(s) IV Push daily  dextrose 5% + sodium chloride 0.45%. 1000 milliLiter(s) (125 mL/Hr) IV Continuous <Continuous>  enoxaparin Injectable 40 milliGRAM(s) SubCutaneous every 12 hours  famotidine Injectable 20 milliGRAM(s) IV Push every 12 hours  fenofibrate Tablet 145 milliGRAM(s) Oral at bedtime  multivitamin 1 Tablet(s) Oral daily  pantoprazole  Injectable 40 milliGRAM(s) IV Push daily      Assessment:  hypoxic resp failure  covid pneumonia  hx of nephrolithiasis  hypoantremia    Recs:  cv stable  no e/o chf   IV hydration as ordered. monitor vol status. encourage PO intake when able  tx of covid pna per med  icu consult appreciated  trend inflammatory markers  wean bilevel as able  dvt ppx          Over 25 minutes spent on total encounter; more than 50% of the visit was spent counseling and/or coordinating care by the attending physician.      Igor Medrano MD   Cardiovascular Disease  [phone]

## 2022-01-05 NOTE — PROGRESS NOTE ADULT - ASSESSMENT
_________________________________________________________________________________________  ========>>  M E D I C A L   A T T E N D I N G    F O L L O W  U P  N O T E  <<=========  -----------------------------------------------------------------------------------------------------    - Patient seen and examined by me earlier today.   - In summary,  ALEXX BROWN is a 61y year old man admitted with SOB, fever..   - Patient has been on Bipap , with O2 and pressures adjusted last PM ..       sat pt up >> bed in chair position, + some dizziness; pt educated and reassured and guided as to Bipap use, hoping not to need to be intubated still ( agree ) ..           pt saturating near 100% on 80% FIO2 Biap >>>> gradually decreased to 65 %, still saturating well              discussed with PA plan of care trough out the day ...    ==================>> REVIEW OF SYSTEM <<=================    GEN: no fever, no chills, no pain  RESP: + some SOB  , no cough, no sputum  CVS: no chest pain, no palpitations, no edema  GI: no abdominal pain, no nausea, poor appetite   : no dysuria, no frequency  Neuro: no headache, no dizziness  Derm : no itching, no rash    ==================>> PHYSICAL EXAM <<=================    GEN: A&O X 2-3 , NAD , comfortable, calm in bed >> encouraged to sit up . pt appears less energetic today / somewhat frustrated but fatigued   HEENT: NCAT, PERRL, MMM, hearing intact  Neck: supple , no JVD appreciated  CVS: S1S2 , regular , No M/R/G appreciated  PULM: CTA B/L,  limited exam as not taking deep breaths   ABD.: soft. non tender, non distended,  bowel sounds present, abd obesity   Extrem: intact pulses , no edema , ? Rt forearm IV infiltrated > RN  to eval   PSYCH : normal mood,  not anxious, somewhat frustrated at times                              ( Note written / Date of service 01-04-22 )    ==================>> MEDICATIONS <<====================    cholecalciferol 2000 Unit(s) Oral daily  dexAMETHasone  Injectable 6 milliGRAM(s) IV Push daily  dextrose 5% + sodium chloride 0.45%. 1000 milliLiter(s) IV Continuous <Continuous>  enoxaparin Injectable 40 milliGRAM(s) SubCutaneous every 12 hours  fenofibrate Tablet 145 milliGRAM(s) Oral at bedtime  multivitamin 1 Tablet(s) Oral daily  sodium phosphate IVPB 30 milliMole(s) IV Intermittent once    MEDICATIONS  (PRN):  acetaminophen     Tablet .. 650 milliGRAM(s) Oral every 6 hours PRN Temp greater or equal to 38C (100.4F), Mild Pain (1 - 3)  ALBUTerol    90 MICROgram(s) HFA Inhaler 2 Puff(s) Inhalation every 6 hours PRN Shortness of Breath and/or Wheezing  aluminum hydroxide/magnesium hydroxide/simethicone Suspension 30 milliLiter(s) Oral every 4 hours PRN Dyspepsia  benzonatate 100 milliGRAM(s) Oral three times a day PRN Cough  melatonin 3 milliGRAM(s) Oral at bedtime PRN Insomnia  ondansetron Injectable 4 milliGRAM(s) IV Push every 8 hours PRN Nausea and/or Vomiting    ___________  Active diet:  Diet, NPO  ___________________    ==================>> VITAL SIGNS <<==================    Vital Signs Last 24 HrsT(C): 36.6 (01-04-22 @ 09:56)  T(F): 97.9 (01-04-22 @ 09:56), Max: 97.9 (01-03-22 @ 21:08)  HR: 96 (01-04-22 @ 15:13) (88 - 99)  BP: 117/85 (01-04-22 @ 09:56)  RR: 20 (01-04-22 @ 09:56) (17 - 97)  SpO2: 95% (01-04-22 @ 15:13) (95% - 99%)       ==================>> LAB AND IMAGING <<==================                        18.5   8.92  )-----------( 467      ( 04 Jan 2022 07:18 )             53.5        01-04    133<L>  |  99  |  27<H>  ----------------------------<  140<H>  4.8   |  23  |  0.71    Ca    9.0      04 Jan 2022 07:18  Phos  2.2     01-04  Mg     2.50     01-04      WBC count:   8.92 <<== ,  11.90 <<== ,  9.56 <<== ,  13.65 <<== ,  9.84 <<==   Hemoglobin:   18.5 <<==,  18.8 <<==,  17.7 <<==,  17.9 <<==,  18.1 <<==  platelets:  467 <==, 403 <==, 445 <==, 470 <==, 341 <==, 300 <==    Creatinine:  0.71  <<==, 0.72  <<==, 0.68  <<==, 0.83  <<==, 0.81  <<==, 0.78  <<==  Sodium:   133  <==, 132  <==, 132  <==, 136  <==, 136  <==, 135  <==       AST:          84 <== , 91 <==      ALT:        71  <== , 59  <==      AP:        51  <=, 49  <=     Bili:        0.4  <=, 0.4  <=    ^^^ Inflammatory markers :  ^^^  C R P :          131.9 (01-04-22)  <<--, 96.4 (01-02-22)  <<--, 31.8 (12-30-21)  <<--, 82.2 (12-28-21)  <<--  P C T :         0.13 (01-02-22) <<--, 0.11 (12-30-21) <<--, 0.16 (12-28-21) <<--    Ferritin :         1058 (01-04-22) <<--, 890 (01-02-22) <<--, 885 (12-28-21) <<--    D-Dimer :          429 (01-04-22) <<--, 322 (01-02-22) <<--, 235 (12-30-21) <<--, 331 (12-28-21) <<--    ___________________________________________________________________________________  ===============>>  A S S E S S M E N T   A N D   P L A N <<===============  ------------------------------------------------------------------------------------------    · Assessment	  61 year old man with history of kidney stone presenting with fever, cough, SOB x 2-3d.   States having positive sick contact in son with COVID-19  Fever to 103 at home, taking mucinex. Noticed worsening cough/SOB in past day, "couldn't take it anymore", so presenting to ED.       Problem/Plan - 1:  ·  Problem: Acute hypoxemic respiratory failure due to COVID-19 with Viral syndrome.  post Remdesivir per hospital protocol  Decadron per hospital protocol > finish   pt offered Toci and declined   trend inflammatory markers   O2 and wean as able  supportive care  nutrition, hydration   vitamins / supplements   deep breathing / OOB to chair as able  >> pt educated/ encouraged as is very deconditioned   DVT prophylaxis : lovenox 40 BID  Continue Current medications otherwise and monitor.   wean down Bipap as able     Problem/Plan - 2:  ·  Problem:  obesity and likely ERICK    discussed with pt re weight loss and sleep study > obtaining and using a CPAP machine pos discharge  on Bipap now     -GI/DVT Prophylaxis per protocol.    --------------------------------------------  Case discussed with pt, PA, pt's family   Education given on findings and plan of care    I already had a prolonged conversation with the patient, ALEXX BROWN, his wife and son as well over the phone, re diagnosis, findings, and plan of care, options, alternatives, prognosis... .      they verbalized clear understanding, all questions answered.        will further update as able >> as above        ___________________________  H. ABNER Knowles.  Pager: [phone]       _________________________________________________________________________________________  ========>>  M E D I C A L   A T T E N D I N G    F O L L O W  U P  N O T E  <<=========  -----------------------------------------------------------------------------------------------------    - Patient seen and examined by me earlier today.   - In summary,  ALEXX BROWN is a 61y year old man admitted with SOB, fever..   - Patient has been on Bipap , with O2 and pressures adjusted last PM ..       sat pt up >> bed in chair position, + some dizziness; pt educated and reassured and guided as to Bipap use, hoping not to need to be intubated still ( agree ) ..           pt saturating near 100% on 80% FIO2 Biap >>>> gradually decreased to 65 %, still saturating well              discussed with PA plan of care trough out the day ...    ==================>> REVIEW OF SYSTEM <<=================    GEN: no fever, no chills, no pain  RESP: + some SOB  , no cough, no sputum  CVS: no chest pain, no palpitations, no edema  GI: no abdominal pain, no nausea, poor appetite   : no dysuria, no frequency  Neuro: no headache, no dizziness  Derm : no itching, no rash    ==================>> PHYSICAL EXAM <<=================    GEN: A&O X 2-3 , NAD , comfortable, calm in bed >> encouraged to sit up . pt appears less energetic today / somewhat frustrated but fatigued   HEENT: NCAT, PERRL, MMM, hearing intact  Neck: supple , no JVD appreciated  CVS: S1S2 , regular , No M/R/G appreciated  PULM: CTA B/L,  limited exam as not taking deep breaths   ABD.: soft. non tender, non distended,  bowel sounds present, abd obesity   Extrem: intact pulses , no edema   PSYCH : normal mood,  not anxious                              ( Note written / Date of service 01-05-22 )    ==================>> MEDICATIONS <<====================    acetaminophen   IVPB .. 1000 milliGRAM(s) IV Intermittent once  cholecalciferol 2000 Unit(s) Oral daily  dexAMETHasone  Injectable 6 milliGRAM(s) IV Push daily  dextrose 5% + sodium chloride 0.45%. 1000 milliLiter(s) IV Continuous <Continuous>  enoxaparin Injectable 40 milliGRAM(s) SubCutaneous every 12 hours  famotidine Injectable 20 milliGRAM(s) IV Push every 12 hours  fenofibrate Tablet 145 milliGRAM(s) Oral at bedtime  multivitamin 1 Tablet(s) Oral daily  pantoprazole  Injectable 40 milliGRAM(s) IV Push daily  sodium phosphate IVPB 30 milliMole(s) IV Intermittent once    MEDICATIONS  (PRN):  acetaminophen     Tablet .. 650 milliGRAM(s) Oral every 6 hours PRN Temp greater or equal to 38C (100.4F), Mild Pain (1 - 3)  ALBUTerol    90 MICROgram(s) HFA Inhaler 2 Puff(s) Inhalation every 6 hours PRN Shortness of Breath and/or Wheezing  aluminum hydroxide/magnesium hydroxide/simethicone Suspension 30 milliLiter(s) Oral every 4 hours PRN Dyspepsia  benzonatate 100 milliGRAM(s) Oral three times a day PRN Cough  melatonin 3 milliGRAM(s) Oral at bedtime PRN Insomnia  ondansetron Injectable 4 milliGRAM(s) IV Push every 8 hours PRN Nausea and/or Vomiting    ___________  Active diet:  Diet, NPO:   Except Medications  With Ice Chips/Sips of Water  ___________________    ==================>> VITAL SIGNS <<==================    Vital Signs Last 24 HrsT(C): 36.4 (01-05-22 @ 10:53)  T(F): 97.5 (01-05-22 @ 10:53), Max: 98.3 (01-04-22 @ 22:54)  HR: 87 (01-05-22 @ 14:26) (71 - 96)  BP: 126/76 (01-05-22 @ 14:26)  RR: 26 (01-05-22 @ 14:26) (20 - 26)  SpO2: 98% (01-05-22 @ 14:26) (95% - 98%)       ==================>> LAB AND IMAGING <<==================                        16.1   9.91  )-----------( 573      ( 05 Jan 2022 08:20 )             46.7        01-05    133<L>  |  97<L>  |  25<H>  ----------------------------<  118<H>  4.8   |  21<L>  |  0.72    Ca    8.6      05 Jan 2022 08:20  Phos  2.3     01-05  Mg     2.30     01-05    WBC count:   9.91 <<== ,  8.92 <<== ,  11.90 <<== ,  9.56 <<== ,  13.65 <<==   Hemoglobin:   16.1 <<==,  18.5 <<==,  18.8 <<==,  17.7 <<==,  17.9 <<==  platelets:  573 <==, 467 <==, 403 <==, 445 <==, 470 <==, 341 <==    Creatinine:  0.72  <<==, 0.71  <<==, 0.72  <<==, 0.68  <<==, 0.83  <<==, 0.81  <<==  Sodium:   133  <==, 133  <==, 132  <==, 132  <==, 136  <==, 136  <==    ___________________________________________________________________________________  ===============>>  A S S E S S M E N T   A N D   P L A N <<===============  ------------------------------------------------------------------------------------------    · Assessment	  61 year old man with history of kidney stone presenting with fever, cough, SOB x 2-3d.   States having positive sick contact in son with COVID-19  Fever to 103 at home, taking mucinex. Noticed worsening cough/SOB in past day, "couldn't take it anymore", so presenting to ED.       Problem/Plan - 1:  ·  Problem: Acute hypoxemic respiratory failure due to COVID-19 with Viral syndrome.  post Remdesivir per hospital protocol  Decadron per hospital protocol > finish   pt offered Toci and declined   trend inflammatory markers   O2 and wean as able  supportive care  nutrition, hydration   vitamins / supplements   deep breathing / OOB to chair as able  >> pt educated/ encouraged as is very deconditioned   DVT prophylaxis : lovenox 40 BID  Continue Current medications otherwise and monitor.   wean down Bipap as able to high flow     Problem/Plan - 2:  ·  Problem:  obesity and likely ERICK    discussed with pt re weight loss and sleep study > obtaining and using a CPAP machine pos discharge  on Bipap now     -GI/DVT Prophylaxis per protocol.    --------------------------------------------  Case discussed with pt, RN  Education given on findings and plan of care  ___________________________  H. ABNER Knowles.  Pager: [phone]       _________________________________________________________________________________________  ========>>  M E D I C A L   A T T E N D I N G    F O L L O W  U P  N O T E  <<=========  -----------------------------------------------------------------------------------------------------    - Patient seen and examined by me earlier today.   - In summary,  ALEXX BROWN is a 61y year old man admitted with SOB, fever..   - Patient has been on Bipap , with O2 and pressures adjusted last PM ..       sat pt up >> bed in chair position, + some dizziness; pt educated and reassured and guided as to Bipap use, hoping not to need to be intubated still ( agree ) ..           pt saturating near 100% on 80% FIO2 Biap >>>> gradually decreased to 65 %, still saturating well              discussed with PA plan of care trough out the day ...    ==================>> REVIEW OF SYSTEM <<=================    GEN: no fever, no chills, no pain  RESP: + some SOB  , no cough, no sputum  CVS: no chest pain, no palpitations, no edema  GI: no abdominal pain, no nausea, poor appetite   : no dysuria, no frequency  Neuro: no headache, no dizziness  Derm : no itching, no rash    ==================>> PHYSICAL EXAM <<=================    GEN: A&O X 2-3 , NAD , comfortable, calm in bed >> encouraged to sit up . pt appears less energetic today / somewhat frustrated but fatigued   HEENT: NCAT, PERRL, MMM, hearing intact  Neck: supple , no JVD appreciated  CVS: S1S2 , regular , No M/R/G appreciated  PULM: CTA B/L,  limited exam as not taking deep breaths   ABD.: soft. non tender, non distended,  bowel sounds present, abd obesity   Extrem: intact pulses , no edema   PSYCH : normal mood,  not anxious                              ( Note written / Date of service 01-05-22 )    ==================>> MEDICATIONS <<====================    acetaminophen   IVPB .. 1000 milliGRAM(s) IV Intermittent once  cholecalciferol 2000 Unit(s) Oral daily  dexAMETHasone  Injectable 6 milliGRAM(s) IV Push daily  dextrose 5% + sodium chloride 0.45%. 1000 milliLiter(s) IV Continuous <Continuous>  enoxaparin Injectable 40 milliGRAM(s) SubCutaneous every 12 hours  famotidine Injectable 20 milliGRAM(s) IV Push every 12 hours  fenofibrate Tablet 145 milliGRAM(s) Oral at bedtime  multivitamin 1 Tablet(s) Oral daily  pantoprazole  Injectable 40 milliGRAM(s) IV Push daily  sodium phosphate IVPB 30 milliMole(s) IV Intermittent once    MEDICATIONS  (PRN):  acetaminophen     Tablet .. 650 milliGRAM(s) Oral every 6 hours PRN Temp greater or equal to 38C (100.4F), Mild Pain (1 - 3)  ALBUTerol    90 MICROgram(s) HFA Inhaler 2 Puff(s) Inhalation every 6 hours PRN Shortness of Breath and/or Wheezing  aluminum hydroxide/magnesium hydroxide/simethicone Suspension 30 milliLiter(s) Oral every 4 hours PRN Dyspepsia  benzonatate 100 milliGRAM(s) Oral three times a day PRN Cough  melatonin 3 milliGRAM(s) Oral at bedtime PRN Insomnia  ondansetron Injectable 4 milliGRAM(s) IV Push every 8 hours PRN Nausea and/or Vomiting    ___________  Active diet:  Diet, NPO:   Except Medications  With Ice Chips/Sips of Water  ___________________    ==================>> VITAL SIGNS <<==================    Vital Signs Last 24 HrsT(C): 36.4 (01-05-22 @ 10:53)  T(F): 97.5 (01-05-22 @ 10:53), Max: 98.3 (01-04-22 @ 22:54)  HR: 87 (01-05-22 @ 14:26) (71 - 96)  BP: 126/76 (01-05-22 @ 14:26)  RR: 26 (01-05-22 @ 14:26) (20 - 26)  SpO2: 98% (01-05-22 @ 14:26) (95% - 98%)       ==================>> LAB AND IMAGING <<==================                        16.1   9.91  )-----------( 573      ( 05 Jan 2022 08:20 )             46.7        01-05    133<L>  |  97<L>  |  25<H>  ----------------------------<  118<H>  4.8   |  21<L>  |  0.72    Ca    8.6      05 Jan 2022 08:20  Phos  2.3     01-05  Mg     2.30     01-05    WBC count:   9.91 <<== ,  8.92 <<== ,  11.90 <<== ,  9.56 <<== ,  13.65 <<==   Hemoglobin:   16.1 <<==,  18.5 <<==,  18.8 <<==,  17.7 <<==,  17.9 <<==  platelets:  573 <==, 467 <==, 403 <==, 445 <==, 470 <==, 341 <==    Creatinine:  0.72  <<==, 0.71  <<==, 0.72  <<==, 0.68  <<==, 0.83  <<==, 0.81  <<==  Sodium:   133  <==, 133  <==, 132  <==, 132  <==, 136  <==, 136  <==    ^^^ Inflammatory markers :  ^^^  C R P :          131.9 (01-04-22)  <<--, 96.4 (01-02-22)  <<--, 31.8 (12-30-21)  <<--, 82.2 (12-28-21)  <<--  P C T :         0.13 (01-02-22) <<--, 0.11 (12-30-21) <<--, 0.16 (12-28-21) <<--    Ferritin :         1058 (01-04-22) <<--, 890 (01-02-22) <<--, 885 (12-28-21) <<--    D-Dimer :          429 (01-04-22) <<--, 322 (01-02-22) <<--, 235 (12-30-21) <<--, 331 (12-28-21) <<--    ___________________________________________________________________________________  ===============>>  A S S E S S M E N T   A N D   P L A N <<===============  ------------------------------------------------------------------------------------------    · Assessment	  61 year old man with history of kidney stone presenting with fever, cough, SOB x 2-3d.   States having positive sick contact in son with COVID-19  Fever to 103 at home, taking mucinex. Noticed worsening cough/SOB in past day, "couldn't take it anymore", so presenting to ED.       Problem/Plan - 1:  ·  Problem: Acute hypoxemic respiratory failure due to COVID-19 with Viral syndrome.  post Remdesivir per hospital protocol  Decadron per hospital protocol > finish   pt offered Toci and declined   trend inflammatory markers   O2 and wean as able  supportive care  nutrition, hydration   vitamins / supplements   deep breathing / OOB to chair as able  >> pt educated/ encouraged as is very deconditioned   DVT prophylaxis : lovenox 40 BID  Continue Current medications otherwise and monitor.   wean down Bipap as able to high flow     Problem/Plan - 2:  ·  Problem:  obesity and likely ERICK    discussed with pt re weight loss and sleep study > obtaining and using a CPAP machine pos discharge  on Bipap now     -GI/DVT Prophylaxis per protocol.    --------------------------------------------  Case discussed with pt, RN  Education given on findings and plan of care  ___________________________  HGarrett Knowles D.O.  Pager: [phone]

## 2022-01-05 NOTE — CHART NOTE - NSCHARTNOTEFT_GEN_A_CORE
Spoke with patient's wife Laura today. Updated on her 's clinical status. Notified her that her 's inflammatory markers uptrended between Jan 2nd to 4th and he still remains on BIPAP with high Fio2 requirements. The patient's wife was grateful for the update. I then broached the topic of intubation should her 's respiratory status worsen. I counseled her that should her 's work of breathing or oxygenation worsen on BIPAP, he would require intubation with MV to sustain his life. To this, the patient's wife became annoyed and stated that they only wants intubation "in a matter of life and death". Spoke with patient's wife Laura today. Updated on her 's clinical status. Notified her that her 's inflammatory markers uptrended between Jan 2nd to 4th and he still remains on BIPAP with high Fio2 requirements. The patient's wife was grateful for the update. I explained that her  still is requiring high FiO2 requirements on BIPAP, but she verbalized that she remains hopeful. I then broached the topic of intubation should her 's respiratory status worsen. I counseled her that should her 's work of breathing or oxygenation worsen on BIPAP, he would require intubation with MV to sustain his life. To this, the patient's wife became annoyed and stated that they only wants intubation "in a matter of life and death".

## 2022-01-06 NOTE — PROVIDER CONTACT NOTE (OTHER) - RECOMMENDATIONS
Put pt. on nonrebreather, continue to monitor.
Pt's goal of care . Monitor for tissue type changes, prevent further breakdown or deterioration.
As per provider increase to 6L

## 2022-01-06 NOTE — PROVIDER CONTACT NOTE (OTHER) - ASSESSMENT
Pt. is AOX4 weaned off from BIPAP on high flow NC w/ deep tissue injury on the bridge of the nose measuring approx. 2x2.
Pt. denies chest pain, does not feel more short of breath then before. On 5L of O2.
Pt. denies chest pain, SOB, trouble breathing. Resting comfortably no complaints.

## 2022-01-06 NOTE — PROGRESS NOTE ADULT - SUBJECTIVE AND OBJECTIVE BOX
Cardiovascular Disease Progress Note    Overnight events: No acute events overnight.  no cp/sob/palps/dizziness. tolerating bipap  Otherwise review of systems negative    Objective Findings:  T(C): 36.6 (01-06-22 @ 05:20), Max: 36.7 (01-05-22 @ 22:00)  HR: 100 (01-06-22 @ 05:20) (74 - 100)  BP: 129/84 (01-06-22 @ 05:20) (102/50 - 129/84)  RR: 18 (01-06-22 @ 05:20) (18 - 26)  SpO2: 95% (01-06-22 @ 05:20) (94% - 98%)  Wt(kg): --  Daily     Daily       Physical Exam:  Gen: NAD  HEENT: EOMI  CV: RRR, normal S1 + S2, no m/r/g  Lungs: CTAB  Abd: soft, non-tender  Ext: No edema    Telemetry:    Laboratory Data:                        16.1   7.33  )-----------( 523      ( 06 Jan 2022 06:31 )             48.2     01-06    130<L>  |  99  |  17  ----------------------------<  95  4.7   |  22  |  0.69    Ca    8.3<L>      06 Jan 2022 06:31  Phos  1.9     01-06  Mg     2.00     01-06                Inpatient Medications:  MEDICATIONS  (STANDING):  acetaminophen   IVPB .. 1000 milliGRAM(s) IV Intermittent once  cholecalciferol 2000 Unit(s) Oral daily  dextrose 5% + sodium chloride 0.45%. 1000 milliLiter(s) (125 mL/Hr) IV Continuous <Continuous>  enoxaparin Injectable 40 milliGRAM(s) SubCutaneous every 12 hours  famotidine Injectable 20 milliGRAM(s) IV Push every 12 hours  fenofibrate Tablet 145 milliGRAM(s) Oral at bedtime  multivitamin 1 Tablet(s) Oral daily  pantoprazole  Injectable 40 milliGRAM(s) IV Push daily      Assessment:  hypoxic resp failure  covid pneumonia  hx of nephrolithiasis  hypoantremia    Recs:  cv stable  no e/o chf   IV hydration. monitor vol status. encourage PO intake when able  tx of covid pna per med  icu consult appreciated  trend inflammatory markers  wean bilevel as able  dvt ppx      Over 25 minutes spent on total encounter; more than 50% of the visit was spent counseling and/or coordinating care by the attending physician.      Igor Medrano MD   Cardiovascular Disease  [phone]

## 2022-01-06 NOTE — PROGRESS NOTE ADULT - ASSESSMENT
_________________________________________________________________________________________  ========>>  M E D I C A L   A T T E N D I N G    F O L L O W  U P  N O T E  <<=========  -----------------------------------------------------------------------------------------------------    - Patient seen and examined by me earlier today.   - In summary,  ALEXX BROWN is a 61y year old man admitted with SOB, fever..   - Patient successfully taken off Bipap to high flow NC, saturating well , sitting in bed in chair position, ate well, dizziness improved      pt educated and reassured and guided as to continued attempts to recovery, IS, etc..            discussed with PA and RN plan of care     ==================>> REVIEW OF SYSTEM <<=================    GEN: no fever, no chills, no pain  RESP: + some SOB  , no cough, no sputum  CVS: no chest pain, no palpitations, no edema  GI: no abdominal pain, no nausea, good appetite   : no dysuria, no frequency  Neuro: no headache, no dizziness  Derm : no itching, no rash    ==================>> PHYSICAL EXAM <<=================    GEN: A&O X 3 , NAD , comfortable, calm in bed >> encouraged to sit up   HEENT: NCAT, PERRL, MMM, hearing intact  Neck: supple , no JVD appreciated  CVS: S1S2 , regular , No M/R/G appreciated  PULM: CTA B/L,  limited exam as not taking deep breaths   ABD.: soft. non tender, non distended,  bowel sounds present, abd obesity   Extrem: intact pulses , no edema   PSYCH : normal mood,  not anxious                             ( Note written / Date of service 01-06-22 )    ==================>> MEDICATIONS <<====================    acetaminophen   IVPB .. 1000 milliGRAM(s) IV Intermittent once  cholecalciferol 2000 Unit(s) Oral daily  enoxaparin Injectable 40 milliGRAM(s) SubCutaneous every 12 hours  famotidine Injectable 20 milliGRAM(s) IV Push every 12 hours  fenofibrate Tablet 145 milliGRAM(s) Oral at bedtime  multivitamin 1 Tablet(s) Oral daily  pantoprazole  Injectable 40 milliGRAM(s) IV Push daily  potassium phosphate / sodium phosphate Powder (PHOS-NaK) 1 Packet(s) Oral three times a day    MEDICATIONS  (PRN):  acetaminophen     Tablet .. 650 milliGRAM(s) Oral every 6 hours PRN Temp greater or equal to 38C (100.4F), Mild Pain (1 - 3)  ALBUTerol    90 MICROgram(s) HFA Inhaler 2 Puff(s) Inhalation every 6 hours PRN Shortness of Breath and/or Wheezing  aluminum hydroxide/magnesium hydroxide/simethicone Suspension 30 milliLiter(s) Oral every 4 hours PRN Dyspepsia  artificial  tears Solution 1 Drop(s) Both EYES four times a day PRN Dry Eyes  benzonatate 100 milliGRAM(s) Oral three times a day PRN Cough  melatonin 3 milliGRAM(s) Oral at bedtime PRN Insomnia  ondansetron Injectable 4 milliGRAM(s) IV Push every 8 hours PRN Nausea and/or Vomiting    ___________  Active diet:  Diet, Regular  ___________________    ==================>> VITAL SIGNS <<==================    Vital Signs Last 24 HrsT(C): 37.2 (01-06-22 @ 22:25)  T(F): 99 (01-06-22 @ 22:25), Max: 99.3 (01-06-22 @ 13:38)  HR: 99 (01-06-22 @ 22:25) (86 - 100)  BP: 123/85 (01-06-22 @ 22:25)  RR: 21 (01-06-22 @ 22:25) (18 - 22)  SpO2: 95% (01-06-22 @ 22:25) (94% - 97%)       ==================>> LAB AND IMAGING <<==================                        16.1   7.33  )-----------( 523      ( 06 Jan 2022 06:31 )             48.2        01-06    130<L>  |  99  |  17  ----------------------------<  95  4.7   |  22  |  0.69    Ca    8.3<L>      06 Jan 2022 06:31  Phos  1.9     01-06  Mg     2.00     01-06    WBC count:   7.33 <<== ,  9.91 <<== ,  8.92 <<== ,  11.90 <<== ,  9.56 <<==   Hemoglobin:   16.1 <<==,  16.1 <<==,  18.5 <<==,  18.8 <<==,  17.7 <<==  platelets:  523 <==, 573 <==, 467 <==, 403 <==, 445 <==, 470 <==    Creatinine:  0.69  <<==, 0.72  <<==, 0.71  <<==, 0.72  <<==, 0.68  <<==, 0.83  <<==  Sodium:   130  <==, 133  <==, 133  <==, 132  <==, 132  <==, 136  <==    ^^^ Inflammatory markers :  ^^^  C R P :          131.9 (01-04-22)  <<--, 96.4 (01-02-22)  <<--, 31.8 (12-30-21)  <<--, 82.2 (12-28-21)  <<--  P C T :         0.13 (01-02-22) <<--, 0.11 (12-30-21) <<--, 0.16 (12-28-21) <<--    Ferritin :         1058 (01-04-22) <<--, 890 (01-02-22) <<--, 885 (12-28-21) <<--    D-Dimer :          429 (01-04-22) <<--, 322 (01-02-22) <<--, 235 (12-30-21) <<--, 331 (12-28-21) <<--    ___________________________________________________________________________________  ===============>>  A S S E S S M E N T   A N D   P L A N <<===============  ------------------------------------------------------------------------------------------    · Assessment	  61 year old man with history of kidney stone presenting with fever, cough, SOB x 2-3d.   States having positive sick contact in son with COVID-19  Fever to 103 at home, taking mucinex. Noticed worsening cough/SOB in past day, "couldn't take it anymore", so presenting to ED.       Problem/Plan - 1:  ·  Problem: Acute hypoxemic respiratory failure due to COVID-19 with Viral syndrome.  post Remdesivir per hospital protocol  Decadron per hospital protocol > finish   pt offered Toci and declined   trend inflammatory markers   O2 and wean as able  supportive care  nutrition, hydration   vitamins / supplements   deep breathing / OOB to chair as able  >> pt educated/ encouraged as is very deconditioned   DVT prophylaxis : lovenox 40 BID  Continue Current medications otherwise and monitor.   wean down high flow as able    offered / advised pt to go back on Bipap overnight, adamantly declined     Problem/Plan - 2:  ·  Problem:  obesity and likely ERICK    discussed with pt re weight loss and sleep study > obtaining and using a CPAP machine pos discharge  on Bipap now     -GI/DVT Prophylaxis per protocol.    --------------------------------------------  Case discussed with pt, RN, PA..   Education given on findings and plan of care  ___________________________  H. ABNER Knowles.  Pager: [phone]

## 2022-01-06 NOTE — PROVIDER CONTACT NOTE (OTHER) - BACKGROUND
Pt. here for SOB due to Covid19.
Pt. was admitted w/ fever, SOB requiring O2. WAs placed on BIPAP for few days. Unable to remove mask for prolonged period of time due to desaturation. Pt. was placed on NPO for risk of aspiration.
Pt. here for SOB, covid positive.

## 2022-01-06 NOTE — PROVIDER CONTACT NOTE (OTHER) - ACTION/TREATMENT ORDERED:
MD Keating notified of status of pressure injury. Skin barrier film applied. "Huddle" provided among staff.

## 2022-01-07 NOTE — PROGRESS NOTE ADULT - ASSESSMENT
_________________________________________________________________________________________  ========>>  M E D I C A L   A T T E N D I N G    F O L L O W  U P  N O T E  <<=========  -----------------------------------------------------------------------------------------------------    - Patient seen and examined by me earlier today.   - In summary,  ALEXX BROWN is a 61y year old man admitted with SOB, fever..   - Patient found today again almost flat on back in bed, with high flow AND nonrebreather mask together ! saturating ok, ate well, dizziness improved      pt educated and reassured and guided as to continued attempts to recovery, sitting, deep breathing, IS, etc..     ==================>> REVIEW OF SYSTEM <<=================    GEN: no fever, no chills, no pain  RESP: + some SOB  , no cough, no sputum  CVS: no chest pain, no palpitations, no edema  GI: no abdominal pain, no nausea, good appetite   : no dysuria, no frequency  Neuro: no headache, no dizziness  Derm : no itching, no rash    ==================>> PHYSICAL EXAM <<=================    GEN: A&O X 3 , NAD , comfortable, calm in bed >> encouraged to sit up   HEENT: NCAT, PERRL, MMM, hearing intact  Neck: supple , no JVD appreciated  CVS: S1S2 , regular , No M/R/G appreciated  PULM: CTA B/L,  limited exam as not taking deep breaths   ABD.: soft. non tender, non distended,  bowel sounds present, abd obesity   Extrem: intact pulses , no edema   PSYCH : normal mood,  not anxious                              ( Note written / Date of service 01-07-22 )    ==================>> MEDICATIONS <<====================    acetaminophen   IVPB .. 1000 milliGRAM(s) IV Intermittent once  cholecalciferol 2000 Unit(s) Oral daily  enoxaparin Injectable 40 milliGRAM(s) SubCutaneous every 12 hours  famotidine Injectable 20 milliGRAM(s) IV Push every 12 hours  fenofibrate Tablet 145 milliGRAM(s) Oral at bedtime  multivitamin 1 Tablet(s) Oral daily  pantoprazole  Injectable 40 milliGRAM(s) IV Push daily  potassium phosphate / sodium phosphate Powder (PHOS-NaK) 1 Packet(s) Oral two times a day    MEDICATIONS  (PRN):  acetaminophen     Tablet .. 650 milliGRAM(s) Oral every 6 hours PRN Temp greater or equal to 38C (100.4F), Mild Pain (1 - 3)  ALBUTerol    90 MICROgram(s) HFA Inhaler 2 Puff(s) Inhalation every 6 hours PRN Shortness of Breath and/or Wheezing  aluminum hydroxide/magnesium hydroxide/simethicone Suspension 30 milliLiter(s) Oral every 4 hours PRN Dyspepsia  artificial  tears Solution 1 Drop(s) Both EYES four times a day PRN Dry Eyes  benzonatate 100 milliGRAM(s) Oral three times a day PRN Cough  melatonin 3 milliGRAM(s) Oral at bedtime PRN Insomnia  ondansetron Injectable 4 milliGRAM(s) IV Push every 8 hours PRN Nausea and/or Vomiting    ___________  Active diet:  Diet, Regular  ___________________    ==================>> VITAL SIGNS <<==================    Vital Signs Last 24 HrsT(C): 36.8 (01-07-22 @ 14:00)  T(F): 98.2 (01-07-22 @ 14:00), Max: 99.2 (01-07-22 @ 05:25)  HR: 121 (01-07-22 @ 18:50) (92 - 124)  BP: 132/76 (01-07-22 @ 14:00)  RR: 32 (01-07-22 @ 18:50) (20 - 32)  SpO2: 91% (01-07-22 @ 18:50) (81% - 96%)       ==================>> LAB AND IMAGING <<==================                        16.7   10.60 )-----------( 567      ( 07 Jan 2022 07:04 )             51.0        01-07    131<L>  |  96<L>  |  14  ----------------------------<  108<H>  4.8   |  27  |  0.82    Ca    8.8      07 Jan 2022 07:04  Phos  1.6     01-07  Mg     2.10     01-07      WBC count:   10.60 <<== ,  7.33 <<== ,  9.91 <<== ,  8.92 <<== ,  11.90 <<==   Hemoglobin:   16.7 <<==,  16.1 <<==,  16.1 <<==,  18.5 <<==,  18.8 <<==  platelets:  567 <==, 523 <==, 573 <==, 467 <==, 403 <==, 445 <==    Creatinine:  0.82  <<==, 0.69  <<==, 0.72  <<==, 0.71  <<==, 0.72  <<==, 0.68  <<==  Sodium:   131  <==, 130  <==, 133  <==, 133  <==, 132  <==, 132  <==    ^^^ Inflammatory markers :  ^^^  C R P :          116.3 (01-07-22)  <<--, 131.9 (01-04-22)  <<--, 96.4 (01-02-22)  <<--, 31.8 (12-30-21)  <<--, 82.2 (12-28-21)  <<--  P C T :         0.15 (01-07-22) <<--, 0.13 (01-02-22) <<--, 0.11 (12-30-21) <<--, 0.16 (12-28-21) <<--    Ferritin :         1107 (01-07-22) <<--, 1058 (01-04-22) <<--, 890 (01-02-22) <<--, 885 (12-28-21) <<--    D-Dimer :          788 (01-07-22) <<--, 429 (01-04-22) <<--, 322 (01-02-22) <<--, 235 (12-30-21) <<--, 331 (12-28-21) <<--    ___________________________________________________________________________________  ===============>>  A S S E S S M E N T   A N D   P L A N <<===============  ------------------------------------------------------------------------------------------    · Assessment	  61 year old man with history of kidney stone presenting with fever, cough, SOB x 2-3d.   States having positive sick contact in son with COVID-19  Fever to 103 at home, taking mucinex. Noticed worsening cough/SOB in past day, "couldn't take it anymore", so presenting to ED.       Problem/Plan - 1:  ·  Problem: Acute hypoxemic respiratory failure due to COVID-19 with Viral syndrome.  post Remdesivir per hospital protocol  Decadron per hospital protocol > finish   pt offered Toci and declined   trend inflammatory markers   O2 and wean as able  supportive care  nutrition, hydration   vitamins / supplements   deep breathing / OOB to chair as able  >> pt educated/ encouraged as is very deconditioned   DVT prophylaxis : lovenox 40 BID  Continue Current medications otherwise and monitor.   wean down high flow as able    offered / advised pt to go back on Bipap overnight, adamantly declined     Problem/Plan - 2:  ·  Problem:  obesity and likely ERICK    discussed with pt re weight loss and sleep study > obtaining and using a CPAP machine pos discharge  on Bipap now     -GI/DVT Prophylaxis per protocol.    --------------------------------------------  Case discussed with pt,   Education given on findings and plan of care  ___________________________  H. ABNER Knowles.  Pager: [phone]

## 2022-01-08 NOTE — PROGRESS NOTE ADULT - ASSESSMENT
Assessment and Plan    62 yo M PMHx of nephrolithiasis intubated for acute hypoxic respiratory failure 2/2 COVID19.    Neuro   #mental status  - sedated on propofol, fentanyl    Pulm  #respiratory status  - intubated for acute hypoxic respiratory failure, worsening work of breathing  - obtain ABGs, follow up vent setting  - nebs  - CXR 1/8: elevated right hemidiaphragm ill defined increased right lung markings and left basilar/retrocardiac markings compatible with hx of COVID, Hazy indistinct left CP region could be due to overlying soft tissues versus a small left pleural effusion. Sharp right CP angle. No pneumothorax.      Cardiovascular  #hemodynamics  - On max gtt    #tachycardia  - in the setting of desaturation, rising d-dimer, would get lower extremity doppler to rule out DVT    GI  #diet  - current NPO, transition to tube feeds    Renal  #hyponatremia  - obtain serum/urine osms, urine na to discern etiology    ID  #COVID19  - s/p decadron and remdesivir   - decline toci   - trend inflammatory markers    #new fevers, leukocytosis  - f/u panculture    Heme-Onc  #DVT ppx  - on lovenox

## 2022-01-08 NOTE — PROGRESS NOTE ADULT - ASSESSMENT
_________________________________________________________________________________________  ========>>  M E D I C A L   A T T E N D I N G    F O L L O W  U P  N O T E  <<=========  -----------------------------------------------------------------------------------------------------    - Patient seen and examined by me earlier today.   - In summary,  ALEXX BROWN is a 61y year old man admitted with SOB, fever..   - Patient seen during RRT done for desaturation, found on bipap, about to be intubated by anesthesia ...   in discussion with NP all day ( who updated family  a few times already)   pt had a high fever today as well    ==================>> REVIEW OF SYSTEM <<=================    unable to obtain     ==================>> PHYSICAL EXAM <<=================    GEN: responsive on Bipap, uncomfortable  HEENT: NCAT, MMM, hearing intact  Neck: supple , no JVD appreciated  CVS: S1S2 , regular , tachy  PULM: CTA B/L,  limited   ABD.: soft. non tender, non distended,  + abd breathing abd obesity   Extrem: intact pulses , no signif edema                              ( note written / Date of service   01-08-22 )    ==================>> MEDICATIONS <<====================    acetaminophen   IVPB .. 1000 milliGRAM(s) IV Intermittent once  ALBUTerol    90 MICROgram(s) HFA Inhaler 2 Puff(s) Inhalation every 6 hours  cholecalciferol 2000 Unit(s) Oral daily  enoxaparin Injectable 40 milliGRAM(s) SubCutaneous every 12 hours  famotidine Injectable 20 milliGRAM(s) IV Push every 12 hours  fenofibrate Tablet 145 milliGRAM(s) Oral at bedtime  fentaNYL    Injectable 100 MICROGram(s) IV Push once  fentaNYL    Injectable 100 MICROGram(s) IV Push once  midazolam Injectable 6 milliGRAM(s) IV Push once  multivitamin 1 Tablet(s) Oral daily  pantoprazole  Injectable 40 milliGRAM(s) IV Push daily  potassium phosphate IVPB 15 milliMole(s) IV Intermittent once    MEDICATIONS  (PRN):  acetaminophen     Tablet .. 650 milliGRAM(s) Oral every 6 hours PRN Temp greater or equal to 38C (100.4F), Mild Pain (1 - 3)  aluminum hydroxide/magnesium hydroxide/simethicone Suspension 30 milliLiter(s) Oral every 4 hours PRN Dyspepsia  artificial  tears Solution 1 Drop(s) Both EYES four times a day PRN Dry Eyes  benzonatate 100 milliGRAM(s) Oral three times a day PRN Cough  melatonin 3 milliGRAM(s) Oral at bedtime PRN Insomnia  ondansetron Injectable 4 milliGRAM(s) IV Push every 8 hours PRN Nausea and/or Vomiting    ___________  Active diet:  Diet, Regular  ___________________    ==================>> VITAL SIGNS <<==================     Vital Signs Last 24 HrsT(C): 36.6 (01-08-22 @ 06:00)  T(F): 97.8 (01-08-22 @ 06:00), Max: 101.3 (01-07-22 @ 20:30)  HR: 135 (01-08-22 @ 13:45) (65 - 135)  BP: 85/56 (01-08-22 @ 13:43)  RR: 27 (01-08-22 @ 13:45) (20 - 35)  SpO2: 79% (01-08-22 @ 13:45) (64% - 98%)      POCT Blood Glucose.: 91 mg/dL (08 Jan 2022 12:14)     ==================>> LAB AND IMAGING <<==================                        16.1   15.69 )-----------( 446      ( 08 Jan 2022 04:46 )             47.5        WBC count:   15.69 <<== ,  10.60 <<== ,  7.33 <<== ,  9.91 <<== ,  8.92 <<==     01-08    129<L>  |  95<L>  |  18  ----------------------------<  101<H>  4.7   |  22  |  0.76    Ca    8.6      08 Jan 2022 04:46  Phos  2.2     01-08  Mg     2.10     01-08    ^^^ Inflammatory markers :  ^^^  C R P :          116.3 (01-07-22)  <<--, 131.9 (01-04-22)  <<--, 96.4 (01-02-22)  <<--, 31.8 (12-30-21)  <<--, 82.2 (12-28-21)  <<--  P C T :         0.15 (01-07-22) <<--, 0.13 (01-02-22) <<--, 0.11 (12-30-21) <<--, 0.16 (12-28-21) <<--    Ferritin :         1107 (01-07-22) <<--, 1058 (01-04-22) <<--, 890 (01-02-22) <<--, 885 (12-28-21) <<--    D-Dimer :          2670 (01-08-22) <<--, 788 (01-07-22) <<--, 429 (01-04-22) <<--, 322 (01-02-22) <<--, 235 (12-30-21) <<--    ___________________________________________________________________________________  ===============>>  A S S E S S M E N T   A N D   P L A N <<===============  ------------------------------------------------------------------------------------------    · Assessment	  61 year old man with history of kidney stone presenting with fever, cough, SOB x 2-3d.   admitted for COVID     Problem/Plan - 1:  ·  Problem: Acute hypoxemic respiratory failure due to COVID-19 with Viral syndrome.  post Remdesivir and Decadron per hospital protocol  pt offered Toci a few days ago and declined   trend inflammatory markers   RRT team / ICU care and mgmt apprecaied >> wean down Vent / O2  as able  supportive care  nutrition, hydration  as able   vitamins / supplements   DVT prophylaxis : lovenox 40 BID  Continue Current medications otherwise and monitor.     ** Fever , leukocytosis  new fever  needs pan cultures   suspect aspiration pneumonia  would start on Zosyn  less likely PE as been on Lovenox 40 BID    would check LE dopplers ( ordered)   antipyretics as needed  IVH  monitor     Problem/Plan - 2:  ·  Problem:  obesity and likely ERICK    discussed with pt re weight loss and sleep study > obtaining and using a CPAP machine pos discharge  on Bipap now     -GI/DVT Prophylaxis per protocol.    --------------------------------------------  Case discussed with yuniel ARRIAGA, RN..  ( family updated already by NP)     ___________________________  H. ABNER Knowles.  Pager: [phone]       _________________________________________________________________________________________  ========>>  M E D I C A L   A T T E N D I N G    F O L L O W  U P  N O T E  <<=========  -----------------------------------------------------------------------------------------------------    - Patient seen and examined by me earlier today.   - In summary,  ALEXX BROWN is a 61y year old man admitted with SOB, fever..   - Patient seen during RRT done for desaturation, found on bipap, about to be intubated by anesthesia ...   in discussion with NP all day ( who updated family  a few times already)   pt had a high fever today as well    ==================>> REVIEW OF SYSTEM <<=================    unable to obtain     ==================>> PHYSICAL EXAM <<=================    GEN: responsive on Bipap, uncomfortable  HEENT: NCAT, MMM, hearing intact  Neck: supple , no JVD appreciated  CVS: S1S2 , regular , tachy  PULM: CTA B/L,  limited   ABD.: soft. non tender, non distended,  + abd breathing abd obesity   Extrem: intact pulses , no signif edema                              ( note written / Date of service   01-08-22 )    ==================>> MEDICATIONS <<====================    acetaminophen   IVPB .. 1000 milliGRAM(s) IV Intermittent once  ALBUTerol    90 MICROgram(s) HFA Inhaler 2 Puff(s) Inhalation every 6 hours  cholecalciferol 2000 Unit(s) Oral daily  enoxaparin Injectable 40 milliGRAM(s) SubCutaneous every 12 hours  famotidine Injectable 20 milliGRAM(s) IV Push every 12 hours  fenofibrate Tablet 145 milliGRAM(s) Oral at bedtime  fentaNYL    Injectable 100 MICROGram(s) IV Push once  fentaNYL    Injectable 100 MICROGram(s) IV Push once  midazolam Injectable 6 milliGRAM(s) IV Push once  multivitamin 1 Tablet(s) Oral daily  pantoprazole  Injectable 40 milliGRAM(s) IV Push daily  potassium phosphate IVPB 15 milliMole(s) IV Intermittent once    MEDICATIONS  (PRN):  acetaminophen     Tablet .. 650 milliGRAM(s) Oral every 6 hours PRN Temp greater or equal to 38C (100.4F), Mild Pain (1 - 3)  aluminum hydroxide/magnesium hydroxide/simethicone Suspension 30 milliLiter(s) Oral every 4 hours PRN Dyspepsia  artificial  tears Solution 1 Drop(s) Both EYES four times a day PRN Dry Eyes  benzonatate 100 milliGRAM(s) Oral three times a day PRN Cough  melatonin 3 milliGRAM(s) Oral at bedtime PRN Insomnia  ondansetron Injectable 4 milliGRAM(s) IV Push every 8 hours PRN Nausea and/or Vomiting    ___________  Active diet:  Diet, Regular  ___________________    ==================>> VITAL SIGNS <<==================     Vital Signs Last 24 HrsT(C): 36.6 (01-08-22 @ 06:00)  T(F): 97.8 (01-08-22 @ 06:00), Max: 101.3 (01-07-22 @ 20:30)  HR: 135 (01-08-22 @ 13:45) (65 - 135)  BP: 85/56 (01-08-22 @ 13:43)  RR: 27 (01-08-22 @ 13:45) (20 - 35)  SpO2: 79% (01-08-22 @ 13:45) (64% - 98%)      POCT Blood Glucose.: 91 mg/dL (08 Jan 2022 12:14)     ==================>> LAB AND IMAGING <<==================                        16.1   15.69 )-----------( 446      ( 08 Jan 2022 04:46 )             47.5        WBC count:   15.69 <<== ,  10.60 <<== ,  7.33 <<== ,  9.91 <<== ,  8.92 <<==     01-08    129<L>  |  95<L>  |  18  ----------------------------<  101<H>  4.7   |  22  |  0.76    Ca    8.6      08 Jan 2022 04:46  Phos  2.2     01-08  Mg     2.10     01-08    ^^^ Inflammatory markers :  ^^^  C R P :          116.3 (01-07-22)  <<--, 131.9 (01-04-22)  <<--, 96.4 (01-02-22)  <<--, 31.8 (12-30-21)  <<--, 82.2 (12-28-21)  <<--  P C T :         0.15 (01-07-22) <<--, 0.13 (01-02-22) <<--, 0.11 (12-30-21) <<--, 0.16 (12-28-21) <<--    Ferritin :         1107 (01-07-22) <<--, 1058 (01-04-22) <<--, 890 (01-02-22) <<--, 885 (12-28-21) <<--    D-Dimer :          2670 (01-08-22) <<--, 788 (01-07-22) <<--, 429 (01-04-22) <<--, 322 (01-02-22) <<--, 235 (12-30-21) <<--    ___________________________________________________________________________________  ===============>>  A S S E S S M E N T   A N D   P L A N <<===============  ------------------------------------------------------------------------------------------    · Assessment	  61 year old man with history of kidney stone presenting with fever, cough, SOB x 2-3d.   admitted for COVID     Problem/Plan - 1:  ·  Problem: Acute hypoxemic respiratory failure due to COVID-19 with Viral syndrome.  post Remdesivir and Decadron per hospital protocol  pt offered Toci a few days ago and declined   trend inflammatory markers   RRT team / ICU care and mgmt apprecaied >> wean down Vent / O2  as able  supportive care  nutrition, hydration  as able   vitamins / supplements   DVT prophylaxis : lovenox 40 BID  Continue Current medications otherwise and monitor.     ** Fever , leukocytosis  new fever  needs pan cultures   suspect aspiration pneumonia  would start on Zosyn  less likely PE as been on Lovenox 40 BID    would check LE dopplers ( ordered)   antipyretics as needed  IVH  monitor     Problem/Plan - 2:  ·  Problem:  obesity and likely ERICK    discussed with pt re weight loss and sleep study > obtaining and using a CPAP machine pos discharge  on Bipap now     -GI/DVT Prophylaxis per protocol.    --------------------------------------------  Case discussed with NP, team, RN..  ( family updated already by NP)     Critical care services provided.   ALEXX BROWN with acute worsening, in worsening critical condition, post rapid response, being transferred to the [facility].   I have personally and independently provided 35 minutes of critical care services for this patient, requiring high complexity decision making for the  medical conditions involving multiple system as noted.      ___________________________  H. ABNER Knowles.  Pager: [phone]

## 2022-01-08 NOTE — PROGRESS NOTE ADULT - SUBJECTIVE AND OBJECTIVE BOX
MICU DOWN GRADE NOTE      Patient is a 61y old  Male who presents with a chief complaint of fever, SOB (08 Jan 2022 13:02)    HPI:    62 yo M PMHx of nephrolithiasis presented to the ED December 28th with worsening fever, cough, SOB for 3 days. Had contact with son who is COVID+. Patient is not vaccinated. Tmax 103 at home. Home O2 sat was 84%.     INTERVAL HPI/OVERNIGHT EVENTS:    Patient has had increasing O2 requirements, today had RRT called for hypoxia on HFNC. Placed on BiPAP with improvement however patient desatted again to the 80s on BiPAP. Intubated in the MICU for increased work of breathing. Patient satted in 70s-80s on vent despite high PEEP and was difficult to sedate, required prop, multiple pushes of fent, 1 push of versed with improvement of sats to mid-high 80s before transfer to the MICU. On max for tachycardia.     Patient intubated, no ROS    MEDICATIONS:  acetaminophen     Tablet .. 650 milliGRAM(s) Oral every 6 hours PRN  ALBUTerol    90 MICROgram(s) HFA Inhaler 2 Puff(s) Inhalation every 6 hours  chlorhexidine 0.12% Liquid 15 milliLiter(s) Oral Mucosa every 12 hours  chlorhexidine 4% Liquid 1 Application(s) Topical <User Schedule>  cholecalciferol 2000 Unit(s) Oral daily  enoxaparin Injectable 40 milliGRAM(s) SubCutaneous every 12 hours  fenofibrate Tablet 145 milliGRAM(s) Oral at bedtime  fentaNYL   Infusion. 0.5 MICROgram(s)/kG/Hr IV Continuous <Continuous>  melatonin 3 milliGRAM(s) Oral at bedtime PRN  multivitamin 1 Tablet(s) Oral daily  petrolatum Ophthalmic Ointment 1 Application(s) Both EYES daily  phenylephrine    Infusion 0.1 MICROgram(s)/kG/Min IV Continuous <Continuous>  propofol Infusion 10 MICROgram(s)/kG/Min IV Continuous <Continuous>      T(C): 36.6 (01-08-22 @ 06:00), Max: 38.5 (01-07-22 @ 20:30)  HR: 102 (01-08-22 @ 15:15) (65 - 135)  BP: 100/72 (01-08-22 @ 15:15) (84/64 - 156/70)  RR: 30 (01-08-22 @ 15:15) (20 - 35)  SpO2: 90% (01-08-22 @ 15:15) (64% - 98%)  Wt(kg): --Vital Signs Last 24 Hrs  T(C): 36.6 (08 Jan 2022 06:00), Max: 38.5 (07 Jan 2022 20:30)  T(F): 97.8 (08 Jan 2022 06:00), Max: 101.3 (07 Jan 2022 20:30)  HR: 102 (08 Jan 2022 15:15) (65 - 135)  BP: 100/72 (08 Jan 2022 15:15) (84/64 - 156/70)  BP(mean): 80 (08 Jan 2022 15:15) (64 - 83)  RR: 30 (08 Jan 2022 15:15) (20 - 35)  SpO2: 90% (08 Jan 2022 15:15) (64% - 98%)    PHYSICAL EXAM:  GENERAL: patient intubated, well groomed  HEAD: normocephalic, atraumatic  HEENT: normal conjunctiva, oral mucosa moist, neck supple  CARDIAC: regular rate and rhythm, normal S1 and S2,  no appreciable murmurs  PULM: clear to ascultation bilaterally, no crackles, rales, rhonchi, or wheezing  GI: abdomen nondistended, soft  NEURO: intubated  SKIN: no visible rashes, dry, well-perfused        Consultant(s) Notes Reviewed:  [x ] YES  [ ] NO  Care Discussed with Consultants/Other Providers [ x] YES  [ ] NO    LABS:                        16.1   15.69 )-----------( 446      ( 08 Jan 2022 04:46 )             47.5     01-08    129<L>  |  95<L>  |  18  ----------------------------<  101<H>  4.7   |  22  |  0.76    Ca    8.6      08 Jan 2022 04:46  Phos  2.2     01-08  Mg     2.10     01-08          CAPILLARY BLOOD GLUCOSE      POCT Blood Glucose.: 91 mg/dL (08 Jan 2022 12:14)      ABG - ( 08 Jan 2022 09:49 )  pH, Arterial: 7.46  pH, Blood: x     /  pCO2: 30    /  pO2: 67    / HCO3: 21    / Base Excess: -1.2  /  SaO2: 95.2                  RADIOLOGY & ADDITIONAL TESTS:    Imaging Personally Reviewed:  [x ] YES  [ ] NO

## 2022-01-08 NOTE — AIRWAY PLACEMENT NOTE ADULT - POST AIRWAY PLACEMENT ASSESSMENT:
direct visualization with glide/breath sounds bilateral/breath sounds equal/positive end tidal CO2 noted/chest excursion noted

## 2022-01-08 NOTE — RAPID RESPONSE TEAM SUMMARY - NSSITUATIONBACKGROUNDRRT_GEN_ALL_CORE
61M w/ hx of kidney stones, initially presenting with fever, cough, and SOB x2-3 days, found to be COVID-19 positive (not vaccinated). RRT earlier for hypoxia on HFNC, now on bipap. RRT called for hypoxia to 80s. Informed pt already accepted to MICU, pending transfer, for intubation in MICU. Pt confirmed satting in 80s, increased work of breathing. Confirmed he wished to be intubated, and intubated by anesthesia. Afterwards, with persistently low O2 sat in 70s-80s, despite high PEEP on vent --> switched to bagging with improvement. Difficult to sedate, requiring prop gtt, multiple pushes of fent and 1 push of versed, with improvement in sat to mid-high 80s prior to transfer. Charly gtt initiated due to persistent tachycardia.

## 2022-01-08 NOTE — CHART NOTE - NSCHARTNOTEFT_GEN_A_CORE
Spoke to Patients son Cade explained that despite non-invasive efforts patients oxygenation has not improved and patient may require intubation.  Son in agreement with plan, patient had stated earlier during his hospitalization when he was not hypoxic that he wanted to be intubated.  Son states "please do everything you can".    Patient pending evaluation by critical care intensivist Spoke to Patients son Cade explained that despite non-invasive efforts patients oxygenation has not improved and patient may require intubation.  Son in agreement with plan, patient had stated earlier during his hospitalization when he was not hypoxic that he wanted to be intubated.  Son states "please do everything you can".    Patient pending evaluation by critical care intensivist    Addendum-patient accepted to MICU, awaiting bed transfer, spoke to son, face time was performed with son & wife who spoke to patient    Addendum called for hypoxia, see RRT flow sheet, patient intubated @ bedside and transferred to ICU

## 2022-01-08 NOTE — RAPID RESPONSE TEAM SUMMARY - NSOTHERINTERVENTIONSRRT_GEN_ALL_CORE
Recommendations:  - obtain ABG  - continue with BIPAP 12/7 FiO2 100% for now; can try AVAPS if respiratory status worsens, otherwise would likely need to intubate if decompensates further  - continue to monitor on continuous pulse ox Recommendations:  - obtain and f/u ABG   - continue with BIPAP 12/7 FiO2 100% for now; can try AVAPS if respiratory status worsens, otherwise would likely need to intubate if decompensates further  - consider periodic mouth swabs to help with mouth dryness from BIPAP as tolerated  - continue to monitor on continuous pulse ox Recommendations:  - obtain and f/u ABG   - continue with BIPAP 12/7 FiO2 100% for now; can try AVAPS if respiratory status worsens, otherwise would likely need to intubate if decompensates further (if still aligns with GOC)  - consider periodic mouth swabs to help with mouth dryness from BIPAP as tolerated  - continue to monitor on continuous pulse ox

## 2022-01-08 NOTE — CHART NOTE - NSCHARTNOTEFT_GEN_A_CORE
RRT was called for hypoxia to low 80s. Pt removed his BIPAP and was switched to HFNC and NRB 15L for approximately 20 minutes. HR noted to be 116 and RR of 40. Axillary temp 98. Patient earlier had a rectal temp of 101.3 and received 1g IV Tylenol. Unable to obtain rectal temp currently due to O2 status. Pt earlier also received Xanax 0.25mg PO x2 due to anxiety from having BIPAP on. Pt is currently A&O x3 and agrees to place BIPAP on again. O2 sat noted to improve to 90%. 1g Tylenol IV given for fever, Morphine 1mg IV x1 given for increased RR.     Plan:   Will obtain ABG, CBC, CMP, coags, D-Dimer   Continue with BIPAP 12/7 FIO2 100% for now, if continues to desat will try AVAPs   Low threshold for intubation

## 2022-01-08 NOTE — CHART NOTE - NSCHARTNOTEFT_GEN_A_CORE
ABG reviewed, BIPAP switched to AVAPS, will repeat ABG in 30 minutes    patient currently alert, oriented x3, son on speaker phone, discussed intubation with patient and son on phone, patient closed eyes when asked.      Son is hopeful patient status will improve and deferred comment regarding intubation at present    will continue to monitor    ICU consult to be called

## 2022-01-08 NOTE — RAPID RESPONSE TEAM SUMMARY - NSSITUATIONBACKGROUNDRRT_GEN_ALL_CORE
INCOMPLETE INC61M w/ hx of kidney stones, initially presenting with fever, cough, and SOB x2-3 days, found to be COVID-19 positive (not vaccinated). RRT called today for hypoxia to low 80s while on HFNC+NRB    On arrival, pt awake, alert, and responding to questioning. Primary team as well as RN and RT in the room, noted that pt was initially sating in low 90s with RR in low 30s while on BIPAP 12/7 FiO2 100%, but then took it off and refused to put it back on. Pt endorsed that BIPAP was uncomfortable and made his mouth dry. VS: T 98F axillary, HR 110s, SBP 120s, RR 40s, sating low-mid 80s on HFNC 60L 100% (also with NRB mask on). Rectal temp noted to be 101.3F earlier in the night and Tylenol was ordered at the time but not yet given. Tylenol 1g IV x1 was started. Discussed with pt and was agreeable to be placed back on BIPAP 12/7 FiO2 100%. Subsequent O2 sat ~90%. Pt initially with RR remaining in high 30s-low 40s. Pt was reportedly given Xanax 0.25mg PO x2 earlier in the night for anxiety for having BIPAP on. Previously ordered Morphine 1mg IV x1 by primary team was given. SBP remained stable, O2 sat low 90s with good wave form, RR improved back to low 30s. RRT ended with primary team at bedside for follow-up.   INC61M w/ hx of kidney stones, initially presenting with fever, cough, and SOB x2-3 days, found to be COVID-19 positive (not vaccinated). RRT called today for hypoxia to low 80s while on HFNC+NRB    On arrival, pt awake, alert, and responding to questioning. Primary team as well as RN and RT in the room, noted that pt was initially sating in low 90s with RR in low 30s while on BIPAP 12/7 FiO2 100%, but then took it off and refused to put it back on. Pt endorsed that BIPAP was uncomfortable and made his mouth dry. VS: T 98F axillary, HR 110s, SBP 120s, RR 40s, sating low-mid 80s on HFNC 60L 100% (also with NRB mask on). Rectal temp noted to be 101.3F earlier in the night and Tylenol was ordered at the time but not yet given. Tylenol 1g IV x1 was started. Discussed with pt and was agreeable to be placed back on BIPAP 12/7 FiO2 100%. Subsequent O2 sat ~90%. Pt initially with RR remaining in high 30s-low 40s. Pt was reportedly given Xanax 0.25mg PO x2 earlier in the night for anxiety for having BIPAP on. Previously ordered Morphine 1mg IV x1 by primary team was given. SBP remained stable, O2 sat low 90s with good wave form, RR improved back to low 30s. RRT ended with primary team at bedside for follow-up. Per primary team, [facility] discussions with pt/family established pt is FULL CODE.   INC61M w/ hx of kidney stones, initially presenting with fever, cough, and SOB x2-3 days, found to be COVID-19 positive (not vaccinated). RRT called today for hypoxia to low 80s while on HFNC+NRB.    On arrival, pt awake and responding to questioning. Primary team as well as RN and RT in the room, noted that pt was initially sating in low 90s with RR in low 30s while on BIPAP 12/7 FiO2 100%, but then took it off and refused to put it back on. Pt endorsed that BIPAP was uncomfortable and made his mouth dry. VS: T 98F axillary, HR 110s, SBP 120s, RR 40s, sating low-mid 80s on HFNC 60L 100% (also with NRB mask on). Rectal temp noted to be 101.3F earlier in the night and Tylenol was ordered at the time but not yet given. Tylenol 1g IV x1 was started. Discussed with pt and was agreeable to be placed back on BIPAP 12/7 FiO2 100%. Subsequent O2 sat ~90%. Pt initially with RR remaining in high 30s-low 40s. Pt was reportedly given Xanax 0.25mg PO x2 earlier in the night for anxiety for having BIPAP on. Previously ordered Morphine 1mg IV x1 by primary team was given. SBP remained stable, O2 sat low 90s with good wave form, RR improved back to low 30s. RRT ended with primary team at bedside for follow-up. Per primary team, [facility] discussions with pt/family established pt is FULL CODE.   INC61M w/ hx of kidney stones, initially presenting with fever, cough, and SOB x2-3 days, found to be COVID-19 positive (not vaccinated). RRT called today for hypoxia to low 80s while on HFNC+NRB.    On arrival, pt awake and responding to questioning. Primary team as well as RN and RT in the room, noted that pt was initially sating in low 90s with RR in low 30s while on BIPAP 12/7 FiO2 100%, but then took it off and refused to put it back on. Pt endorsed that BIPAP was uncomfortable and made his mouth dry. VS: T 98F axillary, HR 110s, SBP 120s, RR 40s, sating low-mid 80s on HFNC 60L 100% (also with NRB mask on). Rectal temp noted to be 101.3F earlier in the night and Tylenol was ordered at the time but not yet given.     Tylenol 1g IV x1 was started. Discussed with pt and was agreeable to be placed back on BIPAP 12/7 FiO2 100%. Subsequent O2 sat ~90%. Pt initially with RR remaining in high 30s-low 40s. Pt was reportedly given Xanax 0.25mg PO x2 earlier in the night for anxiety for having BIPAP on. Previously ordered Morphine 1mg IV x1 by primary team was given. SBP remained stable, O2 sat low 90s with good wave form, RR improved back to low 30s. RRT ended with primary team at bedside for follow-up. Per primary team, [facility] discussions with pt/family established pt is FULL CODE.   61M w/ hx of kidney stones, initially presenting with fever, cough, and SOB x2-3 days, found to be COVID-19 positive (not vaccinated). RRT called today for hypoxia to low 80s while on HFNC+NRB.    On arrival, pt awake and responding to questioning. Primary team as well as RN and RT in the room, noted that pt was initially sating in low 90s with RR in low 30s while on BIPAP 12/7 FiO2 100%, but then took it off and refused to put it back on. Pt endorsed that BIPAP was uncomfortable and made his mouth dry. VS: T 98F axillary, HR 110s, SBP 120s, RR 40s, sating low-mid 80s on HFNC 60L 100% (also with NRB mask on). Rectal temp noted to be 101.3F earlier in the night and Tylenol was ordered at the time but not yet given.     Tylenol 1g IV x1 was started. Discussed with pt and was agreeable to be placed back on BIPAP 12/7 FiO2 100%. Subsequent O2 sat ~90%. Pt initially with RR remaining in high 30s-low 40s. Pt was reportedly given Xanax 0.25mg PO x2 earlier in the night for anxiety for having BIPAP on. Previously ordered Morphine 1mg IV x1 by primary team was given. SBP remained stable, O2 sat low 90s with good wave form, RR improved back to low 30s. RRT ended with primary team at bedside for follow-up. Per primary team, [facility] discussions with pt/family established pt is FULL CODE.

## 2022-01-09 NOTE — CHART NOTE - NSCHARTNOTEFT_GEN_A_CORE
Prior to proning pt  While Supine ABG on 60% FiO2 7.24/46/65  POCUS: bilat lung sliding+; bilat B line ant; Cor: LVsF appears intact, RV enlarged but smaller than LV, ?trace pericardial effusion, IVC 1.8cm, LE DVT study: bilat femoral and popliteal veins fully compressible  UOP has fallen off; given enlarged RV and not small IVC, will give Lasix and monitor UOP  P/F 100  Pt will be proned again as some improvement has been seen with proning.

## 2022-01-09 NOTE — PROGRESS NOTE ADULT - ASSESSMENT
Assessment and Plan    60 yo M PMHx of nephrolithiasis intubated for acute hypoxic respiratory failure 2/2 COVID19.    Neuro   #mental status  - sedated on propofol, fentanyl    Pulm  #respiratory status  - intubated for acute hypoxic respiratory failure, worsening work of breathing  - obtain ABGs, follow up vent setting  - nebs  - CXR 1/8: elevated right hemidiaphragm ill defined increased right lung markings and left basilar/retrocardiac markings compatible with hx of COVID, Hazy indistinct left CP region could be due to overlying soft tissues versus a small left pleural effusion. Sharp right CP angle. No pneumothorax.  - obtain post supination abg    Cardiovascular  #hemodynamics  - On max gtt    #tachycardia  - in the setting of desaturation, rising d-dimer, would get lower extremity doppler to rule out DVT    GI  #diet  - current NPO, transition to tube feeds    Renal  #hyponatremia  - resolved    ID  #COVID19  - s/p decadron and remdesivir   - decline toci   - trend inflammatory markers    #new fevers, leukocytosis  - f/u panculture    Heme-Onc  #DVT ppx  - on lovenox     Assessment and Plan    62 yo M PMHx of nephrolithiasis intubated for acute hypoxic respiratory failure 2/2 COVID19.    Neuro   #mental status  - sedated on propofol, fentanyl    Pulm  #respiratory status  - intubated for acute hypoxic respiratory failure, worsening work of breathing  - obtain ABGs, follow up vent setting  - nebs  - CXR 1/8: elevated right hemidiaphragm ill defined increased right lung markings and left basilar/retrocardiac markings compatible with hx of COVID, Hazy indistinct left CP region could be due to overlying soft tissues versus a small left pleural effusion. Sharp right CP angle. No pneumothorax.  - obtain post supination abg    Cardiovascular  #hemodynamics  - On max gtt  - given albumin and LR 1/9    #tachycardia  - in the setting of desaturation, rising d-dimer, would get lower extremity doppler to rule out DVT    GI  #diet  - current NPO, transition to tube feeds    Renal  #hyponatremia  - resolved    ID  #COVID19  - s/p decadron and remdesivir   - decline toci   - trend inflammatory markers  - empiric abx    #new fevers, leukocytosis  - f/u panculture    Heme-Onc  #DVT ppx  - on lovenox

## 2022-01-09 NOTE — PROGRESS NOTE ADULT - SUBJECTIVE AND OBJECTIVE BOX
INTERVAL HPI/OVERNIGHT EVENTS:    SUBJECTIVE: Proned 9 pm, febrile and given tylenol, high vent settings.     No ros - intubated    OBJECTIVE:    VITAL SIGNS:  ICU Vital Signs Last 24 Hrs  T(C): 38.3 (2022 08:00), Max: 38.6 (2022 04:00)  T(F): 100.9 (2022 08:00), Max: 101.5 (2022 04:00)  HR: 103 (2022 08:00) (82 - 135)  BP: 100/71 (2022 20:00) (84/64 - 107/67)  BP(mean): 81 (2022 20:00) (64 - 83)  ABP: 87/52 (2022 08:00) (86/51 - 123/75)  ABP(mean): 64 (2022 08:00) (62 - 100)  RR: 27 (2022 08:00) (27 - 31)  SpO2: 100% (2022 08:00) (64% - 100%)    Mode: AC/ CMV (Assist Control/ Continuous Mandatory Ventilation), RR (machine): 27, TV (machine): 520, FiO2: 80, PEEP: 11, ITime: 0.94, MAP: 17, PIP: 32     @ 07: @ 07:00  --------------------------------------------------------  IN: 2985.7 mL / OUT: 965 mL / NET: 2020.7 mL     @ 07: @ 08:53  --------------------------------------------------------  IN: 165.3 mL / OUT: 25 mL / NET: 140.3 mL      CAPILLARY BLOOD GLUCOSE      POCT Blood Glucose.: 91 mg/dL (2022 12:14)        PHYSICAL EXAM:  GENERAL: intubated, proned  HEAD: normocephalic, atraumatic  HEENT: normal conjunctiva, oral mucosa moist, neck supple  CARDIAC: proned  PULM: clear to ascultation bilaterally, no crackles, rales, rhonchi, or wheezing  GI: proned  NEURO: intubated   MSK: no visible deformities, no peripheral edema, calf tenderness/redness/swelling  SKIN: no visible rashes, dry, well-perfused      MEDICATIONS:  MEDICATIONS  (STANDING):  ALBUTerol    90 MICROgram(s) HFA Inhaler 2 Puff(s) Inhalation every 6 hours  chlorhexidine 0.12% Liquid 15 milliLiter(s) Oral Mucosa every 12 hours  chlorhexidine 4% Liquid 1 Application(s) Topical <User Schedule>  cholecalciferol 2000 Unit(s) Oral daily  cisatracurium Infusion 3 MICROgram(s)/kG/Min (21.1 mL/Hr) IV Continuous <Continuous>  enoxaparin Injectable 40 milliGRAM(s) SubCutaneous every 12 hours  fenofibrate Tablet 145 milliGRAM(s) Oral at bedtime  fentaNYL   Infusion. 0.5 MICROgram(s)/kG/Hr (5.85 mL/Hr) IV Continuous <Continuous>  multivitamin 1 Tablet(s) Oral daily  norepinephrine Infusion 0.05 MICROgram(s)/kG/Min (5.49 mL/Hr) IV Continuous <Continuous>  petrolatum Ophthalmic Ointment 1 Application(s) Both EYES daily  phenylephrine    Infusion 0.1 MICROgram(s)/kG/Min (2.19 mL/Hr) IV Continuous <Continuous>  propofol Infusion 10 MICROgram(s)/kG/Min (7.02 mL/Hr) IV Continuous <Continuous>    MEDICATIONS  (PRN):      ALLERGIES:  Allergies    Cipro (Rash)    Intolerances    eggs (Diarrhea)      LABS:                        16.1   19.73 )-----------( 462      ( 2022 02:19 )             49.8     01-09    134<L>  |  100  |  31<H>  ----------------------------<  113<H>  4.9   |  22  |  1.43<H>    Ca    8.7      2022 02:19  Phos  5.1     01-09  Mg     2.30     -09    TPro  6.0  /  Alb  2.1<L>  /  TBili  0.7  /  DBili  x   /  AST  34  /  ALT  28  /  AlkPhos  70        Urinalysis Basic - ( 2022 22:34 )    Color: Yellow / Appearance: Slightly Turbid / S.012 / pH: x  Gluc: x / Ketone: Negative  / Bili: Negative / Urobili: <2 mg/dL   Blood: x / Protein: 30 mg/dL / Nitrite: Negative   Leuk Esterase: Negative / RBC: 6 /HPF / WBC 7 /HPF   Sq Epi: x / Non Sq Epi: 5 /HPF / Bacteria: Occasional        RADIOLOGY & ADDITIONAL TESTS: Reviewed.

## 2022-01-10 NOTE — PROGRESS NOTE ADULT - SUBJECTIVE AND OBJECTIVE BOX
Cardiovascular Disease Progress Note    Overnight events: No acute events overnight.  new natty. remains intubated and in prone position. unable to obtain ros      Objective Findings:  T(C): 37.8 (01-10-22 @ 05:00), Max: 38.8 (01-10-22 @ 00:00)  HR: 90 (01-10-22 @ 07:00) (84 - 115)  BP: --  RR: 30 (01-10-22 @ 07:00) (27 - 30)  SpO2: 100% (01-10-22 @ 07:00) (83% - 100%)  Wt(kg): --  Daily     Daily       Physical Exam:  Gen: NAD, intubated  deferred due to covid    Telemetry: nsr    Laboratory Data:                        14.7   19.55 )-----------( 420      ( 10 Shai 2022 03:22 )             44.8     01-10    129<L>  |  98  |  42<H>  ----------------------------<  124<H>  5.2   |  19<L>  |  2.56<H>    Ca    8.0<L>      10 Shai 2022 03:22  Phos  4.9     01-10  Mg     2.30     01-10    TPro  5.5<L>  /  Alb  1.9<L>  /  TBili  0.6  /  DBili  x   /  AST  176<H>  /  ALT  91<H>  /  AlkPhos  62  01-10              Inpatient Medications:  MEDICATIONS  (STANDING):  albumin human 25% IVPB 50 milliLiter(s) IV Intermittent every 6 hours  ALBUTerol    90 MICROgram(s) HFA Inhaler 2 Puff(s) Inhalation every 6 hours  chlorhexidine 0.12% Liquid 15 milliLiter(s) Oral Mucosa every 12 hours  chlorhexidine 4% Liquid 1 Application(s) Topical <User Schedule>  cisatracurium Infusion 3 MICROgram(s)/kG/Min (21.1 mL/Hr) IV Continuous <Continuous>  enoxaparin Injectable 40 milliGRAM(s) SubCutaneous every 12 hours  fentaNYL   Infusion. 0.5 MICROgram(s)/kG/Hr (5.85 mL/Hr) IV Continuous <Continuous>  norepinephrine Infusion 0.05 MICROgram(s)/kG/Min (5.49 mL/Hr) IV Continuous <Continuous>  petrolatum Ophthalmic Ointment 1 Application(s) Both EYES daily  phenylephrine    Infusion 0.1 MICROgram(s)/kG/Min (2.19 mL/Hr) IV Continuous <Continuous>  piperacillin/tazobactam IVPB.. 3.375 Gram(s) IV Intermittent every 8 hours  propofol Infusion 10 MICROgram(s)/kG/Min (7.02 mL/Hr) IV Continuous <Continuous>      Assessment:  hypoxic resp failure  covid pneumonia  hx of nephrolithiasis  hypoantremia    Recs:  appreciate excellent micu care  cv stable  wean vent, pressors and sedation per icu protocol  broad spectrum abx and infectious workup per icu  natty. consult renal. avoid nephrotoxins. renally dose meds  dvt ppx          Over 25 minutes spent on total encounter; more than 50% of the visit was spent counseling and/or coordinating care by the attending physician.      Igor Medrano MD   Cardiovascular Disease  [phone]

## 2022-01-10 NOTE — PROGRESS NOTE ADULT - SUBJECTIVE AND OBJECTIVE BOX
INTERVAL HPI/OVERNIGHT EVENTS:    SUBJECTIVE:     OBJECTIVE:    VITAL SIGNS:  ICU Vital Signs Last 24 Hrs  T(C): 37.2 (10 Shai 2022 08:00), Max: 38.8 (10 Shai 2022 00:00)  T(F): 99 (10 Shai 2022 08:00), Max: 101.8 (10 Shai 2022 00:00)  HR: 92 (10 Shai 2022 09:00) (84 - 115)  BP: --  BP(mean): --  ABP: 91/52 (10 Shai 2022 09:00) (78/45 - 114/70)  ABP(mean): 68 (10 Shai 2022 09:00) (56 - 89)  RR: 25 (10 Shai 2022 09:00) (25 - 30)  SpO2: 94% (10 Shai 2022 09:) (83% - 100%)    Mode: AC/ CMV (Assist Control/ Continuous Mandatory Ventilation), RR (machine): 25, TV (machine): 550, FiO2: 70, PEEP: 8, ITime: 0.81, MAP: 13, PIP: 25     @ 07:01  -  01-10 @ 07:00  --------------------------------------------------------  IN: 5318.6 mL / OUT: 955 mL / NET: 4363.6 mL    01-10 @ 07:01  -  01-10 @ 09:05  --------------------------------------------------------  IN: 324.8 mL / OUT: 150 mL / NET: 174.8 mL      CAPILLARY BLOOD GLUCOSE      POCT Blood Glucose.: 91 mg/dL (2022 12:14)      PHYSICAL EXAM:    General: NAD  HEENT: NC/AT; PERRL, clear conjunctiva  Respiratory: CTA b/l  Cardiovascular: +S1/S2; RRR  Abdomen: soft, NT/ND; +BS x4  Extremities: WWP, 2+ peripheral pulses b/l; no LE edema  Skin: normal color and turgor; no rash  Neurological: AAOx3, no focal deficits    MEDICATIONS:  MEDICATIONS  (STANDING):  ALBUTerol    90 MICROgram(s) HFA Inhaler 2 Puff(s) Inhalation every 6 hours  chlorhexidine 0.12% Liquid 15 milliLiter(s) Oral Mucosa every 12 hours  chlorhexidine 4% Liquid 1 Application(s) Topical <User Schedule>  cisatracurium Infusion 3 MICROgram(s)/kG/Min (21.1 mL/Hr) IV Continuous <Continuous>  enoxaparin Injectable 40 milliGRAM(s) SubCutaneous every 12 hours  fentaNYL   Infusion. 0.5 MICROgram(s)/kG/Hr (5.85 mL/Hr) IV Continuous <Continuous>  norepinephrine Infusion 0.05 MICROgram(s)/kG/Min (5.49 mL/Hr) IV Continuous <Continuous>  petrolatum Ophthalmic Ointment 1 Application(s) Both EYES daily  phenylephrine    Infusion 0.1 MICROgram(s)/kG/Min (2.19 mL/Hr) IV Continuous <Continuous>  piperacillin/tazobactam IVPB.. 3.375 Gram(s) IV Intermittent every 8 hours  propofol Infusion 10 MICROgram(s)/kG/Min (7.02 mL/Hr) IV Continuous <Continuous>    MEDICATIONS  (PRN):      ALLERGIES:  Allergies    Cipro (Rash)    Intolerances    eggs (Diarrhea)      LABS:                        14.7   19.55 )-----------( 420      ( 10 Shai 2022 03:22 )             44.8     01-10    129<L>  |  98  |  42<H>  ----------------------------<  124<H>  5.2   |  19<L>  |  2.56<H>    Ca    8.0<L>      10 Shai 2022 03:22  Phos  4.9     01-10  Mg     2.30     01-10    TPro  5.5<L>  /  Alb  1.9<L>  /  TBili  0.6  /  DBili  x   /  AST  176<H>  /  ALT  91<H>  /  AlkPhos  62  01-10      Urinalysis Basic - ( 2022 22:34 )    Color: Yellow / Appearance: Slightly Turbid / S.012 / pH: x  Gluc: x / Ketone: Negative  / Bili: Negative / Urobili: <2 mg/dL   Blood: x / Protein: 30 mg/dL / Nitrite: Negative   Leuk Esterase: Negative / RBC: 6 /HPF / WBC 7 /HPF   Sq Epi: x / Non Sq Epi: 5 /HPF / Bacteria: Occasional        RADIOLOGY & ADDITIONAL TESTS: Reviewed.   INTERVAL HPI/OVERNIGHT EVENTS:  Patient desaturated to 83% and vent setting RR was increased from     SUBJECTIVE:     OBJECTIVE:    VITAL SIGNS:  ICU Vital Signs Last 24 Hrs  T(C): 37.2 (10 Shai 2022 08:00), Max: 38.8 (10 Shai 2022 00:00)  T(F): 99 (10 Shai 2022 08:00), Max: 101.8 (10 Shai 2022 00:00)  HR: 92 (10 Shai 2022 09:00) (84 - 115)  BP: --  BP(mean): --  ABP: 91/52 (10 Shai 2022 09:) (78/45 - 114/70)  ABP(mean): 68 (10 Shai 2022 09:) (56 - 89)  RR: 25 (10 Shai 2022 09:) (25 - 30)  SpO2: 94% (10 Shai 2022 09:) (83% - 100%)    Mode: AC/ CMV (Assist Control/ Continuous Mandatory Ventilation), RR (machine): 25, TV (machine): 550, FiO2: 70, PEEP: 8, ITime: 0.81, MAP: 13, PIP: 25     @ 07:01  -  01-10 @ 07:00  --------------------------------------------------------  IN: 5318.6 mL / OUT: 955 mL / NET: 4363.6 mL    01-10 @ 07:01  -  01-10 @ 09:05  --------------------------------------------------------  IN: 324.8 mL / OUT: 150 mL / NET: 174.8 mL      CAPILLARY BLOOD GLUCOSE      POCT Blood Glucose.: 91 mg/dL (2022 12:14)      PHYSICAL EXAM:    General: NAD  HEENT: NC/AT; PERRL, clear conjunctiva  Respiratory: CTA b/l  Cardiovascular: +S1/S2; RRR  Abdomen: soft, NT/ND; +BS x4  Extremities: WWP, 2+ peripheral pulses b/l; no LE edema  Skin: normal color and turgor; no rash  Neurological: AAOx3, no focal deficits    MEDICATIONS:  MEDICATIONS  (STANDING):  ALBUTerol    90 MICROgram(s) HFA Inhaler 2 Puff(s) Inhalation every 6 hours  chlorhexidine 0.12% Liquid 15 milliLiter(s) Oral Mucosa every 12 hours  chlorhexidine 4% Liquid 1 Application(s) Topical <User Schedule>  cisatracurium Infusion 3 MICROgram(s)/kG/Min (21.1 mL/Hr) IV Continuous <Continuous>  enoxaparin Injectable 40 milliGRAM(s) SubCutaneous every 12 hours  fentaNYL   Infusion. 0.5 MICROgram(s)/kG/Hr (5.85 mL/Hr) IV Continuous <Continuous>  norepinephrine Infusion 0.05 MICROgram(s)/kG/Min (5.49 mL/Hr) IV Continuous <Continuous>  petrolatum Ophthalmic Ointment 1 Application(s) Both EYES daily  phenylephrine    Infusion 0.1 MICROgram(s)/kG/Min (2.19 mL/Hr) IV Continuous <Continuous>  piperacillin/tazobactam IVPB.. 3.375 Gram(s) IV Intermittent every 8 hours  propofol Infusion 10 MICROgram(s)/kG/Min (7.02 mL/Hr) IV Continuous <Continuous>    MEDICATIONS  (PRN):      ALLERGIES:  Allergies    Cipro (Rash)    Intolerances    eggs (Diarrhea)      LABS:                        14.7   19.55 )-----------( 420      ( 10 Shai 2022 03:22 )             44.8     01-10    129<L>  |  98  |  42<H>  ----------------------------<  124<H>  5.2   |  19<L>  |  2.56<H>    Ca    8.0<L>      10 Shai 2022 03:22  Phos  4.9     01-10  Mg     2.30     01-10    TPro  5.5<L>  /  Alb  1.9<L>  /  TBili  0.6  /  DBili  x   /  AST  176<H>  /  ALT  91<H>  /  AlkPhos  62  01-10      Urinalysis Basic - ( 2022 22:34 )    Color: Yellow / Appearance: Slightly Turbid / S.012 / pH: x  Gluc: x / Ketone: Negative  / Bili: Negative / Urobili: <2 mg/dL   Blood: x / Protein: 30 mg/dL / Nitrite: Negative   Leuk Esterase: Negative / RBC: 6 /HPF / WBC 7 /HPF   Sq Epi: x / Non Sq Epi: 5 /HPF / Bacteria: Occasional        RADIOLOGY & ADDITIONAL TESTS: Reviewed.   INTERVAL HPI/OVERNIGHT EVENTS:  Patient desaturated to 83% and was acidotic on ABG with pH of 7.19. Vent setting RR was increased from 22 to 30. CXR was obtained.    SUBJECTIVE: Patient is proned and sedated.    OBJECTIVE:    VITAL SIGNS:  ICU Vital Signs Last 24 Hrs  T(C): 37.2 (10 Shai 2022 08:00), Max: 38.8 (10 Shai 2022 00:00)  T(F): 99 (10 Shai 2022 08:00), Max: 101.8 (10 Shai 2022 00:00)  HR: 92 (10 Shai 2022 09:) (84 - 115)  BP: --  BP(mean): --  ABP: 91/52 (10 Shai 2022 09:) (78/45 - 114/70)  ABP(mean): 68 (10 Shai 2022 09:) (56 - 89)  RR: 25 (10 Shai 2022 09:) (25 - 30)  SpO2: 94% (10 Shai 2022 09:) (83% - 100%)    Mode: AC/ CMV (Assist Control/ Continuous Mandatory Ventilation), RR (machine): 25, TV (machine): 550, FiO2: 70, PEEP: 8, ITime: 0.81, MAP: 13, PIP: 25     @ :01  -  01-10 @ 07:00  --------------------------------------------------------  IN: 5318.6 mL / OUT: 955 mL / NET: 4363.6 mL    01-10 @ :01  -  01-10 @ 09:05  --------------------------------------------------------  IN: 324.8 mL / OUT: 150 mL / NET: 174.8 mL      CAPILLARY BLOOD GLUCOSE      POCT Blood Glucose.: 91 mg/dL (2022 12:14)      PHYSICAL EXAM:    General: NAD  HEENT: NC/AT; PERRL, clear conjunctiva  Respiratory: CTA b/l  Cardiovascular: +S1/S2; RRR  Abdomen: proned, unable to assess  Extremities: WWP, 2+ peripheral pulses b/l; no LE edema  Skin: normal color and turgor; no rash  Neurological: AAOx0, sedated. no focal deficits    MEDICATIONS:  MEDICATIONS  (STANDING):  ALBUTerol    90 MICROgram(s) HFA Inhaler 2 Puff(s) Inhalation every 6 hours  chlorhexidine 0.12% Liquid 15 milliLiter(s) Oral Mucosa every 12 hours  chlorhexidine 4% Liquid 1 Application(s) Topical <User Schedule>  cisatracurium Infusion 3 MICROgram(s)/kG/Min (21.1 mL/Hr) IV Continuous <Continuous>  enoxaparin Injectable 40 milliGRAM(s) SubCutaneous every 12 hours  fentaNYL   Infusion. 0.5 MICROgram(s)/kG/Hr (5.85 mL/Hr) IV Continuous <Continuous>  norepinephrine Infusion 0.05 MICROgram(s)/kG/Min (5.49 mL/Hr) IV Continuous <Continuous>  petrolatum Ophthalmic Ointment 1 Application(s) Both EYES daily  phenylephrine    Infusion 0.1 MICROgram(s)/kG/Min (2.19 mL/Hr) IV Continuous <Continuous>  piperacillin/tazobactam IVPB.. 3.375 Gram(s) IV Intermittent every 8 hours  propofol Infusion 10 MICROgram(s)/kG/Min (7.02 mL/Hr) IV Continuous <Continuous>    MEDICATIONS  (PRN):      ALLERGIES:  Allergies    Cipro (Rash)    Intolerances    eggs (Diarrhea)      LABS:                        14.7   19.55 )-----------( 420      ( 10 Shai 2022 03:22 )             44.8     01-10    129<L>  |  98  |  42<H>  ----------------------------<  124<H>  5.2   |  19<L>  |  2.56<H>    Ca    8.0<L>      10 Shai 2022 03:22  Phos  4.9     01-10  Mg     2.30     01-10    TPro  5.5<L>  /  Alb  1.9<L>  /  TBili  0.6  /  DBili  x   /  AST  176<H>  /  ALT  91<H>  /  AlkPhos  62  01-10      Urinalysis Basic - ( 2022 22:34 )    Color: Yellow / Appearance: Slightly Turbid / S.012 / pH: x  Gluc: x / Ketone: Negative  / Bili: Negative / Urobili: <2 mg/dL   Blood: x / Protein: 30 mg/dL / Nitrite: Negative   Leuk Esterase: Negative / RBC: 6 /HPF / WBC 7 /HPF   Sq Epi: x / Non Sq Epi: 5 /HPF / Bacteria: Occasional        RADIOLOGY & ADDITIONAL TESTS: Reviewed.

## 2022-01-10 NOTE — PROGRESS NOTE ADULT - ASSESSMENT
Assessment and Plan    60 yo M PMHx of nephrolithiasis intubated for acute hypoxic respiratory failure 2/2 COVID19.    Neuro   #mental status  - sedated on propofol, fentanyl,  and cisatracurium.    Pulm  #respiratory status  - intubated for acute hypoxic respiratory failure, worsening work of breathing  - proned until 5 pm 1/10 (third time)  - obtain ABGs, follow up vent setting  - nebs  - CXR 1/8: elevated right hemidiaphragm ill defined increased right lung markings and left basilar/retrocardiac markings compatible with hx of COVID, Hazy indistinct left CP region could be due to overlying soft tissues versus a small left pleural effusion. Sharp right CP angle. No pneumothorax.  - Overnight 1/9-1/10 worsening acidosis and desat to 83%, RR was increased to 30 over night. AM vent setting turned back to RR 22.  To do: obtain post supination abg at 6 pm 1 hr post supination    Cardiovascular  #hemodynamics  - On max gtt titrated down to 3.8 this am  - given albumin and LR 1/9  #Rapid afib  - HR of 160s then went back to 80s without amio  #tachycardia  - in the setting of desaturation, rising d-dimer  - Will monitor for signs of DVT    GI  #diet  - current NPO while proned  - will transition to tube feeds    Renal  #worsening MELISSA  - Creatinine increased from 1.43 (1/9) to 2.56 (1/10) and 2.62 (1/10) on repeat  - s/p 1x 2mg bumex push 1/9  - Still putting out urine output of ~100cc/hr  - If urine output goes < 75 cc/hr, will given another 2mg bumex push  #hyponatremia  -resolved    ID  #COVID19  - s/p decadron and remdesivir   - s/p toci   - trend inflammatory markers    #new fevers, leukocytosis  - vanc by level and zosyn (1/9- )  - vanc level 5.2  - f/u MRSA swab  - f/u panculture    Heme-Onc  #DVT ppx  - on lovenox     Assessment and Plan    60 yo M PMHx of nephrolithiasis intubated for acute hypoxic respiratory failure 2/2 COVID19.    Neuro   #mental status  - sedated on propofol, fentanyl,  and cisatracurium.    Pulm  #respiratory status  - intubated for acute hypoxic respiratory failure, worsening work of breathing  - proned until 5 pm 1/10 (third time)  - obtain ABGs, follow up vent setting  - nebs  - CXR 1/8: elevated right hemidiaphragm ill defined increased right lung markings and left basilar/retrocardiac markings compatible with hx of COVID, Hazy indistinct left CP region could be due to overlying soft tissues versus a small left pleural effusion. Sharp right CP angle. No pneumothorax.  - Overnight 1/9-1/10 worsening acidosis and desat to 83%, RR was increased to 30 over night. AM vent setting turned back to RR 22.  To do: obtain post supination abg at 6 pm 1 hr post supination    Cardiovascular  #hemodynamics  - On max gtt titrated down to 3.8 this am  - given albumin and LR 1/9  #Rapid afib  - HR of 160s then went back to 80s spontaneously  - Happened again, and 1x amiodarone was given  #tachycardia  - in the setting of desaturation, rising d-dimer  - Will monitor for signs of DVT    GI  #diet  - current NPO while proned  - will transition to tube feeds    Renal  #worsening MELISSA  - Creatinine increased from 1.43 (1/9) to 2.56 (1/10) and 2.62 (1/10) on repeat  - s/p 1x 2mg bumex push 1/9  - Still putting out urine output of ~100cc/hr  - If urine output goes < 75 cc/hr, will given another 2mg bumex push  #hyponatremia  -resolved    ID  #COVID19  - s/p decadron and remdesivir   - s/p toci   - trend inflammatory markers    #new fevers, leukocytosis  - vanc by level and zosyn (1/9- )  - vanc level 5.2  - f/u MRSA swab  - f/u panculture    Heme-Onc  #DVT ppx  - on lovenox     Assessment and Plan    62 yo M PMHx of nephrolithiasis intubated for acute hypoxic respiratory failure 2/2 COVID19.    Neuro   #mental status  - sedated on propofol, fentanyl,  and cisatracurium.    Pulm  #respiratory status  - intubated for acute hypoxic respiratory failure, worsening work of breathing  - proned until 5 pm 1/10 (third time)  - obtain ABGs, follow up vent setting  - nebs  - CXR 1/8: elevated right hemidiaphragm ill defined increased right lung markings and left basilar/retrocardiac markings compatible with hx of COVID, Hazy indistinct left CP region could be due to overlying soft tissues versus a small left pleural effusion. Sharp right CP angle. No pneumothorax.  - Overnight 1/9-1/10 worsening acidosis and desat to 83%, RR was increased to 30 over night. AM vent setting turned back to RR 22.  To do: obtain post supination abg at 6 pm 1 hr post supination    Cardiovascular  #hemodynamics  - On max gtt titrated down to 3.8 this am  - given albumin and LR 1/9  - Started on vasopressin 1/10  #Rapid afib  - HR of 160s then went back to 80s spontaneously  - Happened again, and 1x amiodarone was given  #tachycardia  - in the setting of desaturation, rising d-dimer  - Will monitor for signs of DVT    GI  #diet  - current NPO while proned  - will transition to tube feeds    Renal  #worsening MELISSA  - Creatinine increased from 1.43 (1/9) to 2.56 (1/10) and 2.62 (1/10) on repeat  - s/p 1x 2mg bumex push 1/9  - Still putting out urine output of ~100cc/hr  - If urine output goes < 75 cc/hr, will given another 2mg bumex push  #hyponatremia  -resolved    ID  #COVID19  - s/p decadron and remdesivir   - s/p toci   - trend inflammatory markers    #new fevers, leukocytosis  - vanc by level and zosyn (1/9- )  - vanc level 5.2  - f/u MRSA swab  - f/u panculture    Heme-Onc  #DVT ppx  - on lovenox

## 2022-01-10 NOTE — PROGRESS NOTE ADULT - ASSESSMENT
_________________________________________________________________________________________  ========>>  M E D I C A L   A T T E N D I N G    F O L L O W  U P  N O T E  <<=========  -----------------------------------------------------------------------------------------------------    - Patient seen and examined by me earlier today.   - In summary,  ALEXX BROWN is a 61y year old man admitted with SOB, fever..   - Patient intubated, sedated in the MICU, vented , proned      ==================>> REVIEW OF SYSTEM <<=================    unable to obtain     ==================>> PHYSICAL EXAM <<=================    GEN: sedated, vented, intubated.. NAD  HEENT: NCAT lines and tubes in place   Neck: supple lines and tubes in place   CVS: S1S2 , regular  PULM: CTA B/L,  limited   ABD.: soft. non distended  Extrem: intact pulses , no signif edema      + kim with dark urine                              ( Note written / Date of service 01-10-22 )    ==================>> MEDICATIONS <<====================    acetaminophen   IVPB .. 1000 milliGRAM(s) IV Intermittent once  ALBUTerol    90 MICROgram(s) HFA Inhaler 2 Puff(s) Inhalation every 6 hours  chlorhexidine 0.12% Liquid 15 milliLiter(s) Oral Mucosa every 12 hours  chlorhexidine 4% Liquid 1 Application(s) Topical <User Schedule>  cisatracurium Infusion 3 MICROgram(s)/kG/Min IV Continuous <Continuous>  enoxaparin Injectable 40 milliGRAM(s) SubCutaneous every 12 hours  fentaNYL   Infusion. 0.5 MICROgram(s)/kG/Hr IV Continuous <Continuous>  norepinephrine Infusion 0.05 MICROgram(s)/kG/Min IV Continuous <Continuous>  petrolatum Ophthalmic Ointment 1 Application(s) Both EYES daily  phenylephrine    Infusion 0.1 MICROgram(s)/kG/Min IV Continuous <Continuous>  piperacillin/tazobactam IVPB.. 3.375 Gram(s) IV Intermittent every 8 hours  polyethylene glycol 3350 17 Gram(s) Oral daily  propofol Infusion 10 MICROgram(s)/kG/Min IV Continuous <Continuous>  senna 2 Tablet(s) Oral at bedtime  vasopressin Infusion 0.04 Unit(s)/Min IV Continuous <Continuous>    ___________  Active diet:  Diet, NPO  ___________________    ==================>> VITAL SIGNS <<==================    Vital Signs Last 24 HrsT(C): 37.6 (01-10-22 @ 16:00)  T(F): 99.7 (01-10-22 @ 16:00), Max: 101.8 (01-10-22 @ 00:00)  HR: 84 (01-10-22 @ 18:15) (84 - 130)  BP: -- reviewed on tele   RR: 25 (01-10-22 @ 18:00) (25 - 30)  SpO2: 88% (01-10-22 @ 18:15) (83% - 100%)       ==================>> LAB AND IMAGING <<==================                        14.7   19.55 )-----------( 420      ( 10 Shai 2022 03:22 )             44.8        01-10    132<L>  |  101  |  45<H>  ----------------------------<  123<H>  5.1   |  18<L>  |  2.62<H>    Ca    8.0<L>      10 Shai 2022 10:31  Phos  4.6     01-10  Mg     2.30     01-10    TPro  5.5<L>  /  Alb  1.8<L>  /  TBili  0.7  /  DBili  x   /  AST  135<H>  /  ALT  88<H>  /  AlkPhos  64  01-10    WBC count:   19.55 <<== ,  19.73 <<== ,  28.99 <<== ,  26.00 <<== ,  15.69 <<== ,  10.60 <<==   Hemoglobin:   14.7 <<==,  16.1 <<==,  15.5 <<==,  16.3 <<==,  16.1 <<==,  16.7 <<==  platelets:  420 <==, 462 <==, 581 <==, 560 <==, 446 <==, 567 <==, 523 <==    Creatinine:  2.62  <<==, 2.56  <<==, 1.43  <<==, 1.77  <<==, 1.46  <<==, 0.76  <<==  Sodium:   132  <==, 129  <==, 134  <==, 133  <==, 132  <==, 129  <==, 131  <==       AST:          135 <== , 176 <== , 34 <== , 40 <== , 48 <==      ALT:        88  <== , 91  <== , 28  <== , 30  <== , 28  <==      AP:        64  <=, 62  <=, 70  <=, 75  <=, 78  <=     Bili:        0.7  <=, 0.6  <=, 0.7  <=, 0.6  <=, 0.9  <=    _______________________  C U L T U R E S :    Culture - Sputum (collected 10 Shai 2022 08:28)  Source: .Sputum Sputum  Gram Stain (10 Shai 2022 13:26):    Moderate polymorphonuclear leukocytes per low power field    No Squamous epithelial cells per low power field    Rare Gram Negative Rods per oil power field    Culture - Blood (collected 08 Jan 2022 23:09)  Source: .Blood Blood-Peripheral  Preliminary Report (10 Shai 2022 01:01):    No growth to date.    Culture - Blood (collected 08 Jan 2022 23:09)  Source: .Blood Blood-Peripheral  Preliminary Report (10 Shai 2022 01:01):    No growth to date.      ^^^ Inflammatory markers :  ^^^  C R P :          116.3 (01-07-22)  <<--, 131.9 (01-04-22)  <<--, 96.4 (01-02-22)  <<--, 31.8 (12-30-21)  <<--, 82.2 (12-28-21)  <<--  P C T :         0.15 (01-07-22) <<--, 0.13 (01-02-22) <<--, 0.11 (12-30-21) <<--, 0.16 (12-28-21) <<--    Ferritin :         1107 (01-07-22) <<--, 1058 (01-04-22) <<--, 890 (01-02-22) <<--, 885 (12-28-21) <<--    D-Dimer :          2670 (01-08-22) <<--, 788 (01-07-22) <<--, 429 (01-04-22) <<--, 322 (01-02-22) <<--, 235 (12-30-21) <<--    ___________________________________________________________________________________  ===============>>  A S S E S S M E N T   A N D   P L A N <<===============  ------------------------------------------------------------------------------------------    · Assessment	  61 year old man with history of kidney stone presenting with fever, cough, SOB x 2-3d.   admitted for COVID     Problem/Plan - 1:  ·  Problem: Acute hypoxemic respiratory failure due to COVID-19 with Viral syndrome.  post Remdesivir and Decadron per hospital protocol  trend inflammatory markers   ICU care and mgmt apprecaied >> wean down Vent / O2  as able  supportive care  nutrition, hydration  as able   vitamins / supplements   DVT prophylaxis : lovenox 40 BID  Continue Current medications otherwise and monitor.     ** Fever , leukocytosis  new fever  pan cultures in process   suspect aspiration pneumonia  started on Zosyn / broad spectrum abx   less likely PE as been on Lovenox 40 BID    would check LE dopplers   antipyretics as needed  IVH  monitor     **MELISSA and transaminitis likely due to hemodynamic changes dueing code  with episides of hypotention 2/2 propofol     hold further diuretics     keep MAP >60      tend BMP closely    Avoid nephrotoxic medications.     Problem/Plan - 2:  ·  Problem:  obesity and likely ERICK    discussed with pt re weight loss and sleep study > obtaining and using a CPAP machine pos discharge  on Bipap now     -GI/DVT Prophylaxis per protocol.    --------------------------------------------  Case discussed with ZULEMA..    ___________________________  HECTOR Knowles D.O.  Pager: [phone]

## 2022-01-11 NOTE — PROGRESS NOTE ADULT - ASSESSMENT
Assessment and Plan    62 yo M PMHx of nephrolithiasis intubated for acute hypoxic respiratory failure 2/2 COVID19.    Neuro   #mental status  - sedated on propofol, fentanyl,  and cisatracurium.    Pulm  #respiratory status  - intubated for acute hypoxic respiratory failure, worsening work of breathing  - proned until 5 pm 1/10 (third time)  - obtain ABGs, follow up vent setting  - nebs  - CXR 1/8: elevated right hemidiaphragm ill defined increased right lung markings and left basilar/retrocardiac markings compatible with hx of COVID, Hazy indistinct left CP region could be due to overlying soft tissues versus a small left pleural effusion. Sharp right CP angle. No pneumothorax.  - Overnight 1/9-1/10 worsening acidosis and desat to 83%, RR was increased to 30 over night. AM vent setting turned back to RR 22.  To do: obtain post supination abg at 6 pm 1 hr post supination    Cardiovascular  #hemodynamics  - On max gtt titrated down to 3.8 this am  - given albumin and LR 1/9  - Started on vasopressin 1/10  #Rapid afib  - HR of 160s then went back to 80s spontaneously  - Happened again, and 1x amiodarone was given  #tachycardia  - in the setting of desaturation, rising d-dimer  - Will monitor for signs of DVT    GI  #diet  - current NPO while proned  - will transition to tube feeds    Renal  #worsening MELISSA  - Creatinine increased from 1.43 (1/9) to 2.56 (1/10) and 2.62 (1/10) on repeat  - s/p 1x 2mg bumex push 1/9  - Still putting out urine output of ~100cc/hr  - If urine output goes < 75 cc/hr, will given another 2mg bumex push  #hyponatremia  -resolved    ID  #COVID19  - s/p decadron and remdesivir   - s/p toci   - trend inflammatory markers    #new fevers, leukocytosis  - vanc by level and zosyn (1/9- )  - vanc level 5.2  - f/u MRSA swab  - f/u panculture    Heme-Onc  #DVT ppx  - on lovenox     Assessment and Plan    60 yo M PMHx of nephrolithiasis intubated for acute hypoxic respiratory failure 2/2 COVID19.    Neuro   #mental status  - sedated on propofol, fentanyl,  and cisatracurium.    Pulm  #respiratory status  - intubated for acute hypoxic respiratory failure, worsening work of breathing  - proned until 5 pm 1/10 (third time)  - obtain ABGs, follow up vent setting  - nebs  - CXR 1/8: elevated right hemidiaphragm ill defined increased right lung markings and left basilar/retrocardiac markings compatible with hx of COVID, Hazy indistinct left CP region could be due to overlying soft tissues versus a small left pleural effusion. Sharp right CP angle. No pneumothorax.  - Overnight 1/9-1/10 worsening acidosis and desat to 83%, RR was increased to 30 over night. AM vent setting turned back to RR 22.   Post supination abg PO2 44. Partly likely due to volume overload as patient had net positive 6L during hospital course. Patient was given 4mg bumex   - 1/11: Proned. Supination at 4pm, f/u post supination ABG. Decreasing FiO2    Cardiovascular  #hemodynamics  - given albumin and LR 1/9  - 1/10: Charly and vasopressin  - 1/11: Started on levo, weaning off charly  #Rapid afib  - HR of 160s then went back to 80s spontaneously  - Happened again, and 1x amiodarone was given  #tachycardia  - in the setting of desaturation, rising d-dimer  - Will monitor for signs of DVT    GI  #diet  - current NPO while proned  - will transition to tube feeds    Renal  #worsening MELISSA  - Creatinine increased from 1.43 (1/9) to 2.56 (1/10) and 2.62 (1/10) on repeat  - s/p 1x 2mg bumex push 1/9  - Still putting out urine output of ~100cc/hr  - If urine output goes < 75 cc/hr, will given another 2mg bumex push  #hyponatremia  -resolved    ID  #COVID19  - s/p decadron and remdesivir   - s/p toci   - trend inflammatory markers    #new fevers, leukocytosis  - vanc by level (1/9-1/10) and zosyn (1/9- )  - MRSA negative  - f/u panculture  - Sputum showed few gram negative rods. Blood culture hasn't resulted yet.    Heme-Onc  #DVT ppx  - on heparin subQ     Assessment and Plan    62 yo M PMHx of nephrolithiasis intubated for acute hypoxic respiratory failure 2/2 COVID19.    Neuro   #mental status  - sedated on propofol, fentanyl,  and cisatracurium.    Pulm  #respiratory status  - intubated for acute hypoxic respiratory failure, worsening work of breathing  - proned until 5 pm 1/10 (third time)  - obtain ABGs, follow up vent setting  - nebs  - CXR 1/8: elevated right hemidiaphragm ill defined increased right lung markings and left basilar/retrocardiac markings compatible with hx of COVID, Hazy indistinct left CP region could be due to overlying soft tissues versus a small left pleural effusion. Sharp right CP angle. No pneumothorax.  - Overnight 1/9-1/10 worsening acidosis and desat to 83%, RR was increased to 30 over night. AM vent setting turned back to RR 22.   Post supination abg PO2 44. Partly likely due to volume overload as patient had net positive 6L during hospital course. Patient was given 4mg bumex   - 1/11: Proned. Supination at 4pm, f/u post supination ABG. Decreasing FiO2.     Cardiovascular  #hemodynamics  - given albumin and LR 1/9  - 1/10: Charly and vasopressin  - 1/11: Started on levo, weaning off charly  #Rapid afib  - HR of 160s then went back to 80s spontaneously  - Happened again, and 1x amiodarone was given  #tachycardia  - in the setting of desaturation, rising d-dimer  - Will monitor for signs of DVT    GI  #diet  - after supination will place OG tube  - start trickle feeds    Renal  #MELISSA  - Baseline creatinine 0.8  - s/p 1x 2mg bumex push 1/9  - s/p 1x 4 mg bumex push 1/10  - 1/11: Creatinine stable since yesterday, elevated. Still putting out urine output of ~100cc/hr  - Strict I/O monitoring    #hyponatremia  -resolved    MSK  #Rhabdo  1/11: CK 1890  -replete electrolytes as needed  -hospital course patient net + 7L    ID  #COVID19  - s/p decadron and remdesivir   - trend inflammatory markers    #new fevers, leukocytosis  - vanc by level (1/9-1/10) and zosyn (1/9- )  - MRSA negative  - f/u panculture  - Sputum showed few gram negative rods. Blood culture hasn't resulted yet.    Heme-Onc  #DVT ppx  - on heparin subQ

## 2022-01-11 NOTE — CHART NOTE - NSCHARTNOTEFT_GEN_A_CORE
Patient assessed by RDN on , now for nutrition follow up. Spoke with RN and obtained subjective information from extensive chart review.       Enteral Nutrition: Diet, NPO with Tube Feed:   Tube Feeding Modality: Orogastric  Jevity 1.2 Vern (JEVITY1.2RTH)  Total Volume for 24 Hours (mL): 240  Continuous  Starting Tube Feed Rate {mL per Hour}: 10  Until Goal Tube Feed Rate (mL per Hour): 10  Tube Feed Duration (in Hours): 24  Tube Feed Start Time: 05:00 (22 @ 10:34)    TF Provides:  288 kcals  13 gms protein  193 ml free H2O  240 ml total volume    Weight Trend:                         Dosin.03 kg                      IBW: 69.8 kg    97.7 kg ()    Nutrition Interval Events: Pt s/p RRT  with pt intubated and requiring critical care management. Sedated on cisatracurium, fentanyl and propofol. Propofol running @ 35.1 ml/hr x 24 hrs and contributing approx 924 non-nutritive lipid calories over the past 24 hrs. Pt with BUN/Crea elevated and Phos lab value seems to be trending up. Would suggest changing to Nepro with Carb Steady @ goal rate of 19 ml/hr x 24 hrs which would provide 820 kcals, 37 gms protein, 331 ml free H2O in total volume 456 ml/day. TF with propofol calories would meet pt's calorie need daily. To better meet pt's protein need recommend adding No Carb Prosource x 6/day (90 gms protein) to enteral feeding. This will offer pt a daily protein amount of 127 gms/day. Enteral regimen will ultimately give pt 15 kcals/kg (TF+Propofol) and 1.1 gms/kg (TF+Prosource) of dosing weight. No GI distress; pt noted with constipation and bowel regimen ordered. Suspected DTI of bridge of nose pressure injury noted. Receiving IVPB fluids. Recommend Nephro-Jayda vitamin/mineral supplement to ensure that pt receives optimal micronutrient coverage given the lower rate of TF being suggested. RDN services to remain available as needed.     __________________ Pertinent Medications__________________   MEDICATIONS  (STANDING):  ALBUTerol    90 MICROgram(s) HFA Inhaler 2 Puff(s) Inhalation every 6 hours  chlorhexidine 0.12% Liquid 15 milliLiter(s) Oral Mucosa every 12 hours  chlorhexidine 4% Liquid 1 Application(s) Topical <User Schedule>  cisatracurium Infusion 3 MICROgram(s)/kG/Min (21.1 mL/Hr) IV Continuous <Continuous>  fentaNYL   Infusion. 0.5 MICROgram(s)/kG/Hr (5.85 mL/Hr) IV Continuous <Continuous>  heparin   Injectable 5000 Unit(s) SubCutaneous every 8 hours  norepinephrine Infusion 0.05 MICROgram(s)/kG/Min (5.49 mL/Hr) IV Continuous <Continuous>  petrolatum Ophthalmic Ointment 1 Application(s) Both EYES daily  phenylephrine    Infusion 0.1 MICROgram(s)/kG/Min (2.19 mL/Hr) IV Continuous <Continuous>  piperacillin/tazobactam IVPB.. 3.375 Gram(s) IV Intermittent every 8 hours  polyethylene glycol 3350 17 Gram(s) Oral daily  propofol Infusion 10 MICROgram(s)/kG/Min (7.02 mL/Hr) IV Continuous <Continuous>  senna 2 Tablet(s) Oral at bedtime  vasopressin Infusion 0.04 Unit(s)/Min (2.4 mL/Hr) IV Continuous <Continuous>    MEDICATIONS  (PRN):      __________________ Pertinent Labs__________________    Na132 mmol/L<L> Glu 115 mg/dL<H> K+ 4.9 mmol/L Cr  2.61 mg/dL<H> BUN 46 mg/dL<H>  Phos 4.5 mg/dL  Alb 1.9 g/dL<L> 12-29 Chol 119 mg/dL LDL --    HDL 19 mg/dL<L> Trig 126 mg/dL        Estimated Needs:     1287 - 1755 kcals/day (11-15 kcals/kg of dosing weight)  126 - 140 gms/day (1.8-2.0 gms/lg of IBW)      Previous Nutrition Diagnosis:     Inadequate Energy Intake     Nutrition Diagnosis is:  resolved      New Nutrition Diagnosis:     Increased Nutrient Needs        Related to: physiological causes  As evidenced by: suspected DTI on bridge of nose        Goal(s):  1. Patient to meet > 75% estimated energy needs    Recommendations:   1. Nepro with Carb Steady @ goal rate of 19 ml/hr x 24 hrs with No Carb Prosource x 6/day.  2. Request Nephro-Jayda for micronutrient coverage.    Monitoring and Evaluation:   1. Monitor weights, labs, BMs, skin integrity, TF tolerance and edema.  2. RD services to remain available. Request obtaining new weight to assess trend and determine adequacy of TF.

## 2022-01-11 NOTE — PROGRESS NOTE ADULT - ASSESSMENT
_________________________________________________________________________________________  ========>>  M E D I C A L   A T T E N D I N G    F O L L O W  U P  N O T E  <<=========  -----------------------------------------------------------------------------------------------------    - Patient seen and examined by me earlier today.   - In summary,  ALEXX BROWN is a 61y year old man admitted with SOB, fever..   - Patient intubated, sedated in the MICU, vented , proned      ==================>> REVIEW OF SYSTEM <<=================    unable to obtain     ==================>> PHYSICAL EXAM <<=================    GEN: sedated, vented, intubated.. NAD  HEENT: NCAT lines and tubes in place   Neck: supple lines and tubes in place   CVS: S1S2 , regular  PULM: CTA B/L,  limited   ABD.: soft. non distended  Extrem: intact pulses , no signif edema      + kim with dark urine                              ( Note written / Date of service 01-11-22 )    ==================>> MEDICATIONS <<====================    ALBUTerol    90 MICROgram(s) HFA Inhaler 2 Puff(s) Inhalation every 6 hours  chlorhexidine 0.12% Liquid 15 milliLiter(s) Oral Mucosa every 12 hours  chlorhexidine 4% Liquid 1 Application(s) Topical <User Schedule>  cisatracurium Infusion 3 MICROgram(s)/kG/Min IV Continuous <Continuous>  fentaNYL   Infusion. 0.5 MICROgram(s)/kG/Hr IV Continuous <Continuous>  heparin   Injectable 5000 Unit(s) SubCutaneous every 8 hours  norepinephrine Infusion 0.05 MICROgram(s)/kG/Min IV Continuous <Continuous>  petrolatum Ophthalmic Ointment 1 Application(s) Both EYES daily  phenylephrine    Infusion 0.1 MICROgram(s)/kG/Min IV Continuous <Continuous>  piperacillin/tazobactam IVPB.. 3.375 Gram(s) IV Intermittent every 8 hours  polyethylene glycol 3350 17 Gram(s) Oral daily  propofol Infusion 10 MICROgram(s)/kG/Min IV Continuous <Continuous>  senna 2 Tablet(s) Oral at bedtime  vasopressin Infusion 0.04 Unit(s)/Min IV Continuous <Continuous>    ___________  Active diet:  Diet, NPO with Tube Feed:   Tube Feeding Modality: Orogastric  Jevity 1.2 Vern (JEVITY1.2RTH)  Total Volume for 24 Hours (mL): 240  Continuous  Starting Tube Feed Rate mL per Hour: 10  Until Goal Tube Feed Rate (mL per Hour): 10  Tube Feed Duration (in Hours): 24  Tube Feed Start Time: 05:00  ___________________    ==================>> VITAL SIGNS <<==================    Vital Signs Last 24 HrsT(C): 38 (01-11-22 @ 12:00)  T(F): 100.4 (01-11-22 @ 12:00), Max: 100.4 (01-10-22 @ 20:00)  HR: 85 (01-11-22 @ 13:00) (73 - 130)  BP: -- reviewed on tele   RR: 25 (01-11-22 @ 13:00) (0 - 26)  SpO2: 100% (01-11-22 @ 13:00) (80% - 100%)       ==================>> LAB AND IMAGING <<==================                        14.0   16.33 )-----------( 407      ( 11 Jan 2022 02:33 )             45.3        01-11    132<L>  |  99  |  46<H>  ----------------------------<  115<H>  4.9   |  17<L>  |  2.61<H>    Ca    8.2<L>      11 Jan 2022 02:33  Phos  4.5     01-11  Mg     2.20     01-11    TPro  5.6<L>  /  Alb  1.9<L>  /  TBili  0.8  /  DBili  x   /  AST  182<H>  /  ALT  116<H>  /  AlkPhos  77  01-11    WBC count:   16.33 <<== ,  19.55 <<== ,  19.73 <<== ,  28.99 <<== ,  26.00 <<== ,  15.69 <<==   Hemoglobin:   14.0 <<==,  14.7 <<==,  16.1 <<==,  15.5 <<==,  16.3 <<==,  16.1 <<==  platelets:  407 <==, 420 <==, 462 <==, 581 <==, 560 <==, 446 <==, 567 <==    Creatinine:  2.61  <<==, 2.62  <<==, 2.56  <<==, 1.43  <<==, 1.77  <<==, 1.46  <<==  Sodium:   132  <==, 132  <==, 129  <==, 134  <==, 133  <==, 132  <==, 129  <==       AST:          182 <== , 135 <== , 176 <== , 34 <== , 40 <==      ALT:        116  <== , 88  <== , 91  <== , 28  <== , 30  <==      AP:        77  <=, 64  <=, 62  <=, 70  <=, 75  <=     Bili:        0.8  <=, 0.7  <=, 0.6  <=, 0.7  <=, 0.6  <=    _______________________  C U L T U R E S :    Culture - Sputum (collected 10 Shai 2022 08:28)  Source: .Sputum Sputum  Gram Stain (10 Shai 2022 13:26):    Moderate polymorphonuclear leukocytes per low power field    No Squamous epithelial cells per low power field    Rare Gram Negative Rods per oil power field  Preliminary Report (11 Jan 2022 09:48):    Normal Respiratory Marifer present    Culture - Blood (collected 08 Jan 2022 23:09)  Source: .Blood Blood-Peripheral  Preliminary Report (10 Shai 2022 01:01):    No growth to date.    Culture - Blood (collected 08 Jan 2022 23:09)  Source: .Blood Blood-Peripheral  Preliminary Report (10 Shai 2022 01:01):    No growth to date.    SARS-CoV-2: Detected (12-28-21 @ 08:05)    ^^^ Inflammatory markers :  ^^^  C R P :          116.3 (01-07-22)  <<--, 131.9 (01-04-22)  <<--, 96.4 (01-02-22)  <<--, 31.8 (12-30-21)  <<--, 82.2 (12-28-21)  <<--  P C T :         0.15 (01-07-22) <<--, 0.13 (01-02-22) <<--, 0.11 (12-30-21) <<--, 0.16 (12-28-21) <<--    Ferritin :         1107 (01-07-22) <<--, 1058 (01-04-22) <<--, 890 (01-02-22) <<--, 885 (12-28-21) <<--    D-Dimer :          3352 (01-11-22) <<--, 2670 (01-08-22) <<--, 788 (01-07-22) <<--, 429 (01-04-22) <<--, 322 (01-02-22) <<--    ___________________________________________________________________________________  ===============>>  A S S E S S M E N T   A N D   P L A N <<===============  ------------------------------------------------------------------------------------------    · Assessment	  61 year old man with history of kidney stone presenting with fever, cough, SOB x 2-3d.   admitted for COVID     Problem/Plan - 1:  ·  Problem: Acute hypoxemic respiratory failure due to COVID-19 with Viral syndrome.  post Remdesivir and Decadron per hospital protocol  trend inflammatory markers   ICU care and mgmt apprecaied >> wean down Vent / O2  as able  supportive care  nutrition, hydration  as able   vitamins / supplements   DVT prophylaxis : lovenox 40 BID  Continue Current medications otherwise and monitor.     ** Fever , leukocytosis  new fever on day of intubation   pan cultures in process   suspect aspiration pneumonia  started on Zosyn / broad spectrum abx > would give X 7 days   less likely PE as been on Lovenox 40 BID    would check LE dopplers   antipyretics as needed  monitor     **MELISSA and transaminitis likely due to hemodynamic changes dueing code  with episides of hypotention 2/2 propofol     hold further diuretics     keep MAP >60      tend BMP closely    Avoid nephrotoxic medications.     Problem/Plan - 2:  ·  Problem:  obesity and likely ERICK    discussed with pt re weight loss and sleep study > obtaining and using a CPAP machine pos discharge  on Bipap now     -GI/DVT Prophylaxis per protocol.      ___________________________  H. ABNER Knowles.  Pager: [phone]

## 2022-01-11 NOTE — PROGRESS NOTE ADULT - SUBJECTIVE AND OBJECTIVE BOX
INTERVAL HPI/OVERNIGHT EVENTS: Yesterday afteroon patient had runs of a fib that was converted back post 1x amiodarone. at 5pm, patient was supinated. Post supination abg had Po2 of 44, and thought to be partly due to volume overload. Patient was given 4 mg bumex. ABG PF ratio worsening 180 (was 170)  Patient was started on levo, with max being weaned down.  Patient proned again at 8 pm.    SUBJECTIVE: Patient seen and examined at bedside. No complaints. Denies fever, chills, chest pain, shortness of breath, abdominal pain, nausea, vomiting, changes in bowel habits, or urinary symptoms    OBJECTIVE:    VITAL SIGNS:  ICU Vital Signs Last 24 Hrs  T(C): 38 (11 Jan 2022 08:00), Max: 38 (10 Shai 2022 20:00)  T(F): 100.4 (11 Jan 2022 08:00), Max: 100.4 (10 Shai 2022 20:00)  HR: 83 (11 Jan 2022 09:00) (73 - 130)  BP: --  BP(mean): --  ABP: 107/68 (11 Jan 2022 09:00) (69/40 - 128/72)  ABP(mean): 82 (11 Jan 2022 09:00) (49 - 99)  RR: 25 (11 Jan 2022 09:00) (0 - 26)  SpO2: 100% (11 Jan 2022 09:00) (80% - 100%)    Mode: AC/ CMV (Assist Control/ Continuous Mandatory Ventilation), RR (machine): 25, TV (machine): 550, FiO2: 90, PEEP: 5, ITime: 0.78, MAP: 12, PC: 25, PIP: 30    01-10 @ 07:01 - 01-11 @ 07:00  --------------------------------------------------------  IN: 4706.5 mL / OUT: 2590 mL / NET: 2116.5 mL    01-11 @ 07:01 - 01-11 @ 10:46  --------------------------------------------------------  IN: 279.6 mL / OUT: 0 mL / NET: 279.6 mL      CAPILLARY BLOOD GLUCOSE          PHYSICAL EXAM:    General: NAD  HEENT: NC/AT; PERRL, clear conjunctiva  Respiratory: CTA b/l  Cardiovascular: +S1/S2; RRR  Abdomen: soft, NT/ND; +BS x4  Extremities: WWP, 2+ peripheral pulses b/l; no LE edema  Skin: normal color and turgor; no rash  Neurological: AAOx0, sedated and paralyzed, no focal deficits    MEDICATIONS:  MEDICATIONS  (STANDING):  ALBUTerol    90 MICROgram(s) HFA Inhaler 2 Puff(s) Inhalation every 6 hours  chlorhexidine 0.12% Liquid 15 milliLiter(s) Oral Mucosa every 12 hours  chlorhexidine 4% Liquid 1 Application(s) Topical <User Schedule>  cisatracurium Infusion 3 MICROgram(s)/kG/Min (21.1 mL/Hr) IV Continuous <Continuous>  fentaNYL   Infusion. 0.5 MICROgram(s)/kG/Hr (5.85 mL/Hr) IV Continuous <Continuous>  heparin   Injectable 5000 Unit(s) SubCutaneous every 8 hours  norepinephrine Infusion 0.05 MICROgram(s)/kG/Min (5.49 mL/Hr) IV Continuous <Continuous>  petrolatum Ophthalmic Ointment 1 Application(s) Both EYES daily  phenylephrine    Infusion 0.1 MICROgram(s)/kG/Min (2.19 mL/Hr) IV Continuous <Continuous>  piperacillin/tazobactam IVPB.. 3.375 Gram(s) IV Intermittent every 8 hours  polyethylene glycol 3350 17 Gram(s) Oral daily  propofol Infusion 10 MICROgram(s)/kG/Min (7.02 mL/Hr) IV Continuous <Continuous>  senna 2 Tablet(s) Oral at bedtime  vasopressin Infusion 0.04 Unit(s)/Min (2.4 mL/Hr) IV Continuous <Continuous>    MEDICATIONS  (PRN):      ALLERGIES:  Allergies    Cipro (Rash)    Intolerances    eggs (Diarrhea)      LABS:                        14.0   16.33 )-----------( 407      ( 11 Jan 2022 02:33 )             45.3     01-11    132<L>  |  99  |  46<H>  ----------------------------<  115<H>  4.9   |  17<L>  |  2.61<H>    Ca    8.2<L>      11 Jan 2022 02:33  Phos  4.5     01-11  Mg     2.20     01-11    TPro  5.6<L>  /  Alb  1.9<L>  /  TBili  0.8  /  DBili  x   /  AST  182<H>  /  ALT  116<H>  /  AlkPhos  77  01-11          RADIOLOGY & ADDITIONAL TESTS: Reviewed. INTERVAL HPI/OVERNIGHT EVENTS: Yesterday afteroon patient had runs of a fib that was converted back post 1x amiodarone. at 5pm, patient was supinated. Post supination abg had Po2 of 44, and thought to be partly due to volume overload. Patient was given 4 mg bumex. ABG PF ratio worsening  Patient proned again at 8 pm yesterday.  Patient was started on levo, with max being weaned down.    SUBJECTIVE: Patient seen and examined at bedside. No complaints. Denies fever, chills, chest pain, shortness of breath, abdominal pain, nausea, vomiting, changes in bowel habits, or urinary symptoms    OBJECTIVE:    VITAL SIGNS:  ICU Vital Signs Last 24 Hrs  T(C): 38 (11 Jan 2022 08:00), Max: 38 (10 Shai 2022 20:00)  T(F): 100.4 (11 Jan 2022 08:00), Max: 100.4 (10 Shai 2022 20:00)  HR: 83 (11 Jan 2022 09:00) (73 - 130)  BP: --  BP(mean): --  ABP: 107/68 (11 Jan 2022 09:00) (69/40 - 128/72)  ABP(mean): 82 (11 Jan 2022 09:00) (49 - 99)  RR: 25 (11 Jan 2022 09:00) (0 - 26)  SpO2: 100% (11 Jan 2022 09:00) (80% - 100%)    Mode: AC/ CMV (Assist Control/ Continuous Mandatory Ventilation), RR (machine): 25, TV (machine): 550, FiO2: 90, PEEP: 5, ITime: 0.78, MAP: 12, PC: 25, PIP: 30    01-10 @ 07:01 - 01-11 @ 07:00  --------------------------------------------------------  IN: 4706.5 mL / OUT: 2590 mL / NET: 2116.5 mL    01-11 @ 07:01 - 01-11 @ 10:46  --------------------------------------------------------  IN: 279.6 mL / OUT: 0 mL / NET: 279.6 mL      CAPILLARY BLOOD GLUCOSE          PHYSICAL EXAM:    General: NAD  HEENT: NC/AT; PERRL, clear conjunctiva  Respiratory: CTA b/l  Cardiovascular: +S1/S2; RRR  Abdomen: soft, NT/ND; +BS x4  Extremities: WWP, 2+ peripheral pulses b/l; no LE edema  Skin: normal color and turgor; no rash  Neurological: AAOx0, sedated and paralyzed, no focal deficits    MEDICATIONS:  MEDICATIONS  (STANDING):  ALBUTerol    90 MICROgram(s) HFA Inhaler 2 Puff(s) Inhalation every 6 hours  chlorhexidine 0.12% Liquid 15 milliLiter(s) Oral Mucosa every 12 hours  chlorhexidine 4% Liquid 1 Application(s) Topical <User Schedule>  cisatracurium Infusion 3 MICROgram(s)/kG/Min (21.1 mL/Hr) IV Continuous <Continuous>  fentaNYL   Infusion. 0.5 MICROgram(s)/kG/Hr (5.85 mL/Hr) IV Continuous <Continuous>  heparin   Injectable 5000 Unit(s) SubCutaneous every 8 hours  norepinephrine Infusion 0.05 MICROgram(s)/kG/Min (5.49 mL/Hr) IV Continuous <Continuous>  petrolatum Ophthalmic Ointment 1 Application(s) Both EYES daily  phenylephrine    Infusion 0.1 MICROgram(s)/kG/Min (2.19 mL/Hr) IV Continuous <Continuous>  piperacillin/tazobactam IVPB.. 3.375 Gram(s) IV Intermittent every 8 hours  polyethylene glycol 3350 17 Gram(s) Oral daily  propofol Infusion 10 MICROgram(s)/kG/Min (7.02 mL/Hr) IV Continuous <Continuous>  senna 2 Tablet(s) Oral at bedtime  vasopressin Infusion 0.04 Unit(s)/Min (2.4 mL/Hr) IV Continuous <Continuous>    MEDICATIONS  (PRN):      ALLERGIES:  Allergies    Cipro (Rash)    Intolerances    eggs (Diarrhea)      LABS:                        14.0   16.33 )-----------( 407      ( 11 Jan 2022 02:33 )             45.3     01-11    132<L>  |  99  |  46<H>  ----------------------------<  115<H>  4.9   |  17<L>  |  2.61<H>    Ca    8.2<L>      11 Jan 2022 02:33  Phos  4.5     01-11  Mg     2.20     01-11    TPro  5.6<L>  /  Alb  1.9<L>  /  TBili  0.8  /  DBili  x   /  AST  182<H>  /  ALT  116<H>  /  AlkPhos  77  01-11          RADIOLOGY & ADDITIONAL TESTS: Reviewed.

## 2022-01-12 NOTE — PROGRESS NOTE ADULT - SUBJECTIVE AND OBJECTIVE BOX
INTERVAL HPI/OVERNIGHT EVENTS: No acute overnight events.    SUBJECTIVE: Patient seen and examined at bedside. Unable to obtain ROS due to mental status.  1/11 had a fib rvr and was given 1x amio, and started on amio load. Had low grade fever in afternoon, was given 1g iv acetaminophen. Started on heparin drip for afib with RVR.  Given 2mg bumex. Improving PO2 on abg and improving PF ratio.     OBJECTIVE:    VITAL SIGNS:  ICU Vital Signs Last 24 Hrs  T(C): 37.9 (12 Jan 2022 08:00), Max: 38.2 (11 Jan 2022 16:00)  T(F): 100.3 (12 Jan 2022 08:00), Max: 100.8 (11 Jan 2022 16:00)  HR: 73 (12 Jan 2022 08:00) (70 - 149)  BP: --  BP(mean): --  ABP: 98/64 (12 Jan 2022 08:00) (80/58 - 129/80)  ABP(mean): 77 (12 Jan 2022 08:00) (59 - 154)  RR: 26 (12 Jan 2022 08:00) (22 - 26)  SpO2: 93% (12 Jan 2022 08:00) (52% - 100%)    Mode: AC/ CMV (Assist Control/ Continuous Mandatory Ventilation), RR (machine): 26, FiO2: 80, PEEP: 10, ITime: 0.78, MAP: 19, PC: 25, PIP: 36    01-11 @ 07:01 - 01-12 @ 07:00  --------------------------------------------------------  IN: 3745.5 mL / OUT: 3050 mL / NET: 695.5 mL    01-12 @ 07:01  -  01-12 @ 09:42  --------------------------------------------------------  IN: 298.5 mL / OUT: 0 mL / NET: 298.5 mL      CAPILLARY BLOOD GLUCOSE          PHYSICAL EXAM:    General: NAD  HEENT: NC/AT; PERRL, clear conjunctiva  Respiratory: CTA b/l  Cardiovascular: +S1/S2; RRR  Abdomen: soft, NT/ND; +BS x4  Extremities: WWP, 2+ peripheral pulses b/l; no LE edema  Skin: normal color and turgor; no rash  Neurological: AAOx3, no focal deficits    MEDICATIONS:  MEDICATIONS  (STANDING):  ALBUTerol    90 MICROgram(s) HFA Inhaler 2 Puff(s) Inhalation every 6 hours  aMIOdarone Infusion 1 mG/Min (33.3 mL/Hr) IV Continuous <Continuous>  aMIOdarone Infusion 0.5 mG/Min (16.7 mL/Hr) IV Continuous <Continuous>  chlorhexidine 0.12% Liquid 15 milliLiter(s) Oral Mucosa every 12 hours  chlorhexidine 4% Liquid 1 Application(s) Topical <User Schedule>  cisatracurium Infusion 3 MICROgram(s)/kG/Min (21.1 mL/Hr) IV Continuous <Continuous>  fentaNYL   Infusion. 0.5 MICROgram(s)/kG/Hr (5.85 mL/Hr) IV Continuous <Continuous>  heparin  Infusion.  Unit(s)/Hr (21 mL/Hr) IV Continuous <Continuous>  norepinephrine Infusion 0.05 MICROgram(s)/kG/Min (5.49 mL/Hr) IV Continuous <Continuous>  petrolatum Ophthalmic Ointment 1 Application(s) Both EYES daily  phenylephrine    Infusion 0.1 MICROgram(s)/kG/Min (2.19 mL/Hr) IV Continuous <Continuous>  piperacillin/tazobactam IVPB.. 3.375 Gram(s) IV Intermittent every 8 hours  polyethylene glycol 3350 17 Gram(s) Oral daily  propofol Infusion 10 MICROgram(s)/kG/Min (7.02 mL/Hr) IV Continuous <Continuous>  senna 2 Tablet(s) Oral at bedtime  vasopressin Infusion 0.04 Unit(s)/Min (2.4 mL/Hr) IV Continuous <Continuous>    MEDICATIONS  (PRN):  heparin   Injectable 9500 Unit(s) IV Push every 6 hours PRN For aPTT less than 40  heparin   Injectable 4500 Unit(s) IV Push every 6 hours PRN For aPTT between 40 - 57      ALLERGIES:  Allergies    Cipro (Rash)    Intolerances    eggs (Diarrhea)      LABS:                        13.0   14.78 )-----------( 325      ( 12 Jan 2022 04:51 )             40.9     01-12    131<L>  |  99  |  45<H>  ----------------------------<  151<H>  4.1   |  18<L>  |  2.39<H>    Ca    8.1<L>      12 Jan 2022 00:52  Phos  3.8     01-12  Mg     1.90     01-12    TPro  5.6<L>  /  Alb  2.0<L>  /  TBili  0.9  /  DBili  x   /  AST  109<H>  /  ALT  100<H>  /  AlkPhos  78  01-12    PT/INR - ( 11 Jan 2022 20:10 )   PT: 12.8 sec;   INR: 1.13 ratio         PTT - ( 12 Jan 2022 04:51 )  PTT:46.6 sec      RADIOLOGY & ADDITIONAL TESTS: Reviewed. INTERVAL HPI/OVERNIGHT EVENTS: No acute overnight events.    SUBJECTIVE: Patient seen and examined at bedside. Unable to obtain ROS due to mental status.  1/11 had a fib rvr and was given 1x amio, and started on amio load. Had low grade fever in afternoon, was given 1g iv acetaminophen. Started on heparin drip for afib with RVR.  Given 2mg bumex. Improving PO2 on abg and improving PF ratio.    OBJECTIVE:    VITAL SIGNS:  ICU Vital Signs Last 24 Hrs  T(C): 37.9 (12 Jan 2022 08:00), Max: 38.2 (11 Jan 2022 16:00)  T(F): 100.3 (12 Jan 2022 08:00), Max: 100.8 (11 Jan 2022 16:00)  HR: 73 (12 Jan 2022 08:00) (70 - 149)  BP: --  BP(mean): --  ABP: 98/64 (12 Jan 2022 08:00) (80/58 - 129/80)  ABP(mean): 77 (12 Jan 2022 08:00) (59 - 154)  RR: 26 (12 Jan 2022 08:00) (22 - 26)  SpO2: 93% (12 Jan 2022 08:00) (52% - 100%)    Mode: AC/ CMV (Assist Control/ Continuous Mandatory Ventilation), RR (machine): 26, FiO2: 80, PEEP: 10, ITime: 0.78, MAP: 19, PC: 25, PIP: 36    01-11 @ 07:01 - 01-12 @ 07:00  --------------------------------------------------------  IN: 3745.5 mL / OUT: 3050 mL / NET: 695.5 mL    01-12 @ 07:01  -  01-12 @ 09:42  --------------------------------------------------------  IN: 298.5 mL / OUT: 0 mL / NET: 298.5 mL      CAPILLARY BLOOD GLUCOSE          PHYSICAL EXAM:    General: Sedated and paralyzed, proned  HEENT: NC/AT; PERRL, clear conjunctiva  Respiratory: CTA b/l  Cardiovascular: Unable to appreciate due to prone positioning  Abdomen: Unable to appreciate due to prone positioning  Extremities: WWP, 2+ peripheral pulses b/l; no LE edema  Skin: normal color and turgor; no rash, edematous extremities  Neurological: AAOx0, sedated, paralyzed, no focal deficits    MEDICATIONS:  MEDICATIONS  (STANDING):  ALBUTerol    90 MICROgram(s) HFA Inhaler 2 Puff(s) Inhalation every 6 hours  aMIOdarone Infusion 1 mG/Min (33.3 mL/Hr) IV Continuous <Continuous>  aMIOdarone Infusion 0.5 mG/Min (16.7 mL/Hr) IV Continuous <Continuous>  chlorhexidine 0.12% Liquid 15 milliLiter(s) Oral Mucosa every 12 hours  chlorhexidine 4% Liquid 1 Application(s) Topical <User Schedule>  cisatracurium Infusion 3 MICROgram(s)/kG/Min (21.1 mL/Hr) IV Continuous <Continuous>  fentaNYL   Infusion. 0.5 MICROgram(s)/kG/Hr (5.85 mL/Hr) IV Continuous <Continuous>  heparin  Infusion.  Unit(s)/Hr (21 mL/Hr) IV Continuous <Continuous>  norepinephrine Infusion 0.05 MICROgram(s)/kG/Min (5.49 mL/Hr) IV Continuous <Continuous>  petrolatum Ophthalmic Ointment 1 Application(s) Both EYES daily  phenylephrine    Infusion 0.1 MICROgram(s)/kG/Min (2.19 mL/Hr) IV Continuous <Continuous>  piperacillin/tazobactam IVPB.. 3.375 Gram(s) IV Intermittent every 8 hours  polyethylene glycol 3350 17 Gram(s) Oral daily  propofol Infusion 10 MICROgram(s)/kG/Min (7.02 mL/Hr) IV Continuous <Continuous>  senna 2 Tablet(s) Oral at bedtime  vasopressin Infusion 0.04 Unit(s)/Min (2.4 mL/Hr) IV Continuous <Continuous>    MEDICATIONS  (PRN):  heparin   Injectable 9500 Unit(s) IV Push every 6 hours PRN For aPTT less than 40  heparin   Injectable 4500 Unit(s) IV Push every 6 hours PRN For aPTT between 40 - 57      ALLERGIES:  Allergies    Cipro (Rash)    Intolerances    eggs (Diarrhea)      LABS:                        13.0   14.78 )-----------( 325      ( 12 Jan 2022 04:51 )             40.9     01-12    131<L>  |  99  |  45<H>  ----------------------------<  151<H>  4.1   |  18<L>  |  2.39<H>    Ca    8.1<L>      12 Jan 2022 00:52  Phos  3.8     01-12  Mg     1.90     01-12    TPro  5.6<L>  /  Alb  2.0<L>  /  TBili  0.9  /  DBili  x   /  AST  109<H>  /  ALT  100<H>  /  AlkPhos  78  01-12    PT/INR - ( 11 Jan 2022 20:10 )   PT: 12.8 sec;   INR: 1.13 ratio         PTT - ( 12 Jan 2022 04:51 )  PTT:46.6 sec      RADIOLOGY & ADDITIONAL TESTS: Reviewed.

## 2022-01-12 NOTE — PROGRESS NOTE ADULT - ASSESSMENT
Assessment and Plan    60 yo M PMHx of nephrolithiasis intubated for acute hypoxic respiratory failure 2/2 COVID19.    Neuro   #mental status  - sedated on propofol, fentanyl,  and cisatracurium.    Pulm  #respiratory status  - intubated for acute hypoxic respiratory failure, worsening work of breathing  - proned until 5 pm 1/10 (third time)  - obtain ABGs, follow up vent setting  - nebs  - CXR 1/8: elevated right hemidiaphragm ill defined increased right lung markings and left basilar/retrocardiac markings compatible with hx of COVID, Hazy indistinct left CP region could be due to overlying soft tissues versus a small left pleural effusion. Sharp right CP angle. No pneumothorax.  - Overnight 1/9-1/10 worsening acidosis and desat to 83%, RR was increased to 30 over night. AM vent setting turned back to RR 22.   Post supination abg PO2 44. Partly likely due to volume overload as patient had net positive 6L during hospital course. Patient was given 4mg bumex   - 1/11: Proned. Supination at 4pm, f/u post supination ABG. Decreasing FiO2.     Cardiovascular  #hemodynamics  - given albumin and LR 1/9  - 1/10: Charly and vasopressin  - 1/11: Started on levo, weaning off charly  #Rapid afib  - 1/10: HR of 160s then went back to 80s spontaneously. Happened again, and 1x amiodarone was given  - 1/11:  1x amiodarone. started on amiodarone load. Started on heparin ggt  #tachycardia  - in the setting of desaturation, rising d-dimer  - Will monitor for signs of DVT    GI  #diet  - after supination will place OG tube  - start trickle feeds    Renal  #MELISSA  - Baseline creatinine 0.8  - s/p 1x 2mg bumex push 1/9  - s/p 1x 4 mg bumex push 1/10  - 1/11: Creatinine stable since yesterday, elevated. Still putting out urine output of ~100cc/hr  - Strict I/O monitoring  - 1/12: MELISSA improving  #Mild hyponatremia, stable    MSK  #Rhabdo  1/11: CK 1890  -replete electrolytes as needed    ID  #COVID19  - s/p decadron and remdesivir   - trend inflammatory markers    #new fevers, leukocytosis  - vanc by level (1/9-1/10) and zosyn (1/9- )  - MRSA negative  - f/u panculture  - Sputum showed few gram negative rods. Blood culture no growth to date.    Heme-Onc  #DVT ppx  - on heparin ggt for afib rvr   Assessment and Plan    62 yo M PMHx of nephrolithiasis intubated for acute hypoxic respiratory failure 2/2 COVID19 with ARDS, requiring pressor support, and with MELISSA, complicated by superimposed bacterial infection on zosyn and A fib w/ RVR on amiodarone.    Neuro   #mental status  - sedated on propofol, fentanyl,  and cisatracurium.    Pulm  #respiratory status  - intubated for acute hypoxic respiratory failure, worsening work of breathing  - proned until 5 pm 1/10 (third time)  - obtain ABGs, follow up vent setting  - nebs  - CXR 1/8: elevated right hemidiaphragm ill defined increased right lung markings and left basilar/retrocardiac markings compatible with hx of COVID, Hazy indistinct left CP region could be due to overlying soft tissues versus a small left pleural effusion. Sharp right CP angle. No pneumothorax.  - Overnight 1/9-1/10 worsening acidosis and desat to 83%, RR was increased to 30 over night. AM vent setting turned back to RR 22.   Post supination abg PO2 44. Partly likely due to volume overload as patient had net positive 6L during hospital course. Patient was given 4mg bumex   - 1/11: Proned. Supination at 4pm, f/u post supination ABG. Decreasing FiO2.  - 1/12: Proned until 4pm supination. f/u post supination ABG.    Cardiovascular  #hemodynamics  - given albumin and LR 1/9  - 1/10: Charly and vasopressin  - 1/11: Started on levo, weaning off charly  - 1/12: Weaning off charly  #Rapid afib  - 1/10: HR of 160s then went back to 80s spontaneously. Happened again, and 1x amiodarone was given  - 1/11:  1x amiodarone. started on amiodarone load. Started on heparin ggt  - 1/12: Finishing up Amio Load, starting Amio PO in AM. Trend LFT's.  #tachycardia  - in the setting of desaturation, rising d-dimer  - Will monitor for signs of DVT    GI  #diet  - after supination will place OG tube  - start trickle feeds    Renal  #MELISSA  - Baseline creatinine 0.8  - s/p 1x 2mg bumex push 1/9  - s/p 1x 4 mg bumex push 1/10  - 1/11: Creatinine stable since yesterday, elevated. Still putting out urine output of ~100cc/hr  - Strict I/O monitoring  - 1/12: MELISSA improving  #Mild hyponatremia, stable    MSK  #Rhabdo  1/11: CK 1890  -replete electrolytes as needed    ID  #COVID19  - s/p decadron and remdesivir   - trend inflammatory markers    #new fevers, leukocytosis  - vanc by level (1/9-1/10) and zosyn (1/9- )  - MRSA negative  - f/u panculture  - Sputum showed few gram negative rods. Blood culture no growth to date.    Heme-Onc  #DVT ppx  - on heparin ggt for afib rvr

## 2022-01-12 NOTE — PROGRESS NOTE ADULT - ASSESSMENT
_________________________________________________________________________________________  ========>>  M E D I C A L   A T T E N D I N G    F O L L O W  U P  N O T E  <<=========  -----------------------------------------------------------------------------------------------------    - Patient seen and examined by me earlier today.   - In summary,  ALEXX BROWN is a 61y year old man admitted with SOB, fever..   - Patient intubated, sedated in the MICU, vented , proned      ==================>> REVIEW OF SYSTEM <<=================    unable to obtain     ==================>> PHYSICAL EXAM <<=================    GEN: sedated, vented, intubated.. NAD  HEENT: NCAT lines and tubes in place   Neck: supple lines and tubes in place   CVS: S1S2 , regular  PULM: CTA B/L,  limited   ABD.: soft. non distended  Extrem: intact pulses , no signif edema      + kim with dark urine                               ( Note written / Date of service 01-12-22 )    ==================>> MEDICATIONS <<====================    acetaminophen  Suppository .. 650 milliGRAM(s) Rectal once  ALBUTerol    90 MICROgram(s) HFA Inhaler 2 Puff(s) Inhalation every 6 hours  aMIOdarone Infusion 1 mG/Min IV Continuous <Continuous>  chlorhexidine 0.12% Liquid 15 milliLiter(s) Oral Mucosa every 12 hours  chlorhexidine 4% Liquid 1 Application(s) Topical <User Schedule>  cisatracurium Infusion 3 MICROgram(s)/kG/Min IV Continuous <Continuous>  fentaNYL   Infusion. 0.5 MICROgram(s)/kG/Hr IV Continuous <Continuous>  heparin  Infusion.  Unit(s)/Hr IV Continuous <Continuous>  norepinephrine Infusion 0.05 MICROgram(s)/kG/Min IV Continuous <Continuous>  petrolatum Ophthalmic Ointment 1 Application(s) Both EYES daily  phenylephrine    Infusion 0.1 MICROgram(s)/kG/Min IV Continuous <Continuous>  piperacillin/tazobactam IVPB.. 3.375 Gram(s) IV Intermittent every 8 hours  polyethylene glycol 3350 17 Gram(s) Oral daily  propofol Infusion 10 MICROgram(s)/kG/Min IV Continuous <Continuous>  senna 2 Tablet(s) Oral at bedtime  vasopressin Infusion 0.04 Unit(s)/Min IV Continuous <Continuous>    MEDICATIONS  (PRN):  heparin   Injectable 9500 Unit(s) IV Push every 6 hours PRN For aPTT less than 40  heparin   Injectable 4500 Unit(s) IV Push every 6 hours PRN For aPTT between 40 - 57    ___________  Active diet:  Diet, NPO  ___________________    ==================>> VITAL SIGNS <<==================    Vital Signs Last 24 HrsT(C): 37.9 (01-12-22 @ 08:00)  T(F): 100.3 (01-12-22 @ 08:00), Max: 100.8 (01-11-22 @ 16:00)  HR: 75 (01-12-22 @ 11:00) (70 - 149)  BP: -- reviewed on tele  RR: 26 (01-12-22 @ 11:00) (22 - 26)  SpO2: 93% (01-12-22 @ 11:00) (52% - 100%)         ==================>> LAB AND IMAGING <<==================                        13.0   14.78 )-----------( 325      ( 12 Jan 2022 04:51 )             40.9        01-12    131<L>  |  99  |  45<H>  ----------------------------<  151<H>  4.1   |  18<L>  |  2.39<H>    Ca    8.1<L>      12 Jan 2022 00:52  Phos  3.8     01-12  Mg     1.90     01-12    TPro  5.6<L>  /  Alb  2.0<L>  /  TBili  0.9  /  DBili  x   /  AST  109<H>  /  ALT  100<H>  /  AlkPhos  78  01-12    WBC count:   14.78 <<== ,  15.09 <<== ,  16.33 <<== ,  19.55 <<== ,  19.73 <<== ,  28.99 <<==   Hemoglobin:   13.0 <<==,  13.3 <<==,  14.0 <<==,  14.7 <<==,  16.1 <<==,  15.5 <<==  platelets:  325 <==, 334 <==, 407 <==, 420 <==, 462 <==, 581 <==, 560 <==    Creatinine:  2.39  <<==, 2.61  <<==, 2.62  <<==, 2.56  <<==, 1.43  <<==, 1.77  <<==  Sodium:   131  <==, 132  <==, 132  <==, 129  <==, 134  <==, 133  <==, 132  <==       AST:          109 <== , 182 <== , 135 <== , 176 <== , 34 <==      ALT:        100  <== , 116  <== , 88  <== , 91  <== , 28  <==      AP:        78  <=, 77  <=, 64  <=, 62  <=, 70  <=     Bili:        0.9  <=, 0.8  <=, 0.7  <=, 0.6  <=, 0.7  <=    _______________________  C U L T U R E S :    Culture - Sputum (collected 10 Shai 2022 08:28)  Source: .Sputum Sputum  Gram Stain (10 Shai 2022 13:26):    Moderate polymorphonuclear leukocytes per low power field    No Squamous epithelial cells per low power field    Rare Gram Negative Rods per oil power field  Preliminary Report (11 Jan 2022 09:48):    Normal Respiratory Marifer present    Culture - Blood (collected 08 Jan 2022 23:09)  Source: .Blood Blood-Peripheral  Preliminary Report (10 Shai 2022 01:01):    No growth to date.    Culture - Blood (collected 08 Jan 2022 23:09)  Source: .Blood Blood-Peripheral  Preliminary Report (10 Shai 2022 01:01):    No growth to date.    SARS-CoV-2: Detected (12-28-21 @ 08:05)    ___________________________________________________________________________________  ===============>>  A S S E S S M E N T   A N D   P L A N <<===============  ------------------------------------------------------------------------------------------    · Assessment	  61 year old man with history of kidney stone presenting with fever, cough, SOB x 2-3d.   admitted for COVID     Problem/Plan - 1:  ·  Problem: Acute hypoxemic respiratory failure due to COVID-19 with Viral syndrome.  post Remdesivir and Decadron per hospital protocol  trend inflammatory markers   ICU care and mgmt apprecaied >> wean down Vent / O2  as able  supportive care  nutrition, hydration  as able   vitamins / supplements   DVT prophylaxis : lovenox 40 BID  Continue Current medications otherwise and monitor.     ** Fever , leukocytosis, improved   new fever on day of intubation   pan cultures in process   suspect aspiration pneumonia  on Zosyn / broad spectrum abx > would give X 7 days   less likely PE as been on Lovenox 40 BID    would check LE dopplers   antipyretics as needed  monitor     **MELISSA and transaminitis likely due to hemodynamic changes during code  with episodes of hypotension 2/2 propofol     hold further diuretics     keep MAP >60      tend BMP closely    Avoid nephrotoxic medications.     Problem/Plan - 2:  ·  Problem:  obesity and likely ERICK    discussed with pt re weight loss and sleep study > obtaining and using a CPAP machine pos discharge  on Bipap now     -GI/DVT Prophylaxis per protocol.      ___________________________  H. ABNER Knowles.  Pager: [phone]

## 2022-01-12 NOTE — PROGRESS NOTE ADULT - SUBJECTIVE AND OBJECTIVE BOX
Cardiovascular Disease Progress Note    Overnight events: No acute events overnight.  remains intubated and sedated. high o2 requirements. in prone position. scr improving      Objective Findings:  T(C): 37.5 (01-12-22 @ 04:00), Max: 38.2 (01-11-22 @ 16:00)  HR: 73 (01-12-22 @ 07:00) (70 - 149)  BP: --  RR: 26 (01-12-22 @ 07:00) (22 - 26)  SpO2: 93% (01-12-22 @ 07:00) (52% - 100%)  Wt(kg): --  Daily     Daily       Physical Exam:  deferred due to covid    Telemetry: nsr    Laboratory Data:                        13.0   14.78 )-----------( 325      ( 12 Jan 2022 04:51 )             40.9     01-12    131<L>  |  99  |  45<H>  ----------------------------<  151<H>  4.1   |  18<L>  |  2.39<H>    Ca    8.1<L>      12 Jan 2022 00:52  Phos  3.8     01-12  Mg     1.90     01-12    TPro  5.6<L>  /  Alb  2.0<L>  /  TBili  0.9  /  DBili  x   /  AST  109<H>  /  ALT  100<H>  /  AlkPhos  78  01-12    PT/INR - ( 11 Jan 2022 20:10 )   PT: 12.8 sec;   INR: 1.13 ratio         PTT - ( 12 Jan 2022 04:51 )  PTT:46.6 sec  CARDIAC MARKERS ( 11 Jan 2022 02:50 )  x     / x     / 1890 U/L / x     / x              Inpatient Medications:  MEDICATIONS  (STANDING):  ALBUTerol    90 MICROgram(s) HFA Inhaler 2 Puff(s) Inhalation every 6 hours  aMIOdarone Infusion 1 mG/Min (33.3 mL/Hr) IV Continuous <Continuous>  aMIOdarone Infusion 0.5 mG/Min (16.7 mL/Hr) IV Continuous <Continuous>  chlorhexidine 0.12% Liquid 15 milliLiter(s) Oral Mucosa every 12 hours  chlorhexidine 4% Liquid 1 Application(s) Topical <User Schedule>  cisatracurium Infusion 3 MICROgram(s)/kG/Min (21.1 mL/Hr) IV Continuous <Continuous>  fentaNYL   Infusion. 0.5 MICROgram(s)/kG/Hr (5.85 mL/Hr) IV Continuous <Continuous>  heparin  Infusion.  Unit(s)/Hr (21 mL/Hr) IV Continuous <Continuous>  norepinephrine Infusion 0.05 MICROgram(s)/kG/Min (5.49 mL/Hr) IV Continuous <Continuous>  petrolatum Ophthalmic Ointment 1 Application(s) Both EYES daily  phenylephrine    Infusion 0.1 MICROgram(s)/kG/Min (2.19 mL/Hr) IV Continuous <Continuous>  piperacillin/tazobactam IVPB.. 3.375 Gram(s) IV Intermittent every 8 hours  polyethylene glycol 3350 17 Gram(s) Oral daily  propofol Infusion 10 MICROgram(s)/kG/Min (7.02 mL/Hr) IV Continuous <Continuous>  senna 2 Tablet(s) Oral at bedtime  vasopressin Infusion 0.04 Unit(s)/Min (2.4 mL/Hr) IV Continuous <Continuous>      Assessment:  hypoxic resp failure  covid pneumonia  hx of nephrolithiasis  hypoantremia  septic shock  pAF    Recs:  appreciate excellent micu care  cv stable  wean vent, pressors and sedation per icu protocol  amio per icu to maintain nsr. on hep gtt for rising d-dimer and pAF  broad spectrum abx and infectious workup per icu  natty. consult renal. avoid nephrotoxins. renally dose meds. diuresis prn to maintain net neg  dvt ppx        Over 25 minutes spent on total encounter; more than 50% of the visit was spent counseling and/or coordinating care by the attending physician.      Igor Medrano MD   Cardiovascular Disease  [phone]

## 2022-01-13 NOTE — PROGRESS NOTE ADULT - SUBJECTIVE AND OBJECTIVE BOX
INTERVAL HPI/OVERNIGHT EVENTS: No acute overnight events.    SUBJECTIVE: Patient seen and examined at bedside. Proned, sedated, paralyzed. Overnight, patient was restarted on Charly and stopped for levo for afib.   NG tube was placed over night. Patient was proned at 10 pm. He went back to afib < 110 HR rate controlled.  ROS unable to be obtained due to mental status.    OBJECTIVE:    VITAL SIGNS:  ICU Vital Signs Last 24 Hrs  T(C): 37.2 (13 Jan 2022 12:00), Max: 38.7 (13 Jan 2022 00:00)  T(F): 99 (13 Jan 2022 12:00), Max: 101.6 (13 Jan 2022 00:00)  HR: 75 (13 Jan 2022 13:00) (68 - 101)  BP: --  BP(mean): --  ABP: 109/70 (13 Jan 2022 13:00) (95/58 - 128/83)  ABP(mean): 85 (13 Jan 2022 13:00) (70 - 101)  RR: 26 (13 Jan 2022 13:00) (21 - 26)  SpO2: 91% (13 Jan 2022 13:00) (89% - 97%)    Mode: AC/ CMV (Assist Control/ Continuous Mandatory Ventilation), RR (machine): 26, FiO2: 100, PEEP: 10, ITime: 0.78, MAP: 18, PC: 25, PIP: 36    01-12 @ 07:01 - 01-13 @ 07:00  --------------------------------------------------------  IN: 3556.2 mL / OUT: 4460 mL / NET: -903.8 mL    01-13 @ 07:01 - 01-13 @ 13:54  --------------------------------------------------------  IN: 1053.9 mL / OUT: 575 mL / NET: 478.9 mL      CAPILLARY BLOOD GLUCOSE          PHYSICAL EXAM:    General: Proned, sedated, paralyzed  HEENT: NC/AT; PERRL, clear conjunctiva  Respiratory: CTA b/l  Cardiovascular: unable to be assess due to prone positioning  Abdomen: unable to be assess due to prone positioning  Extremities: WWP, 1+ peripheral pulses b/l; edematous extremities.  Skin: normal color and turgor, skin starting to break down at chin; no rash.  Neurological: AAOx0, sedated, paralyzed, no focal deficits    MEDICATIONS:  MEDICATIONS  (STANDING):  ALBUTerol    90 MICROgram(s) HFA Inhaler 2 Puff(s) Inhalation every 6 hours  aMIOdarone    Tablet 200 milliGRAM(s) Oral every 24 hours  aMIOdarone Infusion 1 mG/Min (33.3 mL/Hr) IV Continuous <Continuous>  chlorhexidine 0.12% Liquid 15 milliLiter(s) Oral Mucosa every 12 hours  chlorhexidine 4% Liquid 1 Application(s) Topical <User Schedule>  cisatracurium Infusion 3 MICROgram(s)/kG/Min (21.1 mL/Hr) IV Continuous <Continuous>  fentaNYL   Infusion. 0.5 MICROgram(s)/kG/Hr (5.85 mL/Hr) IV Continuous <Continuous>  heparin  Infusion.  Unit(s)/Hr (21 mL/Hr) IV Continuous <Continuous>  norepinephrine Infusion 0.05 MICROgram(s)/kG/Min (5.49 mL/Hr) IV Continuous <Continuous>  petrolatum Ophthalmic Ointment 1 Application(s) Both EYES daily  phenylephrine    Infusion 0.1 MICROgram(s)/kG/Min (2.19 mL/Hr) IV Continuous <Continuous>  piperacillin/tazobactam IVPB.. 3.375 Gram(s) IV Intermittent every 8 hours  polyethylene glycol 3350 17 Gram(s) Oral daily  potassium chloride  20 mEq/100 mL IVPB 20 milliEquivalent(s) IV Intermittent once  propofol Infusion 10 MICROgram(s)/kG/Min (7.02 mL/Hr) IV Continuous <Continuous>  senna 2 Tablet(s) Oral at bedtime  vasopressin Infusion 0.04 Unit(s)/Min (2.4 mL/Hr) IV Continuous <Continuous>    MEDICATIONS  (PRN):  heparin   Injectable 9500 Unit(s) IV Push every 6 hours PRN For aPTT less than 40  heparin   Injectable 4500 Unit(s) IV Push every 6 hours PRN For aPTT between 40 - 57      ALLERGIES:  Allergies    Cipro (Rash)    Intolerances    eggs (Diarrhea)      LABS:                        13.2   15.70 )-----------( 282      ( 13 Jan 2022 02:51 )             39.8     01-13    128<L>  |  95<L>  |  38<H>  ----------------------------<  123<H>  3.6   |  21<L>  |  2.03<H>    Ca    8.0<L>      13 Jan 2022 02:51  Phos  3.2     01-13  Mg     1.60     01-13    TPro  5.7<L>  /  Alb  1.9<L>  /  TBili  1.6<H>  /  DBili  x   /  AST  72<H>  /  ALT  76<H>  /  AlkPhos  77  01-13    PT/INR - ( 12 Jan 2022 18:20 )   PT: 13.1 sec;   INR: 1.16 ratio         PTT - ( 13 Jan 2022 00:58 )  PTT:73.5 sec      RADIOLOGY & ADDITIONAL TESTS: Reviewed.

## 2022-01-13 NOTE — CONSULT NOTE ADULT - ATTENDING COMMENTS
ARDS from COVID pneumonia.   Completed Remdesivir 1/1 and Decadron 1/6.   Worsening fevers since 1/7.   Intubated 1/8.   Empiric antibiotics started 1/9.   I think this is all COVID. No clear secondary bacterial infection (did not received Tocilizumab).   Can complete a 7-day course of Zosyn out of caution.   Doing poorly.   I don't have anything else to offer, unfortunately.   I think his outcome would have been very different if he were vaccinated.     Discussed with MICU   Will sign off, call back if needed     Santana Gutierrez MD   Infectious Disease   Pager [phone]   After 5PM and on weekends please page fellow on call or call [phone] ARDS from COVID pneumonia.   Completed Remdesivir 1/1 and Decadron 1/6.   Worsening fevers since 1/7.   Intubated 1/8.   Empiric antibiotics started 1/9.   I think this is all COVID. No clear secondary bacterial infection (did not received Tocilizumab and I wouldn't give at this point).   Doing poorly.   Can complete a 7-day course of Zosyn out of caution.   I don't have anything else to offer, unfortunately.   I think his outcome would have been very different if he were vaccinated.     Discussed with MICU   Will sign off, call back if needed     Santana Gutierrez MD   Infectious Disease   Pager [phone]   After 5PM and on weekends please page fellow on call or call [phone]

## 2022-01-13 NOTE — CONSULT NOTE ADULT - ASSESSMENT
62 yo M, with left kideney stone and urine culture with E. faecalis, high BMI 39, admitted since 12/28/21 for COVID, with ARDS and intubated in MICU. Treated empirically with zosyn for presumed bacterial superimposed PNA. Family requested ID for consult.  Completed remdesivir for 5 days (12/28-1/1)  Persistent fever and leukocytosis.  MELISSA, Cr>2  Sputum culture: NRF  MRSA nasal PCR negative  Procal not elevated  On Zosyn since 1/9  CXR consitent with COVID ARDS    #COVID PNA  - With low procalcitonin, normal sputum culture and the morphology of the CXR. Likely all related to COVID PNA, not bacterial.  - The pt has obesity and not vaccinated that put him high risk for severe COVID  - Follow up BCx and procal  - Would limit Zoysn to 5-7 days  - Stop vancomycin    Anyi Mathur MD, PGY5  ID fellow  Pager: [phone]  [facility] pager ID: [phone] (would prefer to text page for any new consult or question, please include name/location and best call back number)  After 5pm/weekends call [phone]   62 yo M, with left kideney stone and urine culture with E. faecalis, high BMI 39, admitted since 12/28/21 for COVID, with ARDS and intubated in MICU. Treated empirically with zosyn for presumed bacterial superimposed PNA. Family requested ID for consult.  Completed remdesivir for 5 days (12/28-1/1)  Persistent fever and leukocytosis.  MELISSA, Cr>2  Sputum culture: NRF  MRSA nasal PCR negative  Procal not elevated until 1/7, however it went up to 1.43 on 1/13, likely due to MELISSA not real bacterial PNA because he was on Zosyn already since 1/9 that should be treated, plus the sputum culture did not detect any resistant bacteria.  On Zosyn since 1/9  CXR consitent with COVID ARDS    #COVID PNA  - With low procalcitonin, normal sputum culture and the morphology of the CXR. Likely all related to COVID PNA, not bacterial.  - The pt has obesity and not vaccinated that put him high risk for severe COVID  - Follow up BCx  - Would limit Zoysn to 5-7 days  - Stop vancomycin    Anyi Mathur MD, PGY5  ID fellow  Pager: [phone]  [facility] pager ID: [phone] (would prefer to text page for any new consult or question, please include name/location and best call back number)  After 5pm/weekends call [phone]   62 yo M, with left kideney stone and urine culture with E. faecalis, high BMI 39, admitted since 12/28/21 for COVID, with ARDS and intubated in MICU. Treated empirically with zosyn for presumed bacterial superimposed PNA. Family requested ID for consult.  Completed remdesivir for 5 days (12/28-1/1)  Per nurse, not making lots of ETT secretions.  Persistent fever and leukocytosis.  MELISSA, Cr>2  Sputum culture: NRF  MRSA nasal PCR negative  Procal not elevated until 1/7, however it went up to 1.43 on 1/13, likely due to MELISSA not real bacterial PNA because he was on Zosyn already since 1/9 that should be treated, plus the sputum culture did not detect any resistant bacteria.  On Zosyn since 1/9  CXR consitent with COVID ARDS    #COVID PNA  - With low procalcitonin, normal sputum culture and the morphology of the CXR. Likely all related to COVID PNA, not bacterial.  - The pt has obesity and not vaccinated that put him high risk for severe COVID  - Follow up BCx  - Would limit Zoysn to 5-7 days  - Stop vancomycin    Anyi Mathur MD, PGY5  ID fellow  Pager: [phone]  [facility] pager ID: [phone] (would prefer to text page for any new consult or question, please include name/location and best call back number)  After 5pm/weekends call [phone]   62 yo M, with left kideney stone and urine culture with E. faecalis, high BMI 39, admitted since 12/28/21 for COVID, with ARDS and intubated in MICU. Treated empirically with zosyn for presumed bacterial superimposed PNA. Family requested ID for consult.  Completed remdesivir for 5 days (12/28-1/1)  Intubated on 1/8, WBC went up high after intubation so likely leukocytosis has some component of reactivation from stress, RRT, intubation, etc. Probably not because Zosyn is working so WBC went down afterwards.  Per nurse, not making lots of ETT secretions.  Persistent fever and leukocytosis.  MELISSA, Cr>2  Sputum culture: NRF  MRSA nasal PCR negative  Procal not elevated until 1/7, however it went up to 1.43 on 1/13, likely due to MELISSA not real bacterial PNA because he was on Zosyn already since 1/9 that should be treated, plus the sputum culture did not detect any resistant bacteria.  On Zosyn since 1/9  CXR consitent with COVID ARDS    #COVID PNA  - With low procalcitonin, normal sputum culture and the morphology of the CXR. Likely all related to COVID PNA, not bacterial.  - The pt has obesity and not vaccinated that put him high risk for severe COVID  - Follow up BCx  - Would limit Zoysn to 5-7 days  - Stop vancomycin    Anyi Mathur MD, PGY5  ID fellow  Pager: [phone]  [facility] pager ID: [phone] (would prefer to text page for any new consult or question, please include name/location and best call back number)  After 5pm/weekends call [phone]   60 yo M, high BMI 39, admitted since 12/28/21 for COVID, with ARDS and intubated in MICU. Treated empirically with zosyn for presumed bacterial superimposed PNA. Family requested ID for consult.  Completed remdesivir for 5 days (12/28-1/1)  Intubated on 1/8, WBC went up high after intubation so likely leukocytosis has some component of reactivation from stress, RRT, intubation, etc. WBC was coming down already before Zosyn started.   Per nurse, not making lots of ETT secretions.  Persistent fever and leukocytosis.  MELISSA, Cr>2  Sputum culture: NRF  MRSA nasal PCR negative  Procal not elevated until 1/7, however it went up to 1.43 on 1/13, likely due to MELISSA not real bacterial PNA because he was on Zosyn already since 1/9 that should be treated, plus the sputum culture did not detect any resistant bacteria.  On Zosyn since 1/9  CXR consitent with COVID ARDS    #COVID PNA  - With low procalcitonin, normal sputum culture and the morphology of the CXR. Likely all related to COVID PNA, not bacterial.  - The pt has obesity and not vaccinated that put him high risk for severe COVID  - Follow up BCx  - Would limit Zoysn to 5-7 days  - Stop vancomycin    Anyi Mathur MD, PGY5  ID fellow  Pager: [phone]  [facility] pager ID: [phone] (would prefer to text page for any new consult or question, please include name/location and best call back number)  After 5pm/weekends call [phone]

## 2022-01-13 NOTE — PROGRESS NOTE ADULT - ASSESSMENT
Assessment and Plan    60 yo M PMHx of nephrolithiasis intubated for acute hypoxic respiratory failure 2/2 COVID19 with ARDS, requiring pressor support, and with MELISSA, complicated by superimposed bacterial infection on zosyn and A fib w/ RVR on amiodarone.    Neuro   #mental status  - sedated on propofol, fentanyl,  and cisatracurium.    Pulm  #respiratory status  - intubated for acute hypoxic respiratory failure, worsening work of breathing  - proned until 5 pm 1/10 (third time)  - obtain ABGs, follow up vent setting  - nebs  - CXR 1/8: elevated right hemidiaphragm ill defined increased right lung markings and left basilar/retrocardiac markings compatible with hx of COVID, Hazy indistinct left CP region could be due to overlying soft tissues versus a small left pleural effusion. Sharp right CP angle. No pneumothorax.  - Overnight 1/9-1/10 worsening acidosis and desat to 83%, RR was increased to 30 over night. AM vent setting turned back to RR 22.   Post supination abg PO2 44. Partly likely due to volume overload as patient had net positive 6L during hospital course. Patient was given 4mg bumex   - 1/11: Proned. Supination at 4pm, f/u post supination ABG. Decreasing FiO2.  - 1/12: Proned until 4pm supination. post supination ABG. pO2 66% Satting 90%.  - 1/13: Supination at 4pm. decreased fio2 from 100% this AM  to 80%. BMP repeat PO2 68.    Cardiovascular  #hemodynamics  - given albumin and LR 1/9  - 1/10: Charly and vasopressin  - 1/11: Started on levo, weaning off charly  - 1/12: Weaning off charly  - 1/13: over night d/c levo, back on charly.  #Rapid afib  - 1/10: HR of 160s then went back to 80s spontaneously. Happened again, and 1x amiodarone was given  - 1/11:  1x amiodarone. started on amiodarone load. Started on heparin ggt  - 1/12: Finishing up Amio Load, starting Amio PO in AM. Trend LFT's.  - 1/13: C/w PO amio. rate controlled.    GI  #diet  - NG tube placed over night  - started trickle feeds nephro    Renal  #MELISSA  - Baseline creatinine 0.8  - s/p 1x 2mg bumex push 1/9  - s/p 1x 4 mg bumex push 1/10  - 1/11: Creatinine stable since yesterday, elevated. Still putting out urine output of ~100cc/hr  - Strict I/O monitoring  - 1/12: MELISSA improving  - 1/13: MELISSA improving  #Mild hyponatremia  - Further diuresis, net + 7L hospital course. 2g bumex at 3 pm. goal net - 1L to 1.5L q24 hr.    MSK  #Rhabdo  1/11: CK 1890  -replete electrolytes as needed    ID  #[id number]  - s/p decadron and remdesivir   - trend inflammatory markers    #new fevers, leukocytosis  - vanc by level (1/9-1/10) and zosyn (1/9- )  - MRSA negative  - f/u blood cultures  - Sputum showed few gram negative rods. Blood culture no growth to date.  1/13: F/u ID consult requested by family. F/u Procal.    Heme-Onc  #DVT ppx  - on heparin ggt for afib rvr   Assessment and Plan    60 yo M PMHx of nephrolithiasis intubated for acute hypoxic respiratory failure 2/2 COVID19 with ARDS, requiring pressor support, and with MELISSA, complicated by superimposed bacterial infection on zosyn and A fib w/ RVR on amiodarone.    Neuro   #mental status  - sedated on propofol, fentanyl,  and cisatracurium.    Pulm  #respiratory status  - intubated for acute hypoxic respiratory failure, worsening work of breathing  - proned until 5 pm 1/10 (third time)  - obtain ABGs, follow up vent setting  - nebs  - CXR 1/8: elevated right hemidiaphragm ill defined increased right lung markings and left basilar/retrocardiac markings compatible with hx of COVID, Hazy indistinct left CP region could be due to overlying soft tissues versus a small left pleural effusion. Sharp right CP angle. No pneumothorax.  - Overnight 1/9-1/10 worsening acidosis and desat to 83%, RR was increased to 30 over night. AM vent setting turned back to RR 22.   Post supination abg PO2 44. Partly likely due to volume overload as patient had net positive 6L during hospital course. Patient was given 4mg bumex   - 1/11: Proned. Supination at 4pm, f/u post supination ABG. Decreasing FiO2.  - 1/12: Proned until 4pm supination. post supination ABG. pO2 66% Satting 90%.  - 1/13: Supination at 4pm. decreased fio2 from 100% this AM  to 80%. BMP repeat PO2 68.    Cardiovascular  #hemodynamics  - given albumin and LR 1/9  - 1/10: Charly and vasopressin  - 1/11: Started on levo, weaning off charly  - 1/12: Weaning off charly  - 1/13: over night d/c levo, back on charly.  #Rapid afib  - 1/10: HR of 160s then went back to 80s spontaneously. Happened again, and 1x amiodarone was given  - 1/11:  1x amiodarone. started on amiodarone load. Started on heparin ggt  - 1/12: Finishing up Amio Load, starting Amio PO in AM. Trend LFT's.  - 1/13: C/w PO amio. rate controlled.    GI  #diet  - NG tube placed over night  - started trickle feeds nephro    Renal  #MELISSA  - Baseline creatinine 0.8  - s/p 1x 2mg bumex push 1/9  - s/p 1x 4 mg bumex push 1/10  - 1/11: Creatinine stable since yesterday, elevated. Still putting out urine output of ~100cc/hr  - Strict I/O monitoring  - 1/12: MELISSA improving  - 1/13: MELISSA improving  #Mild hyponatremia  - Further diuresis, net + 7L hospital course. 2g bumex at 3 pm. goal net - 1L to 1.5L q24 hr.    MSK  #Rhabdo  1/11: CK 1890  -replete electrolytes as needed    ID  #[id number]  - s/p decadron and remdesivir  - trend inflammatory markers    #new fevers, leukocytosis  - vanc by level (1/9-1/10) and zosyn (1/9- )  - MRSA negative  - f/u blood cultures  - Sputum showed few gram negative rods. Blood culture no growth to date.  1/13: F/u ID consult requested by family. Procal 1.43.    Heme-Onc  #DVT ppx  - on heparin ggt for afib rvr

## 2022-01-13 NOTE — PROGRESS NOTE ADULT - ASSESSMENT
_________________________________________________________________________________________  ========>>  M E D I C A L   A T T E N D I N G    F O L L O W  U P  N O T E  <<=========  -----------------------------------------------------------------------------------------------------    - Patient seen and examined by me earlier today.   - In summary,  ALEXX BROWN is a 61y year old man admitted with SOB, fever..   - Patient intubated, sedated in the MICU, vented , proned      ==================>> REVIEW OF SYSTEM <<=================    unable to obtain     ==================>> PHYSICAL EXAM <<=================    GEN: sedated, vented, intubated.. NAD  HEENT: NCAT lines and tubes in place   Neck: supple lines and tubes in place   CVS: S1S2 , regular  PULM: CTA B/L,  limited   ABD.: soft. non distended  Extrem: intact pulses , no signif edema      + kim with yellow urine                              ( Note written / Date of service 01-13-22 )    ==================>> MEDICATIONS <<====================    ALBUTerol    90 MICROgram(s) HFA Inhaler 2 Puff(s) Inhalation every 6 hours  aMIOdarone    Tablet 200 milliGRAM(s) Oral every 24 hours  aMIOdarone Infusion 1 mG/Min IV Continuous <Continuous>  chlorhexidine 0.12% Liquid 15 milliLiter(s) Oral Mucosa every 12 hours  chlorhexidine 4% Liquid 1 Application(s) Topical <User Schedule>  cisatracurium Infusion 3 MICROgram(s)/kG/Min IV Continuous <Continuous>  fentaNYL   Infusion. 0.5 MICROgram(s)/kG/Hr IV Continuous <Continuous>  heparin  Infusion.  Unit(s)/Hr IV Continuous <Continuous>  norepinephrine Infusion 0.05 MICROgram(s)/kG/Min IV Continuous <Continuous>  petrolatum Ophthalmic Ointment 1 Application(s) Both EYES daily  phenylephrine    Infusion 0.1 MICROgram(s)/kG/Min IV Continuous <Continuous>  piperacillin/tazobactam IVPB.. 3.375 Gram(s) IV Intermittent every 8 hours  polyethylene glycol 3350 17 Gram(s) Oral daily  propofol Infusion 10 MICROgram(s)/kG/Min IV Continuous <Continuous>  senna 2 Tablet(s) Oral at bedtime  vasopressin Infusion 0.04 Unit(s)/Min IV Continuous <Continuous>    MEDICATIONS  (PRN):  heparin   Injectable 9500 Unit(s) IV Push every 6 hours PRN For aPTT less than 40  heparin   Injectable 4500 Unit(s) IV Push every 6 hours PRN For aPTT between 40 - 57    ___________  Active diet:  Diet, NPO with Tube Feed:   Tube Feeding Modality: Nasogastric  Nepro with Carb Steady (NEPRORTH)  Total Volume for 24 Hours (mL): 240  Continuous  Starting Tube Feed Rate mL per Hour: 10  Until Goal Tube Feed Rate (mL per Hour): 10  Tube Feed Duration (in Hours): 24  Tube Feed Start Time: 11:00  ___________________    ==================>> VITAL SIGNS <<==================    Vital Signs Last 24 HrsT(C): 37.6 (01-13-22 @ 20:00)  T(F): 99.6 (01-13-22 @ 20:00), Max: 101.6 (01-13-22 @ 00:00)  HR: 75 (01-13-22 @ 22:00) (68 - 101)  BP: -- noted on tele   RR: 26 (01-13-22 @ 22:00) (26 - 26)  SpO2: 93% (01-13-22 @ 22:00) (89% - 97%)       ==================>> LAB AND IMAGING <<==================                        13.2   15.70 )-----------( 282      ( 13 Jan 2022 02:51 )             39.8        01-13    128<L>  |  95<L>  |  38<H>  ----------------------------<  123<H>  3.6   |  21<L>  |  2.03<H>    Ca    8.0<L>      13 Jan 2022 02:51  Phos  3.2     01-13  Mg     1.60     01-13    TPro  5.7<L>  /  Alb  1.9<L>  /  TBili  1.6<H>  /  DBili  x   /  AST  72<H>  /  ALT  76<H>  /  AlkPhos  77  01-13    WBC count:   15.70 <<== ,  14.78 <<== ,  15.09 <<== ,  16.33 <<== ,  19.55 <<== ,  19.73 <<==   Hemoglobin:   13.2 <<==,  13.0 <<==,  13.3 <<==,  14.0 <<==,  14.7 <<==,  16.1 <<==  platelets:  282 <==, 325 <==, 334 <==, 407 <==, 420 <==, 462 <==, 581 <==    Creatinine:  2.03  <<==, 2.39  <<==, 2.61  <<==, 2.62  <<==, 2.56  <<==, 1.43  <<==  Sodium:   128  <==, 131  <==, 132  <==, 132  <==, 129  <==, 134  <==, 133  <==       AST:          72 <== , 109 <== , 182 <== , 135 <== , 176 <==      ALT:        76  <== , 100  <== , 116  <== , 88  <== , 91  <==      AP:        77  <=, 78  <=, 77  <=, 64  <=, 62  <=     Bili:        1.6  <=, 0.9  <=, 0.8  <=, 0.7  <=, 0.6  <=    _______________________  C U L T U R E S :    Culture - Blood (collected 12 Jan 2022 16:30)  Source: .Blood Blood-Peripheral  Preliminary Report (13 Jan 2022 17:02):    No growth to date.    Culture - Blood (collected 12 Jan 2022 16:30)  Source: .Blood Blood-Peripheral  Preliminary Report (13 Jan 2022 17:02):    No growth to date.    Culture - Sputum (collected 10 Shai 2022 02:30)  Source: .Sputum Sputum  Gram Stain (10 Shai 2022 13:26):    Moderate polymorphonuclear leukocytes per low power field    No Squamous epithelial cells per low power field    Rare Gram Negative Rods per oil power field  Final Report (12 Jan 2022 14:27):    Normal Respiratory Marifer present    Culture - Blood (collected 08 Jan 2022 23:09)  Source: .Blood Blood-Peripheral  Preliminary Report (10 Shai 2022 01:01):    No growth to date.    Culture - Blood (collected 08 Jan 2022 23:09)  Source: .Blood Blood-Peripheral  Preliminary Report (10 Shai 2022 01:01):    No growth to date.    SARS-CoV-2: Detected (12-28-21 @ 08:05)    ^^^ Inflammatory markers :  ^^^  C R P :          116.3 (01-07-22)  <<--, 131.9 (01-04-22)  <<--, 96.4 (01-02-22)  <<--, 31.8 (12-30-21)  <<--, 82.2 (12-28-21)  <<--  P C T :         1.43 (01-13-22) <<--, 0.15 (01-07-22) <<--, 0.13 (01-02-22) <<--, 0.11 (12-30-21) <<--, 0.16 (12-28-21) <<--    Ferritin :         1107 (01-07-22) <<--, 1058 (01-04-22) <<--, 890 (01-02-22) <<--, 885 (12-28-21) <<--    D-Dimer :          3073 (01-11-22) <<--, 3352 (01-11-22) <<--, 2670 (01-08-22) <<--, 788 (01-07-22) <<--, 429 (01-04-22) <<--      < from: Xray Chest 1 View- PORTABLE-Urgent (Xray Chest 1 View- PORTABLE-Urgent .) (01.12.22 @ 21:42) >  IMPRESSION:  The study limited with collimation of both upper lungs.  Left IJ central venous catheter tip in SVC.  Enteric tube tip in stomach.  No menstruation bilateral lung opacities consistent with ARDS.  No sizable pleural effusion. No pneumothorax.  < end of copied text >    ___________________________________________________________________________________  ===============>>  A S S E S S M E N T   A N D   P L A N <<===============  ------------------------------------------------------------------------------------------    · Assessment	  61 year old man with history of kidney stone presenting with fever, cough, SOB x 2-3d.   admitted for COVID     Problem/Plan - 1:  ·  Problem: Acute hypoxemic respiratory failure due to COVID-19 with Viral syndrome. >> ARDS  post Remdesivir and Decadron per hospital protocol  trend inflammatory markers   ICU care and mgmt apprecaied >> wean down Vent / O2  as able  supportive care  nutrition, hydration  as able   vitamins / supplements   DVT prophylaxis : lovenox BID  Continue Current medications otherwise and monitor.     ** Fever , leukocytosis, improved   new fever on day of intubation   pan cultures in process   suspect aspiration pneumonia  on Zosyn / broad spectrum abx > would give X 7 days   ID on board, appreciated and discussed   less likely PE as been on Lovenox 40 BID    would check LE dopplers   antipyretics as needed  monitor     **MELISSA and transaminitis likely due to hemodynamic changes during code  with episodes of hypotension 2/2 propofol     hold further diuretics     keep MAP >60      tend BMP closely > improved / stable     Avoid nephrotoxic medications.     Problem/Plan - 2:  ·  Problem:  obesity and likely ERICK    discussed with pt re weight loss and sleep study > obtaining and using a CPAP machine pos discharge  on Bipap now     -GI/DVT Prophylaxis per protocol.    overall prognosis guarded   pt full code     ___________________________  H. ABNER Knowles.  Pager: [phone]

## 2022-01-13 NOTE — PROGRESS NOTE ADULT - SUBJECTIVE AND OBJECTIVE BOX
Cardiovascular Disease Progress Note    Overnight events: No acute events overnight.  no new cardiac issues. remains intubated and sedated      Objective Findings:  T(C): 37.7 (22 @ 04:00), Max: 38.7 (22 @ 00:00)  HR: 69 (22 @ 07:00) (69 - 101)  BP: --  RR: 26 (22 @ 07:00) (21 - 26)  SpO2: 94% (22 @ 07:00) (89% - 96%)  Wt(kg): --  Daily     Daily Weight in k.7 (2022 06:00)      Physical Exam:  Gen: NAD  deferred d/t covid    Telemetry: nsr brief paf    Laboratory Data:                        13.2   1570 )-----------( 282      ( 2022 02:51 )             39.8         128<L>  |  95<L>  |  38<H>  ----------------------------<  123<H>  3.6   |  21<L>  |  2.03<H>    Ca    8.0<L>      2022 02:51  Phos  3.2       Mg     1.60         TPro  5.7<L>  /  Alb  1.9<L>  /  TBili  1.6<H>  /  DBili  x   /  AST  72<H>  /  ALT  76<H>  /  AlkPhos  77      PT/INR - ( 2022 18:20 )   PT: 13.1 sec;   INR: 1.16 ratio         PTT - ( 2022 00:58 )  PTT:73.5 sec          Inpatient Medications:  MEDICATIONS  (STANDING):  ALBUTerol    90 MICROgram(s) HFA Inhaler 2 Puff(s) Inhalation every 6 hours  aMIOdarone    Tablet 200 milliGRAM(s) Oral every 24 hours  aMIOdarone Infusion 1 mG/Min (33.3 mL/Hr) IV Continuous <Continuous>  chlorhexidine 0.12% Liquid 15 milliLiter(s) Oral Mucosa every 12 hours  chlorhexidine 4% Liquid 1 Application(s) Topical <User Schedule>  cisatracurium Infusion 3 MICROgram(s)/kG/Min (21.1 mL/Hr) IV Continuous <Continuous>  fentaNYL   Infusion. 0.5 MICROgram(s)/kG/Hr (5.85 mL/Hr) IV Continuous <Continuous>  heparin  Infusion.  Unit(s)/Hr (21 mL/Hr) IV Continuous <Continuous>  norepinephrine Infusion 0.05 MICROgram(s)/kG/Min (5.49 mL/Hr) IV Continuous <Continuous>  petrolatum Ophthalmic Ointment 1 Application(s) Both EYES daily  phenylephrine    Infusion 0.1 MICROgram(s)/kG/Min (2.19 mL/Hr) IV Continuous <Continuous>  piperacillin/tazobactam IVPB.. 3.375 Gram(s) IV Intermittent every 8 hours  polyethylene glycol 3350 17 Gram(s) Oral daily  propofol Infusion 10 MICROgram(s)/kG/Min (7.02 mL/Hr) IV Continuous <Continuous>  senna 2 Tablet(s) Oral at bedtime  vasopressin Infusion 0.04 Unit(s)/Min (2.4 mL/Hr) IV Continuous <Continuous>      Assessment:  hypoxic resp failure  covid pneumonia  hx of nephrolithiasis  hypoantremia  septic shock  pAF    Recs:  appreciate excellent micu care  cv stable  wean vent, pressors and sedation per icu protocol  amio per icu to maintain nsr. on hep gtt for rising d-dimer and pAF  broad spectrum abx and infectious workup per icu  natty, improving. avoid nephrotoxins. renally dose meds. diuresis prn to maintain net neg  dvt ppx        Over 25 minutes spent on total encounter; more than 50% of the visit was spent counseling and/or coordinating care by the attending physician.      Igor Medrano MD   Cardiovascular Disease  [phone]

## 2022-01-13 NOTE — CONSULT NOTE ADULT - SUBJECTIVE AND OBJECTIVE BOX
Patient is a 61y old  Male who presents with a chief complaint of fever, SOB (13 Jan 2022 07:38)    HPI:    >>>>>>Medical Attending Initial / Admission note<<<<<<  -------------------------------------------------------------------------------  CHIEF COMPLAINT & HISTORY OF PRESENT ILLNESS:      Pt is a 61 year old man with history of kidney stone presenting with fever, cough, SOB x 2-3d.   States having positive sick contact in son with COVID-19, is currently NOT vaccinated.   Fever to 103 at home, taking mucinex. Noticed worsening cough/SOB in past day, "couldn't take it anymore", so presenting to ED.   reportedly at home O2 sat was 84%.   Denies N/V/D, abdominal pain, chest pain, LE swelling.  elevated inflammatory markers  COVID pending : assumed positive        (28 Dec 2021 10:22)       REVIEW OF SYSTEMS  [  ] ROS unobtainable because:    [  ] All other systems negative except as noted below    Constitutional:  [ ] fever [ ] chills  [ ] weight loss  [ ]night sweat  [ ]poor appetite/PO intake [ ]fatigue   Skin:  [ ] rash [ ] phlebitis	  Eyes: [ ] icterus [ ] pain  [ ] discharge	  ENMT: [ ] sore throat  [ ] thrush [ ] ulcers [ ] exudates [ ]anosmia  Respiratory: [ ] dyspnea [ ] hemoptysis [ ] cough [ ] sputum	  Cardiovascular:  [ ] chest pain [ ] palpitations [ ] edema	  Gastrointestinal:  [ ] nausea [ ] vomiting [ ] diarrhea [ ] constipation [ ] pain	  Genitourinary:  [ ] dysuria [ ] frequency [ ] hematuria [ ] discharge [ ] flank pain  [ ] incontinence  Musculoskeletal:  [ ] myalgias [ ] arthralgias [ ] arthritis  [ ] back pain  Neurological:  [ ] headache [ ] weakness [ ] seizures  [ ] confusion/altered mental status    prior hospital charts reviewed [V]  primary team notes reviewed [V]  other consultant notes reviewed [V]    PAST MEDICAL & SURGICAL HISTORY:  Obese    No significant past surgical history        SOCIAL HISTORY:  Alcohol: socially   Smoking: past smoker, Quit > 20 years ago  recently retired      FAMILY HISTORY:      Allergies  Cipro (Rash)        ANTIMICROBIALS:  piperacillin/tazobactam IVPB.. 3.375 every 8 hours      ANTIMICROBIALS (past 90 days):  MEDICATIONS  (STANDING):  piperacillin/tazobactam IVPB.   200 mL/Hr IV Intermittent (01-09-22 @ 14:01)    piperacillin/tazobactam IVPB..   25 mL/Hr IV Intermittent (01-13-22 @ 05:14)   25 mL/Hr IV Intermittent (01-12-22 @ 21:31)   25 mL/Hr IV Intermittent (01-12-22 @ 14:30)   25 mL/Hr IV Intermittent (01-12-22 @ 05:24)   25 mL/Hr IV Intermittent (01-11-22 @ 22:15)   25 mL/Hr IV Intermittent (01-11-22 @ 14:09)   25 mL/Hr IV Intermittent (01-11-22 @ 05:00)   25 mL/Hr IV Intermittent (01-10-22 @ 22:46)   25 mL/Hr IV Intermittent (01-10-22 @ 13:27)   25 mL/Hr IV Intermittent (01-10-22 @ 06:16)   25 mL/Hr IV Intermittent (01-09-22 @ 22:11)    remdesivir  IVPB   500 mL/Hr IV Intermittent (12-28-21 @ 17:13)    remdesivir  IVPB   500 mL/Hr IV Intermittent (01-01-22 @ 18:24)   500 mL/Hr IV Intermittent (12-31-21 @ 18:13)   500 mL/Hr IV Intermittent (12-30-21 @ 18:45)   500 mL/Hr IV Intermittent (12-29-21 @ 19:07)    vancomycin  IVPB   250 mL/Hr IV Intermittent (01-09-22 @ 14:44)    vancomycin  IVPB   250 mL/Hr IV Intermittent (01-10-22 @ 17:55)        OTHER MEDS:   MEDICATIONS  (STANDING):  ALBUTerol    90 MICROgram(s) HFA Inhaler 2 every 6 hours  aMIOdarone    Tablet 200 every 24 hours  aMIOdarone Infusion 1 <Continuous>  cisatracurium Infusion 3 <Continuous>  fentaNYL   Infusion. 0.5 <Continuous>  heparin   Injectable 9500 every 6 hours PRN  heparin   Injectable 4500 every 6 hours PRN  heparin  Infusion.  <Continuous>  norepinephrine Infusion 0.05 <Continuous>  phenylephrine    Infusion 0.1 <Continuous>  polyethylene glycol 3350 17 daily  propofol Infusion 10 <Continuous>  senna 2 at bedtime  vasopressin Infusion 0.04 <Continuous>      VITALS:  Vital Signs Last 24 Hrs  T(F): 99 (01-13-22 @ 12:00), Max: 101.8 (01-10-22 @ 00:00)    Vital Signs Last 24 Hrs  HR: 85 (01-13-22 @ 12:00) (68 - 101)  BP: --  RR: 26 (01-13-22 @ 12:00)  SpO2: 90% (01-13-22 @ 12:00) (89% - 97%)  Wt(kg): --    EXAM:  Physical Exam:  Constitutional:  well preserved, comfortable  Head/Eyes: no icterus, PERRL, EOMI  ENT:  supple; no thrush  LUNGS:  CTA  CVS:  normal S1, S2, no murmur  Abd:  soft, non-tender; non-distended  Ext:  no edema  Vascular:  IV site no erythema tenderness or discharge  MSK:  joints without swelling  Neuro: AAO X 3, non- focal    Labs:                        13.2   15.70 )-----------( 282      ( 13 Jan 2022 02:51 )             39.8     01-13    128<L>  |  95<L>  |  38<H>  ----------------------------<  123<H>  3.6   |  21<L>  |  2.03<H>    Ca    8.0<L>      13 Jan 2022 02:51  Phos  3.2     01-13  Mg     1.60     01-13    TPro  5.7<L>  /  Alb  1.9<L>  /  TBili  1.6<H>  /  DBili  x   /  AST  72<H>  /  ALT  76<H>  /  AlkPhos  77  01-13      WBC Trend:  WBC Count: 15.70 (01-13-22 @ 02:51)  WBC Count: 14.78 (01-12-22 @ 04:51)  WBC Count: 15.09 (01-12-22 @ 00:52)  WBC Count: 16.33 (01-11-22 @ 02:33)      Auto Neutrophil #: 12.37 K/uL (01-13-22 @ 02:51)  Auto Neutrophil #: 13.48 K/uL (01-12-22 @ 00:52)  Auto Neutrophil #: 13.95 K/uL (01-11-22 @ 02:33)  Auto Neutrophil #: 17.48 K/uL (01-10-22 @ 03:22)  Auto Neutrophil #: 17.77 K/uL (01-09-22 @ 02:19)      Creatine Trend:  Creatinine, Serum: 2.03 (01-13)  Creatinine, Serum: 2.39 (01-12)  Creatinine, Serum: 2.61 (01-11)  Creatinine, Serum: 2.62 (01-10)      Liver Biochemical Testing Trend:  Alanine Aminotransferase (ALT/SGPT): 76 *H* (01-13)  Alanine Aminotransferase (ALT/SGPT): 100 *H* (01-12)  Alanine Aminotransferase (ALT/SGPT): 116 *H* (01-11)  Alanine Aminotransferase (ALT/SGPT): 88 *H* (01-10)  Alanine Aminotransferase (ALT/SGPT): 91 *H* (01-10)  Aspartate Aminotransferase (AST/SGOT): 72 (01-13-22 @ 02:51)  Aspartate Aminotransferase (AST/SGOT): 109 (01-12-22 @ 00:52)  Aspartate Aminotransferase (AST/SGOT): 182 (01-11-22 @ 02:33)  Aspartate Aminotransferase (AST/SGOT): 135 (01-10-22 @ 10:31)  Aspartate Aminotransferase (AST/SGOT): 176 (01-10-22 @ 03:22)  Bilirubin Total, Serum: 1.6 (01-13)  Bilirubin Total, Serum: 0.9 (01-12)  Bilirubin Total, Serum: 0.8 (01-11)  Bilirubin Total, Serum: 0.7 (01-10)  Bilirubin Total, Serum: 0.6 (01-10)      MICROBIOLOGY:  Vancomycin Level, Trough: 5.2 (01-10 @ 13:25)    MRSA PCR Result.: NotDetec (01-09-22 @ 18:52)    Culture - Sputum (collected 10 Shai 2022 02:30)  Source: .Sputum Sputum  Final Report:    Normal Respiratory Marifer present    Culture - Blood (collected 08 Jan 2022 23:09)  Source: .Blood Blood-Peripheral  Preliminary Report:    No growth to date.    Culture - Blood (collected 08 Jan 2022 23:09)  Source: .Blood Blood-Peripheral  Preliminary Report:    No growth to date.    Culture - Blood (collected 12 Dec 2020 10:52)  Source: .Blood Blood  Final Report:    No Growth Final    Culture - Blood (collected 12 Dec 2020 10:51)  Source: .Blood Blood  Final Report:    No Growth Final    Culture - Other (collected 12 Dec 2020 01:15)  Source: Skin left kidney stone culture  Final Report:    Rare Enterococcus faecalis  Organism: Enterococcus faecalis  Organism: Enterococcus faecalis    Sensitivities:      -  Ampicillin: S <=2 Predicts results to ampicillin/sulbactam, amoxacillin-clavulanate and  piperacillin-tazobactam.      -  Tetra/Doxy: R >8      -  Vancomycin: S 2      Method Type: OMAR    Culture - Urine (collected 12 Dec 2020 01:00)  Source: Kidney Kidney  Final Report:    Moderate Enterococcus faecalis  Organism: Enterococcus faecalis  Organism: Enterococcus faecalis    Sensitivities:      -  Ampicillin: S <=2 Predicts results to ampicillin/sulbactam, amoxacillin-clavulanate and  piperacillin-tazobactam.      -  Ciprofloxacin: S <=1      -  Levofloxacin: S <=1      -  Tetra/Doxy: R >8      -  Vancomycin: S 2      Method Type: OMAR    Culture - Urine (collected 12 Dec 2020 00:52)  Source: Kidney Kidney  Final Report:    Moderate Enterococcus faecalis  Organism: Enterococcus faecalis  Organism: Enterococcus faecalis    Sensitivities:      -  Ampicillin: S <=2 Predicts results to ampicillin/sulbactam, amoxacillin-clavulanate and  piperacillin-tazobactam.      -  Ciprofloxacin: S <=1      -  Levofloxacin: S <=1      -  Tetra/Doxy: R >8      -  Vancomycin: S 2      Method Type: OMAR    Culture - Urine (collected 02 Dec 2020 12:49)  Source: .Urine Clean Catch (Midstream)  Final Report:    10,000 - 49,000 CFU/mL Enterococcus faecalis  Organism: Enterococcus faecalis  Organism: Enterococcus faecalis    Sensitivities:      -  Ampicillin: S <=2 Predicts results to ampicillin/sulbactam, amoxacillin-clavulanate and  piperacillin-tazobactam.      -  Ciprofloxacin: S <=1      -  Levofloxacin: S <=1      -  Nitrofurantoin: S <=32 Should not be used to treat pyelonephritis.      -  Tetra/Doxy: R >8      -  Vancomycin: S 2      Method Type: OMAR    Procalcitonin, Serum: 0.15 (01-07)    C-Reactive Protein, Serum: 116.3 (01-07)    Ferritin, Serum: 1107 (01-07)    D-Dimer Assay, Quantitative: 3073 (01-11)  D-Dimer Assay, Quantitative: 3352 (01-11)  D-Dimer Assay, Quantitative: 2670 (01-08)  D-Dimer Assay, Quantitative: 788 (01-07)    Lactate Dehydrogenase, Serum: 421 (01-07)    Blood Gas Arterial, Lactate: 2.2 (01-13 @ 12:35)  Blood Gas Arterial, Lactate: 2.4 (01-13 @ 02:51)  Blood Gas Arterial, Lactate: 1.9 (01-12 @ 23:34)  Blood Gas Arterial, Lactate: 2.4 (01-12 @ 18:31)    A1C with Estimated Average Glucose Result: 6.4 % (12-29-21 @ 07:07)    RADIOLOGY:  imaging below personally reviewed   Patient is a 61y old  Male who presents with a chief complaint of fever, SOB (13 Jan 2022 07:38)    HPI:  60 yo M, with left kideney stone and urine culture with E. faecalis, high BMI 39, admitted since 12/28/21 for COVID, with ARDS and intubated in MICU. Treated empirically with zosyn for presumed bacterial superimposed PNA. Family requested ID for consult.      REVIEW OF SYSTEMS  [X  ] ROS unobtainable because:  intubated  [  ] All other systems negative except as noted below    Constitutional:  [ ] fever [ ] chills  [ ] weight loss  [ ]night sweat  [ ]poor appetite/PO intake [ ]fatigue   Skin:  [ ] rash [ ] phlebitis	  Eyes: [ ] icterus [ ] pain  [ ] discharge	  ENMT: [ ] sore throat  [ ] thrush [ ] ulcers [ ] exudates [ ]anosmia  Respiratory: [ ] dyspnea [ ] hemoptysis [ ] cough [ ] sputum	  Cardiovascular:  [ ] chest pain [ ] palpitations [ ] edema	  Gastrointestinal:  [ ] nausea [ ] vomiting [ ] diarrhea [ ] constipation [ ] pain	  Genitourinary:  [ ] dysuria [ ] frequency [ ] hematuria [ ] discharge [ ] flank pain  [ ] incontinence  Musculoskeletal:  [ ] myalgias [ ] arthralgias [ ] arthritis  [ ] back pain  Neurological:  [ ] headache [ ] weakness [ ] seizures  [ ] confusion/altered mental status    prior hospital charts reviewed [V]  primary team notes reviewed [V]  other consultant notes reviewed [V]    PAST MEDICAL & SURGICAL HISTORY:  Obese    SOCIAL HISTORY:  Alcohol: socially   Smoking: past smoker, Quit > 20 years ago  recently retired      FAMILY HISTORY:  Father : Leukemia  otherwise negative     Allergies  Cipro (Rash)    ANTIMICROBIALS:  piperacillin/tazobactam IVPB.. 3.375 every 8 hours    ANTIMICROBIALS (past 90 days):  MEDICATIONS  (STANDING):  piperacillin/tazobactam IVPB. 1/9-    remdesivir  IVPB 12/28-1/1  vancomycin  IVPB 1/9-10        OTHER MEDS:   MEDICATIONS  (STANDING):  ALBUTerol    90 MICROgram(s) HFA Inhaler 2 every 6 hours  aMIOdarone    Tablet 200 every 24 hours  aMIOdarone Infusion 1 <Continuous>  cisatracurium Infusion 3 <Continuous>  fentaNYL   Infusion. 0.5 <Continuous>  heparin   Injectable 9500 every 6 hours PRN  heparin   Injectable 4500 every 6 hours PRN  heparin  Infusion.  <Continuous>  norepinephrine Infusion 0.05 <Continuous>  phenylephrine    Infusion 0.1 <Continuous>  polyethylene glycol 3350 17 daily  propofol Infusion 10 <Continuous>  senna 2 at bedtime  vasopressin Infusion 0.04 <Continuous>      VITALS:  Vital Signs Last 24 Hrs  T(F): 99 (01-13-22 @ 12:00), Max: 101.8 (01-10-22 @ 00:00)    Vital Signs Last 24 Hrs  HR: 85 (01-13-22 @ 12:00) (68 - 101)  BP: --  RR: 26 (01-13-22 @ 12:00)  SpO2: 90% (01-13-22 @ 12:00) (89% - 97%)  Wt(kg): --    EXAM:  Physical Exam:  Constitutional:  intubated  Head/Eyes: no icterus, PERRL, EOMI  ENT:  supple; no thrush  LUNGS:  CTA  CVS:  normal S1, S2, no murmur  Abd:  soft, non-tender; non-distended  Ext:  no edema  Vascular:  IV site no erythema tenderness or discharge  MSK:  joints without swelling  Neuro: intubated    Labs:                        13.2   15.70 )-----------( 282      ( 13 Jan 2022 02:51 )             39.8     01-13    128<L>  |  95<L>  |  38<H>  ----------------------------<  123<H>  3.6   |  21<L>  |  2.03<H>    Ca    8.0<L>      13 Jan 2022 02:51  Phos  3.2     01-13  Mg     1.60     01-13    TPro  5.7<L>  /  Alb  1.9<L>  /  TBili  1.6<H>  /  DBili  x   /  AST  72<H>  /  ALT  76<H>  /  AlkPhos  77  01-13      WBC Trend:  WBC Count: 15.70 (01-13-22 @ 02:51)  WBC Count: 14.78 (01-12-22 @ 04:51)  WBC Count: 15.09 (01-12-22 @ 00:52)  WBC Count: 16.33 (01-11-22 @ 02:33)      Auto Neutrophil #: 12.37 K/uL (01-13-22 @ 02:51)  Auto Neutrophil #: 13.48 K/uL (01-12-22 @ 00:52)  Auto Neutrophil #: 13.95 K/uL (01-11-22 @ 02:33)  Auto Neutrophil #: 17.48 K/uL (01-10-22 @ 03:22)  Auto Neutrophil #: 17.77 K/uL (01-09-22 @ 02:19)      Creatine Trend:  Creatinine, Serum: 2.03 (01-13)  Creatinine, Serum: 2.39 (01-12)  Creatinine, Serum: 2.61 (01-11)  Creatinine, Serum: 2.62 (01-10)      Liver Biochemical Testing Trend:  Alanine Aminotransferase (ALT/SGPT): 76 *H* (01-13)  Alanine Aminotransferase (ALT/SGPT): 100 *H* (01-12)  Alanine Aminotransferase (ALT/SGPT): 116 *H* (01-11)  Alanine Aminotransferase (ALT/SGPT): 88 *H* (01-10)  Alanine Aminotransferase (ALT/SGPT): 91 *H* (01-10)  Aspartate Aminotransferase (AST/SGOT): 72 (01-13-22 @ 02:51)  Aspartate Aminotransferase (AST/SGOT): 109 (01-12-22 @ 00:52)  Aspartate Aminotransferase (AST/SGOT): 182 (01-11-22 @ 02:33)  Aspartate Aminotransferase (AST/SGOT): 135 (01-10-22 @ 10:31)  Aspartate Aminotransferase (AST/SGOT): 176 (01-10-22 @ 03:22)  Bilirubin Total, Serum: 1.6 (01-13)  Bilirubin Total, Serum: 0.9 (01-12)  Bilirubin Total, Serum: 0.8 (01-11)  Bilirubin Total, Serum: 0.7 (01-10)  Bilirubin Total, Serum: 0.6 (01-10)      MICROBIOLOGY:  Vancomycin Level, Trough: 5.2 (01-10 @ 13:25)    MRSA PCR Result.: NotDetec (01-09-22 @ 18:52)    Culture - Sputum (collected 10 Shai 2022 02:30)  Source: .Sputum Sputum  Final Report:    Normal Respiratory Marifer present    Culture - Blood (collected 08 Jan 2022 23:09)  Source: .Blood Blood-Peripheral  Preliminary Report:    No growth to date.    Culture - Blood (collected 08 Jan 2022 23:09)  Source: .Blood Blood-Peripheral  Preliminary Report:    No growth to date.    Culture - Blood (collected 12 Dec 2020 10:52)  Source: .Blood Blood  Final Report:    No Growth Final    Culture - Blood (collected 12 Dec 2020 10:51)  Source: .Blood Blood  Final Report:    No Growth Final    Culture - Other (collected 12 Dec 2020 01:15)  Source: Skin left kidney stone culture  Final Report:    Rare Enterococcus faecalis  Organism: Enterococcus faecalis  Organism: Enterococcus faecalis    Sensitivities:      -  Ampicillin: S <=2 Predicts results to ampicillin/sulbactam, amoxacillin-clavulanate and  piperacillin-tazobactam.      -  Tetra/Doxy: R >8      -  Vancomycin: S 2      Method Type: OMAR    Culture - Urine (collected 12 Dec 2020 01:00)  Source: Kidney Kidney  Final Report:    Moderate Enterococcus faecalis  Organism: Enterococcus faecalis  Organism: Enterococcus faecalis    Sensitivities:      -  Ampicillin: S <=2 Predicts results to ampicillin/sulbactam, amoxacillin-clavulanate and  piperacillin-tazobactam.      -  Ciprofloxacin: S <=1      -  Levofloxacin: S <=1      -  Tetra/Doxy: R >8      -  Vancomycin: S 2      Method Type: OMAR    Culture - Urine (collected 12 Dec 2020 00:52)  Source: Kidney Kidney  Final Report:    Moderate Enterococcus faecalis  Organism: Enterococcus faecalis  Organism: Enterococcus faecalis    Sensitivities:      -  Ampicillin: S <=2 Predicts results to ampicillin/sulbactam, amoxacillin-clavulanate and  piperacillin-tazobactam.      -  Ciprofloxacin: S <=1      -  Levofloxacin: S <=1      -  Tetra/Doxy: R >8      -  Vancomycin: S 2      Method Type: OMAR    Culture - Urine (collected 02 Dec 2020 12:49)  Source: .Urine Clean Catch (Midstream)  Final Report:    10,000 - 49,000 CFU/mL Enterococcus faecalis  Organism: Enterococcus faecalis  Organism: Enterococcus faecalis    Sensitivities:      -  Ampicillin: S <=2 Predicts results to ampicillin/sulbactam, amoxacillin-clavulanate and  piperacillin-tazobactam.      -  Ciprofloxacin: S <=1      -  Levofloxacin: S <=1      -  Nitrofurantoin: S <=32 Should not be used to treat pyelonephritis.      -  Tetra/Doxy: R >8      -  Vancomycin: S 2      Method Type: OMAR    Procalcitonin, Serum: 0.15 (01-07)    C-Reactive Protein, Serum: 116.3 (01-07)    Ferritin, Serum: 1107 (01-07)    D-Dimer Assay, Quantitative: 3073 (01-11)  D-Dimer Assay, Quantitative: 3352 (01-11)  D-Dimer Assay, Quantitative: 2670 (01-08)  D-Dimer Assay, Quantitative: 788 (01-07)    Lactate Dehydrogenase, Serum: 421 (01-07)    Blood Gas Arterial, Lactate: 2.2 (01-13 @ 12:35)  Blood Gas Arterial, Lactate: 2.4 (01-13 @ 02:51)  Blood Gas Arterial, Lactate: 1.9 (01-12 @ 23:34)  Blood Gas Arterial, Lactate: 2.4 (01-12 @ 18:31)    A1C with Estimated Average Glucose Result: 6.4 % (12-29-21 @ 07:07)    RADIOLOGY:  imaging below personally reviewed   Patient is a 61y old  Male who presents with a chief complaint of fever, SOB (13 Jan 2022 07:38)    HPI:  60 yo M, with left kideney stone and urine culture with E. faecalis, high BMI 39, admitted since 12/28/21 for COVID, with ARDS and intubated in MICU. Treated empirically with zosyn for presumed bacterial superimposed PNA. Family requested ID for consult.    Per nurse, not making lots of ETT secretions.    REVIEW OF SYSTEMS  [X  ] ROS unobtainable because:  intubated  [  ] All other systems negative except as noted below    Constitutional:  [ ] fever [ ] chills  [ ] weight loss  [ ]night sweat  [ ]poor appetite/PO intake [ ]fatigue   Skin:  [ ] rash [ ] phlebitis	  Eyes: [ ] icterus [ ] pain  [ ] discharge	  ENMT: [ ] sore throat  [ ] thrush [ ] ulcers [ ] exudates [ ]anosmia  Respiratory: [ ] dyspnea [ ] hemoptysis [ ] cough [ ] sputum	  Cardiovascular:  [ ] chest pain [ ] palpitations [ ] edema	  Gastrointestinal:  [ ] nausea [ ] vomiting [ ] diarrhea [ ] constipation [ ] pain	  Genitourinary:  [ ] dysuria [ ] frequency [ ] hematuria [ ] discharge [ ] flank pain  [ ] incontinence  Musculoskeletal:  [ ] myalgias [ ] arthralgias [ ] arthritis  [ ] back pain  Neurological:  [ ] headache [ ] weakness [ ] seizures  [ ] confusion/altered mental status    prior hospital charts reviewed [V]  primary team notes reviewed [V]  other consultant notes reviewed [V]    PAST MEDICAL & SURGICAL HISTORY:  Obese    SOCIAL HISTORY:  Alcohol: socially   Smoking: past smoker, Quit > 20 years ago  recently retired      FAMILY HISTORY:  Father : Leukemia  otherwise negative     Allergies  Cipro (Rash)    ANTIMICROBIALS:  piperacillin/tazobactam IVPB.. 3.375 every 8 hours    ANTIMICROBIALS (past 90 days):  MEDICATIONS  (STANDING):  piperacillin/tazobactam IVPB. 1/9-    remdesivir  IVPB 12/28-1/1  vancomycin  IVPB 1/9-10        OTHER MEDS:   MEDICATIONS  (STANDING):  ALBUTerol    90 MICROgram(s) HFA Inhaler 2 every 6 hours  aMIOdarone    Tablet 200 every 24 hours  aMIOdarone Infusion 1 <Continuous>  cisatracurium Infusion 3 <Continuous>  fentaNYL   Infusion. 0.5 <Continuous>  heparin   Injectable 9500 every 6 hours PRN  heparin   Injectable 4500 every 6 hours PRN  heparin  Infusion.  <Continuous>  norepinephrine Infusion 0.05 <Continuous>  phenylephrine    Infusion 0.1 <Continuous>  polyethylene glycol 3350 17 daily  propofol Infusion 10 <Continuous>  senna 2 at bedtime  vasopressin Infusion 0.04 <Continuous>      VITALS:  Vital Signs Last 24 Hrs  T(F): 99 (01-13-22 @ 12:00), Max: 101.8 (01-10-22 @ 00:00)    Vital Signs Last 24 Hrs  HR: 85 (01-13-22 @ 12:00) (68 - 101)  BP: --  RR: 26 (01-13-22 @ 12:00)  SpO2: 90% (01-13-22 @ 12:00) (89% - 97%)  Wt(kg): --    EXAM:  Physical Exam:  Constitutional:  intubated, in proning position  HEENT: left IJ central line, on ETT  LUNGS:  CTA  CVS: unable to listen given in prone  Abd:  unable to exam given in prone  Ext:  no edema  Vascular:  IV site no erythema tenderness or discharge  MSK:  joints without swelling  Neuro: intubated  On Arizmendi  No sacral ulcer    Labs:                        13.2   15.70 )-----------( 282      ( 13 Jan 2022 02:51 )             39.8     01-13    128<L>  |  95<L>  |  38<H>  ----------------------------<  123<H>  3.6   |  21<L>  |  2.03<H>    Ca    8.0<L>      13 Jan 2022 02:51  Phos  3.2     01-13  Mg     1.60     01-13    TPro  5.7<L>  /  Alb  1.9<L>  /  TBili  1.6<H>  /  DBili  x   /  AST  72<H>  /  ALT  76<H>  /  AlkPhos  77  01-13      WBC Trend:  WBC Count: 15.70 (01-13-22 @ 02:51)  WBC Count: 14.78 (01-12-22 @ 04:51)  WBC Count: 15.09 (01-12-22 @ 00:52)  WBC Count: 16.33 (01-11-22 @ 02:33)      Auto Neutrophil #: 12.37 K/uL (01-13-22 @ 02:51)  Auto Neutrophil #: 13.48 K/uL (01-12-22 @ 00:52)  Auto Neutrophil #: 13.95 K/uL (01-11-22 @ 02:33)  Auto Neutrophil #: 17.48 K/uL (01-10-22 @ 03:22)  Auto Neutrophil #: 17.77 K/uL (01-09-22 @ 02:19)      Creatine Trend:  Creatinine, Serum: 2.03 (01-13)  Creatinine, Serum: 2.39 (01-12)  Creatinine, Serum: 2.61 (01-11)  Creatinine, Serum: 2.62 (01-10)      Liver Biochemical Testing Trend:  Alanine Aminotransferase (ALT/SGPT): 76 *H* (01-13)  Alanine Aminotransferase (ALT/SGPT): 100 *H* (01-12)  Alanine Aminotransferase (ALT/SGPT): 116 *H* (01-11)  Alanine Aminotransferase (ALT/SGPT): 88 *H* (01-10)  Alanine Aminotransferase (ALT/SGPT): 91 *H* (01-10)  Aspartate Aminotransferase (AST/SGOT): 72 (01-13-22 @ 02:51)  Aspartate Aminotransferase (AST/SGOT): 109 (01-12-22 @ 00:52)  Aspartate Aminotransferase (AST/SGOT): 182 (01-11-22 @ 02:33)  Aspartate Aminotransferase (AST/SGOT): 135 (01-10-22 @ 10:31)  Aspartate Aminotransferase (AST/SGOT): 176 (01-10-22 @ 03:22)  Bilirubin Total, Serum: 1.6 (01-13)  Bilirubin Total, Serum: 0.9 (01-12)  Bilirubin Total, Serum: 0.8 (01-11)  Bilirubin Total, Serum: 0.7 (01-10)  Bilirubin Total, Serum: 0.6 (01-10)      MICROBIOLOGY:  Vancomycin Level, Trough: 5.2 (01-10 @ 13:25)    MRSA PCR Result.: NotDetec (01-09-22 @ 18:52)    Culture - Sputum (collected 10 Shai 2022 02:30)  Source: .Sputum Sputum  Final Report:    Normal Respiratory Marifer present    Culture - Blood (collected 08 Jan 2022 23:09)  Source: .Blood Blood-Peripheral  Preliminary Report:    No growth to date.    Culture - Blood (collected 08 Jan 2022 23:09)  Source: .Blood Blood-Peripheral  Preliminary Report:    No growth to date.    Culture - Blood (collected 12 Dec 2020 10:52)  Source: .Blood Blood  Final Report:    No Growth Final    Culture - Blood (collected 12 Dec 2020 10:51)  Source: .Blood Blood  Final Report:    No Growth Final    Culture - Other (collected 12 Dec 2020 01:15)  Source: Skin left kidney stone culture  Final Report:    Rare Enterococcus faecalis  Organism: Enterococcus faecalis  Organism: Enterococcus faecalis    Sensitivities:      -  Ampicillin: S <=2 Predicts results to ampicillin/sulbactam, amoxacillin-clavulanate and  piperacillin-tazobactam.      -  Tetra/Doxy: R >8      -  Vancomycin: S 2      Method Type: OMAR    Culture - Urine (collected 12 Dec 2020 01:00)  Source: Kidney Kidney  Final Report:    Moderate Enterococcus faecalis  Organism: Enterococcus faecalis  Organism: Enterococcus faecalis    Sensitivities:      -  Ampicillin: S <=2 Predicts results to ampicillin/sulbactam, amoxacillin-clavulanate and  piperacillin-tazobactam.      -  Ciprofloxacin: S <=1      -  Levofloxacin: S <=1      -  Tetra/Doxy: R >8      -  Vancomycin: S 2      Method Type: OMAR    Culture - Urine (collected 12 Dec 2020 00:52)  Source: Kidney Kidney  Final Report:    Moderate Enterococcus faecalis  Organism: Enterococcus faecalis  Organism: Enterococcus faecalis    Sensitivities:      -  Ampicillin: S <=2 Predicts results to ampicillin/sulbactam, amoxacillin-clavulanate and  piperacillin-tazobactam.      -  Ciprofloxacin: S <=1      -  Levofloxacin: S <=1      -  Tetra/Doxy: R >8      -  Vancomycin: S 2      Method Type: OMAR    Culture - Urine (collected 02 Dec 2020 12:49)  Source: .Urine Clean Catch (Midstream)  Final Report:    10,000 - 49,000 CFU/mL Enterococcus faecalis  Organism: Enterococcus faecalis  Organism: Enterococcus faecalis    Sensitivities:      -  Ampicillin: S <=2 Predicts results to ampicillin/sulbactam, amoxacillin-clavulanate and  piperacillin-tazobactam.      -  Ciprofloxacin: S <=1      -  Levofloxacin: S <=1      -  Nitrofurantoin: S <=32 Should not be used to treat pyelonephritis.      -  Tetra/Doxy: R >8      -  Vancomycin: S 2      Method Type: OMAR    Procalcitonin, Serum: 0.15 (01-07)    C-Reactive Protein, Serum: 116.3 (01-07)    Ferritin, Serum: 1107 (01-07)    D-Dimer Assay, Quantitative: 3073 (01-11)  D-Dimer Assay, Quantitative: 3352 (01-11)  D-Dimer Assay, Quantitative: 2670 (01-08)  D-Dimer Assay, Quantitative: 788 (01-07)    Lactate Dehydrogenase, Serum: 421 (01-07)    Blood Gas Arterial, Lactate: 2.2 (01-13 @ 12:35)  Blood Gas Arterial, Lactate: 2.4 (01-13 @ 02:51)  Blood Gas Arterial, Lactate: 1.9 (01-12 @ 23:34)  Blood Gas Arterial, Lactate: 2.4 (01-12 @ 18:31)    A1C with Estimated Average Glucose Result: 6.4 % (12-29-21 @ 07:07)    RADIOLOGY:  imaging below personally reviewed   Patient is a 61y old  Male who presents with a chief complaint of fever, SOB (13 Jan 2022 07:38)    HPI:  62 yo M, with left kideney stone and urine culture with E. faecalis, high BMI 39, admitted since 12/28/21 for COVID, with ARDS and intubated in MICU. Treated empirically with zosyn for presumed bacterial superimposed PNA. Family requested ID for consult.    Per nurse, not making lots of ETT secretions.    REVIEW OF SYSTEMS  [X  ] ROS unobtainable because:  intubated        prior hospital charts reviewed [V]  primary team notes reviewed [V]  other consultant notes reviewed [V]    PAST MEDICAL & SURGICAL HISTORY:  Obese    SOCIAL HISTORY:  Alcohol: socially   Smoking: past smoker, Quit > 20 years ago  recently retired      FAMILY HISTORY:  Father : Leukemia  otherwise negative     Allergies  Cipro (Rash)    ANTIMICROBIALS:  piperacillin/tazobactam IVPB.. 3.375 every 8 hours    ANTIMICROBIALS (past 90 days):  MEDICATIONS  (STANDING):  piperacillin/tazobactam IVPB. 1/9-    remdesivir  IVPB 12/28-1/1  vancomycin  IVPB 1/9-10        OTHER MEDS:   MEDICATIONS  (STANDING):  ALBUTerol    90 MICROgram(s) HFA Inhaler 2 every 6 hours  aMIOdarone    Tablet 200 every 24 hours  aMIOdarone Infusion 1 <Continuous>  cisatracurium Infusion 3 <Continuous>  fentaNYL   Infusion. 0.5 <Continuous>  heparin   Injectable 9500 every 6 hours PRN  heparin   Injectable 4500 every 6 hours PRN  heparin  Infusion.  <Continuous>  norepinephrine Infusion 0.05 <Continuous>  phenylephrine    Infusion 0.1 <Continuous>  polyethylene glycol 3350 17 daily  propofol Infusion 10 <Continuous>  senna 2 at bedtime  vasopressin Infusion 0.04 <Continuous>      VITALS:  Vital Signs Last 24 Hrs  T(F): 99 (01-13-22 @ 12:00), Max: 101.8 (01-10-22 @ 00:00)    Vital Signs Last 24 Hrs  HR: 85 (01-13-22 @ 12:00) (68 - 101)  BP: --  RR: 26 (01-13-22 @ 12:00)  SpO2: 90% (01-13-22 @ 12:00) (89% - 97%)  Wt(kg): --    EXAM:  Physical Exam:  Constitutional:  intubated, in proned position  HEENT: left IJ central line, ET tube   LUNGS: mechanically ventilated   CVS: regular rate. unable to listen given in prone  Abd:  unable to exam given in prone  Ext:  no edema  Vascular:  IV site no erythema tenderness or discharge  MSK:  no focal joint swelling  Neuro: sedated   On Arizmendi  No sacral ulcer    Labs:                        13.2   15.70 )-----------( 282      ( 13 Jan 2022 02:51 )             39.8     01-13    128<L>  |  95<L>  |  38<H>  ----------------------------<  123<H>  3.6   |  21<L>  |  2.03<H>    Ca    8.0<L>      13 Jan 2022 02:51  Phos  3.2     01-13  Mg     1.60     01-13    TPro  5.7<L>  /  Alb  1.9<L>  /  TBili  1.6<H>  /  DBili  x   /  AST  72<H>  /  ALT  76<H>  /  AlkPhos  77  01-13      WBC Trend:  WBC Count: 15.70 (01-13-22 @ 02:51)  WBC Count: 14.78 (01-12-22 @ 04:51)  WBC Count: 15.09 (01-12-22 @ 00:52)  WBC Count: 16.33 (01-11-22 @ 02:33)      Auto Neutrophil #: 12.37 K/uL (01-13-22 @ 02:51)  Auto Neutrophil #: 13.48 K/uL (01-12-22 @ 00:52)  Auto Neutrophil #: 13.95 K/uL (01-11-22 @ 02:33)  Auto Neutrophil #: 17.48 K/uL (01-10-22 @ 03:22)  Auto Neutrophil #: 17.77 K/uL (01-09-22 @ 02:19)      Creatine Trend:  Creatinine, Serum: 2.03 (01-13)  Creatinine, Serum: 2.39 (01-12)  Creatinine, Serum: 2.61 (01-11)  Creatinine, Serum: 2.62 (01-10)      Liver Biochemical Testing Trend:  Alanine Aminotransferase (ALT/SGPT): 76 *H* (01-13)  Alanine Aminotransferase (ALT/SGPT): 100 *H* (01-12)  Alanine Aminotransferase (ALT/SGPT): 116 *H* (01-11)  Alanine Aminotransferase (ALT/SGPT): 88 *H* (01-10)  Alanine Aminotransferase (ALT/SGPT): 91 *H* (01-10)  Aspartate Aminotransferase (AST/SGOT): 72 (01-13-22 @ 02:51)  Aspartate Aminotransferase (AST/SGOT): 109 (01-12-22 @ 00:52)  Aspartate Aminotransferase (AST/SGOT): 182 (01-11-22 @ 02:33)  Aspartate Aminotransferase (AST/SGOT): 135 (01-10-22 @ 10:31)  Aspartate Aminotransferase (AST/SGOT): 176 (01-10-22 @ 03:22)  Bilirubin Total, Serum: 1.6 (01-13)  Bilirubin Total, Serum: 0.9 (01-12)  Bilirubin Total, Serum: 0.8 (01-11)  Bilirubin Total, Serum: 0.7 (01-10)  Bilirubin Total, Serum: 0.6 (01-10)      MICROBIOLOGY:  Vancomycin Level, Trough: 5.2 (01-10 @ 13:25)    MRSA PCR Result.: NotDetec (01-09-22 @ 18:52)    Culture - Sputum (collected 10 Shai 2022 02:30)  Source: .Sputum Sputum  Final Report:    Normal Respiratory Marifer present    Culture - Blood (collected 08 Jan 2022 23:09)  Source: .Blood Blood-Peripheral  Preliminary Report:    No growth to date.    Culture - Blood (collected 08 Jan 2022 23:09)  Source: .Blood Blood-Peripheral  Preliminary Report:    No growth to date.    Culture - Blood (collected 12 Dec 2020 10:52)  Source: .Blood Blood  Final Report:    No Growth Final    Culture - Blood (collected 12 Dec 2020 10:51)  Source: .Blood Blood  Final Report:    No Growth Final    Culture - Other (collected 12 Dec 2020 01:15)  Source: Skin left kidney stone culture  Final Report:    Rare Enterococcus faecalis  Organism: Enterococcus faecalis  Organism: Enterococcus faecalis    Sensitivities:      -  Ampicillin: S <=2 Predicts results to ampicillin/sulbactam, amoxacillin-clavulanate and  piperacillin-tazobactam.      -  Tetra/Doxy: R >8      -  Vancomycin: S 2      Method Type: OMAR    Culture - Urine (collected 12 Dec 2020 01:00)  Source: Kidney Kidney  Final Report:    Moderate Enterococcus faecalis  Organism: Enterococcus faecalis  Organism: Enterococcus faecalis    Sensitivities:      -  Ampicillin: S <=2 Predicts results to ampicillin/sulbactam, amoxacillin-clavulanate and  piperacillin-tazobactam.      -  Ciprofloxacin: S <=1      -  Levofloxacin: S <=1      -  Tetra/Doxy: R >8      -  Vancomycin: S 2      Method Type: OMAR    Culture - Urine (collected 12 Dec 2020 00:52)  Source: Kidney Kidney  Final Report:    Moderate Enterococcus faecalis  Organism: Enterococcus faecalis  Organism: Enterococcus faecalis    Sensitivities:      -  Ampicillin: S <=2 Predicts results to ampicillin/sulbactam, amoxacillin-clavulanate and  piperacillin-tazobactam.      -  Ciprofloxacin: S <=1      -  Levofloxacin: S <=1      -  Tetra/Doxy: R >8      -  Vancomycin: S 2      Method Type: OMAR    Culture - Urine (collected 02 Dec 2020 12:49)  Source: .Urine Clean Catch (Midstream)  Final Report:    10,000 - 49,000 CFU/mL Enterococcus faecalis  Organism: Enterococcus faecalis  Organism: Enterococcus faecalis    Sensitivities:      -  Ampicillin: S <=2 Predicts results to ampicillin/sulbactam, amoxacillin-clavulanate and  piperacillin-tazobactam.      -  Ciprofloxacin: S <=1      -  Levofloxacin: S <=1      -  Nitrofurantoin: S <=32 Should not be used to treat pyelonephritis.      -  Tetra/Doxy: R >8      -  Vancomycin: S 2      Method Type: OMAR    Procalcitonin, Serum: 0.15 (01-07)    C-Reactive Protein, Serum: 116.3 (01-07)    Ferritin, Serum: 1107 (01-07)    D-Dimer Assay, Quantitative: 3073 (01-11)  D-Dimer Assay, Quantitative: 3352 (01-11)  D-Dimer Assay, Quantitative: 2670 (01-08)  D-Dimer Assay, Quantitative: 788 (01-07)    Lactate Dehydrogenase, Serum: 421 (01-07)    Blood Gas Arterial, Lactate: 2.2 (01-13 @ 12:35)  Blood Gas Arterial, Lactate: 2.4 (01-13 @ 02:51)  Blood Gas Arterial, Lactate: 1.9 (01-12 @ 23:34)  Blood Gas Arterial, Lactate: 2.4 (01-12 @ 18:31)    A1C with Estimated Average Glucose Result: 6.4 % (12-29-21 @ 07:07)    RADIOLOGY:  imaging below personally reviewed

## 2022-01-14 NOTE — PROGRESS NOTE ADULT - ASSESSMENT
_________________________________________________________________________________________  ========>>  M E D I C A L   A T T E N D I N G    F O L L O W  U P  N O T E  <<=========  -----------------------------------------------------------------------------------------------------    - Patient seen and examined by me earlier today.   - In summary,  ALEXX BROWN is a 61y year old man admitted with SOB, fever..   - Patient intubated, sedated in the MICU, vented , proned      ==================>> REVIEW OF SYSTEM <<=================    unable to obtain     ==================>> PHYSICAL EXAM <<=================    GEN: sedated, vented, intubated.. NAD  HEENT: NCAT lines and tubes in place   Neck: supple lines and tubes in place   CVS: S1S2 , regular  PULM: CTA B/L,  limited   ABD.: soft. non distended  Extrem: intact pulses , no signif edema      + kim with yellow urine                                ( Note written / Date of service 01-14-22 )    ==================>> MEDICATIONS <<====================    ALBUTerol    90 MICROgram(s) HFA Inhaler 2 Puff(s) Inhalation every 6 hours  aMIOdarone    Tablet 200 milliGRAM(s) Oral every 24 hours  aMIOdarone Infusion 1 mG/Min IV Continuous <Continuous>  chlorhexidine 0.12% Liquid 15 milliLiter(s) Oral Mucosa every 12 hours  chlorhexidine 4% Liquid 1 Application(s) Topical <User Schedule>  cisatracurium Infusion 3 MICROgram(s)/kG/Min IV Continuous <Continuous>  fentaNYL   Infusion. 0.5 MICROgram(s)/kG/Hr IV Continuous <Continuous>  heparin  Infusion.  Unit(s)/Hr IV Continuous <Continuous>  norepinephrine Infusion 0.05 MICROgram(s)/kG/Min IV Continuous <Continuous>  petrolatum Ophthalmic Ointment 1 Application(s) Both EYES daily  phenylephrine    Infusion 0.1 MICROgram(s)/kG/Min IV Continuous <Continuous>  piperacillin/tazobactam IVPB.. 3.375 Gram(s) IV Intermittent every 8 hours  polyethylene glycol 3350 17 Gram(s) Oral daily  propofol Infusion 10 MICROgram(s)/kG/Min IV Continuous <Continuous>  senna 2 Tablet(s) Oral at bedtime  vasopressin Infusion 0.04 Unit(s)/Min IV Continuous <Continuous>    MEDICATIONS  (PRN):  heparin   Injectable 9500 Unit(s) IV Push every 6 hours PRN For aPTT less than 40  heparin   Injectable 4500 Unit(s) IV Push every 6 hours PRN For aPTT between 40 - 57    ___________  Active diet:  Diet, NPO with Tube Feed:   Tube Feeding Modality: Nasogastric  Nepro with Carb Steady (NEPRORTH)  Total Volume for 24 Hours (mL): 240  Continuous  Starting Tube Feed Rate mL per Hour: 10  Until Goal Tube Feed Rate (mL per Hour): 10  Tube Feed Duration (in Hours): 24  Tube Feed Start Time: 11:00  ___________________    ==================>> VITAL SIGNS <<==================    Vital Signs Last 24 HrsT(C): 37.9 (01-14-22 @ 16:00)  T(F): 100.2 (01-14-22 @ 16:00), Max: 101.5 (01-14-22 @ 04:00)  HR: 69 (01-14-22 @ 17:00) (69 - 83)  BP: -- reviewed on tele   RR: 26 (01-14-22 @ 17:00) (23 - 26)  SpO2: 89% (01-14-22 @ 17:00) (87% - 99%)       ==================>> LAB AND IMAGING <<==================                        13.4   16.49 )-----------( 264      ( 14 Jan 2022 02:41 )             39.9        01-14    128<L>  |  93<L>  |  37<H>  ----------------------------<  109<H>  3.6   |  19<L>  |  1.80<H>    Ca    7.9<L>      14 Jan 2022 02:41  Phos  3.6     01-14  Mg     1.60     01-14    TPro  6.0  /  Alb  2.1<L>  /  TBili  1.6<H>  /  DBili  x   /  AST  67<H>  /  ALT  63<H>  /  AlkPhos  74  01-14    WBC count:   16.49 <<== ,  15.70 <<== ,  14.78 <<== ,  15.09 <<== ,  16.33 <<== ,  19.55 <<==   Hemoglobin:   13.4 <<==,  13.2 <<==,  13.0 <<==,  13.3 <<==,  14.0 <<==,  14.7 <<==  platelets:  264 <==, 282 <==, 325 <==, 334 <==, 407 <==, 420 <==, 462 <==    Creatinine:  1.80  <<==, 2.03  <<==, 2.39  <<==, 2.61  <<==, 2.62  <<==, 2.56  <<==  Sodium:   128  <==, 128  <==, 131  <==, 132  <==, 132  <==, 129  <==, 134  <==       AST:          67 <== , 72 <== , 109 <== , 182 <== , 135 <==      ALT:        63  <== , 76  <== , 100  <== , 116  <== , 88  <==      AP:        74  <=, 77  <=, 78  <=, 77  <=, 64  <=     Bili:        1.6  <=, 1.6  <=, 0.9  <=, 0.8  <=, 0.7  <=    _______________________  C U L T U R E S :    Culture - Blood (collected 12 Jan 2022 16:30)  Source: .Blood Blood-Peripheral  Preliminary Report (13 Jan 2022 17:02):    No growth to date.    Culture - Blood (collected 12 Jan 2022 16:30)  Source: .Blood Blood-Peripheral  Preliminary Report (13 Jan 2022 17:02):    No growth to date.    Culture - Sputum (collected 10 Shai 2022 02:30)  Source: .Sputum Sputum  Gram Stain (10 Shai 2022 13:26):    Moderate polymorphonuclear leukocytes per low power field    No Squamous epithelial cells per low power field    Rare Gram Negative Rods per oil power field  Final Report (12 Jan 2022 14:27):    Normal Respiratory Marifer present    Culture - Blood (collected 08 Jan 2022 19:45)  Source: .Blood Blood-Peripheral  Final Report (14 Jan 2022 01:01):    No Growth Final    Culture - Blood (collected 08 Jan 2022 19:35)  Source: .Blood Blood-Peripheral  Final Report (14 Jan 2022 01:01):    No Growth Final    SARS-CoV-2: Detected (12-28-21 @ 08:05)    ^^^ Inflammatory markers :  ^^^  C R P :          116.3 (01-07-22)  <<--, 131.9 (01-04-22)  <<--, 96.4 (01-02-22)  <<--, 31.8 (12-30-21)  <<--, 82.2 (12-28-21)  <<--  P C T :         1.43 (01-13-22) <<--, 0.15 (01-07-22) <<--, 0.13 (01-02-22) <<--, 0.11 (12-30-21) <<--, 0.16 (12-28-21) <<--    Ferritin :         1107 (01-07-22) <<--, 1058 (01-04-22) <<--, 890 (01-02-22) <<--, 885 (12-28-21) <<--    D-Dimer :          1179 (01-14-22) <<--, 3073 (01-11-22) <<--, 3352 (01-11-22) <<--, 2670 (01-08-22) <<--, 788 (01-07-22) <<--      < from: Xray Chest 1 View- PORTABLE-Urgent (Xray Chest 1 View- PORTABLE-Urgent .) (01.12.22 @ 21:42) >  IMPRESSION:  The study limited with collimation of both upper lungs.  Left IJ central venous catheter tip in SVC.  Enteric tube tip in stomach.  No menstruation bilateral lung opacities consistent with ARDS.  No sizable pleural effusion. No pneumothorax.  < end of copied text >    ___________________________________________________________________________________  ===============>>  A S S E S S M E N T   A N D   P L A N <<===============  ------------------------------------------------------------------------------------------    · Assessment	  61 year old man with history of kidney stone presenting with fever, cough, SOB x 2-3d.   admitted for COVID     Problem/Plan - 1:  ·  Problem: Acute hypoxemic respiratory failure due to COVID-19 with Viral syndrome. >> ARDS  post Remdesivir and Decadron per hospital protocol  trend inflammatory markers   ICU care and mgmt appreciated >> wean down Vent / O2  as able  supportive care  nutrition, hydration  as able   vitamins / supplements   DVT prophylaxis : lovenox BID  Continue Current medications otherwise and monitor.     ** Fever , leukocytosis, improved   new fever on day of intubation   pan cultures in process   suspect aspiration pneumonia  on Zosyn / broad spectrum abx > would give X 7 days   ID on board, appreciated and discussed   less likely PE as been on Lovenox 40 BID    would check LE dopplers   antipyretics as needed  monitor     **MELISSA and transaminitis likely due to hemodynamic changes during code  with episodes of hypotension 2/2 propofol     hold further diuretics     keep MAP >60      tend BMP closely > improved / stable     Avoid nephrotoxic medications.     Problem/Plan - 2:  ·  Problem:  obesity and likely ERICK    discussed with pt re weight loss and sleep study > obtaining and using a CPAP machine pos discharge  on Bipap now     -GI/DVT Prophylaxis per protocol.    overall prognosis guarded   pt full code     ___________________________  H. ABNER Knowles.  Pager: [phone]

## 2022-01-14 NOTE — PROGRESS NOTE ADULT - ASSESSMENT
Assessment and Plan    62 yo M PMHx of nephrolithiasis intubated for acute hypoxic respiratory failure 2/2 COVID19 with ARDS, requiring pressor support, and with MELISSA, complicated by superimposed bacterial infection on zosyn and A fib w/ RVR on amiodarone.    Neuro   #mental status  - sedated on propofol, fentanyl,  and cisatracurium.    Pulm  #respiratory status  - intubated for acute hypoxic respiratory failure, worsening work of breathing  - proned until 5 pm 1/10 (third time)  - obtain ABGs, follow up vent setting  - nebs  - CXR 1/8: elevated right hemidiaphragm ill defined increased right lung markings and left basilar/retrocardiac markings compatible with hx of COVID, Hazy indistinct left CP region could be due to overlying soft tissues versus a small left pleural effusion. Sharp right CP angle. No pneumothorax.  - Overnight 1/9-1/10 worsening acidosis and desat to 83%, RR was increased to 30 over night. AM vent setting turned back to RR 22.   Post supination abg PO2 44. Partly likely due to volume overload as patient had net positive 6L during hospital course. Patient was given 4mg bumex   - 1/11: Proned. Supination at 4pm, f/u post supination ABG. Decreasing FiO2.  - 1/12: Proned until 4pm supination. post supination ABG. pO2 66% Satting 90%.  - 1/13: Supination at 4pm. decreased fio2 from 100% this AM  to 80%. BMP repeat PO2 68.    Cardiovascular  #hemodynamics  - given albumin and LR 1/9  - 1/10: Charly and vasopressin  - 1/11: Started on levo, weaning off charly  - 1/12: Weaning off charly  - 1/13: over night d/c levo, back on charly.  #Rapid afib  - 1/10: HR of 160s then went back to 80s spontaneously. Happened again, and 1x amiodarone was given  - 1/11:  1x amiodarone. started on amiodarone load. Started on heparin ggt  - 1/12: Finishing up Amio Load, starting Amio PO in AM. Trend LFT's.  - 1/13: C/w PO amio. rate controlled.    GI  #diet  - NG tube placed over night  - started trickle feeds nephro    Renal  #MELISSA  - Baseline creatinine 0.8  - s/p 1x 2mg bumex push 1/9  - s/p 1x 4 mg bumex push 1/10  - 1/11: Creatinine stable since yesterday, elevated. Still putting out urine output of ~100cc/hr  - Strict I/O monitoring  - 1/12: MELISSA improving  - 1/13: MELISSA improving  #Mild hyponatremia  - Further diuresis, net + 7L hospital course. 2g bumex at 3 pm. goal net - 1L to 1.5L q24 hr.    MSK  #Rhabdo  1/11: CK 1890  -replete electrolytes as needed    ID  #[id number]  - s/p decadron and remdesivir  - trend inflammatory markers    #new fevers, leukocytosis  - vanc by level (1/9-1/10) and zosyn (1/9- )  - MRSA negative  - f/u blood cultures  - Sputum showed few gram negative rods. Blood culture no growth to date.  1/13: F/u ID consult requested by family. Procal 1.43.    Heme-Onc  #DVT ppx  - on heparin ggt for afib rvr   Assessment and Plan    60 yo M PMHx of nephrolithiasis intubated for acute hypoxic respiratory failure 2/2 COVID19 with ARDS, requiring pressor support, and with MELISSA, and A fib w/ RVR on amiodarone.    Neuro   #mental status  - sedated on propofol, fentanyl,  and cisatracurium.    Pulm  #respiratory status  - intubated for acute hypoxic respiratory failure, worsening work of breathing  - proned until 5 pm 1/10 (third time)  - obtain ABGs, follow up vent setting  - nebs  - CXR 1/8: elevated right hemidiaphragm ill defined increased right lung markings and left basilar/retrocardiac markings compatible with hx of COVID, Hazy indistinct left CP region could be due to overlying soft tissues versus a small left pleural effusion. Sharp right CP angle. No pneumothorax.  - Overnight 1/9-1/10 worsening acidosis and desat to 83%, RR was increased to 30 over night. AM vent setting turned back to RR 22.   Post supination abg PO2 44. Partly likely due to volume overload as patient had net positive 6L during hospital course. Patient was given 4mg bumex   - 1/11: Proned. Supination at 4pm, f/u post supination ABG. Decreasing FiO2.  - 1/12: Proned until 4pm supination. post supination ABG. pO2 66% Satting 90%.  - 1/13: Supination at 4pm. decreased fio2 from 100% this AM  to 80%. BMP repeat PO2 68.    Cardiovascular  #hemodynamics  - given albumin and LR 1/9  - 1/10: Charly and vasopressin  - 1/11: Started on levo, weaning off charly  - 1/12: Weaning off charly  - 1/13: over night d/c levo, back on cahrly.  #Rapid afib  - 1/10: HR of 160s then went back to 80s spontaneously. Happened again, and 1x amiodarone was given  - 1/11:  1x amiodarone. started on amiodarone load. Started on heparin ggt  - 1/12: Finishing up Amio Load, starting Amio PO in AM. Trend LFT's.  - 1/13: C/w PO amio. rate controlled.    GI  #diet  - NG tube placed over night  - started trickle feeds nephro    Renal  #MELISSA  - Baseline creatinine 0.8  - s/p 1x 2mg bumex push 1/9  - s/p 1x 4 mg bumex push 1/10  - 1/11: Creatinine stable since yesterday, elevated. Still putting out urine output of ~100cc/hr  - Strict I/O monitoring  - 1/12: MELISSA improving  - 1/13: MELISSA improving  - 1/14: MELISSA improving  #Mild hyponatremia  - Further diuresis, net + 7L hospital course. 2g bumex at 3 pm. goal net - 1L to 1.5L q24 hr.    MSK  #Rhabdo  1/11: CK 1890  -replete electrolytes as needed    ID  #[id number]  - s/p decadron and remdesivir  - trend inflammatory markers    #new fevers, leukocytosis  - vanc by level (1/9-1/10) and zosyn (1/9-1/15)  - MRSA negative  - f/u blood cultures  - Sputum showed few gram negative rods. Blood culture no growth to date.  1/13: F/u ID consult requested by family. Procal 1.43.    Heme-Onc  #DVT ppx  - on heparin ggt for afib rvr

## 2022-01-14 NOTE — PROGRESS NOTE ADULT - SUBJECTIVE AND OBJECTIVE BOX
INTERVAL HPI/OVERNIGHT EVENTS: No acute overnight events.    SUBJECTIVE: Patient seen and examined at bedside. No complaints. Denies fever, chills, chest pain, shortness of breath, abdominal pain, nausea, vomiting, changes in bowel habits, or urinary symptoms    OBJECTIVE:    VITAL SIGNS:  ICU Vital Signs Last 24 Hrs  T(C): 37.7 (14 Jan 2022 12:00), Max: 38.6 (14 Jan 2022 04:00)  T(F): 99.8 (14 Jan 2022 12:00), Max: 101.5 (14 Jan 2022 04:00)  HR: 73 (14 Jan 2022 14:00) (71 - 83)  BP: --  BP(mean): --  ABP: 108/65 (14 Jan 2022 14:00) (84/51 - 120/77)  ABP(mean): 81 (14 Jan 2022 14:00) (61 - 95)  RR: 26 (14 Jan 2022 14:00) (26 - 26)  SpO2: 89% (14 Jan 2022 14:00) (87% - 95%)    Mode: AC/ CMV (Assist Control/ Continuous Mandatory Ventilation), RR (machine): 26, FiO2: 80, PEEP: 10, ITime: 0.8, MAP: 19, PC: 25, PIP: 36    01-13 @ 07:01 - 01-14 @ 07:00  --------------------------------------------------------  IN: 4106.2 mL / OUT: 3805 mL / NET: 301.2 mL    01-14 @ 07:01 - 01-14 @ 14:23  --------------------------------------------------------  IN: 1067.9 mL / OUT: 1375 mL / NET: -307.1 mL      CAPILLARY BLOOD GLUCOSE          PHYSICAL EXAM:    General: NAD  HEENT: NC/AT; PERRL, clear conjunctiva  Respiratory: CTA b/l  Cardiovascular: +S1/S2; RRR  Abdomen: soft, NT/ND; +BS x4  Extremities: WWP, 2+ peripheral pulses b/l; no LE edema  Skin: normal color and turgor; no rash  Neurological: AAOx3, no focal deficits    MEDICATIONS:  MEDICATIONS  (STANDING):  ALBUTerol    90 MICROgram(s) HFA Inhaler 2 Puff(s) Inhalation every 6 hours  aMIOdarone    Tablet 200 milliGRAM(s) Oral every 24 hours  aMIOdarone Infusion 1 mG/Min (33.3 mL/Hr) IV Continuous <Continuous>  chlorhexidine 0.12% Liquid 15 milliLiter(s) Oral Mucosa every 12 hours  chlorhexidine 4% Liquid 1 Application(s) Topical <User Schedule>  cisatracurium Infusion 3 MICROgram(s)/kG/Min (21.1 mL/Hr) IV Continuous <Continuous>  fentaNYL   Infusion. 0.5 MICROgram(s)/kG/Hr (5.85 mL/Hr) IV Continuous <Continuous>  heparin  Infusion.  Unit(s)/Hr (21 mL/Hr) IV Continuous <Continuous>  norepinephrine Infusion 0.05 MICROgram(s)/kG/Min (5.49 mL/Hr) IV Continuous <Continuous>  petrolatum Ophthalmic Ointment 1 Application(s) Both EYES daily  phenylephrine    Infusion 0.1 MICROgram(s)/kG/Min (2.19 mL/Hr) IV Continuous <Continuous>  piperacillin/tazobactam IVPB.. 3.375 Gram(s) IV Intermittent every 8 hours  polyethylene glycol 3350 17 Gram(s) Oral daily  propofol Infusion 10 MICROgram(s)/kG/Min (7.02 mL/Hr) IV Continuous <Continuous>  senna 2 Tablet(s) Oral at bedtime  vasopressin Infusion 0.04 Unit(s)/Min (2.4 mL/Hr) IV Continuous <Continuous>    MEDICATIONS  (PRN):  heparin   Injectable 9500 Unit(s) IV Push every 6 hours PRN For aPTT less than 40  heparin   Injectable 4500 Unit(s) IV Push every 6 hours PRN For aPTT between 40 - 57      ALLERGIES:  Allergies    Cipro (Rash)    Intolerances    eggs (Diarrhea)      LABS:                        13.4   16.49 )-----------( 264      ( 14 Jan 2022 02:41 )             39.9     01-14    128<L>  |  93<L>  |  37<H>  ----------------------------<  109<H>  3.6   |  19<L>  |  1.80<H>    Ca    7.9<L>      14 Jan 2022 02:41  Phos  3.6     01-14  Mg     1.60     01-14    TPro  6.0  /  Alb  2.1<L>  /  TBili  1.6<H>  /  DBili  x   /  AST  67<H>  /  ALT  63<H>  /  AlkPhos  74  01-14    PT/INR - ( 14 Jan 2022 02:41 )   PT: 13.5 sec;   INR: 1.19 ratio         PTT - ( 14 Jan 2022 02:41 )  PTT:78.2 sec      RADIOLOGY & ADDITIONAL TESTS: Reviewed. INTERVAL HPI/OVERNIGHT EVENTS: Proned at 10 AM Wound care consult place.  SUBJECTIVE: Patient seen and examined at bedside. No complaints. Denies fever, chills, chest pain, shortness of breath, abdominal pain, nausea, vomiting, changes in bowel habits, or urinary symptoms    OBJECTIVE:    VITAL SIGNS:  ICU Vital Signs Last 24 Hrs  T(C): 37.7 (14 Jan 2022 12:00), Max: 38.6 (14 Jan 2022 04:00)  T(F): 99.8 (14 Jan 2022 12:00), Max: 101.5 (14 Jan 2022 04:00)  HR: 73 (14 Jan 2022 14:00) (71 - 83)  BP: --  BP(mean): --  ABP: 108/65 (14 Jan 2022 14:00) (84/51 - 120/77)  ABP(mean): 81 (14 Jan 2022 14:00) (61 - 95)  RR: 26 (14 Jan 2022 14:00) (26 - 26)  SpO2: 89% (14 Jan 2022 14:00) (87% - 95%)    Mode: AC/ CMV (Assist Control/ Continuous Mandatory Ventilation), RR (machine): 26, FiO2: 80, PEEP: 10, ITime: 0.8, MAP: 19, PC: 25, PIP: 36    01-13 @ 07:01 - 01-14 @ 07:00  --------------------------------------------------------  IN: 4106.2 mL / OUT: 3805 mL / NET: 301.2 mL    01-14 @ 07:01  -  01-14 @ 14:23  --------------------------------------------------------  IN: 1067.9 mL / OUT: 1375 mL / NET: -307.1 mL      CAPILLARY BLOOD GLUCOSE          PHYSICAL EXAM:    General: Sedated, paralyzed.  HEENT: NC/AT; PERRL, clear conjunctiva  Respiratory: CTA b/l  Cardiovascular: +S1/S2; RRR  Abdomen: soft, NT/ND; +BS x4  Extremities: WWP, 2+ peripheral pulses b/l; no LE edema  Skin: Skin break down at chin area.  Neurological: AAOx0, no focal deficits    MEDICATIONS:  MEDICATIONS  (STANDING):  ALBUTerol    90 MICROgram(s) HFA Inhaler 2 Puff(s) Inhalation every 6 hours  aMIOdarone    Tablet 200 milliGRAM(s) Oral every 24 hours  aMIOdarone Infusion 1 mG/Min (33.3 mL/Hr) IV Continuous <Continuous>  chlorhexidine 0.12% Liquid 15 milliLiter(s) Oral Mucosa every 12 hours  chlorhexidine 4% Liquid 1 Application(s) Topical <User Schedule>  cisatracurium Infusion 3 MICROgram(s)/kG/Min (21.1 mL/Hr) IV Continuous <Continuous>  fentaNYL   Infusion. 0.5 MICROgram(s)/kG/Hr (5.85 mL/Hr) IV Continuous <Continuous>  heparin  Infusion.  Unit(s)/Hr (21 mL/Hr) IV Continuous <Continuous>  norepinephrine Infusion 0.05 MICROgram(s)/kG/Min (5.49 mL/Hr) IV Continuous <Continuous>  petrolatum Ophthalmic Ointment 1 Application(s) Both EYES daily  phenylephrine    Infusion 0.1 MICROgram(s)/kG/Min (2.19 mL/Hr) IV Continuous <Continuous>  piperacillin/tazobactam IVPB.. 3.375 Gram(s) IV Intermittent every 8 hours  polyethylene glycol 3350 17 Gram(s) Oral daily  propofol Infusion 10 MICROgram(s)/kG/Min (7.02 mL/Hr) IV Continuous <Continuous>  senna 2 Tablet(s) Oral at bedtime  vasopressin Infusion 0.04 Unit(s)/Min (2.4 mL/Hr) IV Continuous <Continuous>    MEDICATIONS  (PRN):  heparin   Injectable 9500 Unit(s) IV Push every 6 hours PRN For aPTT less than 40  heparin   Injectable 4500 Unit(s) IV Push every 6 hours PRN For aPTT between 40 - 57      ALLERGIES:  Allergies    Cipro (Rash)    Intolerances    eggs (Diarrhea)      LABS:                        13.4   16.49 )-----------( 264      ( 14 Jan 2022 02:41 )             39.9     01-14    128<L>  |  93<L>  |  37<H>  ----------------------------<  109<H>  3.6   |  19<L>  |  1.80<H>    Ca    7.9<L>      14 Jan 2022 02:41  Phos  3.6     01-14  Mg     1.60     01-14    TPro  6.0  /  Alb  2.1<L>  /  TBili  1.6<H>  /  DBili  x   /  AST  67<H>  /  ALT  63<H>  /  AlkPhos  74  01-14    PT/INR - ( 14 Jan 2022 02:41 )   PT: 13.5 sec;   INR: 1.19 ratio         PTT - ( 14 Jan 2022 02:41 )  PTT:78.2 sec      RADIOLOGY & ADDITIONAL TESTS: Reviewed.

## 2022-01-15 NOTE — PROGRESS NOTE ADULT - ASSESSMENT
Assessment and Plan    60 yo M PMHx of nephrolithiasis intubated for acute hypoxic respiratory failure 2/2 COVID19 with ARDS, requiring pressor support, and with MELISSA, and A fib w/ RVR on amiodarone.    Neuro   #mental status  - sedated on propofol, fentanyl,  and cisatracurium.    Pulm  #respiratory status  - intubated for acute hypoxic respiratory failure, worsening work of breathing  - proned until 5 pm 1/10 (third time)  - obtain ABGs, follow up vent setting  - nebs  - CXR 1/8: elevated right hemidiaphragm ill defined increased right lung markings and left basilar/retrocardiac markings compatible with hx of COVID, Hazy indistinct left CP region could be due to overlying soft tissues versus a small left pleural effusion. Sharp right CP angle. No pneumothorax.  - Overnight 1/9-1/10 worsening acidosis and desat to 83%, RR was increased to 30 over night. AM vent setting turned back to RR 22.   Post supination abg PO2 44. Partly likely due to volume overload as patient had net positive 6L during hospital course. Patient was given 4mg bumex   - 1/11: Proned. Supination at 4pm, f/u post supination ABG. Decreasing FiO2.  - 1/12: Proned until 4pm supination. post supination ABG. pO2 66% Satting 90%.  - 1/13: Supination at 4pm. decreased fio2 from 100% this AM  to 80%. BMP repeat PO2 68.  - 1/14: Supination at 4 pm. Gases look same as when proned. No more proning.  - 1/15:     Cardiovascular  #hemodynamics  - given albumin and LR 1/9  - 1/10: Charly and vasopressin  - 1/11: Started on levo, weaning off charly  - 1/12: Weaning off charly  - 1/13: over night d/c levo, back on charly.  #Rapid afib  - 1/10: HR of 160s then went back to 80s spontaneously. Happened again, and 1x amiodarone was given  - 1/11:  1x amiodarone. started on amiodarone load. Started on heparin ggt  - 1/12: Finishing up Amio Load, starting Amio PO in AM. Trend LFT's.  - 1/13: C/w PO amio. rate controlled.    GI  #diet  - NG tube placed over night  - On glucerna rate of 40    Renal  #MELISSA  - Baseline creatinine 0.8  - s/p 1x 2mg bumex push 1/9  - s/p 1x 4 mg bumex push 1/10  - 1/11: Creatinine stable since yesterday, elevated. Still putting out urine output of ~100cc/hr  - Strict I/O monitoring  - 1/12: MELISSA improving  - 1/13: MELISSA improving  - 1/14: MELISSA improving  #Mild hyponatremia  - Further diuresis, net + 7L hospital course. 2g bumex at 3 pm. goal net - 1L to 1.5L q24 hr.    MSK  #Rhabdo  1/11: CK 1890  -replete electrolytes as needed    ID  #COVID19  - s/p decadron and remdesivir  - trend inflammatory markers    #new fevers, leukocytosis  - vanc by level (1/9-1/10) and zosyn (1/9-1/15)  - MRSA negative  - f/u blood cultures  - Sputum showed few gram negative rods. Blood culture no growth to date.  1/13: F/u ID consult requested by family. Procal 1.43.  1/15: Last day of Zosyn    Heme-Onc  #DVT ppx  - on heparin ggt for afib rvr

## 2022-01-15 NOTE — PROGRESS NOTE ADULT - SUBJECTIVE AND OBJECTIVE BOX
INTERVAL HPI/OVERNIGHT EVENTS: Didn't prone over night. 2g bumex given over night. Full diet started at 40 rate.  TOF 4/4 nimbex was uptitrated.    SUBJECTIVE: Patient seen and examined at bedside. Sedated and paralyzd. . ROS unable to be obtained due to mental status.    OBJECTIVE:    VITAL SIGNS:  ICU Vital Signs Last 24 Hrs  T(C): 37.7 (14 Jan 2022 12:00), Max: 38.6 (14 Jan 2022 04:00)  T(F): 99.8 (14 Jan 2022 12:00), Max: 101.5 (14 Jan 2022 04:00)  HR: 73 (14 Jan 2022 14:00) (71 - 83)  BP: --  BP(mean): --  ABP: 108/65 (14 Jan 2022 14:00) (84/51 - 120/77)  ABP(mean): 81 (14 Jan 2022 14:00) (61 - 95)  RR: 26 (14 Jan 2022 14:00) (26 - 26)  SpO2: 89% (14 Jan 2022 14:00) (87% - 95%)    Mode: AC/ CMV (Assist Control/ Continuous Mandatory Ventilation), RR (machine): 26, FiO2: 80, PEEP: 10, ITime: 0.8, MAP: 19, PC: 25, PIP: 36    01-13 @ 07:01 - 01-14 @ 07:00  --------------------------------------------------------  IN: 4106.2 mL / OUT: 3805 mL / NET: 301.2 mL    01-14 @ 07:01  -  01-14 @ 14:23  --------------------------------------------------------  IN: 1067.9 mL / OUT: 1375 mL / NET: -307.1 mL      CAPILLARY BLOOD GLUCOSE          PHYSICAL EXAM:    General: Sedated, paralyzed.  HEENT: NC/AT; PERRL, clear conjunctiva  Respiratory: CTA b/l  Cardiovascular: +S1/S2; RRR  Abdomen: soft, NT/ND; +BS x4  Extremities: WWP, 2+ peripheral pulses b/l; no LE edema  Skin: Skin break down at chin area.  Neurological: AAOx0, no focal deficits    MEDICATIONS:  MEDICATIONS  (STANDING):  ALBUTerol    90 MICROgram(s) HFA Inhaler 2 Puff(s) Inhalation every 6 hours  aMIOdarone    Tablet 200 milliGRAM(s) Oral every 24 hours  aMIOdarone Infusion 1 mG/Min (33.3 mL/Hr) IV Continuous <Continuous>  chlorhexidine 0.12% Liquid 15 milliLiter(s) Oral Mucosa every 12 hours  chlorhexidine 4% Liquid 1 Application(s) Topical <User Schedule>  cisatracurium Infusion 3 MICROgram(s)/kG/Min (21.1 mL/Hr) IV Continuous <Continuous>  fentaNYL   Infusion. 0.5 MICROgram(s)/kG/Hr (5.85 mL/Hr) IV Continuous <Continuous>  heparin  Infusion.  Unit(s)/Hr (21 mL/Hr) IV Continuous <Continuous>  norepinephrine Infusion 0.05 MICROgram(s)/kG/Min (5.49 mL/Hr) IV Continuous <Continuous>  petrolatum Ophthalmic Ointment 1 Application(s) Both EYES daily  phenylephrine    Infusion 0.1 MICROgram(s)/kG/Min (2.19 mL/Hr) IV Continuous <Continuous>  piperacillin/tazobactam IVPB.. 3.375 Gram(s) IV Intermittent every 8 hours  polyethylene glycol 3350 17 Gram(s) Oral daily  propofol Infusion 10 MICROgram(s)/kG/Min (7.02 mL/Hr) IV Continuous <Continuous>  senna 2 Tablet(s) Oral at bedtime  vasopressin Infusion 0.04 Unit(s)/Min (2.4 mL/Hr) IV Continuous <Continuous>    MEDICATIONS  (PRN):  heparin   Injectable 9500 Unit(s) IV Push every 6 hours PRN For aPTT less than 40  heparin   Injectable 4500 Unit(s) IV Push every 6 hours PRN For aPTT between 40 - 57      ALLERGIES:  Allergies    Cipro (Rash)    Intolerances    eggs (Diarrhea)      LABS:                        13.4   16.49 )-----------( 264      ( 14 Jan 2022 02:41 )             39.9     01-14    128<L>  |  93<L>  |  37<H>  ----------------------------<  109<H>  3.6   |  19<L>  |  1.80<H>    Ca    7.9<L>      14 Jan 2022 02:41  Phos  3.6     01-14  Mg     1.60     01-14    TPro  6.0  /  Alb  2.1<L>  /  TBili  1.6<H>  /  DBili  x   /  AST  67<H>  /  ALT  63<H>  /  AlkPhos  74  01-14    PT/INR - ( 14 Jan 2022 02:41 )   PT: 13.5 sec;   INR: 1.19 ratio         PTT - ( 14 Jan 2022 02:41 )  PTT:78.2 sec      RADIOLOGY & ADDITIONAL TESTS: Reviewed.

## 2022-01-15 NOTE — PROGRESS NOTE ADULT - SUBJECTIVE AND OBJECTIVE BOX
Cardiovascular Disease Progress Note    Overnight events: No acute events overnight.  no new cardiac issues noted      Objective Findings:  T(C): 37.9 (01-15-22 @ 08:00), Max: 37.9 (01-14-22 @ 16:00)  HR: 78 (01-15-22 @ 10:15) (69 - 79)  BP: --  RR: 26 (01-15-22 @ 10:15) (23 - 26)  SpO2: 87% (01-15-22 @ 10:15) (85% - 99%)  Wt(kg): --  Daily     Daily       Physical Exam:  Gen: NAD  deferred due to covid    Telemetry: nsr    Laboratory Data:                        13.0   16.94 )-----------( 263      ( 15 Shai 2022 01:13 )             39.2     01-15    126<L>  |  92<L>  |  36<H>  ----------------------------<  116<H>  3.7   |  20<L>  |  1.73<H>    Ca    7.8<L>      15 Shai 2022 01:13  Phos  2.9     01-15  Mg     1.80     01-15    TPro  6.0  /  Alb  1.9<L>  /  TBili  1.6<H>  /  DBili  x   /  AST  51<H>  /  ALT  44<H>  /  AlkPhos  65  01-15    PT/INR - ( 14 Jan 2022 02:41 )   PT: 13.5 sec;   INR: 1.19 ratio         PTT - ( 15 Shai 2022 06:37 )  PTT:98.0 sec          Inpatient Medications:  MEDICATIONS  (STANDING):  ALBUTerol    90 MICROgram(s) HFA Inhaler 2 Puff(s) Inhalation every 6 hours  aMIOdarone    Tablet 200 milliGRAM(s) Oral every 24 hours  chlorhexidine 0.12% Liquid 15 milliLiter(s) Oral Mucosa every 12 hours  chlorhexidine 4% Liquid 1 Application(s) Topical <User Schedule>  cisatracurium Infusion 3 MICROgram(s)/kG/Min (21.1 mL/Hr) IV Continuous <Continuous>  fentaNYL   Infusion. 0.5 MICROgram(s)/kG/Hr (5.85 mL/Hr) IV Continuous <Continuous>  heparin  Infusion.  Unit(s)/Hr (21 mL/Hr) IV Continuous <Continuous>  petrolatum Ophthalmic Ointment 1 Application(s) Both EYES daily  phenylephrine    Infusion 0.1 MICROgram(s)/kG/Min (2.19 mL/Hr) IV Continuous <Continuous>  piperacillin/tazobactam IVPB.. 3.375 Gram(s) IV Intermittent every 8 hours  polyethylene glycol 3350 17 Gram(s) Oral daily  propofol Infusion 10 MICROgram(s)/kG/Min (7.02 mL/Hr) IV Continuous <Continuous>  senna 2 Tablet(s) Oral at bedtime  vasopressin Infusion 0.04 Unit(s)/Min (2.4 mL/Hr) IV Continuous <Continuous>      Assessment:  hypoxic resp failure  covid pneumonia  hx of nephrolithiasis  hypoantremia  septic shock  pAF    Recs:  appreciate excellent micu care  cv stable  wean vent, pressors and sedation per icu protocol  amio per icu to maintain nsr. on hep gtt for rising d-dimer and pAF. favor weaning off amio shortly  broad spectrum abx and infectious workup per icu  natty, improving. avoid nephrotoxins. renally dose meds. diuresis prn to maintain net neg  dvt ppx          Over 25 minutes spent on total encounter; more than 50% of the visit was spent counseling and/or coordinating care by the attending physician.      Igor Medrano MD   Cardiovascular Disease  [phone]

## 2022-01-16 NOTE — PROGRESS NOTE ADULT - ASSESSMENT
Assessment and Plan    60 yo M PMHx of nephrolithiasis intubated for acute hypoxic respiratory failure 2/2 COVID19 with ARDS, requiring pressor support, and with MELISSA, and A fib w/ RVR on amiodarone.    *Incomplete note*  Neuro   #mental status  - sedated on propofol, fentanyl,  and cisatracurium.    Pulm  #respiratory status  - intubated for acute hypoxic respiratory failure, worsening work of breathing  - proned until 5 pm 1/10 (third time)  - obtain ABGs, follow up vent setting  - nebs  - CXR 1/8: elevated right hemidiaphragm ill defined increased right lung markings and left basilar/retrocardiac markings compatible with hx of COVID, Hazy indistinct left CP region could be due to overlying soft tissues versus a small left pleural effusion. Sharp right CP angle. No pneumothorax.  - Overnight 1/9-1/10 worsening acidosis and desat to 83%, RR was increased to 30 over night. AM vent setting turned back to RR 22.   Post supination abg PO2 44. Partly likely due to volume overload as patient had net positive 6L during hospital course. Patient was given 4mg bumex   - 1/11: Proned. Supination at 4pm, f/u post supination ABG. Decreasing FiO2.  - 1/12: Proned until 4pm supination. post supination ABG. pO2 66% Satting 90%.  - 1/13: Supination at 4pm. decreased fio2 from 100% this AM  to 80%. BMP repeat PO2 68.  - 1/14: Supination at 4 pm. Gases look same as when proned. No more proning.  - 1/15:     Cardiovascular  #hemodynamics  - given albumin and LR 1/9  - 1/10: Charly and vasopressin  - 1/11: Started on levo, weaning off charly  - 1/12: Weaning off charly  - 1/13: over night d/c levo, back on charly.  #Rapid afib  - 1/10: HR of 160s then went back to 80s spontaneously. Happened again, and 1x amiodarone was given  - 1/11:  1x amiodarone. started on amiodarone load. Started on heparin ggt  - 1/12: Finishing up Amio Load, starting Amio PO in AM. Trend LFT's.  - 1/13: C/w PO amio. rate controlled.    GI  #diet  - NG tube placed over night  - On glucerna rate of 40    Renal  #MELISSA  - Baseline creatinine 0.8  - s/p 1x 2mg bumex push 1/9  - s/p 1x 4 mg bumex push 1/10  - 1/11: Creatinine stable since yesterday, elevated. Still putting out urine output of ~100cc/hr  - Strict I/O monitoring  - 1/12: MELISSA improving  - 1/13: MELISSA improving  - 1/14: MELISSA improving  #Mild hyponatremia  - Further diuresis, net + 7L hospital course. 2g bumex at 3 pm. goal net - 1L to 1.5L q24 hr.    MSK  #Rhabdo  1/11: CK 1890  -replete electrolytes as needed    ID  #COVID19  - s/p decadron and remdesivir  - trend inflammatory markers    #new fevers, leukocytosis  - vanc by level (1/9-1/10) and zosyn (1/9-1/15)  - MRSA negative  - f/u blood cultures  - Sputum showed few gram negative rods. Blood culture no growth to date.  1/13: F/u ID consult requested by family. Procal 1.43.  1/15: Last day of Zosyn    Heme-Onc  #DVT ppx  - on heparin ggt for afib rvr   Assessment and Plan    62 yo M PMHx of nephrolithiasis intubated for acute hypoxic respiratory failure 2/2 COVID19 with ARDS, requiring pressor support, and with MELISSA, and A fib w/ RVR on amiodarone.    Neuro   #mental status  - sedated with propofol and fentanyl  - paralyzed with nimbex    Pulm  #respiratory status  - intubated for acute hypoxic respiratory failure, worsening work of breathing  - proned until 5 pm 1/10 (third time)  - obtain ABGs, follow up vent setting  - c/w albuterol  - CXR 1/8: elevated right hemidiaphragm ill defined increased right lung markings and left basilar/retrocardiac markings compatible with hx of COVID, Hazy indistinct left CP region could be due to overlying soft tissues versus a small left pleural effusion. Sharp right CP angle. No pneumothorax.  - Overnight 1/9-1/10 worsening acidosis and desat to 83%, RR was increased to 30 over night. AM vent setting turned back to RR 22.   Post supination abg PO2 44. Partly likely due to volume overload as patient had net positive 6L during hospital course. Patient was given 4mg bumex   - 1/11: Proned. Supination at 4pm, f/u post supination ABG. Decreasing FiO2.  - 1/12: Proned until 4pm supination. post supination ABG. pO2 66% Satting 90%.  - 1/13: Supination at 4pm. decreased fio2 from 100% this AM  to 80%. BMP repeat PO2 68.  - 1/14: Supination at 4 pm. Gases look same as when proned. No more proning.  - 1/15: status quo  - 1/16: Repeat CXR ordered. Currently on PC 25/12/100%    Cardiovascular  #hemodynamics  - given albumin and LR 1/9  - 1/10: Charly and vasopressin  - 1/11: Started on levo, weaning off charly  - 1/12: Weaning off charly  - 1/13: over night d/c levo, back on charly.  - 1/16: transitioned from charly to levo to try to decrease volume of fluids given. Pt on amio.  #Rapid afib  - 1/10: HR of 160s then went back to 80s spontaneously. Happened again, and 1x amiodarone was given  - 1/11:  1x amiodarone. started on amiodarone load. Started on heparin ggt  - 1/12: Finishing up Amio Load, starting Amio PO in AM. Trend LFT's.  - 1/13: C/w PO amio. rate controlled.    GI  #diet  - NGT in place  - On glucerna rate of 40    Renal  #MELISSA  - Baseline creatinine 0.8  - s/p 1x 2mg bumex push 1/9  - s/p 1x 4 mg bumex push 1/10  - 1/11: Creatinine stable since yesterday, elevated. Still putting out urine output of ~100cc/hr  - Strict I/O monitoring  - 1/12-14: MELISSA improving  - 1/16: Bumex 2mg today.  #Mild hyponatremia  - Further diuresis, net + 7L hospital course. 2g bumex at 3 pm. goal net - 1L to 1.5L q24 hr.    MSK  #Rhabdo  1/11: CK 1890  -replete electrolytes as needed    ID  #COVID19  - s/p decadron and remdesivir  - trend inflammatory markers    #new fevers, leukocytosis  - s/p vanc by level (1/9-1/10) and zosyn (1/9-1/15)  - MRSA negative  - f/u blood cultures  - Sputum showed few gram negative rods. Blood culture no growth to date.  1/13: F/u ID consult requested by family. Procal 1.43.  1/15: Last day of Zosyn  1/16: Afebrile ovn.    Heme-Onc  #DVT ppx  - on heparin ggt for afib rvr

## 2022-01-16 NOTE — PROGRESS NOTE ADULT - SUBJECTIVE AND OBJECTIVE BOX
Sal Lyle, PGY1  [phone]/[id number]/On Teams    OVERNIGHT EVENTS/SUBJECTIVE:  Patient seen and examined at bedside.   - Recieved 2mg Bumex ovn.  - No acute events overnight.      ROS:  [x] Unable to assess; pt intubated and sedated.    OBJECTIVE:    VITAL SIGNS:  ICU Vital Signs Last 24 Hrs  T(C): 36.9 (16 Jan 2022 12:00), Max: 37.3 (15 Shai 2022 16:00)  T(F): 98.5 (16 Jan 2022 12:00), Max: 99.1 (15 Shai 2022 16:00)  HR: 89 (16 Jan 2022 12:00) (71 - 89)  BP: --  BP(mean): --  ABP: 96/55 (16 Jan 2022 12:00) (90/53 - 136/81)  ABP(mean): 68 (16 Jan 2022 12:00) (65 - 103)  RR: 26 (16 Jan 2022 12:00) (26 - 27)  SpO2: 87% (16 Jan 2022 12:00) (87% - 92%)    Mode: AC/ CMV (Assist Control/ Continuous Mandatory Ventilation), RR (machine): 26, FiO2: 100, PEEP: 12, ITime: 0.95, MAP: 22, PC: 25, PIP: 38    01-15 @ 07:01 - 01-16 @ 07:00  --------------------------------------------------------  IN: 5142.8 mL / OUT: 2020 mL / NET: 3122.8 mL    01-16 @ 07:01  -  01-16 @ 13:31  --------------------------------------------------------  IN: 1229 mL / OUT: 725 mL / NET: 504 mL      CAPILLARY BLOOD GLUCOSE          PHYSICAL EXAM:    General: Sedated, paralyzed.  HEENT: NC/AT; PERRL, clear conjunctiva  Respiratory: CTA b/l. Intubated.  Cardiovascular: +S1/S2; RRR  Abdomen: soft, NT/ND; +BS x4  Extremities: WWP, 2+ peripheral pulses b/l; no LE edema  Skin: Skin break down at chin area.  Neurological: AAOx0, no focal deficits    MEDICATIONS:  MEDICATIONS  (STANDING):  ALBUTerol    90 MICROgram(s) HFA Inhaler 2 Puff(s) Inhalation every 6 hours  aMIOdarone    Tablet 200 milliGRAM(s) Oral every 24 hours  buMETAnide Injectable 2 milliGRAM(s) IV Push once  chlorhexidine 0.12% Liquid 15 milliLiter(s) Oral Mucosa every 12 hours  chlorhexidine 4% Liquid 1 Application(s) Topical <User Schedule>  cisatracurium Infusion 3 MICROgram(s)/kG/Min (21.1 mL/Hr) IV Continuous <Continuous>  fentaNYL   Infusion. 0.5 MICROgram(s)/kG/Hr (5.85 mL/Hr) IV Continuous <Continuous>  heparin  Infusion.  Unit(s)/Hr (21 mL/Hr) IV Continuous <Continuous>  norepinephrine Infusion 0.05 MICROgram(s)/kG/Min (5.49 mL/Hr) IV Continuous <Continuous>  petrolatum Ophthalmic Ointment 1 Application(s) Both EYES daily  piperacillin/tazobactam IVPB.. 3.375 Gram(s) IV Intermittent every 8 hours  polyethylene glycol 3350 17 Gram(s) Oral daily  propofol Infusion 10 MICROgram(s)/kG/Min (7.02 mL/Hr) IV Continuous <Continuous>  senna 2 Tablet(s) Oral at bedtime  vasopressin Infusion 0.04 Unit(s)/Min (2.4 mL/Hr) IV Continuous <Continuous>    MEDICATIONS  (PRN):  heparin   Injectable 9500 Unit(s) IV Push every 6 hours PRN For aPTT less than 40  heparin   Injectable 4500 Unit(s) IV Push every 6 hours PRN For aPTT between 40 - 57      ALLERGIES:  Allergies    Cipro (Rash)    Intolerances    eggs (Diarrhea)      LABS:                        13.0   17.27 )-----------( 269      ( 16 Jan 2022 03:16 )             38.3     01-16    126<L>  |  93<L>  |  36<H>  ----------------------------<  116<H>  3.7   |  20<L>  |  1.66<H>    Ca    8.3<L>      16 Jan 2022 03:40  Phos  4.0     01-16  Mg     1.80     01-16    TPro  5.9<L>  /  Alb  2.0<L>  /  TBili  1.6<H>  /  DBili  x   /  AST  60<H>  /  ALT  39  /  AlkPhos  70  01-16    PT/INR - ( 16 Jan 2022 03:22 )   PT: 12.8 sec;   INR: 1.13 ratio         PTT - ( 16 Jan 2022 03:22 )  PTT:94.2 sec      RADIOLOGY & ADDITIONAL TESTS: Reviewed.

## 2022-01-16 NOTE — PROGRESS NOTE ADULT - ASSESSMENT
_________________________________________________________________________________________  ========>>  M E D I C A L   A T T E N D I N G    F O L L O W  U P  N O T E  <<=========  -----------------------------------------------------------------------------------------------------    - Patient seen and examined by me earlier today.   - In summary,  ALEXX BROWN is a 61y year old man admitted with SOB, fever..   - Patient intubated, sedated in the MICU, vented       reportedly pt had a short run of AF > placed on Amio !     ==================>> REVIEW OF SYSTEM <<=================    unable to obtain     ==================>> PHYSICAL EXAM <<=================    GEN: sedated, vented, intubated.. NAD  HEENT: NCAT lines and tubes in place   Neck: supple lines and tubes in place   CVS: S1S2 , regular  PULM: CTA B/L,  limited   ABD.: soft. non distended  Extrem: intact pulses , no signif edema      + kim with yellow urine                              ( Note written / Date of service 01-16-22 )    ==================>> MEDICATIONS <<====================    ALBUTerol    90 MICROgram(s) HFA Inhaler 2 Puff(s) Inhalation every 6 hours  aMIOdarone    Tablet 200 milliGRAM(s) Oral every 24 hours  chlorhexidine 0.12% Liquid 15 milliLiter(s) Oral Mucosa every 12 hours  chlorhexidine 4% Liquid 1 Application(s) Topical <User Schedule>  cisatracurium Infusion 3 MICROgram(s)/kG/Min IV Continuous <Continuous>  fentaNYL   Infusion. 0.5 MICROgram(s)/kG/Hr IV Continuous <Continuous>  heparin  Infusion.  Unit(s)/Hr IV Continuous <Continuous>  methylnaltrexone Injectable 12 milliGRAM(s) SubCutaneous once  norepinephrine Infusion 0.05 MICROgram(s)/kG/Min IV Continuous <Continuous>  petrolatum Ophthalmic Ointment 1 Application(s) Both EYES daily  piperacillin/tazobactam IVPB.. 3.375 Gram(s) IV Intermittent every 8 hours  polyethylene glycol 3350 17 Gram(s) Oral two times a day  propofol Infusion 10 MICROgram(s)/kG/Min IV Continuous <Continuous>  senna 2 Tablet(s) Oral at bedtime  vasopressin Infusion 0.04 Unit(s)/Min IV Continuous <Continuous>    MEDICATIONS  (PRN):  heparin   Injectable 9500 Unit(s) IV Push every 6 hours PRN For aPTT less than 40  heparin   Injectable 4500 Unit(s) IV Push every 6 hours PRN For aPTT between 40 - 57    ___________  Active diet:  Diet, NPO:   Tube Feeding Modality: Orogastric  Nepro with Carb Steady (NEPRORTH)  Total Volume for 24 Hours (mL): 960  Continuous  Starting Tube Feed Rate mL per Hour: 10  Until Goal Tube Feed Rate (mL per Hour): 40  Tube Feed Duration (in Hours): 24  Tube Feed Start Time: 04:00  ___________________    ==================>> VITAL SIGNS <<==================    Vital Signs Last 24 HrsT(C): 36.9 (01-16-22 @ 12:00)  T(F): 98.5 (01-16-22 @ 12:00), Max: 99.1 (01-15-22 @ 16:00)  HR: 90 (01-16-22 @ 14:00) (71 - 90)  BP: -- reviewed on tele  RR: 26 (01-16-22 @ 14:00) (26 - 27)  SpO2: 86% (01-16-22 @ 14:00) (85% - 92%)      ==================>> LAB AND IMAGING <<==================                        13.0   17.27 )-----------( 269      ( 16 Jan 2022 03:16 )             38.3        01-16    126<L>  |  93<L>  |  36<H>  ----------------------------<  116<H>  3.7   |  20<L>  |  1.66<H>    Ca    8.3<L>      16 Jan 2022 03:40  Phos  4.0     01-16  Mg     1.80     01-16    TPro  5.9<L>  /  Alb  2.0<L>  /  TBili  1.6<H>  /  DBili  x   /  AST  60<H>  /  ALT  39  /  AlkPhos  70  01-16    WBC count:   17.27 <<== ,  16.94 <<== ,  16.49 <<== ,  15.70 <<== ,  14.78 <<== ,  15.09 <<==   Hemoglobin:   13.0 <<==,  13.0 <<==,  13.4 <<==,  13.2 <<==,  13.0 <<==,  13.3 <<==  platelets:  269 <==, 263 <==, 264 <==, 282 <==, 325 <==, 334 <==, 407 <==    Creatinine:  1.66  <<==, 1.73  <<==, 1.80  <<==, 2.03  <<==, 2.39  <<==, 2.61  <<==  Sodium:   126  <==, 126  <==, 128  <==, 128  <==, 131  <==, 132  <==       AST:          60 <== , 51 <== , 67 <== , 72 <== , 109 <==      ALT:        39  <== , 44  <== , 63  <== , 76  <== , 100  <==      AP:        70  <=, 65  <=, 74  <=, 77  <=, 78  <=     Bili:        1.6  <=, 1.6  <=, 1.6  <=, 1.6  <=, 0.9  <=    _______________________  C U L T U R E S :    Culture - Blood (collected 12 Jan 2022 16:30)  Source: .Blood Blood-Peripheral  Preliminary Report (13 Jan 2022 17:02):    No growth to date.    Culture - Blood (collected 12 Jan 2022 16:30)  Source: .Blood Blood-Peripheral  Preliminary Report (13 Jan 2022 17:02):    No growth to date.    Culture - Sputum (collected 10 Shai 2022 02:30)  Source: .Sputum Sputum  Gram Stain (10 Shai 2022 13:26):    Moderate polymorphonuclear leukocytes per low power field    No Squamous epithelial cells per low power field    Rare Gram Negative Rods per oil power field  Final Report (12 Jan 2022 14:27):    Normal Respiratory Marifer present    Culture - Blood (collected 08 Jan 2022 19:45)  Source: .Blood Blood-Peripheral  Final Report (14 Jan 2022 01:01):    No Growth Final    Culture - Blood (collected 08 Jan 2022 19:35)  Source: .Blood Blood-Peripheral  Final Report (14 Jan 2022 01:01):    No Growth Final    ^^^ Inflammatory markers :  ^^^  C R P :          116.3 (01-07-22)  <<--, 131.9 (01-04-22)  <<--, 96.4 (01-02-22)  <<--, 31.8 (12-30-21)  <<--, 82.2 (12-28-21)  <<--  P C T :         1.43 (01-13-22) <<--, 0.15 (01-07-22) <<--, 0.13 (01-02-22) <<--, 0.11 (12-30-21) <<--, 0.16 (12-28-21) <<--    Ferritin :         1107 (01-07-22) <<--, 1058 (01-04-22) <<--, 890 (01-02-22) <<--, 885 (12-28-21) <<--    D-Dimer :          1106 (01-16-22) <<--, 1179 (01-14-22) <<--, 3073 (01-11-22) <<--, 3352 (01-11-22) <<--, 2670 (01-08-22) <<--    < from: Xray Chest 1 View- PORTABLE-Urgent (Xray Chest 1 View- PORTABLE-Urgent .) (01.16.22 @ 13:15) >  IMPRESSION: The heart size cannot be assessed. ET tube and NG tube appear   well-positioned. There is left lung base opacification compatible with   atelectasis and/or pneumonia with a small left pleural effusion. Mild   bilateral airspace disease is present without change.  < end of copied text >    ___________________________________________________________________________________  ===============>>  A S S E S S M E N T   A N D   P L A N <<===============  ------------------------------------------------------------------------------------------    · Assessment	  61 year old man with history of kidney stone presenting with fever, cough, SOB x 2-3d.   admitted for COVID     Problem/Plan - 1:  ·  Problem: Acute hypoxemic respiratory failure due to COVID-19 with Viral syndrome. >> ARDS  post Remdesivir and Decadron per hospital protocol  trend inflammatory markers   ICU care and mgmt appreciated >> prone per protocol     wean down Vent / O2  as able     appears pt has been requiring more and more o2 > now on full vent support with 100% O2   supportive care  nutrition, hydration  as able   vitamins / supplements   DVT prophylaxis : lovenox BID  would consider DC Amio given lung disease'  would consider another course of steroids ( IV +/- inhaled)  Continue Current medications otherwise and monitor.     ** Fever , leukocytosis, improved   new fever on day of intubation   pan cultures in process   suspect aspiration pneumonia  remains on Zosyn >> ? DC   ID on board  less likely PE as been on Lovenox 40 BID    would check LE dopplers   antipyretics as needed  monitor     **MELISSA and transaminitis likely due to hemodynamic changes during code  with episodes of hypotension 2/2 propofol     overall improved     diuretics as needed      keep MAP >60      tend BMP closely     Avoid nephrotoxic medications.     Problem/Plan - 2:  ·  Problem: AF + RVR > improved / resolved     appreciated cardio follow up     would agree to DC Amio as able     -GI/DVT Prophylaxis per protocol.    overall prognosis guarded   pt full code     ___________________________  H. ABNER Knowles.  Pager: [phone]

## 2022-01-17 NOTE — ADVANCED PRACTICE NURSE CONSULT - RECOMMEDATIONS
Topical recommendations:     Bridge of nose and chin- Apply Betadine wipe daily. Allow to air dry.     Under neck fold- Cleanse with NS, pat dry. Apply Liquid barrier film to periwound skin (allow to dry). Apply silicone foam with border. Change daily. Once foam in place, apply Interdry textile sheeting, under neck leaving 2 inches exposed at ends to wick, remove to wash & dry affected area, then replace. Individual sheeting may be used for up to 5 days unless soiled.     Continue low air loss bed therapy,  heel elevation with offloading boots, turn & reposition q2h with Z-flow fluidized pillow, continue moisture management with barrier creams as specified above & single breathable pad, continue measures to decrease friction/shear.   Plan discussed with primary RN and MICU team.     Please contact Wound/Ostomy Care Service Line if we can be of further assistance (ext [phone]).

## 2022-01-17 NOTE — CHART NOTE - NSCHARTNOTEFT_GEN_A_CORE
S:  Called to bedside for Tachycardia >130s. Continues to exhibit AFib now with AFib with RVR, which has occurred for past several nights while on Levophed. Pt this time noted to be febrile (which has been true basically for his entire hospitalization), unresponsive to tylenol & ice packs. Continues on Amiodarone PO QD (total ~2150mg by now). Intermittently in sinus rhythm on this and phenylephrine. He's been intermittently on and off phenylephrine given     O:  I/O:  - Net positive 13L since admission  - Net positive 3L in last ~30h with UOP ~0.43cc/kg/hr w/o diuretics    Na downtrending, Cl downtrending, Cr stable  ABGs worsening, previously unresponsive to continued proning    A/P:  #AFib with RVR  He continues to exhibit intermittent AFib with RVR nightly on volume-sparing norepinephrine gtt. Multiple attempts made tonight to avoid switching to max, but continued to have HR > 120-130s despite conservative management. Will switch again to phenylephrine and consider further volume assessment as below.  Understanding that Amio can result in Pneumonitis, Hepatitis, etc. not a lot of great options given his persistent pressor requirements.  - would recommend c/w amio or other rhythm controlling agent as BB would likely result in unrestricted alpha agonism & dilt would result in further hypotension  - c/w Hep gtt    #Leukocytosis  Likely 2/2 COVID-19, less likely superimposed bacterial infxn but pt on Zosyn. Febrile.  - Recultured  - C/W Zosyn    #Volume Status #Septic Shock (2/2 COVID-19)  Volume assessment difficult. IVC one metric; I/O's one metric. Likely having insensible losses throughout hospitalization but unclear if those would be able to keep up with his significant intakes. Multiple attemtps made to avoid high volume solns as above.   - Bumex 2mg   - may have to consider avoiding Bumex given worsening hyponatremia, hypochloremia (though any diuretic will result in some salt wasting)  - Vaptans considered but questionable efficacy and high cost/benefit ratio overall  - daily weights would be highly inaccurate given only bed weights available but may be an option for an additionally assessment of volume status    #Respiratory Failure  ABGs relatively stable but definitively not improving. Initial proning improved pO2 slightly (60->90) but then stopped working, so proning d/c'd d/t risk for skin breakdown and other complications of proning with minimal benefit.    #GOC  Family reportedly requesting ECMO consult, though pt not a candidate. (V-V ECMO Contraindicated for pt's requiring high vent settings & V-A ECMO contraindicated in pts with chronic organ dysfunction, which is his renal disease).   - this author informed that ECMO team verbalized to primary team that pt not a candidate and should document to that effect    Tavon Mcdonald MD PGY-1 S:  Called to bedside for Tachycardia >130s. Continues to exhibit AFib now with AFib with RVR, which has occurred for past several nights while on Levophed. Pt this time noted to be febrile (which has been true basically for his entire hospitalization), unresponsive to tylenol & ice packs. Continues on Amiodarone PO QD (total ~2150mg by now). Intermittently in sinus rhythm on this and phenylephrine. He's been intermittently on and off phenylephrine given     O:  I/O:  - Net positive 13L since admission  - Net positive 3L in last ~30h with UOP ~0.43cc/kg/hr w/o diuretics    Na downtrending, Cl downtrending, Cr stable  ABGs worsening, previously unresponsive to continued proning    A/P:  #AFib with RVR  He continues to exhibit intermittent AFib with RVR nightly on volume-sparing norepinephrine gtt. Multiple attempts made tonight to avoid switching to max, but continued to have HR > 120-130s despite conservative management. Will switch again to phenylephrine and consider further volume assessment as below.  Understanding that Amio can result in Pneumonitis, Hepatitis, etc. not a lot of great options given his persistent pressor requirements.  - would recommend c/w amio or other rhythm controlling agent as BB would likely result in unrestricted alpha agonism & dilt would result in further hypotension  - c/w Hep gtt    #Leukocytosis  Likely 2/2 COVID-19, less likely superimposed bacterial infxn but pt on Zosyn. Febrile.  - Recultured  - C/W Zosyn    #Volume Status #Septic Shock (2/2 COVID-19)  Volume assessment difficult. IVC one metric; I/O's one metric. Likely having insensible losses throughout hospitalization but unclear if those would be able to keep up with his significant intakes. Multiple attemtps made to avoid high volume solns as above.   - Bumex 2mg   - may have to consider avoiding Bumex given worsening hyponatremia, hypochloremia (though any diuretic will result in some salt wasting)  - Vaptans considered but questionable efficacy and high cost/benefit ratio overall  - daily weights would be highly inaccurate given only bed weights available but may be an option for an additionally assessment of volume status    #Respiratory Failure  ABGs relatively stable but definitively not improving. Initial proning improved pO2 slightly (60->90) but then stopped working, so proning d/c'd d/t risk for skin breakdown and other complications of proning with minimal benefit.    #GOC  Family reportedly requesting ECMO consult, though pt not a candidate. (V-V ECMO Contraindicated for pt's requiring high vent settings & V-A ECMO contraindicated in pts with chronic organ dysfunction, which is his renal disease).     Tavon Mcdonald MD PGY-1 S:  Called to bedside for Tachycardia >130s. Continues to exhibit AFib now with AFib with RVR, which has occurred for past several nights while on Levophed. Pt this time noted to be febrile (which has been true basically for his entire hospitalization), unresponsive to tylenol & ice packs. Continues on Amiodarone PO QD (total ~2150mg by now). Intermittently in sinus rhythm on this and phenylephrine. He's been intermittently on and off phenylephrine given     O:  I/O:  - Net positive 13L since admission  - Net positive 3L in last ~30h with UOP ~0.43cc/kg/hr w/o diuretics    Na downtrending, Cl downtrending, Cr stable  ABGs worsening, previously unresponsive to continued proning    A/P:  #AFib with RVR  He continues to exhibit intermittent AFib with RVR nightly on volume-sparing norepinephrine gtt. Multiple attempts made tonight to avoid switching to max, but continued to have HR > 120-130s despite conservative management. Will switch again to phenylephrine and consider further volume assessment as below.  Understanding that Amio can result in Pneumonitis, Hepatitis, etc. not a lot of great options given his persistent pressor requirements.  - would recommend c/w amio or other rhythm controlling agent as BB would likely result in unrestricted alpha agonism & dilt would result in further hypotension  - c/w Hep gtt    #Leukocytosis  Likely 2/2 COVID-19, less likely superimposed bacterial infxn but pt on Zosyn. Febrile. Most likely 2/2 COVID progression.  - UA, SCx  - C/W Zosyn    #Volume Status #Septic Shock (2/2 COVID-19)  Volume assessment difficult. IVC one metric; I/O's one metric. Likely having insensible losses throughout hospitalization but unclear if those would be able to keep up with his significant intakes. Multiple attemtps made to avoid high volume solns as above.   - Bumex 2mg   - may have to consider avoiding Bumex given worsening hyponatremia, hypochloremia (though any diuretic will result in some salt wasting)  - Vaptans considered but questionable efficacy and high cost/benefit ratio overall  - daily weights would be highly inaccurate given only bed weights available but may be an option for an additionally assessment of volume status    #Respiratory Failure  ABGs relatively stable but definitively not improving. Initial proning improved pO2 slightly (60->90) but then stopped working, so proning d/c'd d/t risk for skin breakdown and other complications of proning with minimal benefit.    #[facility]  Family reportedly requesting ECMO consult, though pt not a candidate. (V-V ECMO Contraindicated for pt's requiring high vent settings & V-A ECMO contraindicated in pts with chronic organ dysfunction, which is his renal disease).     Tavon Mcdonald MD PGY-1 S:  Called to bedside for Tachycardia >130s. Continues to exhibit AFib now with AFib with RVR, which has occurred for past several nights while on Levophed. Pt this time noted to be febrile (which has been true basically for his entire hospitalization), unresponsive to tylenol & ice packs. Continues on Amiodarone PO QD (total ~2150mg by now). Intermittently in sinus rhythm on this and phenylephrine. He's been intermittently on and off phenylephrine given     O:  I/O:  - Net positive 13L since admission  - Net positive 3L in last ~30h with UOP ~0.43cc/kg/hr w/o diuretics    Na downtrending, Cl downtrending, Cr stable  ABGs worsening, previously unresponsive to continued proning    A/P:  #AFib with RVR  He continues to exhibit intermittent AFib with RVR nightly on volume-sparing norepinephrine gtt. Multiple attempts made tonight to avoid switching to max, but continued to have HR > 120-130s despite conservative management. Will switch again to phenylephrine and consider further volume assessment as below.  Understanding that Amio can result in Pneumonitis, Hepatitis, etc. not a lot of great options given his persistent pressor requirements.  - would recommend c/w amio or other rhythm controlling agent as BB would likely result in unrestricted alpha agonism & dilt would result in further hypotension  - c/w Hep gtt    #Leukocytosis  Likely 2/2 COVID-19, less likely superimposed bacterial infxn but pt on Zosyn. Febrile. Most likely 2/2 COVID progression.  - UA, SCx  - C/W Zosyn    #Volume Status #Septic Shock (2/2 COVID-19)  Volume assessment difficult. IVC one metric; I/O's one metric. Likely having insensible losses throughout hospitalization but unclear if those would be able to keep up with his significant intakes. Multiple attemtps made to avoid high volume solns as above.   - Bumex 2mg   - may have to consider avoiding Bumex given worsening hyponatremia, hypochloremia (though any diuretic will result in some salt wasting)  - Vaptans considered but questionable efficacy and high cost/benefit ratio overall  - daily weights would be highly inaccurate given only bed weights available but may be an option for an additionally assessment of volume status    #Respiratory Failure  ABGs relatively stable but definitively not improving. Initial proning improved pO2 slightly (60->90) but then stopped working, so proning d/c'd d/t risk for skin breakdown and other complications of proning with minimal benefit.    #GOC  Ongoing discussions with family, please see attending attestations re: ECMO & other management.    Tavon Mcdonald MD PGY-1

## 2022-01-17 NOTE — PROGRESS NOTE ADULT - SUBJECTIVE AND OBJECTIVE BOX
INTERVAL HPI/OVERNIGHT EVENTS: Patient had PEEP increased to 14 over night on 100% and was still desaturating to 70s. 2g bumex was given at 2:30 AM but pateint was still net 4L on in the last 24 hours.  Patient was febrile again last night. Urinalysis sent.    SUBJECTIVE: Patient seen and examined at bedside. No complaints. Denies fever, chills, chest pain, shortness of breath, abdominal pain, nausea, vomiting, changes in bowel habits, or urinary symptoms    OBJECTIVE:    VITAL SIGNS:  ICU Vital Signs Last 24 Hrs  T(C): 36.5 (2022 12:00), Max: 38.3 (2022 00:00)  T(F): 97.7 (2022 12:00), Max: 101 (2022 00:00)  HR: 94 (2022 15:00) (85 - 135)  BP: --  BP(mean): --  ABP: 105/62 (2022 15:00) (89/61 - 120/70)  ABP(mean): 75 (2022 15:00) (68 - 87)  RR: 26 (2022 15:00) (26 - 26)  SpO2: 93% (2022 15:00) (85% - 94%)    Mode: AC/ CMV (Assist Control/ Continuous Mandatory Ventilation), RR (machine): 26, FiO2: 100, PEEP: 15, ITime: 0.95, MAP: 25, PC: 25, PIP: 42     @ 07: @ 07:00  --------------------------------------------------------  IN: 5512.9 mL / OUT: 2095 mL / NET: 3417.9 mL     @ 07: @ 15:14  --------------------------------------------------------  IN: 1656.5 mL / OUT: 1650 mL / NET: 6.5 mL      CAPILLARY BLOOD GLUCOSE          PHYSICAL EXAM:    General: Intubated, sedated, paralyzed  HEENT: NC/AT; PERRL, clear conjunctiva  Respiratory: CTA b/l  Cardiovascular: +S1/S2; RRR  Abdomen: soft, NT/ND; +BS x4  Extremities: WWP, 2+ peripheral pulses b/l; no LE edema  Skin: normal color and turgor; no rash  Neurological: AAOx0, no focal deficits    MEDICATIONS:  MEDICATIONS  (STANDING):  ALBUTerol    90 MICROgram(s) HFA Inhaler 2 Puff(s) Inhalation every 6 hours  aMIOdarone    Tablet 200 milliGRAM(s) Oral every 24 hours  chlorhexidine 0.12% Liquid 15 milliLiter(s) Oral Mucosa every 12 hours  chlorhexidine 4% Liquid 1 Application(s) Topical <User Schedule>  cisatracurium Infusion 3 MICROgram(s)/kG/Min (21.1 mL/Hr) IV Continuous <Continuous>  fentaNYL   Infusion. 0.5 MICROgram(s)/kG/Hr (5.85 mL/Hr) IV Continuous <Continuous>  heparin  Infusion.  Unit(s)/Hr (21 mL/Hr) IV Continuous <Continuous>  norepinephrine Infusion 0.05 MICROgram(s)/kG/Min (5.49 mL/Hr) IV Continuous <Continuous>  petrolatum Ophthalmic Ointment 1 Application(s) Both EYES daily  piperacillin/tazobactam IVPB.. 3.375 Gram(s) IV Intermittent every 8 hours  polyethylene glycol 3350 17 Gram(s) Oral two times a day  propofol Infusion 10 MICROgram(s)/kG/Min (7.02 mL/Hr) IV Continuous <Continuous>  senna 2 Tablet(s) Oral at bedtime  vasopressin Infusion 0.04 Unit(s)/Min (2.4 mL/Hr) IV Continuous <Continuous>    MEDICATIONS  (PRN):  heparin   Injectable 9500 Unit(s) IV Push every 6 hours PRN For aPTT less than 40  heparin   Injectable 4500 Unit(s) IV Push every 6 hours PRN For aPTT between 40 - 57      ALLERGIES:  Allergies    Cipro (Rash)    Intolerances    eggs (Diarrhea)      LABS:                        13.1   23.79 )-----------( 321      ( 2022 03:44 )             40.0     01-17    122<L>  |  87<L>  |  37<H>  ----------------------------<  141<H>  3.9   |  20<L>  |  1.94<H>    Ca    7.9<L>      2022 03:44  Phos  4.5       Mg     1.80         TPro  6.2  /  Alb  2.0<L>  /  TBili  1.5<H>  /  DBili  x   /  AST  81<H>  /  ALT  45<H>  /  AlkPhos  90      PT/INR - ( 2022 03:44 )   PT: 12.8 sec;   INR: 1.12 ratio         PTT - ( 2022 03:44 )  PTT:88.6 sec  Urinalysis Basic - ( 2022 06:41 )    Color: Yellow / Appearance: Slightly Turbid / S.020 / pH: x  Gluc: x / Ketone: Negative  / Bili: Negative / Urobili: 3 mg/dL   Blood: x / Protein: 30 mg/dL / Nitrite: Negative   Leuk Esterase: Small / RBC: 5-10 /HPF / WBC 10-20 /HPF   Sq Epi: x / Non Sq Epi: 5 /HPF / Bacteria: Few        RADIOLOGY & ADDITIONAL TESTS: Reviewed.

## 2022-01-17 NOTE — PROGRESS NOTE ADULT - ASSESSMENT
_________________________________________________________________________________________  ========>>  M E D I C A L   A T T E N D I N G    F O L L O W  U P  N O T E  <<=========  -----------------------------------------------------------------------------------------------------    - Patient seen and examined by me earlier today.   - In summary,  ALEXX BROWN is a 61y year old man admitted with SOB, fever..   - Patient intubated, sedated, paralyzed,  in the MICU, vented       per RN pt had more episodes of AF > on heparin drip now      no more proning done / planned     ==================>> REVIEW OF SYSTEM <<=================    unable to obtain     ==================>> PHYSICAL EXAM <<=================    GEN: sedated, vented, intubated.. NAD  HEENT: NCAT lines and tubes in place   Neck: supple lines and tubes in place   CVS: S1S2 , regular  PULM: CTA B/L,  limited , + increased PEEP, 100 % FIO2, sat 92%  ABD.: soft. non distended  Extrem: intact pulses , mild LE edema      + ikm with yellow urine                              ( Note written / Date of service 01-17-22 )    ==================>> MEDICATIONS <<====================    ALBUTerol    90 MICROgram(s) HFA Inhaler 2 Puff(s) Inhalation every 6 hours  aMIOdarone    Tablet 200 milliGRAM(s) Oral every 24 hours  buMETAnide Injectable 2 milliGRAM(s) IV Push once  chlorhexidine 0.12% Liquid 15 milliLiter(s) Oral Mucosa every 12 hours  chlorhexidine 4% Liquid 1 Application(s) Topical <User Schedule>  cisatracurium Infusion 3 MICROgram(s)/kG/Min IV Continuous <Continuous>  fentaNYL   Infusion. 0.5 MICROgram(s)/kG/Hr IV Continuous <Continuous>  heparin  Infusion.  Unit(s)/Hr IV Continuous <Continuous>  norepinephrine Infusion 0.05 MICROgram(s)/kG/Min IV Continuous <Continuous>  petrolatum Ophthalmic Ointment 1 Application(s) Both EYES daily  piperacillin/tazobactam IVPB.. 3.375 Gram(s) IV Intermittent every 8 hours  polyethylene glycol 3350 17 Gram(s) Oral two times a day  propofol Infusion 10 MICROgram(s)/kG/Min IV Continuous <Continuous>  senna 2 Tablet(s) Oral at bedtime  vasopressin Infusion 0.04 Unit(s)/Min IV Continuous <Continuous>    MEDICATIONS  (PRN):  heparin   Injectable 9500 Unit(s) IV Push every 6 hours PRN For aPTT less than 40  heparin   Injectable 4500 Unit(s) IV Push every 6 hours PRN For aPTT between 40 - 57    ___________  Active diet:  Diet, NPO:   Tube Feeding Modality: Orogastric  Nepro with Carb Steady (NEPRORTH)  Total Volume for 24 Hours (mL): 960  Continuous  Starting Tube Feed Rate mL per Hour: 10  Until Goal Tube Feed Rate (mL per Hour): 40  Tube Feed Duration (in Hours): 24  Tube Feed Start Time: 04:00  ___________________    ==================>> VITAL SIGNS <<==================    Vital Signs Last 24 HrsT(C): 36.5 (01-17-22 @ 12:00)  T(F): 97.7 (01-17-22 @ 12:00), Max: 101 (01-17-22 @ 00:00)  HR: 94 (01-17-22 @ 12:00) (85 - 135)  BP: -- reviewed on tel   RR: 26 (01-17-22 @ 12:00) (26 - 49)  SpO2: 92% (01-17-22 @ 12:00) (79% - 94%)         ==================>> LAB AND IMAGING <<==================                        13.1   23.79 )-----------( 321      ( 17 Jan 2022 03:44 )             40.0        01-17    122<L>  |  87<L>  |  37<H>  ----------------------------<  141<H>  3.9   |  20<L>  |  1.94<H>    Ca    7.9<L>      17 Jan 2022 03:44  Phos  4.5     01-17  Mg     1.80     01-17    TPro  6.2  /  Alb  2.0<L>  /  TBili  1.5<H>  /  DBili  x   /  AST  81<H>  /  ALT  45<H>  /  AlkPhos  90  01-17    WBC count:   23.79 <<== ,  17.27 <<== ,  16.94 <<== ,  16.49 <<== ,  15.70 <<==   Hemoglobin:   13.1 <<==,  13.0 <<==,  13.0 <<==,  13.4 <<==,  13.2 <<==  platelets:  321 <==, 269 <==, 263 <==, 264 <==, 282 <==, 325 <==, 334 <==    Creatinine:  1.94  <<==, 1.66  <<==, 1.73  <<==, 1.80  <<==, 2.03  <<==, 2.39  <<==  Sodium:   122  <==, 126  <==, 126  <==, 128  <==, 128  <==, 131  <==       AST:          81 <== , 60 <== , 51 <== , 67 <== , 72 <==      ALT:        45  <== , 39  <== , 44  <== , 63  <== , 76  <==      AP:        90  <=, 70  <=, 65  <=, 74  <=, 77  <=     Bili:        1.5  <=, 1.6  <=, 1.6  <=, 1.6  <=, 1.6  <=    _______________________  C U L T U R E S :    Culture - Blood (collected 12 Jan 2022 16:30)  Source: .Blood Blood-Peripheral  Preliminary Report (13 Jan 2022 17:02):    No growth to date.    Culture - Blood (collected 12 Jan 2022 16:30)  Source: .Blood Blood-Peripheral  Preliminary Report (13 Jan 2022 17:02):    No growth to date.    Culture - Sputum (collected 10 Shai 2022 02:30)  Source: .Sputum Sputum  Gram Stain (10 Shai 2022 13:26):    Moderate polymorphonuclear leukocytes per low power field    No Squamous epithelial cells per low power field    Rare Gram Negative Rods per oil power field  Final Report (12 Jan 2022 14:27):    Normal Respiratory Marifer present    Culture - Blood (collected 08 Jan 2022 19:45)  Source: .Blood Blood-Peripheral  Final Report (14 Jan 2022 01:01):    No Growth Final    Culture - Blood (collected 08 Jan 2022 19:35)  Source: .Blood Blood-Peripheral  Final Report (14 Jan 2022 01:01):    No Growth Final    < from: Xray Chest 1 View- PORTABLE-Urgent (Xray Chest 1 View- PORTABLE-Urgent .) (01.16.22 @ 13:15) >  IMPRESSION: The heart size cannot be assessed. ET tube and NG tube appear   well-positioned. There is left lung base opacification compatible with   atelectasis and/or pneumonia with a small left pleural effusion. Mild   bilateral airspace disease is present without change.  < end of copied text >    ___________________________________________________________________________________  ===============>>  A S S E S S M E N T   A N D   P L A N <<===============  ------------------------------------------------------------------------------------------    · Assessment	  61 year old man with history of kidney stone presenting with fever, cough, SOB x 2-3d.   admitted for COVID     Problem/Plan - 1:  ·  Problem: Acute hypoxemic respiratory failure due to COVID-19 with Viral syndrome. >> ARDS  post Remdesivir and Decadron per hospital protocol  trend inflammatory markers   ICU care and mgmt appreciated >> prone per protocol     wean down Vent / O2  as able     appears pt has been requiring more and more o2 > now on full vent support with 100% O2   supportive care  nutrition, hydration  as able   vitamins / supplements   DVT prophylaxis : lovenox BID  would consider DC Amio given lung disease'  would consider another course of steroids ( IV +/- inhaled)  Diuretics + IV albumin ( pt with low serum albumin) may help (bedside sono / check volume status)   Continue Current medications otherwise and monitor.     ** Fever , leukocytosis, improved   new fever on day of intubation   pan cultures in process   suspect aspiration pneumonia  remains on Zosyn >> > duration   ID on board  leukocytosis increased > monitor   less likely PE as been on Lovenox 40 BID    would check LE dopplers   antipyretics as needed  monitor     **MELISSA and transaminitis likely due to hemodynamic changes during code  with episodes of hypotension 2/2 propofol     overall improved     diuretics as needed      keep MAP >60      tend BMP closely     Avoid nephrotoxic medications.     **hyponatremia     Diuretics as able     nutrition va OGT     Problem/Plan - 2:  ·  Problem: AF + RVR > improved     appreciated cardio follow up     would agree to DC Amio as able     on heparin drip    -GI/DVT Prophylaxis per protocol.    overall prognosis guarded   pt full code     ___________________________  H. ABNER Knowles.  Pager: [phone]

## 2022-01-17 NOTE — PROGRESS NOTE ADULT - ASSESSMENT
Assessment and Plan    60 yo M PMHx of nephrolithiasis intubated for acute hypoxic respiratory failure 2/2 COVID19 with ARDS, requiring pressor support, and with MELISSA, and A fib w/ RVR on amiodarone.    Neuro   #mental status  - sedated with propofol and fentanyl  - paralyzed with nimbex    Pulm  #respiratory status  - intubated for acute hypoxic respiratory failure, worsening work of breathing  - proned until 5 pm 1/10 (third time)  - obtain ABGs, follow up vent setting  - c/w albuterol  - CXR 1/8: elevated right hemidiaphragm ill defined increased right lung markings and left basilar/retrocardiac markings compatible with hx of COVID, Hazy indistinct left CP region could be due to overlying soft tissues versus a small left pleural effusion. Sharp right CP angle. No pneumothorax.  - Overnight 1/9-1/10 worsening acidosis and desat to 83%, RR was increased to 30 over night. AM vent setting turned back to RR 22.   Post supination abg PO2 44. Partly likely due to volume overload as patient had net positive 6L during hospital course. Patient was given 4mg bumex   - 1/11: Proned. Supination at 4pm, f/u post supination ABG. Decreasing FiO2.  - 1/12: Proned until 4pm supination. post supination ABG. pO2 66% Satting 90%.  - 1/13: Supination at 4pm. decreased fio2 from 100% this AM  to 80%. BMP repeat PO2 68.  - 1/14: Supination at 4 pm. Gases look same as when proned. No more proning.  - 1/15: status quo  - 1/16: Repeat CXR ordered. Currently on PC 25/12/100%  - 1/17: CXR similar to prior studies. PEEP was increased to 15 this AM.    Cardiovascular  #hemodynamics  - given albumin and LR 1/9  - 1/10: Charly and vasopressin  - 1/11: Started on levo, weaning off charly  - 1/12: Weaning off charly  - 1/13: over night d/c levo, back on charly.  - 1/16: transitioned from charly to levo to try to decrease volume of fluids given. Pt on amio.  - 1/17: Still on levo and vasopressin  #Rapid afib  - 1/10: HR of 160s then went back to 80s spontaneously. Happened again, and 1x amiodarone was given  - 1/11:  1x amiodarone. started on amiodarone load. Started on heparin ggt  - 1/12: Finishing up Amio Load, starting Amio PO in AM. Trend LFT's.  - 1/13: C/w PO amio. rate controlled.    GI  #diet  - NGT in place  - On glucerna rate of 40    Renal  #MELISSA  - Baseline creatinine 0.8  - s/p 1x 2mg bumex push 1/9  - s/p 1x 4 mg bumex push 1/10  - 1/11: Creatinine stable since yesterday, elevated. Still putting out urine output of ~100cc/hr  - Strict I/O monitoring  - 1/12-14: MELISSA improving  - 1/16: Bumex 2mg today.  #Mild hyponatremia  - Further diuresis, net + 7L hospital course. 2g bumex at 3 pm. goal net - 1L to 1.5L q24 hr.  - 1/17: Worsening hyponatremia. 2mg bumex at 2:30 AM, 2mg bumex in afternoon.    MSK  #Rhabdo  1/11: CK 1890  -replete electrolytes as needed    ID  #COVID19  - s/p decadron and remdesivir  - trend inflammatory markers    #new fevers, leukocytosis  - s/p vanc by level (1/9-1/10) and zosyn (1/9-1/15)  - MRSA negative  - f/u blood cultures  - Sputum showed few gram negative rods. Blood culture no growth to date.  1/13: F/u ID consult requested by family. Procal 1.43.  1/15: Last day of Zosyn  1/16: Afebrile ovn.  1/17: Febrile overnight. UA sent. Bcx pending collection.    Heme-Onc  #DVT ppx  - on heparin ggt for afib rvr

## 2022-01-17 NOTE — ADVANCED PRACTICE NURSE CONSULT - REASON FOR CONSULT
Patient seen on skin care rounds after wound care referral received for assessment of skin impairment and recommendations of topical management. Chart reviewed: WBC 23.79, H/H 13.1/40.0, platelets 321, D-dimer 1106, A1C 6.4, serum PH 7.22, BMI 39.2, Ben 10. Patient H/O of nephrolithiasis admitted with COVID. Hospital course complicated by Acute hypoxemic respiratory failure due to COVID-19 with Viral syndrome- post Remdesivir and Decadron- requiring more and more o2 > now on full vent support with 100% O2- patient proned multiple times 1/9, 1/11, 1/12, 1/13 and 1/14,  Fever , leukocytosis currently on IV anbx, and MELISSA and transaminitis likely due to hemodynamic changes during code  with episodes of hypotension 2/2 propofol. Patient seen by infectious for COVID, cardiology for SOB and nephrology for hyponatremia currently being managed in MICU.

## 2022-01-17 NOTE — CONSULT NOTE ADULT - SUBJECTIVE AND OBJECTIVE BOX
HPI: Mr. Arenas is a 61 year-old man with history of nephrolithiasis, who presented 21 to the [facility] ER with fever, cough, and shortness of breath for 2-3 days. He was diagnosed on admission with COVID pneumonia; he was placed on IV Remdesivir, IV Decadron, and supplemental oxygen. At times over the next several days he required BIPAP. As of 22, he developed hypoxemic respiratory failure; he required intubation at that point. Since arrival in the MICU, he has developed both acute kidney injury, and hyponatremia. His creatinine peaked at 2.62mg/dL as of 1/10/22, and has been trending down since then. His hyponatremia, however, has continued to worsen. Today his serum sodium was noted to have dropped to 122meq/L. In light of the hyponatremia, a renal consultation was requested.    At present, Mr. Arenas remains on a ventilator, and he is requiring 100% FiO2. He is on Vasopressin and Norepinephrine gtts. He is being sedated with Fentanyl, Propofol, and Nimbex. He is also on a heparin gtt    PAST MEDICAL & SURGICAL HISTORY:  Obese  Nephrolithiasis/associated surgery      Allergies/Intolerances  Cipro (Rash)  eggs (Diarrhea)    SOCIAL HISTORY:  Denies ETOh,Smoking,     FAMILY HISTORY:  No CKD    REVIEW OF SYSTEMS:  unable to obtain from patient - intubated/sedated    VITAL:  T(C): , Max: 38.3 (22 @ 00:00)  T(F): , Max: 101 (22 @ 00:00)  HR: 92 (22 @ 09:00)  BP: 109/64  RR: 26 (22 @ 09:00)  SpO2: 89% (22 @ 09:00)  urine output 2095cc/24h    PHYSICAL EXAM:  Constitutional: intubated/sedated; FiO2 100%  HEENT: (+)ETT/(+)NGT  Neck: Supple, No JVD  Respiratory: CTA-b/l  Cardiovascular: RRR s1s2, no m/r/g  Gastrointestinal: BS+, soft, NT/ND  : (+)kim  Extremities: No peripheral edema b/l  Neurological: no focal deficits; strength grossly intact  Back: no CVAT b/l  Skin: No rashes, no nevi    LABS:                        13.1   23.79 )-----------( 321      ( 2022 03:44 )             40.0     Na(122)/K(3.9)/Cl(87)/HCO3(20)/BUN(37)/Cr(1.94)Glu(141)/Ca(7.9)/Mg(1.80)/PO4(4.5)     @ 03:44  Na(126)/K(3.7)/Cl(93)/HCO3(20)/BUN(36)/Cr(1.66)Glu(116)/Ca(8.3)/Mg(1.80)/PO4(4.0)     @ 03:40  Na(126)/K(3.7)/Cl(92)/HCO3(20)/BUN(36)/Cr(1.73)Glu(116)/Ca(7.8)/Mg(1.80)/PO4(2.9)    01-15 @ 01:13    Urinalysis Basic - ( 2022 06:41 )  Color: Yellow / Appearance: Slightly Turbid / S.020 / pH: x  Gluc: x / Ketone: Negative  / Bili: Negative / Urobili: 3 mg/dL   Blood: x / Protein: 30 mg/dL / Nitrite: Negative   Leuk Esterase: Small / RBC: 5-10 /HPF / WBC 10-20 /HPF   Sq Epi: x / Non Sq Epi: 5 /HPF / Bacteria: Few      IMAGING:  < from: Xray Chest 1 View- PORTABLE-Urgent (Xray Chest 1 View- PORTABLE-Urgent .) (22 @ 13:15) >  IMPRESSION: The heart size cannot be assessed. ET tube and NG tube appear   well-positioned. There is left lung base opacification compatible with   atelectasis and/or pneumonia with a small left pleural effusion. Mild   bilateral airspace disease is present without change.      ASSESSMENT:  (1)Renal - MELISSA - largely ischemic ATN in association with episode of hypoxemic respiratory failure 22. Has slowly been resolving since then (although creatinine today is up from yesterday). GFR likely ~20-30ml/min    (2)Hyponatremia - worsening as of today. Hard to advocate for administration of hypertonic IVF here, as associated worsening pulmonary edema could worsen his already tenuous respiratory status. Samsca does appear highly reasonable here, in my opinion.      RECOMMEND:  (1)Change the substrate for Zosyn from D5W to NS  (2)Would give Samsca 15mg po x 1 today (and prn Na <125)  (3)Weaning down FiO2 and weaning down pressors per MICU  (4)Dose new meds for GFR 20-30ml/min (present dosing is acceptable)  (5)BMP at least q12h for now    Thank you for involving [facility] in this patient's care.    With warm regards,    Tarik Kendrick MD   [facility]  Office: [phone]  Cell: [phone]               HPI: Mr. Arenas is a 61 year-old man with history of nephrolithiasis, who presented 21 to the [facility] ER with fever, cough, and shortness of breath for 2-3 days. He was diagnosed on admission with COVID pneumonia; he was placed on IV Remdesivir, IV Decadron, and supplemental oxygen. At times over the next several days he required BIPAP. As of 22, he developed hypoxemic respiratory failure; he required intubation at that point. Since arrival in the MICU, he has developed both acute kidney injury, and hyponatremia. His creatinine peaked at 2.62mg/dL as of 1/10/22, and has been trending down since then. His hyponatremia, however, has continued to worsen. Today his serum sodium was noted to have dropped to 122meq/L. In light of the hyponatremia, a renal consultation was requested.    At present, Mr. Arenas remains on a ventilator, and he is requiring 100% FiO2. He is on Vasopressin and Norepinephrine gtts. He is being sedated with Fentanyl, Propofol, and Nimbex. He is also on a heparin gtt    PAST MEDICAL & SURGICAL HISTORY:  Obese  Nephrolithiasis/associated surgery      Allergies/Intolerances  Cipro (Rash)  eggs (Diarrhea)    SOCIAL HISTORY:  Denies ETOh,Smoking,     FAMILY HISTORY:  No CKD    REVIEW OF SYSTEMS:  unable to obtain from patient - intubated/sedated    VITAL:  T(C): , Max: 38.3 (22 @ 00:00)  T(F): , Max: 101 (22 @ 00:00)  HR: 92 (22 @ 09:00)  BP: 109/64  RR: 26 (22 @ 09:00)  SpO2: 89% (22 @ 09:00)  urine output 2095cc/24h    PHYSICAL EXAM:  Constitutional: intubated/sedated; FiO2 100%  HEENT: (+)ETT/(+)NGT  Neck: Supple, No JVD  Respiratory: CTA-b/l  Cardiovascular: RRR s1s2, no m/r/g  Gastrointestinal: BS+, soft, NT/ND  : (+)kim  Extremities: 2+ b/l LE edema  Neurological: tone WNL  Back: no CVAT b/l  Skin: No rashes, no nevi    LABS:                        13.1   23.79 )-----------( 321      ( 2022 03:44 )             40.0     Na(122)/K(3.9)/Cl(87)/HCO3(20)/BUN(37)/Cr(1.94)Glu(141)/Ca(7.9)/Mg(1.80)/PO4(4.5)     @ 03:44  Na(126)/K(3.7)/Cl(93)/HCO3(20)/BUN(36)/Cr(1.66)Glu(116)/Ca(8.3)/Mg(1.80)/PO4(4.0)     @ 03:40  Na(126)/K(3.7)/Cl(92)/HCO3(20)/BUN(36)/Cr(1.73)Glu(116)/Ca(7.8)/Mg(1.80)/PO4(2.9)    01-15 @ 01:13    Urinalysis Basic - ( 2022 06:41 )  Color: Yellow / Appearance: Slightly Turbid / S.020 / pH: x  Gluc: x / Ketone: Negative  / Bili: Negative / Urobili: 3 mg/dL   Blood: x / Protein: 30 mg/dL / Nitrite: Negative   Leuk Esterase: Small / RBC: 5-10 /HPF / WBC 10-20 /HPF   Sq Epi: x / Non Sq Epi: 5 /HPF / Bacteria: Few      IMAGING:  < from: Xray Chest 1 View- PORTABLE-Urgent (Xray Chest 1 View- PORTABLE-Urgent .) (22 @ 13:15) >  IMPRESSION: The heart size cannot be assessed. ET tube and NG tube appear   well-positioned. There is left lung base opacification compatible with   atelectasis and/or pneumonia with a small left pleural effusion. Mild   bilateral airspace disease is present without change.      ASSESSMENT:  (1)Renal - MELISSA - largely ischemic ATN in association with episode of hypoxemic respiratory failure 22. Has slowly been resolving since then (although creatinine today is up from yesterday). GFR likely ~20-30ml/min    (2)Hyponatremia - worsening as of today. Hard to advocate for administration of hypertonic IVF here, as associated worsening pulmonary edema could worsen his already tenuous respiratory status. Samsca does appear highly reasonable here, in my opinion.      RECOMMEND:  (1)Change the substrate for Zosyn from D5W to NS  (2)Would give Samsca 15mg po x 1 today (and prn Na <125)  (3)Weaning down FiO2 and weaning down pressors per MICU  (4)Dose new meds for GFR 20-30ml/min (present dosing is acceptable)  (5)BMP at least q12h for now    Thank you for involving [facility] in this patient's care.    With warm regards,    Tarik Kendrick MD   [facility]  Office: [phone]  Cell: [phone]

## 2022-01-18 NOTE — PROGRESS NOTE ADULT - ASSESSMENT
_________________________________________________________________________________________  ========>>  M E D I C A L   A T T E N D I N G    F O L L O W  U P  N O T E  <<=========  -----------------------------------------------------------------------------------------------------    - Patient seen and examined by me earlier today.   - In summary,  ALEXX BROWN is a 61y year old man admitted with SOB, fever..   - Patient intubated, sedated, paralyzed,  in the MICU, vented       per RN pt had coffee ground drainage from gastric tube (OGT)     no more proning done / planned     ==================>> REVIEW OF SYSTEM <<=================    unable to obtain     ==================>> PHYSICAL EXAM <<=================    GEN: sedated, vented, intubated.. NAD  HEENT: NCAT lines and tubes in place , + facial swelling   Neck: supple lines and tubes in place   CVS: S1S2 , regular  PULM: CTA B/L,  limited , + increased PEEP, 100 % FIO2, sat 89%  ABD.: soft. non distended  Extrem: intact pulses , mild LE edema      + kim with dark yellow urine                              ( Note written / Date of service 01-18-22 )    ==================>> MEDICATIONS <<====================    ALBUTerol    90 MICROgram(s) HFA Inhaler 2 Puff(s) Inhalation every 6 hours  aMIOdarone    Tablet 200 milliGRAM(s) Oral every 24 hours  chlorhexidine 0.12% Liquid 15 milliLiter(s) Oral Mucosa every 12 hours  chlorhexidine 4% Liquid 1 Application(s) Topical <User Schedule>  cisatracurium Infusion 3 MICROgram(s)/kG/Min IV Continuous <Continuous>  fentaNYL   Infusion. 0.5 MICROgram(s)/kG/Hr IV Continuous <Continuous>  heparin  Infusion.  Unit(s)/Hr IV Continuous <Continuous>  norepinephrine Infusion 0.05 MICROgram(s)/kG/Min IV Continuous <Continuous>  pantoprazole  Injectable 40 milliGRAM(s) IV Push daily  petrolatum Ophthalmic Ointment 1 Application(s) Both EYES daily  polyethylene glycol 3350 17 Gram(s) Oral two times a day  propofol Infusion 10 MICROgram(s)/kG/Min IV Continuous <Continuous>  senna 2 Tablet(s) Oral at bedtime  sodium chloride 1.5%. 500 milliLiter(s) IV Continuous <Continuous>  vasopressin Infusion 0.04 Unit(s)/Min IV Continuous <Continuous>    MEDICATIONS  (PRN):  heparin   Injectable 9500 Unit(s) IV Push every 6 hours PRN For aPTT less than 40  heparin   Injectable 4500 Unit(s) IV Push every 6 hours PRN For aPTT between 40 - 57    ___________  Active diet:  Diet, NPO:   Except Medications  ___________________    ==================>> VITAL SIGNS <<==================    Vital Signs Last 24 HrsT(C): 37.2 (01-18-22 @ 17:00)  T(F): 98.9 (01-18-22 @ 17:00), Max: 100.5 (01-18-22 @ 10:00)  HR: 93 (01-18-22 @ 18:09) (82 - 99)  BP: -- reviewed on tele   RR: 26 (01-18-22 @ 18:00) (23 - 26)  SpO2: 91% (01-18-22 @ 18:09) (88% - 94%)       ==================>> LAB AND IMAGING <<==================                        12.1   22.64 )-----------( 334      ( 18 Jan 2022 03:10 )             36.7        01-18    121<L>  |  86<L>  |  39<H>  ----------------------------<  106<H>  3.5   |  22  |  2.15<H>    Ca    7.7<L>      18 Jan 2022 03:10  Phos  4.4     01-18  Mg     1.90     01-18    TPro  6.0  /  Alb  1.9<L>  /  TBili  1.4<H>  /  DBili  x   /  AST  56<H>  /  ALT  40  /  AlkPhos  77  01-18    WBC count:   22.64 <<== ,  23.79 <<== ,  17.27 <<== ,  16.94 <<== ,  16.49 <<==   Hemoglobin:   12.1 <<==,  13.1 <<==,  13.0 <<==,  13.0 <<==,  13.4 <<==  platelets:  334 <==, 321 <==, 269 <==, 263 <==, 264 <==, 282 <==    Creatinine:  2.15  <<==, 2.02  <<==, 1.94  <<==, 1.66  <<==, 1.73  <<==, 1.80  <<==  Sodium:   121  <==, 121  <==, 122  <==, 126  <==, 126  <==, 128  <==, 128  <==       AST:          56 <== , 81 <== , 60 <== , 51 <== , 67 <==      ALT:        40  <== , 45  <== , 39  <== , 44  <== , 63  <==      AP:        77  <=, 90  <=, 70  <=, 65  <=, 74  <=     Bili:        1.4  <=, 1.5  <=, 1.6  <=, 1.6  <=, 1.6  <=    _______________________  C U L T U R E S :    Culture - Blood (collected 12 Jan 2022 13:15)  Source: .Blood Blood-Peripheral  Final Report (17 Jan 2022 17:00):    No Growth Final    Culture - Blood (collected 12 Jan 2022 13:00)  Source: .Blood Blood-Peripheral  Final Report (17 Jan 2022 17:00):    No Growth Final    Culture - Sputum (collected 10 Shai 2022 02:30)  Source: .Sputum Sputum  Gram Stain (10 Shai 2022 13:26):    Moderate polymorphonuclear leukocytes per low power field    No Squamous epithelial cells per low power field    Rare Gram Negative Rods per oil power field  Final Report (12 Jan 2022 14:27):    Normal Respiratory Marifer present    Culture - Blood (collected 08 Jan 2022 19:45)  Source: .Blood Blood-Peripheral  Final Report (14 Jan 2022 01:01):    No Growth Final    Culture - Blood (collected 08 Jan 2022 19:35)  Source: .Blood Blood-Peripheral  Final Report (14 Jan 2022 01:01):    No Growth Final      < from: Xray Chest 1 View- PORTABLE-Urgent (Xray Chest 1 View- PORTABLE-Urgent .) (01.16.22 @ 13:15) >  IMPRESSION: The heart size cannot be assessed. ET tube and NG tube appear   well-positioned. There is left lung base opacification compatible with   atelectasis and/or pneumonia with a small left pleural effusion. Mild   bilateral airspace disease is present without change.  < end of copied text >    ___________________________________________________________________________________  ===============>>  A S S E S S M E N T   A N D   P L A N <<===============  ------------------------------------------------------------------------------------------    · Assessment	  61 year old man with history of kidney stone presenting with fever, cough, SOB x 2-3d.   admitted for COVID     Problem/Plan - 1:  ·  Problem: Acute hypoxemic respiratory failure due to COVID-19 with Viral syndrome. >> ARDS  post Remdesivir and Decadron per hospital protocol  trend inflammatory markers   ICU care and mgmt appreciated >> prone per protocol     wean down Vent / O2  as able     appears pt has been requiring more and more o2 > now on full vent support with 100% O2   supportive care  nutrition, hydration  as able   vitamins / supplements   DVT prophylaxis : lovenox BID  would consider DC Amio given lung disease'  would consider another course of steroids ( IV +/- inhaled)  Diuretics + IV albumin ( pt with low serum albumin) may help (bedside sono / check volume status)   Continue Current medications otherwise and monitor.     ** Fever , leukocytosis, improved   new fever on day of intubation   pan cultures in process   suspect aspiration pneumonia  remains on Zosyn >> > duration   ID on board  leukocytosis increased > monitor   less likely PE as been on Lovenox 40 BID    would check LE dopplers   antipyretics as needed  monitor     **MELISSA and transaminitis likely due to hemodynamic changes during code  with episodes of hypotension 2/2 propofol     overall improved     diuretics as needed      keep MAP >60      tend BMP closely     Avoid nephrotoxic medications.     **hyponatremia     Diuretics as able     nutrition va OGT     Problem/Plan - 2:  ·  Problem: AF + RVR > improved     appreciated cardio follow up     would agree to DC Amio as able     on heparin drip    -GI/DVT Prophylaxis per protocol.  on heparin drip and protonix    overall prognosis guarded   pt full code     ___________________________  HGarrett Knowles D.O.  Pager: [phone]

## 2022-01-18 NOTE — PROGRESS NOTE ADULT - SUBJECTIVE AND OBJECTIVE BOX
INTERVAL HPI/OVERNIGHT EVENTS: No acute overnight events.    SUBJECTIVE: Patient seen and examined at bedside. No complaints. Denies fever, chills, chest pain, shortness of breath, abdominal pain, nausea, vomiting, changes in bowel habits, or urinary symptoms    OBJECTIVE:    VITAL SIGNS:  ICU Vital Signs Last 24 Hrs  T(C): 38 (2022 12:00), Max: 38.1 (2022 10:00)  T(F): 100.4 (2022 12:00), Max: 100.5 (2022 10:00)  HR: 87 (2022 12:00) (82 - 99)  BP: --  BP(mean): --  ABP: 107/64 (2022 12:00) (85/52 - 112/65)  ABP(mean): 78 (2022 12:00) (62 - 80)  RR: 26 (2022 12:00) (23 - 26)  SpO2: 88% (:00) (88% - 94%)    Mode: AC/ CMV (Assist Control/ Continuous Mandatory Ventilation), RR (machine): 26, FiO2: 100, PEEP: 15, ITime: 0.95, MAP: 25, PC: 25, PIP: 41     @ : @ 07:00  --------------------------------------------------------  IN: 4247.3 mL / OUT: 5000 mL / NET: -752.7 mL     @ 07: @ 12:46  --------------------------------------------------------  IN: 459.6 mL / OUT: 200 mL / NET: 259.6 mL      CAPILLARY BLOOD GLUCOSE          PHYSICAL EXAM:    General: NAD  HEENT: NC/AT; PERRL, clear conjunctiva  Respiratory: CTA b/l  Cardiovascular: +S1/S2; RRR  Abdomen: soft, NT/ND; +BS x4  Extremities: WWP, 2+ peripheral pulses b/l; no LE edema  Skin: normal color and turgor; no rash  Neurological: AAOx3, no focal deficits    MEDICATIONS:  MEDICATIONS  (STANDING):  acetaminophen   IVPB .. 1000 milliGRAM(s) IV Intermittent once  ALBUTerol    90 MICROgram(s) HFA Inhaler 2 Puff(s) Inhalation every 6 hours  aMIOdarone    Tablet 200 milliGRAM(s) Oral every 24 hours  chlorhexidine 0.12% Liquid 15 milliLiter(s) Oral Mucosa every 12 hours  chlorhexidine 4% Liquid 1 Application(s) Topical <User Schedule>  cisatracurium Infusion 3 MICROgram(s)/kG/Min (21.1 mL/Hr) IV Continuous <Continuous>  fentaNYL   Infusion. 0.5 MICROgram(s)/kG/Hr (5.85 mL/Hr) IV Continuous <Continuous>  heparin  Infusion.  Unit(s)/Hr (21 mL/Hr) IV Continuous <Continuous>  norepinephrine Infusion 0.05 MICROgram(s)/kG/Min (5.49 mL/Hr) IV Continuous <Continuous>  pantoprazole  Injectable 40 milliGRAM(s) IV Push daily  petrolatum Ophthalmic Ointment 1 Application(s) Both EYES daily  polyethylene glycol 3350 17 Gram(s) Oral two times a day  propofol Infusion 10 MICROgram(s)/kG/Min (7.02 mL/Hr) IV Continuous <Continuous>  senna 2 Tablet(s) Oral at bedtime  vasopressin Infusion 0.04 Unit(s)/Min (2.4 mL/Hr) IV Continuous <Continuous>    MEDICATIONS  (PRN):  heparin   Injectable 9500 Unit(s) IV Push every 6 hours PRN For aPTT less than 40  heparin   Injectable 4500 Unit(s) IV Push every 6 hours PRN For aPTT between 40 - 57      ALLERGIES:  Allergies    Cipro (Rash)    Intolerances    eggs (Diarrhea)      LABS:                        12.1   22.64 )-----------( 334      ( 2022 03:10 )             36.7     01-18    121<L>  |  86<L>  |  39<H>  ----------------------------<  106<H>  3.5   |  22  |  2.15<H>    Ca    7.7<L>      2022 03:10  Phos  4.4       Mg     1.90         TPro  6.0  /  Alb  1.9<L>  /  TBili  1.4<H>  /  DBili  x   /  AST  56<H>  /  ALT  40  /  AlkPhos  77      PT/INR - ( 2022 03:10 )   PT: 12.3 sec;   INR: 1.08 ratio         PTT - ( 2022 03:10 )  PTT:79.4 sec  Urinalysis Basic - ( 2022 06:41 )    Color: Yellow / Appearance: Slightly Turbid / S.020 / pH: x  Gluc: x / Ketone: Negative  / Bili: Negative / Urobili: 3 mg/dL   Blood: x / Protein: 30 mg/dL / Nitrite: Negative   Leuk Esterase: Small / RBC: 5-10 /HPF / WBC 10-20 /HPF   Sq Epi: x / Non Sq Epi: 5 /HPF / Bacteria: Few    RADIOLOGY & ADDITIONAL TESTS: Reviewed. INTERVAL HPI/OVERNIGHT EVENTS: No acute overnight events. Cultures were sent yesterday. Gota tolvaptan but had high residuals overnight.  Patient was net negative so no bumex was given over night.    SUBJECTIVE: Patient seen and examined at bedside. Still sedated and paralyzed. ROS unable to be obtained due to mental status.    OBJECTIVE:    VITAL SIGNS:  ICU Vital Signs Last 24 Hrs  T(C): 38 (2022 12:00), Max: 38.1 (2022 10:00)  T(F): 100.4 (2022 12:00), Max: 100.5 (2022 10:00)  HR: 87 (2022 12:00) (82 - 99)  BP: --  BP(mean): --  ABP: 107/64 (2022 12:00) (85/52 - 112/65)  ABP(mean): 78 (2022 12:00) (62 - 80)  RR: 26 (:00) (23 - 26)  SpO2: 88% (2022 12:00) (88% - 94%)    Mode: AC/ CMV (Assist Control/ Continuous Mandatory Ventilation), RR (machine): 26, FiO2: 100, PEEP: 15, ITime: 0.95, MAP: 25, PC: 25, PIP: 41     @ 07: @ 07:00  --------------------------------------------------------  IN: 4247.3 mL / OUT: 5000 mL / NET: -752.7 mL     @ 07:01  -   @ 12:46  --------------------------------------------------------  IN: 459.6 mL / OUT: 200 mL / NET: 259.6 mL      CAPILLARY BLOOD GLUCOSE          PHYSICAL EXAM:    General: NAD  HEENT: NC/AT; PERRL, clear conjunctiva  Respiratory: CTA b/l  Cardiovascular: +S1/S2; RRR  Abdomen: soft, NT/ND; +BS x4  Extremities: WWP, 2+ peripheral pulses b/l; edematous extremities. paralyzed.   Skin: normal color and turgor; no rash  Neurological: AAOx0, sedated and paralyzed.    MEDICATIONS:  MEDICATIONS  (STANDING):  acetaminophen   IVPB .. 1000 milliGRAM(s) IV Intermittent once  ALBUTerol    90 MICROgram(s) HFA Inhaler 2 Puff(s) Inhalation every 6 hours  aMIOdarone    Tablet 200 milliGRAM(s) Oral every 24 hours  chlorhexidine 0.12% Liquid 15 milliLiter(s) Oral Mucosa every 12 hours  chlorhexidine 4% Liquid 1 Application(s) Topical <User Schedule>  cisatracurium Infusion 3 MICROgram(s)/kG/Min (21.1 mL/Hr) IV Continuous <Continuous>  fentaNYL   Infusion. 0.5 MICROgram(s)/kG/Hr (5.85 mL/Hr) IV Continuous <Continuous>  heparin  Infusion.  Unit(s)/Hr (21 mL/Hr) IV Continuous <Continuous>  norepinephrine Infusion 0.05 MICROgram(s)/kG/Min (5.49 mL/Hr) IV Continuous <Continuous>  pantoprazole  Injectable 40 milliGRAM(s) IV Push daily  petrolatum Ophthalmic Ointment 1 Application(s) Both EYES daily  polyethylene glycol 3350 17 Gram(s) Oral two times a day  propofol Infusion 10 MICROgram(s)/kG/Min (7.02 mL/Hr) IV Continuous <Continuous>  senna 2 Tablet(s) Oral at bedtime  vasopressin Infusion 0.04 Unit(s)/Min (2.4 mL/Hr) IV Continuous <Continuous>    MEDICATIONS  (PRN):  heparin   Injectable 9500 Unit(s) IV Push every 6 hours PRN For aPTT less than 40  heparin   Injectable 4500 Unit(s) IV Push every 6 hours PRN For aPTT between 40 - 57      ALLERGIES:  Allergies    Cipro (Rash)    Intolerances    eggs (Diarrhea)      LABS:                        12.1   22.64 )-----------( 334      ( 2022 03:10 )             36.7     01-18    121<L>  |  86<L>  |  39<H>  ----------------------------<  106<H>  3.5   |  22  |  2.15<H>    Ca    7.7<L>      2022 03:10  Phos  4.4       Mg     1.90         TPro  6.0  /  Alb  1.9<L>  /  TBili  1.4<H>  /  DBili  x   /  AST  56<H>  /  ALT  40  /  AlkPhos  77      PT/INR - ( 2022 03:10 )   PT: 12.3 sec;   INR: 1.08 ratio         PTT - ( 2022 03:10 )  PTT:79.4 sec  Urinalysis Basic - ( 2022 06:41 )    Color: Yellow / Appearance: Slightly Turbid / S.020 / pH: x  Gluc: x / Ketone: Negative  / Bili: Negative / Urobili: 3 mg/dL   Blood: x / Protein: 30 mg/dL / Nitrite: Negative   Leuk Esterase: Small / RBC: 5-10 /HPF / WBC 10-20 /HPF   Sq Epi: x / Non Sq Epi: 5 /HPF / Bacteria: Few    RADIOLOGY & ADDITIONAL TESTS: Reviewed.

## 2022-01-18 NOTE — PROGRESS NOTE ADULT - SUBJECTIVE AND OBJECTIVE BOX
Remains on Vasopressin and Norepinephrine gtts  Remains on vent; sedated on Nimbex, Propofol, and Fentanyl gtts    VITAL:  T(C): , Max: 38.1 (22 @ 10:00)  T(F): , Max: 100.5 (22 @ 10:00)  HR: 94 (22 @ 13:00)  BP: 103/63  RR: 26 (22 @ 13:00)  SpO2: 90% (22 @ 13:00)      PHYSICAL EXAM:  Constitutional: intubated/sedated; FiO2 100%  HEENT: (+)ETT/(+)NGT  Neck: Supple, No JVD  Respiratory: CTA-b/l  Cardiovascular: RRR s1s2, no m/r/g  Gastrointestinal: BS+, soft, NT/ND  : (+)kim  Extremities: 2+ b/l LE edema  Neurological: tone WNL  Back: no CVAT b/l  Skin: No rashes, no nevi    LABS:                        12.1   22.64 )-----------( 334      ( 2022 03:10 )             36.7     Na(121)/K(3.5)/Cl(86)/HCO3(22)/BUN(39)/Cr(2.15)Glu(106)/Ca(7.7)/Mg(1.90)/PO4(4.4)     @ 03:10  Na(121)/K(3.7)/Cl(88)/HCO3(20)/BUN(40)/Cr(2.02)Glu(127)/Ca(8.0)/Mg(--)/PO4(--)     @ 16:47  Na(122)/K(3.9)/Cl(87)/HCO3(20)/BUN(37)/Cr(1.94)Glu(141)/Ca(7.9)/Mg(1.80)/PO4(4.5)     @ 03:44  Na(126)/K(3.7)/Cl(93)/HCO3(20)/BUN(36)/Cr(1.66)Glu(116)/Ca(8.3)/Mg(1.80)/PO4(4.0)    -16 @ 03:40    Urinalysis Basic - ( 2022 06:41)  Color: Yellow / Appearance: Slightly Turbid / S.020 / pH: x  Gluc: x / Ketone: Negative  / Bili: Negative / Urobili: 3 mg/dL   Blood: x / Protein: 30 mg/dL / Nitrite: Negative   Leuk Esterase: Small / RBC: 5-10 /HPF / WBC 10-20 /HPF   Sq Epi: x / Non Sq Epi: 5 /HPF / Bacteria: Few        IMPRESSION: 61M w/ nephrolithiasis, 23 p/w COVID PNA, c/b MELISSA and hyponatremia    (1)Renal - 2 bouts of MELISSA here; there was a bout from 22 due to ischemic ATN, which has largely resolved. There is a new component of MELISSA here from the past couple of days, I suspect that this is prerenally driven.    (2)Hyponatremia - worsening as of today. Hard to advocate for administration of hypertonic IVF here, as associated worsening pulmonary edema could worsen his already tenuous respiratory status. Samsca does appear highly reasonable here, in my opinion.    (3)Hypokalemia - mild    (4)CV - in shock/on multiple pressors. On maximum FiO2 via vent. Very poor overall prognosis    RECOMMEND:  (1)Favor gentle IVF here - LR 100cc/h x 10h?  (2)Weaning down FiO2 and weaning down pressors per MICU  (3)Dose new meds for GFR 20-30ml/min (present dosing is acceptable)  (4)BMP at least q12h for now          Tarik Kendrick MD  [facility]  Office: [phone]  Cell: [phone]       Remains on Vasopressin and Norepinephrine gtts  Remains on vent; sedated on Nimbex, Propofol, and Fentanyl gtts    VITAL:  T(C): , Max: 38.1 (22 @ 10:00)  T(F): , Max: 100.5 (22 @ 10:00)  HR: 94 (22 @ 13:00)  BP: 103/63  RR: 26 (22 @ 13:00)  SpO2: 90% (22 @ 13:00)  urine output 1300cc/24h      PHYSICAL EXAM:  Constitutional: intubated/sedated; FiO2 100%  HEENT: (+)ETT/(+)NGT  Neck: Supple, No JVD  Respiratory: CTA-b/l  Cardiovascular: RRR s1s2, no m/r/g  Gastrointestinal: BS+, soft, NT/ND  : (+)kim  Extremities: 2+ b/l LE edema  Neurological: tone WNL  Back: no CVAT b/l  Skin: No rashes, no nevi    LABS:                        12.1   22.64 )-----------( 334      ( 2022 03:10 )             36.7     Na(121)/K(3.5)/Cl(86)/HCO3(22)/BUN(39)/Cr(2.15)Glu(106)/Ca(7.7)/Mg(1.90)/PO4(4.4)     @ 03:10  Na(121)/K(3.7)/Cl(88)/HCO3(20)/BUN(40)/Cr(2.02)Glu(127)/Ca(8.0)/Mg(--)/PO4(--)     @ 16:47  Na(122)/K(3.9)/Cl(87)/HCO3(20)/BUN(37)/Cr(1.94)Glu(141)/Ca(7.9)/Mg(1.80)/PO4(4.5)     @ 03:44  Na(126)/K(3.7)/Cl(93)/HCO3(20)/BUN(36)/Cr(1.66)Glu(116)/Ca(8.3)/Mg(1.80)/PO4(4.0)    01-16 @ 03:40    Urinalysis Basic - ( 2022 06:41)  Color: Yellow / Appearance: Slightly Turbid / S.020 / pH: x  Gluc: x / Ketone: Negative  / Bili: Negative / Urobili: 3 mg/dL   Blood: x / Protein: 30 mg/dL / Nitrite: Negative   Leuk Esterase: Small / RBC: 5-10 /HPF / WBC 10-20 /HPF   Sq Epi: x / Non Sq Epi: 5 /HPF / Bacteria: Few        IMPRESSION: 61M w/ nephrolithiasis, 23 p/w COVID PNA, c/b MELISSA and hyponatremia    (1)Renal - 2 bouts of MELISSA here; there was a bout from 22 due to ischemic ATN, which has largely resolved. There is a new component of MELISSA here from the past couple of days, I suspect that this is prerenally driven.    (2)Hyponatremia - worsening as of today. There is reason for concern that administration of crystalloids could exacerbate his already tenuous respiratory status. However, there are not many other good options here for management of his hyponatremia. We could give a combination of (a)hypertonic IVF and (b)diuretics in effort to drive up the serum sodium without inducing worsening pulmonary edema/pleural effusions    (3)Hypokalemia - mild    (4)CV - in shock/on multiple pressors. On maximum FiO2 via vent. Very poor overall prognosis    RECOMMEND:  (1)1.5%NS+20meq/L KCL, 50cc/h x 10h  (2)Lasix 40mg iv x 1  (3)Weaning down FiO2 and weaning down pressors per MICU  (4)Dose new meds for GFR 20-30ml/min (present dosing is acceptable)  (5)BMP at least q12h for now    Spoke to son Cade at length by phone ([phone]) - answered all questions to his satisfaction      Tarik Kendrick MD  [facility]  Office: [phone]  Cell: [phone]       Remains on Vasopressin and Norepinephrine gtts  Remains on vent; sedated on Nimbex, Propofol, and Fentanyl gtts      VITAL:  T(C): , Max: 38.1 (22 @ 10:00)  T(F): , Max: 100.5 (22 @ 10:00)  HR: 94 (22 @ 13:00)  BP: 103/63  RR: 26 (22 @ 13:00)  SpO2: 90% (22 @ 13:00)  urine output 1300cc/24h      PHYSICAL EXAM:  Constitutional: intubated/sedated; FiO2 100%  HEENT: (+)ETT/(+)NGT  Neck: Supple, No JVD  Respiratory: CTA-b/l  Cardiovascular: RRR s1s2, no m/r/g  Gastrointestinal: BS+, soft, NT/ND  : (+)kim  Extremities: 2+ b/l LE edema  Neurological: tone WNL  Back: no CVAT b/l  Skin: No rashes, no nevi    LABS:                        12.1   22.64 )-----------( 334      ( 2022 03:10 )             36.7     Na(121)/K(3.5)/Cl(86)/HCO3(22)/BUN(39)/Cr(2.15)Glu(106)/Ca(7.7)/Mg(1.90)/PO4(4.4)     @ 03:10  Na(121)/K(3.7)/Cl(88)/HCO3(20)/BUN(40)/Cr(2.02)Glu(127)/Ca(8.0)/Mg(--)/PO4(--)     @ 16:47  Na(122)/K(3.9)/Cl(87)/HCO3(20)/BUN(37)/Cr(1.94)Glu(141)/Ca(7.9)/Mg(1.80)/PO4(4.5)     @ 03:44  Na(126)/K(3.7)/Cl(93)/HCO3(20)/BUN(36)/Cr(1.66)Glu(116)/Ca(8.3)/Mg(1.80)/PO4(4.0)    01-16 @ 03:40    Urinalysis Basic - ( 2022 06:41)  Color: Yellow / Appearance: Slightly Turbid / S.020 / pH: x  Gluc: x / Ketone: Negative  / Bili: Negative / Urobili: 3 mg/dL   Blood: x / Protein: 30 mg/dL / Nitrite: Negative   Leuk Esterase: Small / RBC: 5-10 /HPF / WBC 10-20 /HPF   Sq Epi: x / Non Sq Epi: 5 /HPF / Bacteria: Few        IMPRESSION: 61M w/ nephrolithiasis, 23 p/w COVID PNA, c/b MELISSA and hyponatremia    (1)Renal - 2 bouts of MELISSA here; there was a bout from 22 due to ischemic ATN, which has largely resolved. There is a new component of MELISSA here from the past couple of days, I suspect that this is prerenally driven.    (2)Hyponatremia - no improvement in serum sodium as of today, despite administration of Tolvaptan last night...this argues in favor of hypovolemic hyponatremia as the etiology. There is reason for concern that administration of crystalloids could exacerbate his already tenuous respiratory status. However, there are not many other good options here for management of his hyponatremia.     (3)Hypokalemia - mild    (4)CV - in shock/on multiple pressors. On maximum FiO2 via vent. Very poor overall prognosis    RECOMMEND:  (1)1.5%NS x 500cc  (3)Weaning down FiO2 and weaning down pressors per MICU  (4)Dose new meds for GFR 20-30ml/min (present dosing is acceptable)  (5)BMP at least q12h for now    Recs discussed with MICU team/Dr. Stack  Spoke to son Cade at length by phone ([phone]) - answered all questions to his satisfaction      Tarik Kendrick MD  [facility]  Office: [phone]  Cell: [phone]

## 2022-01-18 NOTE — PROGRESS NOTE ADULT - SUBJECTIVE AND OBJECTIVE BOX
Cardiovascular Disease Progress Note    Overnight events: No acute events overnight.  remains critically ill. no new cardiac issues noted      Objective Findings:  T(C): 37.3 (22 @ 00:00), Max: 37.3 (22 @ 20:00)  HR: 98 (22 @ 07:06) (82 - 98)  BP: --  RR: 23 (22 @ 06:00) (23 - 26)  SpO2: 92% (22 @ 07:06) (89% - 94%)  Wt(kg): --  Daily     Daily Weight in k.6 (2022 04:00)      Physical Exam:  Gen: NAD, intubated  HEENT: EOMI  CV: RRR, normal S1 + S2, no m/r/g  Lungs: CTAB  Abd: soft, non-tender  Ext: No edema    Telemetry: nsr    Laboratory Data:                        12.1   22.64 )-----------( 334      ( 2022 03:10 )             36.7         121<L>  |  86<L>  |  39<H>  ----------------------------<  106<H>  3.5   |  22  |  2.15<H>    Ca    7.7<L>      2022 03:10  Phos  4.4       Mg     1.90         TPro  6.0  /  Alb  1.9<L>  /  TBili  1.4<H>  /  DBili  x   /  AST  56<H>  /  ALT  40  /  AlkPhos  77      PT/INR - ( 2022 03:10 )   PT: 12.3 sec;   INR: 1.08 ratio         PTT - ( 2022 03:10 )  PTT:79.4 sec          Inpatient Medications:  MEDICATIONS  (STANDING):  ALBUTerol    90 MICROgram(s) HFA Inhaler 2 Puff(s) Inhalation every 6 hours  aMIOdarone    Tablet 200 milliGRAM(s) Oral every 24 hours  chlorhexidine 0.12% Liquid 15 milliLiter(s) Oral Mucosa every 12 hours  chlorhexidine 4% Liquid 1 Application(s) Topical <User Schedule>  cisatracurium Infusion 3 MICROgram(s)/kG/Min (21.1 mL/Hr) IV Continuous <Continuous>  fentaNYL   Infusion. 0.5 MICROgram(s)/kG/Hr (5.85 mL/Hr) IV Continuous <Continuous>  heparin  Infusion.  Unit(s)/Hr (21 mL/Hr) IV Continuous <Continuous>  norepinephrine Infusion 0.05 MICROgram(s)/kG/Min (5.49 mL/Hr) IV Continuous <Continuous>  petrolatum Ophthalmic Ointment 1 Application(s) Both EYES daily  polyethylene glycol 3350 17 Gram(s) Oral two times a day  propofol Infusion 10 MICROgram(s)/kG/Min (7.02 mL/Hr) IV Continuous <Continuous>  senna 2 Tablet(s) Oral at bedtime  vasopressin Infusion 0.04 Unit(s)/Min (2.4 mL/Hr) IV Continuous <Continuous>      Assessment:  hypoxic resp failure  covid pneumonia  hx of nephrolithiasis  hypoantremia  septic shock  pAF    Recs:  appreciate excellent micu care  cv stable  wean vent, pressors and sedation per icu protocol  amio per icu to maintain nsr. on hep gtt for rising d-dimer and pAF. favor weaning off amio shortly. consider digoxin once renal fxn stabilizes  broad spectrum abx and infectious workup per icu  natty + hyponatremia --> ? hypovolemia vs siadh. renal helping. would trial IV fluids and monitor for response. diuresis prn to maintain net neg  ECMO consult appreciated. currently not eligible due to multiorgan failure per ecmo team  dvt ppx        Over 25 minutes spent on total encounter; more than 50% of the visit was spent counseling and/or coordinating care by the attending physician.      Igor Medrano MD   Cardiovascular Disease  [phone]

## 2022-01-18 NOTE — PROGRESS NOTE ADULT - ASSESSMENT
Assessment and Plan    62 yo M PMHx of nephrolithiasis intubated for acute hypoxic respiratory failure 2/2 COVID19 with ARDS, requiring pressor support, and with MELISSA, and A fib w/ RVR on amiodarone.    Neuro   #mental status  - sedated with propofol and fentanyl  - paralyzed with nimbex  - f/u triglyceride level for prolonged propofol use    Pulm  #respiratory status  - intubated for acute hypoxic respiratory failure, worsening work of breathing  - proned until 5 pm 1/10 (third time)  - obtain ABGs, follow up vent setting  - c/w albuterol  - CXR 1/8: elevated right hemidiaphragm ill defined increased right lung markings and left basilar/retrocardiac markings compatible with hx of COVID, Hazy indistinct left CP region could be due to overlying soft tissues versus a small left pleural effusion. Sharp right CP angle. No pneumothorax.  - Overnight 1/9-1/10 worsening acidosis and desat to 83%, RR was increased to 30 over night. AM vent setting turned back to RR 22.   Post supination abg PO2 44. Partly likely due to volume overload as patient had net positive 6L during hospital course. Patient was given 4mg bumex   - 1/11: Proned. Supination at 4pm, f/u post supination ABG. Decreasing FiO2.  - 1/12: Proned until 4pm supination. post supination ABG. pO2 66% Satting 90%.  - 1/13: Supination at 4pm. decreased fio2 from 100% this AM  to 80%. BMP repeat PO2 68.  - 1/14: Supination at 4 pm. Gases look same as when proned. No more proning.  - 1/15: status quo  - 1/16: Repeat CXR ordered. Currently on PC 25/12/100%  - 1/17: CXR similar to prior studies. PEEP was increased to 15 this AM.  - 1/18: Status quo    Cardiovascular  #hemodynamics  - given albumin and LR 1/9  - 1/10: Charly and vasopressin  - 1/11: Started on levo, weaning off charly  - 1/12: Weaning off charly  - 1/13: over night d/c levo, back on charly.  - 1/16: transitioned from charly to levo to try to decrease volume of fluids given. Pt on amio.  - 1/17- 1/18: Still on levo and vasopressin  #Rapid afib  - 1/10: HR of 160s then went back to 80s spontaneously. Happened again, and 1x amiodarone was given  - 1/11:  1x amiodarone. started on amiodarone load. Started on heparin ggt  - 1/12: Finishing up Amio Load, starting Amio PO in AM. Trend LFT's.  - 1/13: C/w PO amio. rate controlled.    GI  #diet  - NGT in place  - On glucerna rate of 40    Renal  #MELISSA  - Baseline creatinine 0.8  - s/p 1x 2mg bumex push 1/9  - s/p 1x 4 mg bumex push 1/10  - 1/11: Creatinine stable since yesterday, elevated. Still putting out urine output of ~100cc/hr  - Strict I/O monitoring  - 1/12-14: MELISSA improving  - 1/16: Bumex 2mg today.  #Mild hyponatremia  - Further diuresis, net + 7L hospital course. 2g bumex at 3 pm. goal net - 1L to 1.5L q24 hr.  - 1/17: Worsening hyponatremia. 2mg bumex at 2:30 AM, 2mg bumex in afternoon.    MSK  #Rhabdo  1/11: CK 1890  -replete electrolytes as needed    ID  #COVID19  - s/p decadron and remdesivir  - trend inflammatory markers    #new fevers, leukocytosis  - s/p vanc by level (1/9-1/10) and zosyn (1/9-1/15)  - MRSA negative  - f/u blood cultures  - Sputum showed few gram negative rods. Blood culture no growth to date.  1/13: F/u ID consult requested by family. Procal 1.43.  1/15: Last day of Zosyn  1/16: Afebrile ovn.  1/17: Febrile overnight. UA sent. Bcx pending collection.    Heme-Onc  #DVT ppx  - on heparin ggt for afib rvr

## 2022-01-19 NOTE — CHART NOTE - NSCHARTNOTEFT_GEN_A_CORE
Pt found to again be in narrow complex tachycardia to 130s, Afib vs aflutter on telemetry. D/T avoidance of cyclic management (i.e. switching NE --> Phenylephrine only to have it switch back d/t high volumes), ordered Metop 5mg IV x1. HR < 110 afterward, but pressor requirements mildly increased. Will CTM    Limited oral options given persistently high residuals, no BMs despite thorough bowel regimen. will get KUB    Tavon Mcdonald MD PGY-1 Pt found to again be in narrow complex tachycardia to 130s, Afib vs aflutter on telemetry. D/T avoidance of cyclic management (i.e. switching NE --> Phenylephrine only to have it switch back d/t high volumes), ordered Metop 5mg IV x1. HR < 110 afterward, but pressor requirements mildly increased. Will CTM    Limited oral options given persistently high residuals, no BMs despite thorough bowel regimen. will get KUB    0135 Pt desat persistently to 70-80s. Attempted BVM with PEEP valve set at 15-20 w/o improvement. Resumed on vent.     Tavon Mcdonald MD PGY-1

## 2022-01-19 NOTE — PROGRESS NOTE ADULT - ASSESSMENT
_________________________________________________________________________________________  ========>>  M E D I C A L   A T T E N D I N G    F O L L O W  U P  N O T E  <<=========  -----------------------------------------------------------------------------------------------------    - Patient seen and examined by me earlier today.   - In summary,  ALEXX BROWN is a 61y year old man admitted with SOB, fever..   - Patient intubated, sedated, paralyzed,  in the MICU, vented     pt on full vent support, medically paralyzed and on 100% FIO2, saturating in high 80s.. !        pt also NPO with NGT to suction as poor bowel function no BM, OGT to suction per RN    ==================>> REVIEW OF SYSTEM <<=================  unable to obtain   ==================>> PHYSICAL EXAM <<=================    GEN: sedated, vented, intubated.. NAD  HEENT: NCAT lines and tubes in place , + facial swelling   Neck: supple lines and tubes in place   CVS: S1S2 , regular  PULM: CTA B/L,  limited , + increased PEEP, 100 % FIO2, sat 88%  ABD.: soft. non distended  Extrem: intact pulses , mild LE edema      + kim with dark yellow urine                              ( Note written / Date of service 01-19-22 )    ==================>> MEDICATIONS <<====================    ALBUTerol    90 MICROgram(s) HFA Inhaler 2 Puff(s) Inhalation every 6 hours  aMIOdarone    Tablet 200 milliGRAM(s) Oral every 24 hours  buMETAnide Infusion 1 mG/Hr IV Continuous <Continuous>  chlorhexidine 0.12% Liquid 15 milliLiter(s) Oral Mucosa every 12 hours  chlorhexidine 4% Liquid 1 Application(s) Topical <User Schedule>  cisatracurium Infusion 3 MICROgram(s)/kG/Min IV Continuous <Continuous>  cisatracurium Injectable 20 milliGRAM(s) IV Push once  conivaptan Infusion 0.833 mG/Hr IV Continuous <Continuous>  fentaNYL   Infusion. 0.5 MICROgram(s)/kG/Hr IV Continuous <Continuous>  heparin  Infusion.  Unit(s)/Hr IV Continuous <Continuous>  metoprolol tartrate Injectable 2.5 milliGRAM(s) IV Push every 5 minutes  midazolam Injectable 4 milliGRAM(s) IV Push once  norepinephrine Infusion 0.05 MICROgram(s)/kG/Min IV Continuous <Continuous>  pantoprazole  Injectable 40 milliGRAM(s) IV Push daily  petrolatum Ophthalmic Ointment 1 Application(s) Both EYES daily  polyethylene glycol 3350 17 Gram(s) Oral two times a day  propofol Infusion 10 MICROgram(s)/kG/Min IV Continuous <Continuous>  senna 2 Tablet(s) Oral at bedtime  sodium chloride 1.5%. 500 milliLiter(s) IV Continuous <Continuous>  vasopressin Infusion 0.04 Unit(s)/Min IV Continuous <Continuous>    MEDICATIONS  (PRN):  heparin   Injectable 9500 Unit(s) IV Push every 6 hours PRN For aPTT less than 40  heparin   Injectable 4500 Unit(s) IV Push every 6 hours PRN For aPTT between 40 - 57    ___________  Active diet:  Diet, NPO:   Except Medications  ___________________    ==================>> VITAL SIGNS <<==================    Vital Signs Last 24 HrsT(C): 37.6 (01-19-22 @ 16:00)  T(F): 99.7 (01-19-22 @ 16:00), Max: 100.4 (01-19-22 @ 06:00)  HR: 105 (01-19-22 @ 17:00) (84 - 122)  BP: -- noted on tele   RR: 28 (01-19-22 @ 17:00) (10 - 28)  SpO2: 83% (01-19-22 @ 17:00) (77% - 92%)      CAPILLARY BLOOD GLUCOSE  POCT Blood Glucose.: 131 mg/dL (19 Jan 2022 00:31)     ==================>> LAB AND IMAGING <<==================                        11.9   22.69 )-----------( 385      ( 19 Jan 2022 04:58 )             35.7        01-19    122<L>  |  88<L>  |  39<H>  ----------------------------<  136<H>  4.2   |  18<L>  |  2.10<H>    Ca    7.6<L>      19 Jan 2022 15:09  Phos  4.5     01-19  Mg     1.90     01-19    TPro  5.9<L>  /  Alb  1.8<L>  /  TBili  1.3<H>  /  DBili  x   /  AST  49<H>  /  ALT  29  /  AlkPhos  73  01-19    WBC count:   22.69 <<== ,  22.64 <<== ,  23.79 <<== ,  17.27 <<== ,  16.94 <<==   Hemoglobin:   11.9 <<==,  12.1 <<==,  13.1 <<==,  13.0 <<==,  13.0 <<==  platelets:  385 <==, 334 <==, 321 <==, 269 <==, 263 <==, 264 <==    Creatinine:  2.10  <<==, 2.20  <<==, 2.15  <<==, 2.02  <<==, 1.94  <<==, 1.66  <<==  Sodium:   122  <==, 122  <==, 121  <==, 121  <==, 122  <==, 126  <==, 126  <==       AST:          49 <== , 56 <== , 81 <== , 60 <== , 51 <==      ALT:        29  <== , 40  <== , 45  <== , 39  <== , 44  <==      AP:        73  <=, 77  <=, 90  <=, 70  <=, 65  <=     Bili:        1.3  <=, 1.4  <=, 1.5  <=, 1.6  <=, 1.6  <=    _______________________  C U L T U R E S :    Culture - Sputum (collected 19 Jan 2022 02:03)  Source: .Sputum Sputum  Gram Stain (19 Jan 2022 07:28):    Numerous polymorphonuclear leukocytes per low power field    No Squamous epithelial cells per low power field    No organisms seen per oil power field  Preliminary Report (19 Jan 2022 16:39):    Normal Respiratory Marifer present    Culture - Blood (collected 17 Jan 2022 20:47)  Source: .Blood Blood-Peripheral  Preliminary Report (18 Jan 2022 21:02):    No growth to date.    Culture - Blood (collected 17 Jan 2022 20:47)  Source: .Blood Blood-Peripheral  Preliminary Report (18 Jan 2022 21:02):    No growth to date.    Culture - Blood (collected 12 Jan 2022 13:15)  Source: .Blood Blood-Peripheral  Final Report (17 Jan 2022 17:00):    No Growth Final    Culture - Blood (collected 12 Jan 2022 13:00)  Source: .Blood Blood-Peripheral  Final Report (17 Jan 2022 17:00):    No Growth Final    Culture - Sputum (collected 10 Shai 2022 02:30)  Source: .Sputum Sputum  Gram Stain (10 Shai 2022 13:26):    Moderate polymorphonuclear leukocytes per low power field    No Squamous epithelial cells per low power field    Rare Gram Negative Rods per oil power field  Final Report (12 Jan 2022 14:27):    Normal Respiratory Marifer present    [appreciated bedside US showing Curly B lines, no PTX or effusion, normal LVEF ]    < from: Xray Chest 1 View- PORTABLE-Urgent (Xray Chest 1 View- PORTABLE-Urgent .) (01.16.22 @ 13:15) >  IMPRESSION: The heart size cannot be assessed. ET tube and NG tube appear   well-positioned. There is left lung base opacification compatible with   atelectasis and/or pneumonia with a small left pleural effusion. Mild   bilateral airspace disease is present without change.  < end of copied text >    ___________________________________________________________________________________  ===============>>  A S S E S S M E N T   A N D   P L A N <<===============  ------------------------------------------------------------------------------------------    · Assessment	  61 year old man with history of kidney stone presenting with fever, cough, SOB x 2-3d.   admitted for COVID     Problem/Plan - 1:  ·  Problem: Acute hypoxemic respiratory failure due to COVID-19 with Viral syndrome. >> ARDS  post Remdesivir and Decadron per hospital protocol  trend inflammatory markers   ICU care and mgmt appreciated >> prone per protocol     wean down Vent / O2  as able     appears pt has been requiring more and more o2 > now on full vent support with 100% O2   supportive care  nutrition, hydration  as able   vitamins / supplements   on full dose AC already  pt now on Bumax drip and Vaptan therapy for diuresis and hyponatremia  would consider DC Amio given lung disease'  would consider another course of steroids ( IV +/- inhaled)  IV albumin  as neede : monitor output    Continue Current medications otherwise and monitor.     ** Fever , leukocytosis  unclear etiology   pan cultures > follow   pt already post course of abx   leukocytosis increased > monitor   less likely PE as been on Lovenox 40 BID  antipyretics as needed  monitor     **MELISSA and transaminitis likely due to hemodynamic changes during code  with episodes of hypotension 2/2 propofol     overall improved     diuretics as needed      keep MAP >60      tend BMP closely     Avoid nephrotoxic medications.     **hyponatremia     Diuretics as able     nutrition va OGT  as able      on Vaptan     Problem/Plan - 2:  ·  Problem: AF + RVR > improved     appreciated cardio follow up     would agree to DC Amio as able     on AC     -GI/DVT Prophylaxis per protocol.  on heparin drip and protonix    overall prognosis guarded   pt full code     Critical care services provided.   ALEXX BROWN is in critical condition in the [facility].   I have personally and independently provided 35 minutes of critical care services for this patient, requiring high complexity decision making for the  medical conditions involving multiple system as noted.    I had a long discussion with ICU team as well     ___________________________  H. ABNER Knowles.  Pager: [phone]

## 2022-01-19 NOTE — CONSULT NOTE ADULT - SUBJECTIVE AND OBJECTIVE BOX
All Cardiology service information can be found  on [email or web address], password: [email or web address]    Patient seen and evaluated at bedside    Chief Complaint: 61 year old man with history of kidney stone presenting with fever, cough, SOB x 2-3d, unvaccinated with recent COVID exposure requiring intubation for acute hypoxic resp failure, sedated and paralyzed s/p proning. Now on multiple pressors. Developed paroxysmal fib with RVR intermittently since 1/10, initiated on amio and heparin drip , remains on amiodarone po maintenance. Rates currently well controlled.     PMHx:   No pertinent past medical history  Obese    PSHx:   No significant past surgical history    Allergies:  Cipro (Rash)  eggs (Diarrhea)    Current Medications:   ALBUTerol    90 MICROgram(s) HFA Inhaler 2 Puff(s) Inhalation every 6 hours  aMIOdarone    Tablet 200 milliGRAM(s) Oral every 24 hours  bisacodyl Suppository 10 milliGRAM(s) Rectal once  chlorhexidine 0.12% Liquid 15 milliLiter(s) Oral Mucosa every 12 hours  chlorhexidine 4% Liquid 1 Application(s) Topical <User Schedule>  cisatracurium Infusion 3 MICROgram(s)/kG/Min IV Continuous <Continuous>  fentaNYL   Infusion. 0.5 MICROgram(s)/kG/Hr IV Continuous <Continuous>  heparin   Injectable 9500 Unit(s) IV Push every 6 hours PRN  heparin   Injectable 4500 Unit(s) IV Push every 6 hours PRN  heparin  Infusion.  Unit(s)/Hr IV Continuous <Continuous>  metoprolol tartrate Injectable 2.5 milliGRAM(s) IV Push every 5 minutes  norepinephrine Infusion 0.05 MICROgram(s)/kG/Min IV Continuous <Continuous>  pantoprazole  Injectable 40 milliGRAM(s) IV Push daily  petrolatum Ophthalmic Ointment 1 Application(s) Both EYES daily  polyethylene glycol 3350 17 Gram(s) Oral two times a day  potassium chloride  20 mEq/100 mL IVPB 20 milliEquivalent(s) IV Intermittent once  propofol Infusion 10 MICROgram(s)/kG/Min IV Continuous <Continuous>  senna 2 Tablet(s) Oral at bedtime  sodium chloride 1.5%. 500 milliLiter(s) IV Continuous <Continuous>  vasopressin Infusion 0.04 Unit(s)/Min IV Continuous <Continuous>    Physical Exam:  T(F): 100.4 (01-19), Max: 100.5 ()  HR: 89 () (87 - 122)  BP: --  RR: 26 ()  SpO2: 85% ()  GENERAL: intubated and sedated   CHEST/LUNG: intubated breath sounds   HEART: irreg rhythm, reg rate, No murmurs, rubs, or gallops  ABDOMEN: Soft, Nontender, Nondistended; Bowel sounds present  EXTREMITIES:  No clubbing, cyanosis, or edema  PSYCH: Nl behavior, nl affect  NEUROLOGY: AAOx3, non-focal, cranial nerves intact  SKIN: Normal color, No rashes or lesions    Cardiovascular Diagnostic Testing:    ECG: fib with RVR, S1Q3T3    Imaging:    CXR: Personally reviewed    Labs: Personally reviewed                        .9   69 )-----------( 385      ( 2022 04:58 )             35.7         122<L>  |  89<L>  |  39<H>  ----------------------------<  125<H>  3.8   |  20<L>  |  2.20<H>    Ca    7.4<L>      2022 04:58  Phos  4.5       Mg     1.90         TPro  5.9<L>  /  Alb  1.8<L>  /  TBili  1.3<H>  /  DBili  x   /  AST  49<H>  /  ALT  29  /  AlkPhos  73      PT/INR - ( 2022 03:10 )   PT: 12.3 sec;   INR: 1.08 ratio       PTT - ( 2022 04:58 )  PTT:66.3 sec    Total Cholesterol: --  LDL: --  HDL: --  T    61 year old man with history of kidney stone presenting with fever, cough, SOB x 2-3d, unvaccinated with recent COVID exposure requiring intubation for acute hypoxic resp failure, sedated and paralyzed s/p proning on multiple pressors, EP consulted for afib management.     - afib likely driven by underlying severe COVID   - agree with amiodarone during acute illness, 200mg daily, s/p load, will need to monitor for pulmonary toxicity however risk relatively low   - would avoid beta blockade while on pressors, alternatively can preferentially use alpha agonists to allow for concomitant beta blocker administration  - can consider digoxin once Cr stabilizes   - agree with heparin drip as written   - reasonable to obtain TTE although unlikely to

## 2022-01-19 NOTE — PROGRESS NOTE ADULT - SUBJECTIVE AND OBJECTIVE BOX
INTERVAL HPI/OVERNIGHT EVENTS: A fib with RVR over night, metoprolol IV pushes were given. Patient was switched to max but put back on levo due to volume status. Patient also desatted to 70s, bagging didn't help so was put back onto vent. As per nephro, patient was given 500 cc of 1.5% NS.    SUBJECTIVE: Patient seen and examined at bedside. Intubated, sedated, paralyzed. ROS unable to be obtained due to mental status.    OBJECTIVE:    VITAL SIGNS:  ICU Vital Signs Last 24 Hrs  T(C): 37.5 (19 Jan 2022 08:00), Max: 38 (18 Jan 2022 12:00)  T(F): 99.5 (19 Jan 2022 08:00), Max: 100.4 (18 Jan 2022 12:00)  HR: 89 (19 Jan 2022 11:00) (84 - 122)  BP: --  BP(mean): --  ABP: 96/58 (19 Jan 2022 11:00) (69/43 - 107/64)  ABP(mean): 69 (19 Jan 2022 11:00) (51 - 78)  RR: 28 (19 Jan 2022 11:00) (10 - 28)  SpO2: 85% (19 Jan 2022 11:00) (77% - 92%)    Mode: AC/ CMV (Assist Control/ Continuous Mandatory Ventilation), RR (machine): 28, FiO2: 100, PEEP: 15, ITime: 0.8, MAP: 26, PC: 25, PIP: 41    01-18 @ 07:01 - 01-19 @ 07:00  --------------------------------------------------------  IN: 4431.8 mL / OUT: 1680 mL / NET: 2751.8 mL    01-19 @ 07:01 - 01-19 @ 11:55  --------------------------------------------------------  IN: 508 mL / OUT: 135 mL / NET: 373 mL      CAPILLARY BLOOD GLUCOSE      POCT Blood Glucose.: 131 mg/dL (19 Jan 2022 00:31)      PHYSICAL EXAM:    General: NAD  HEENT: NC/AT; PERRL, clear conjunctiva  Respiratory: CTA b/l  Cardiovascular: +S1/S2; RRR. bedside ECHO showed dilated RV, likely 2/2 high PEEP requirements  Abdomen: soft, NT/ND; +BS x4  Extremities: WWP, 2+ peripheral pulses b/l; edematous extremities.  Skin: normal color and turgor; no rash  Neurological: AAOx0, intubated, sedated, paralyzed, no focal deficits    MEDICATIONS:  MEDICATIONS  (STANDING):  ALBUTerol    90 MICROgram(s) HFA Inhaler 2 Puff(s) Inhalation every 6 hours  aMIOdarone    Tablet 200 milliGRAM(s) Oral every 24 hours  bisacodyl Suppository 10 milliGRAM(s) Rectal once  buMETAnide Infusion 1 mG/Hr (5 mL/Hr) IV Continuous <Continuous>  chlorhexidine 0.12% Liquid 15 milliLiter(s) Oral Mucosa every 12 hours  chlorhexidine 4% Liquid 1 Application(s) Topical <User Schedule>  conivaptan Infusion 40 mG/Hr (200 mL/Hr) IV Continuous <Continuous>  conivaptan Infusion 0.833 mG/Hr (4.17 mL/Hr) IV Continuous <Continuous>  fentaNYL   Infusion. 0.5 MICROgram(s)/kG/Hr (5.85 mL/Hr) IV Continuous <Continuous>  heparin  Infusion.  Unit(s)/Hr (21 mL/Hr) IV Continuous <Continuous>  metoprolol tartrate Injectable 2.5 milliGRAM(s) IV Push every 5 minutes  norepinephrine Infusion 0.05 MICROgram(s)/kG/Min (5.49 mL/Hr) IV Continuous <Continuous>  pantoprazole  Injectable 40 milliGRAM(s) IV Push daily  petrolatum Ophthalmic Ointment 1 Application(s) Both EYES daily  polyethylene glycol 3350 17 Gram(s) Oral two times a day  propofol Infusion 10 MICROgram(s)/kG/Min (7.02 mL/Hr) IV Continuous <Continuous>  senna 2 Tablet(s) Oral at bedtime  sodium chloride 1.5%. 500 milliLiter(s) (50 mL/Hr) IV Continuous <Continuous>  vasopressin Infusion 0.04 Unit(s)/Min (2.4 mL/Hr) IV Continuous <Continuous>    MEDICATIONS  (PRN):  heparin   Injectable 9500 Unit(s) IV Push every 6 hours PRN For aPTT less than 40  heparin   Injectable 4500 Unit(s) IV Push every 6 hours PRN For aPTT between 40 - 57      ALLERGIES:  Allergies    Cipro (Rash)    Intolerances    eggs (Diarrhea)      LABS:                        11.9   22.69 )-----------( 385      ( 19 Jan 2022 04:58 )             35.7     01-19    122<L>  |  89<L>  |  39<H>  ----------------------------<  125<H>  3.8   |  20<L>  |  2.20<H>    Ca    7.4<L>      19 Jan 2022 04:58  Phos  4.5     01-19  Mg     1.90     01-19    TPro  5.9<L>  /  Alb  1.8<L>  /  TBili  1.3<H>  /  DBili  x   /  AST  49<H>  /  ALT  29  /  AlkPhos  73  01-19    PT/INR - ( 18 Jan 2022 03:10 )   PT: 12.3 sec;   INR: 1.08 ratio         PTT - ( 19 Jan 2022 04:58 )  PTT:66.3 sec      RADIOLOGY & ADDITIONAL TESTS: Reviewed.

## 2022-01-19 NOTE — CHART NOTE - NSCHARTNOTEFT_GEN_A_CORE
Source: Patient [ ]    Family [ ]     other [x] chart review RN     Nutrition f/u for extended Length Of Stay in critical care.     62 y/o M with ARDS 2/2 COVID-19. Intubated and sedated; maxed out on propofol. (842 mL in past 24 hrs) TG 1/18 395.   Pt NPO since 1/18 2/2 coffee ground drainage from OGT, high residuals, and abdominal distension. Current with NGT to LCWS with 200 mL output in last 24 hrs.     Diet, NPO:   Except Medications (01-17-22 @ 19:20)      Enteral /Parenteral Nutrition: Pt was previously ordered for Glucerna 1.5 @ 40 mL/hr x 24 hrs. Pt received 926 gian from prop in past 24 hrs.     Current Weight:   1/19 107.1 kg  1/11 97.7 kg  Dosing (12/28) 117 kg    Edema: 3+ generalized   Pressure Injuries: none    __________________ Pertinent Medications__________________   MEDICATIONS  (STANDING):  ALBUTerol    90 MICROgram(s) HFA Inhaler 2 Puff(s) Inhalation every 6 hours  aMIOdarone    Tablet 200 milliGRAM(s) Oral every 24 hours  bisacodyl Suppository 10 milliGRAM(s) Rectal once  buMETAnide Infusion 1 mG/Hr (5 mL/Hr) IV Continuous <Continuous>  chlorhexidine 0.12% Liquid 15 milliLiter(s) Oral Mucosa every 12 hours  chlorhexidine 4% Liquid 1 Application(s) Topical <User Schedule>  conivaptan Infusion 40 mG/Hr (200 mL/Hr) IV Continuous <Continuous>  conivaptan Infusion 0.833 mG/Hr (4.17 mL/Hr) IV Continuous <Continuous>  fentaNYL   Infusion. 0.5 MICROgram(s)/kG/Hr (5.85 mL/Hr) IV Continuous <Continuous>  heparin  Infusion.  Unit(s)/Hr (21 mL/Hr) IV Continuous <Continuous>  metoprolol tartrate Injectable 2.5 milliGRAM(s) IV Push every 5 minutes  norepinephrine Infusion 0.05 MICROgram(s)/kG/Min (5.49 mL/Hr) IV Continuous <Continuous>  pantoprazole  Injectable 40 milliGRAM(s) IV Push daily  petrolatum Ophthalmic Ointment 1 Application(s) Both EYES daily  polyethylene glycol 3350 17 Gram(s) Oral two times a day  propofol Infusion 10 MICROgram(s)/kG/Min (7.02 mL/Hr) IV Continuous <Continuous>  senna 2 Tablet(s) Oral at bedtime  sodium chloride 1.5%. 500 milliLiter(s) (50 mL/Hr) IV Continuous <Continuous>  vasopressin Infusion 0.04 Unit(s)/Min (2.4 mL/Hr) IV Continuous <Continuous>    MEDICATIONS  (PRN):  heparin   Injectable 9500 Unit(s) IV Push every 6 hours PRN For aPTT less than 40  heparin   Injectable 4500 Unit(s) IV Push every 6 hours PRN For aPTT between 40 - 57      __________________ Pertinent Labs__________________   01-19 Na122 mmol/L<L> Glu 125 mg/dL<H> K+ 3.8 mmol/L Cr  2.20 mg/dL<H> BUN 39 mg/dL<H> 01-19 Phos 4.5 mg/dL 01-19 Alb 1.8 g/dL<L> 01-18 Chol --    LDL --    HDL --    Trig 395 mg/dL<H>  CAPILLARY BLOOD GLUCOSE      POCT Blood Glucose.: 131 mg/dL (19 Jan 2022 00:31)    Estimated Needs:   [x ] no change since previous assessment  11-15 gian/kg = 4630-1370 gian/d   1.8-2.0 gm pro/ kg IBW = 126-140 gm pro.     Previous Nutrition Diagnosis: Increased nutrient needs     Nutrition Diagnosis is [x ] ongoing     New Nutrition Diagnosis: Altered GI function r/t clinical course as evidenced by NPO, coffee ground output.       Recommendations:  1. Consider alternate means of nutrition (post-pyloric vs TPN)  2. If to resume enteral feeds recommend Vital HP @ 30 mL/hr x 24 hrs (720 mL, 720 gian, 63 gm pro) + No-carb Prosource 5x daily       Monitoring and Evaluation:      [ x] Tolerance to diet prescription [x ] weights [x ] follow up per protocol  [ ] other:

## 2022-01-19 NOTE — PROGRESS NOTE ADULT - ASSESSMENT
Assessment and Plan    60 yo M PMHx of nephrolithiasis intubated for acute hypoxic respiratory failure 2/2 COVID19 with ARDS, requiring pressor support, and with MELISSA, and A fib w/ RVR on amiodarone.    Neuro   #mental status  - sedated with propofol and fentanyl  - paralyzed with nimbex  - f/u triglyceride level for prolonged propofol use    Pulm  #respiratory status  - intubated for acute hypoxic respiratory failure, worsening work of breathing  - proned until 5 pm 1/10 (third time)  - obtain ABGs, follow up vent setting  - c/w albuterol  - CXR 1/8: elevated right hemidiaphragm ill defined increased right lung markings and left basilar/retrocardiac markings compatible with hx of COVID, Hazy indistinct left CP region could be due to overlying soft tissues versus a small left pleural effusion. Sharp right CP angle. No pneumothorax.  - Overnight 1/9-1/10 worsening acidosis and desat to 83%, RR was increased to 30 over night. AM vent setting turned back to RR 22.   Post supination abg PO2 44. Partly likely due to volume overload as patient had net positive 6L during hospital course. Patient was given 4mg bumex   - 1/11: Proned. Supination at 4pm, f/u post supination ABG. Decreasing FiO2.  - 1/12: Proned until 4pm supination. post supination ABG. pO2 66% Satting 90%.  - 1/13: Supination at 4pm. decreased fio2 from 100% this AM  to 80%. BMP repeat PO2 68.  - 1/14: Supination at 4 pm. Gases look same as when proned. No more proning.  - 1/15: status quo  - 1/16: Repeat CXR ordered. Currently on PC 25/12/100%  - 1/17: CXR similar to prior studies. PEEP was increased to 15 this AM.  - 1/18: Status quo    Cardiovascular  #hemodynamics  - given albumin and LR 1/9  - 1/10: Charly and vasopressin  - 1/11: Started on levo, weaning off charly  - 1/12: Weaning off charly  - 1/13: over night d/c levo, back on charly.  - 1/16: transitioned from charly to levo to try to decrease volume of fluids given. Pt on amio.  - 1/17- 1/18: Still on levo and vasopressin  #Rapid afib  - 1/10: HR of 160s then went back to 80s spontaneously. Happened again, and 1x amiodarone was given  - 1/11:  1x amiodarone. started on amiodarone load. Started on heparin ggt  - 1/12: Finishing up Amio Load, starting Amio PO in AM. Trend LFT's.  - 1/13: C/w PO amio. rate controlled.    GI  #diet  - NGT in place  - On glucerna rate of 40    Renal  #MELISSA  - Baseline creatinine 0.8  - s/p 1x 2mg bumex push 1/9  - s/p 1x 4 mg bumex push 1/10  - 1/11: Creatinine stable since yesterday, elevated. Still putting out urine output of ~100cc/hr  - Strict I/O monitoring  - 1/12-14: MELISSA improving  - 1/16: Bumex 2mg today.  #Mild hyponatremia  - Further diuresis, net + 7L hospital course. 2g bumex at 3 pm. goal net - 1L to 1.5L q24 hr.  - 1/17: Worsening hyponatremia. 2mg bumex at 2:30 AM, 2mg bumex in afternoon.    MSK  #Rhabdo  1/11: CK 1890  -replete electrolytes as needed    ID  #COVID19  - s/p decadron and remdesivir  - trend inflammatory markers    #new fevers, leukocytosis  - s/p vanc by level (1/9-1/10) and zosyn (1/9-1/15)  - MRSA negative  - f/u blood cultures  - Sputum showed few gram negative rods. Blood culture no growth to date.  1/13: F/u ID consult requested by family. Procal 1.43.  1/15: Last day of Zosyn  1/16: Afebrile ovn.  1/17: Febrile overnight. UA sent. Bcx pending collection.    Heme-Onc  #DVT ppx  - on heparin ggt for afib rvr   Assessment and Plan    60 yo M PMHx of nephrolithiasis intubated for acute hypoxic respiratory failure 2/2 COVID19 with ARDS, requiring pressor support, and with MELISSA, and A fib w/ RVR on amio loaded now on PO amiodarone.    Neuro   #mental status  sedated with propofol and fentanyl  paralyzed with nimbex  - Stop nimbex and observe response.  - Can titrate up sedation as needed.    Pulm  #COVID ARDS  Intubated for acute hypoxic respiratory failure, worsening work of breathing  Proned daily 1/10- 1/14, stopped helping on ABG.  Currently on max vent settings PAC Rate 28/Inspiratory pressure 25/PEEP 15/FiO2 100%.  -c/w albuterol  -f/u ABGs    Cardiovascular  #hemodynamics  Requiring pressor support with levo and vasopressin  -Titrated as tolerated with MAP goal of 65  #Rapid afib  Rate controlled  Amio loaded, now on PO amio  -C/w Amio    GI  #diet  NGT in place  Have been having high residuals, so feeds were held on 1/17    Renal  #MELISSA  Baseline creatinine 0.8  Initially improving MELISSA with creatinine going from 2.62 to 1.66  Creatinine now uptrending to 2.2  #Volume overload  Net positive I/O hospital course with persistently net positive almost daily with bumex pushes  - Start Bumex drip  - Strict I/O monitoring  #Hyponatremia  s/p 1x PO tolvaptan  s/p 500 cc 1.5% NS  - Conivaptan 20 mg push in 30 min, then 20 mg over 24 hours  - Monitor BMP q4hr while on conivaptan    MSK  #Rhabdo  1/11: CK 1890  -replete electrolytes as needed    ID  #COVID19  - s/p decadron and remdesivir  - trend inflammatory markers    #new fevers, leukocytosis  s/p vanc by level (1/9-1/10) and zosyn (1/9-1/17)  MRSA negative  Blood cultures have been consistently negative.  Prior sputum showed few gram negative rods.  ID consult requested by family, saw patient on 1/13, thought to be due to COVID not bacterial infection  Fever may be due to medication reaction vs infection  - If persistently febile-> daily sputum cultures and bcx q48 hr    Heme-Onc  #DVT ppx  - c/w heparin ggt for afib rvr   Assessment and Plan    60 yo M PMHx of nephrolithiasis intubated for acute hypoxic respiratory failure 2/2 COVID19 with ARDS, requiring pressor support, and with MELISSA, and A fib w/ RVR on amio loaded now on PO amiodarone.    Neuro   #mental status  sedated with propofol and fentanyl  paralyzed with nimbex  - Stop nimbex and observe response.  - Can titrate up sedation as needed.    Pulm  #COVID ARDS  Intubated for acute hypoxic respiratory failure, worsening work of breathing  Proned daily 1/10- 1/14, stopped helping on ABG.  Currently on max vent settings PAC Rate 28/Inspiratory pressure 25/PEEP 15/FiO2 100%.  -c/w albuterol  -f/u ABGs    Cardiovascular  #hemodynamics  Requiring pressor support with levo and vasopressin  -Titrated as tolerated with MAP goal of 65  #Rapid afib  Rate controlled  Amio loaded, now on PO amio  -C/w Amio PO    GI  #diet  NGT in place  Have been having high residuals and was suctioned out 1L so feeds were held on 1/17    Renal  #MELISSA  Baseline creatinine 0.8  Initially improving MELISSA with creatinine going from 2.62 to 1.66  Creatinine now uptrending to 2.2  #Volume overload  Net positive I/O hospital course with persistently net positive almost daily with bumex pushes  - Start Bumex drip  - Strict I/O monitoring  #Hyponatremia  s/p 1x PO tolvaptan  s/p 500 cc 1.5% NS  - Conivaptan 20 mg push in 30 min, then 20 mg over 24 hours  - Monitor BMP q4hr while on conivaptan    MSK  #Rhabdo  1/11: CK 1890  -replete electrolytes as needed    ID  #COVID19  - s/p decadron and remdesivir  - trend inflammatory markers    #new fevers, leukocytosis  s/p vanc by level (1/9-1/10) and zosyn (1/9-1/17)  MRSA negative  Blood cultures have been consistently negative.  Prior sputum showed few gram negative rods.  ID consult requested by family, saw patient on 1/13, thought to be due to COVID not bacterial infection  Fever may be due to medication reaction vs infection  - If persistently febile-> daily sputum cultures and bcx q48 hr    Heme-Onc  #DVT ppx  - c/w heparin ggt for afib rvr

## 2022-01-19 NOTE — CONSULT NOTE ADULT - ATTENDING COMMENTS
61 year old man with history of kidney stone presenting with fever, cough, SOB x 2-3d, unvaccinated with recent COVID exposure requiring intubation for acute hypoxic resp failure, sedated and paralyzed s/p proning on multiple pressors, EP consulted for afib management. Agree with amiodarone during acute illness, 200mg daily, s/p load, will need to monitor for pulmonary toxicity however risk relatively low. Would avoid beta blockade while on hypotensive. Agree with use alpha agonists to allow for concomitant beta blocker administration.  Could use digoxin, adjusted for renal failure (just use 0.125 mg 3 days per week with subsequent drug levels. Heparin. TTE and TFTs.

## 2022-01-19 NOTE — PROGRESS NOTE ADULT - SUBJECTIVE AND OBJECTIVE BOX
Remains on Vasopressin and Levophed gtts  Remains on Nimbex, Fentanyl, and Propofol gtts      VITAL:  T(C): , Max: 38.1 (01-18-22 @ 10:00)  T(F): , Max: 100.5 (01-18-22 @ 10:00)  HR: 89 (01-19-22 @ 07:00)  BP: 96/59  RR: 26 (01-19-22 @ 07:00)  SpO2: 80% (01-19-22 @ 05:00)      PHYSICAL EXAM:  Constitutional: intubated/sedated; FiO2 100%  HEENT: (+)ETT/(+)NGT  Neck: Supple, No JVD  Respiratory: CTA-b/l  Cardiovascular: RRR s1s2, no m/r/g  Gastrointestinal: BS+, soft, NT/ND  : (+)kim  Extremities: 2+ b/l LE edema  Neurological: tone WNL  Back: no CVAT b/l  Skin: No rashes, no nevi    LABS:                        11.9   22.69 )-----------( 385      ( 19 Jan 2022 04:58 )             35.7     Na(122)/K(3.8)/Cl(89)/HCO3(20)/BUN(39)/Cr(2.20)Glu(125)/Ca(7.4)/Mg(1.90)/PO4(4.5)    01-19 @ 04:58  Na(121)/K(3.5)/Cl(86)/HCO3(22)/BUN(39)/Cr(2.15)Glu(106)/Ca(7.7)/Mg(1.90)/PO4(4.4)    01-18 @ 03:10  Na(121)/K(3.7)/Cl(88)/HCO3(20)/BUN(40)/Cr(2.02)Glu(127)/Ca(8.0)/Mg(--)/PO4(--)    01-17 @ 16:47  Na(122)/K(3.9)/Cl(87)/HCO3(20)/BUN(37)/Cr(1.94)Glu(141)/Ca(7.9)/Mg(1.80)/PO4(4.5)    01-17 @ 03:44      IMPRESSION: 61M w/ nephrolithiasis, 12/28/21 p/w COVID PNA, c/b MELISSA and hyponatremia    (1)Renal - MELISSA from 1-2 weeks ago presumed due to ischemic ATN; newly component of MELISSA of late presumed prerenally mediated. GFR ~20-30ml/min at present.    (2)Hyponatremia - minimal improvement in serum sodium s/p administration of 1.5%NS x 500cc (and s/p Tolvaptan the night prior). Presumed persistently low effective arterial blood volume, with associated persistent (appropriate) hypersecretion of ADH. SIADH is also a possibility here; could check urine lytes + osm to help determine etiology of hyponatremia here    (3)CV - in shock/on multiple pressors. On maximum FiO2 via vent; SpO2 down to 80% despite the 100% FiO2. Very poor overall prognosis      RECOMMEND:  (1)Urine: lytes, osm  (2)No further hypertonic IVF nor Tolvaptan for now  (3)BMP q12h; if Na trends below 120, would add 3%NS, 20cc/h; would trend BMP q6h if 3%NS is added  (4)Dose new meds for GFR 20-30ml/min (present dosing is acceptable)              Tarik Kendrick MD  [facility]  Office: [phone]  Cell: [phone]

## 2022-01-19 NOTE — CHART NOTE - NSCHARTNOTEFT_GEN_A_CORE
: PGY3 Surjit PGY 2 Gifty, Attending, Dr. Stack    INDICATION:    PROCEDURE:  [x] LIMITED ECHO  [x] LIMITED CHEST  [ ] LIMITED RETROPERITONEAL  [ ] LIMITED ABDOMINAL  [ ] LIMITED DVT  [ ] NEEDLE GUIDANCE VASCULAR  [ ] NEEDLE GUIDANCE THORACENTESIS  [ ] NEEDLE GUIDANCE PARACENTESIS  [ ] NEEDLE GUIDANCE PERICARDIOCENTESIS  [ ] OTHER    FINDINGS:  B-line predominant bilaterally. Lung sliding observed. No pleural effusions bilaterally.    Limited Echo remarkable for dilated RV, increased pressures with septal wall flattening during systole. LV systolic function grossly normal, VTI within normal limits. No pericardial effusion. IVC diameter approximately 1.3-1.5cm      INTERPRETATION:  B-line predominance suggestive of fluid overload. No pleural effusions visualized. Grossly normal LV systolic function with increased RV pressures. IVC is indeterminate. : PGY3 Surjit PGY 2 Gifty, Attending, Dr. Stack    INDICATION: Persistent Hypoxemic Respiratory Failure 2/2 COVID-19 pneumonia complicated by fluid retention    PROCEDURE:  [x] LIMITED ECHO  [x] LIMITED CHEST  [ ] LIMITED RETROPERITONEAL  [ ] LIMITED ABDOMINAL  [ ] LIMITED DVT  [ ] NEEDLE GUIDANCE VASCULAR  [ ] NEEDLE GUIDANCE THORACENTESIS  [ ] NEEDLE GUIDANCE PARACENTESIS  [ ] NEEDLE GUIDANCE PERICARDIOCENTESIS  [ ] OTHER    FINDINGS:  B-line predominant bilaterally. Lung sliding observed. No pleural effusions bilaterally.    Limited Echo remarkable for dilated RV, increased pressures with septal wall flattening during systole. LV systolic function grossly normal, VTI within normal limits. No pericardial effusion. IVC diameter approximately 1.3-1.5cm      INTERPRETATION:  B-line predominance suggestive of fluid overload. No pleural effusions visualized. Grossly normal LV systolic function with increased RV pressures. IVC is indeterminate. : PGY3 Surjit PGY 2 Gifty, Attending, Dr. Stack    INDICATION: Persistent Hypoxemic Respiratory Failure 2/2 COVID-19 pneumonia complicated by fluid retention    PROCEDURE:  [x] LIMITED ECHO  [x] LIMITED CHEST  [ ] LIMITED RETROPERITONEAL  [ ] LIMITED ABDOMINAL  [ ] LIMITED DVT  [ ] NEEDLE GUIDANCE VASCULAR  [ ] NEEDLE GUIDANCE THORACENTESIS  [ ] NEEDLE GUIDANCE PARACENTESIS  [ ] NEEDLE GUIDANCE PERICARDIOCENTESIS  [ ] OTHER    FINDINGS:  B-line predominant bilaterally. Lung sliding observed. No pleural effusions bilaterally.    Limited Echo remarkable for dilated RV, increased pressures with septal wall flattening during systole. LV systolic function grossly normal, VTI within normal limits. No pericardial effusion. IVC diameter approximately 1.3-1.5cm      INTERPRETATION:  B-line predominance suggestive of fluid overload. No pleural effusions visualized. Grossly normal LV systolic function with increased RV pressures. IVC is indeterminate. No pneumothorax. : PGY3 Eddy PGY 2 Gifty, Attending, Dr. Stack    INDICATION: Persistent Hypoxemic Respiratory Failure 2/2 COVID-19 pneumonia complicated by fluid retention    PROCEDURE:  [x] LIMITED ECHO  [x] LIMITED CHEST  [ ] LIMITED RETROPERITONEAL  [ ] LIMITED ABDOMINAL  [ ] LIMITED DVT  [ ] NEEDLE GUIDANCE VASCULAR  [ ] NEEDLE GUIDANCE THORACENTESIS  [ ] NEEDLE GUIDANCE PARACENTESIS  [ ] NEEDLE GUIDANCE PERICARDIOCENTESIS  [ ] OTHER    FINDINGS:  B-line predominant bilaterally. Lung sliding observed. No pleural effusions bilaterally.    Limited Echo remarkable for dilated RV, increased pressures with septal wall flattening during systole. LV systolic function grossly normal, VTI within normal limits. No pericardial effusion. IVC diameter approximately 1.3-1.5cm      INTERPRETATION:  B-line predominance suggestive of fluid overload. No pleural effusions visualized. Grossly normal LV systolic function with increased RV pressures. IVC is indeterminate. No pneumothorax.    Attending note:  As above. At bedside for procedure.  SUSAN Stack DO, FCCP

## 2022-01-20 NOTE — PROGRESS NOTE ADULT - SUBJECTIVE AND OBJECTIVE BOX
INTERVAL HPI/OVERNIGHT EVENTS: No acute overnight events.    SUBJECTIVE: Patient seen and examined at bedside. No complaints. Denies fever, chills, chest pain, shortness of breath, abdominal pain, nausea, vomiting, changes in bowel habits, or urinary symptoms    OBJECTIVE:    VITAL SIGNS:  ICU Vital Signs Last 24 Hrs  T(C): 37.7 (20 Jan 2022 08:00), Max: 37.8 (19 Jan 2022 20:00)  T(F): 99.8 (20 Jan 2022 08:00), Max: 100.1 (19 Jan 2022 20:00)  HR: 114 (20 Jan 2022 11:00) (92 - 117)  BP: --  BP(mean): --  ABP: 92/54 (20 Jan 2022 11:00) (83/49 - 103/59)  ABP(mean): 64 (20 Jan 2022 11:00) (59 - 72)  RR: 28 (20 Jan 2022 11:00) (28 - 28)  SpO2: 75% (20 Jan 2022 11:00) (69% - 88%)    Mode: AC/ CMV (Assist Control/ Continuous Mandatory Ventilation), RR (machine): 28, FiO2: 100, PEEP: 15, ITime: 0.8, MAP: 24, PC: 25, PIP: 41    01-19 @ 07:01 - 01-20 @ 07:00  --------------------------------------------------------  IN: 3937.4 mL / OUT: 3640 mL / NET: 297.4 mL    01-20 @ 07:01 - 01-20 @ 11:54  --------------------------------------------------------  IN: 618.8 mL / OUT: 210 mL / NET: 408.8 mL      CAPILLARY BLOOD GLUCOSE      POCT Blood Glucose.: 131 mg/dL (19 Jan 2022 00:31)      PHYSICAL EXAM:    General: NAD  HEENT: NC/AT; PERRL, clear conjunctiva  Respiratory: CTA b/l  Cardiovascular: +S1/S2; RRR  Abdomen: soft, NT/ND; +BS x4  Extremities: WWP, 2+ peripheral pulses b/l; no LE edema  Skin: normal color and turgor; no rash  Neurological: AAOx3, no focal deficits    MEDICATIONS:  MEDICATIONS  (STANDING):  aMIOdarone    Tablet 200 milliGRAM(s) Oral every 24 hours  buMETAnide Infusion 1 mG/Hr (5 mL/Hr) IV Continuous <Continuous>  chlorhexidine 0.12% Liquid 15 milliLiter(s) Oral Mucosa every 12 hours  chlorhexidine 4% Liquid 1 Application(s) Topical <User Schedule>  cisatracurium Infusion 3 MICROgram(s)/kG/Min (21.1 mL/Hr) IV Continuous <Continuous>  conivaptan Infusion 0.833 mG/Hr (4.17 mL/Hr) IV Continuous <Continuous>  fentaNYL   Infusion..... 0.5 MICROgram(s)/kG/Hr (1.17 mL/Hr) IV Continuous <Continuous>  heparin  Infusion.  Unit(s)/Hr (21 mL/Hr) IV Continuous <Continuous>  metoprolol tartrate Injectable 2.5 milliGRAM(s) IV Push every 5 minutes  norepinephrine Infusion 0.05 MICROgram(s)/kG/Min (5.49 mL/Hr) IV Continuous <Continuous>  pantoprazole  Injectable 40 milliGRAM(s) IV Push daily  petrolatum Ophthalmic Ointment 1 Application(s) Both EYES daily  polyethylene glycol 3350 17 Gram(s) Oral two times a day  propofol Infusion 10 MICROgram(s)/kG/Min (7.02 mL/Hr) IV Continuous <Continuous>  senna 2 Tablet(s) Oral at bedtime  sodium chloride 1.5%. 500 milliLiter(s) (50 mL/Hr) IV Continuous <Continuous>  vasopressin Infusion 0.04 Unit(s)/Min (2.4 mL/Hr) IV Continuous <Continuous>    MEDICATIONS  (PRN):  heparin   Injectable 9500 Unit(s) IV Push every 6 hours PRN For aPTT less than 40  heparin   Injectable 4500 Unit(s) IV Push every 6 hours PRN For aPTT between 40 - 57      ALLERGIES:  Allergies    Cipro (Rash)    Intolerances    eggs (Diarrhea)      LABS:                        12.1   25.86 )-----------( 412      ( 20 Jan 2022 02:07 )             36.5     01-20    123<L>  |  86<L>  |  41<H>  ----------------------------<  141<H>  x    |  18<L>  |  2.41<H>    Ca    7.9<L>      20 Jan 2022 11:11  Phos  5.2     01-20  Mg     2.00     01-20    TPro  6.1  /  Alb  1.9<L>  /  TBili  1.2  /  DBili  x   /  AST  47<H>  /  ALT  28  /  AlkPhos  81  01-20    PT/INR - ( 20 Jan 2022 02:07 )   PT: 12.5 sec;   INR: 1.10 ratio         PTT - ( 20 Jan 2022 02:07 )  PTT:62.6 sec      RADIOLOGY & ADDITIONAL TESTS: Reviewed. INTERVAL HPI/OVERNIGHT EVENTS: No acute overnight events. Patient sodium is trended on conivaptan, which still did not show any improvements. 600 cc was suctioned out from NG tube. Patient persistent desats to 70s on max vent settings.    SUBJECTIVE: Patient seen and examined at bedside. Patient is sedated and paralyzed. ROS unable to be obtained due to mental status.    OBJECTIVE:    VITAL SIGNS:  ICU Vital Signs Last 24 Hrs  T(C): 37.7 (20 Jan 2022 08:00), Max: 37.8 (19 Jan 2022 20:00)  T(F): 99.8 (20 Jan 2022 08:00), Max: 100.1 (19 Jan 2022 20:00)  HR: 114 (20 Jan 2022 11:00) (92 - 117)  BP: --  BP(mean): --  ABP: 92/54 (20 Jan 2022 11:00) (83/49 - 103/59)  ABP(mean): 64 (20 Jan 2022 11:00) (59 - 72)  RR: 28 (20 Jan 2022 11:00) (28 - 28)  SpO2: 75% (20 Jan 2022 11:00) (69% - 88%)    Mode: AC/ CMV (Assist Control/ Continuous Mandatory Ventilation), RR (machine): 28, FiO2: 100, PEEP: 15, ITime: 0.8, MAP: 24, PC: 25, PIP: 41    01-19 @ 07:01 - 01-20 @ 07:00  --------------------------------------------------------  IN: 3937.4 mL / OUT: 3640 mL / NET: 297.4 mL    01-20 @ 07:01 - 01-20 @ 11:54  --------------------------------------------------------  IN: 618.8 mL / OUT: 210 mL / NET: 408.8 mL      CAPILLARY BLOOD GLUCOSE      POCT Blood Glucose.: 131 mg/dL (19 Jan 2022 00:31)      PHYSICAL EXAM:    General: Sedated and paralyzed, on max vent settings  HEENT: NC/AT; PERRL, clear conjunctiva  Respiratory: CTA b/l  Cardiovascular: +S1/S2; RRR  Abdomen: soft, distended; +BS x4  Extremities: WWP, 2+ peripheral pulses b/l; edematous  Skin: normal color and turgor; no rash  Neurological: AAOx0, sedated, paralyzed.    MEDICATIONS:  MEDICATIONS  (STANDING):  aMIOdarone    Tablet 200 milliGRAM(s) Oral every 24 hours  buMETAnide Infusion 1 mG/Hr (5 mL/Hr) IV Continuous <Continuous>  chlorhexidine 0.12% Liquid 15 milliLiter(s) Oral Mucosa every 12 hours  chlorhexidine 4% Liquid 1 Application(s) Topical <User Schedule>  cisatracurium Infusion 3 MICROgram(s)/kG/Min (21.1 mL/Hr) IV Continuous <Continuous>  conivaptan Infusion 0.833 mG/Hr (4.17 mL/Hr) IV Continuous <Continuous>  fentaNYL   Infusion..... 0.5 MICROgram(s)/kG/Hr (1.17 mL/Hr) IV Continuous <Continuous>  heparin  Infusion.  Unit(s)/Hr (21 mL/Hr) IV Continuous <Continuous>  metoprolol tartrate Injectable 2.5 milliGRAM(s) IV Push every 5 minutes  norepinephrine Infusion 0.05 MICROgram(s)/kG/Min (5.49 mL/Hr) IV Continuous <Continuous>  pantoprazole  Injectable 40 milliGRAM(s) IV Push daily  petrolatum Ophthalmic Ointment 1 Application(s) Both EYES daily  polyethylene glycol 3350 17 Gram(s) Oral two times a day  propofol Infusion 10 MICROgram(s)/kG/Min (7.02 mL/Hr) IV Continuous <Continuous>  senna 2 Tablet(s) Oral at bedtime  sodium chloride 1.5%. 500 milliLiter(s) (50 mL/Hr) IV Continuous <Continuous>  vasopressin Infusion 0.04 Unit(s)/Min (2.4 mL/Hr) IV Continuous <Continuous>    MEDICATIONS  (PRN):  heparin   Injectable 9500 Unit(s) IV Push every 6 hours PRN For aPTT less than 40  heparin   Injectable 4500 Unit(s) IV Push every 6 hours PRN For aPTT between 40 - 57      ALLERGIES:  Allergies    Cipro (Rash)    Intolerances    eggs (Diarrhea)      LABS:                        12.1   25.86 )-----------( 412      ( 20 Jan 2022 02:07 )             36.5     01-20    123<L>  |  86<L>  |  41<H>  ----------------------------<  141<H>  x    |  18<L>  |  2.41<H>    Ca    7.9<L>      20 Jan 2022 11:11  Phos  5.2     01-20  Mg     2.00     01-20    TPro  6.1  /  Alb  1.9<L>  /  TBili  1.2  /  DBili  x   /  AST  47<H>  /  ALT  28  /  AlkPhos  81  01-20    PT/INR - ( 20 Jan 2022 02:07 )   PT: 12.5 sec;   INR: 1.10 ratio         PTT - ( 20 Jan 2022 02:07 )  PTT:62.6 sec      RADIOLOGY & ADDITIONAL TESTS: Reviewed.

## 2022-01-20 NOTE — PROGRESS NOTE ADULT - SUBJECTIVE AND OBJECTIVE BOX
Cardiovascular Disease Progress Note    Overnight events: worsening resp status despite maximal vent settings. unable to obtain ros    Objective Findings:  T(C): 37.8 (01-20-22 @ 04:00), Max: 37.8 (01-19-22 @ 20:00)  HR: 116 (01-20-22 @ 07:00) (84 - 117)  BP: --  RR: 28 (01-20-22 @ 07:00) (16 - 28)  SpO2: 74% (01-20-22 @ 07:00) (74% - 90%)  Wt(kg): --  Daily     Daily       Physical Exam:  deferred due to covid    Telemetry: nsr st    Laboratory Data:                        12.1   25.86 )-----------( 412      ( 20 Jan 2022 02:07 )             36.5     01-20    120<LL>  |  89<L>  |  40<H>  ----------------------------<  134<H>  4.1   |  20<L>  |  2.33<H>    Ca    7.5<L>      20 Jan 2022 06:56  Phos  5.2     01-20  Mg     2.00     01-20    TPro  6.1  /  Alb  1.9<L>  /  TBili  1.2  /  DBili  x   /  AST  47<H>  /  ALT  28  /  AlkPhos  81  01-20    PT/INR - ( 20 Jan 2022 02:07 )   PT: 12.5 sec;   INR: 1.10 ratio         PTT - ( 20 Jan 2022 02:07 )  PTT:62.6 sec          Inpatient Medications:  MEDICATIONS  (STANDING):  ALBUTerol    90 MICROgram(s) HFA Inhaler 2 Puff(s) Inhalation every 6 hours  aMIOdarone    Tablet 200 milliGRAM(s) Oral every 24 hours  buMETAnide Infusion 1 mG/Hr (5 mL/Hr) IV Continuous <Continuous>  chlorhexidine 0.12% Liquid 15 milliLiter(s) Oral Mucosa every 12 hours  chlorhexidine 4% Liquid 1 Application(s) Topical <User Schedule>  cisatracurium Infusion 3 MICROgram(s)/kG/Min (21.1 mL/Hr) IV Continuous <Continuous>  conivaptan Infusion 0.833 mG/Hr (4.17 mL/Hr) IV Continuous <Continuous>  fentaNYL   Infusion. 0.5 MICROgram(s)/kG/Hr (5.85 mL/Hr) IV Continuous <Continuous>  heparin  Infusion.  Unit(s)/Hr (21 mL/Hr) IV Continuous <Continuous>  metoprolol tartrate Injectable 2.5 milliGRAM(s) IV Push every 5 minutes  norepinephrine Infusion 0.05 MICROgram(s)/kG/Min (5.49 mL/Hr) IV Continuous <Continuous>  pantoprazole  Injectable 40 milliGRAM(s) IV Push daily  petrolatum Ophthalmic Ointment 1 Application(s) Both EYES daily  polyethylene glycol 3350 17 Gram(s) Oral two times a day  propofol Infusion 10 MICROgram(s)/kG/Min (7.02 mL/Hr) IV Continuous <Continuous>  senna 2 Tablet(s) Oral at bedtime  sodium chloride 1.5%. 500 milliLiter(s) (50 mL/Hr) IV Continuous <Continuous>  vasopressin Infusion 0.04 Unit(s)/Min (2.4 mL/Hr) IV Continuous <Continuous>      Assessment:  hypoxic resp failure  covid pneumonia  hx of nephrolithiasis  hypoantremia  septic shock  pAF    Recs:  appreciate excellent micu care  cv stable  wean vent, pressors and sedation per icu protocol  amio per icu to maintain nsr. on hep gtt for rising d-dimer and pAF. favor weaning off amio shortly. consider digoxin once renal fxn stabilizes  broad spectrum abx and infectious workup per icu  natty + hyponatremia --> ? hypovolemia vs siadh. renal helping. s/p trial IV fluids without improvement in renal fxn. diuresis prn to maintain net neg  ECMO consult appreciated. currently not eligible due to multiorgan failure per ecmo team  dvt ppx  prognosis guarded      Over 25 minutes spent on total encounter; more than 50% of the visit was spent counseling and/or coordinating care by the attending physician.      Igor Medrano MD   Cardiovascular Disease  [phone]

## 2022-01-20 NOTE — PROGRESS NOTE ADULT - ASSESSMENT
Assessment and Plan    62 yo M PMHx of nephrolithiasis intubated for acute hypoxic respiratory failure 2/2 COVID19 with ARDS, requiring pressor support, and with MELISSA, and A fib w/ RVR on amio loaded now on PO amiodarone.    Neuro   #mental status  sedated with propofol and fentanyl  paralyzed with nimbex  desaturated off nimbex, so back on nimbex    Pulm  #COVID ARDS  Intubated for acute hypoxic respiratory failure, worsening work of breathing  Proned daily 1/10- 1/14, stopped helping on ABG.  Currently on max vent settings PAC Rate 28/Inspiratory pressure 25/PEEP 15/FiO2 100%.  -d/c albuterol  -f/u ABGs    Cardiovascular  #hemodynamics  Requiring pressor support with levo and vasopressin  -Titrated as tolerated with MAP goal of 65  #Rapid afib  Rate controlled  Amio loaded, now on PO amio  -C/w Amio PO    GI  #diet  NGT in place  Have been having high residuals and was suctioned out 1L so feeds were held on 1/17      Renal  #MELISSA  Baseline creatinine 0.8  Initially improving MELISSA with creatinine going from 2.62 to 1.66  Creatinine now uptrending to 2.2  #Volume overload  Net positive I/O hospital course with persistently net positive almost daily with bumex pushes  - Start Bumex drip  - Strict I/O monitoring  #Hyponatremia  s/p 1x PO tolvaptan  s/p 500 cc 1.5% NS  - Conivaptan 20 mg push in 30 min, then 20 mg over 24 hours  - Monitor BMP q4hr while on conivaptan    MSK  #Rhabdo  1/11: CK 1890  -replete electrolytes as needed    ID  #COVID19  - s/p decadron and remdesivir  - trend inflammatory markers    #new fevers, leukocytosis  s/p vanc by level (1/9-1/10) and zosyn (1/9-1/17)  MRSA negative  Blood cultures have been consistently negative.  Prior sputum showed few gram negative rods.  ID consult requested by family, saw patient on 1/13, thought to be due to COVID not bacterial infection  Fever may be due to medication reaction vs infection  - If persistently febile-> daily sputum cultures and bcx q48 hr    Heme-Onc  #DVT ppx  - c/w heparin ggt for afib rvr   Assessment and Plan    60 yo M PMHx of nephrolithiasis intubated for acute hypoxic respiratory failure 2/2 COVID19 with ARDS, requiring pressor support, and with MELISSA, and A fib w/ RVR on amio loaded now on PO amiodarone.    Neuro   #mental status  sedated with propofol and fentanyl  paralyzed with nimbex  desaturated off nimbex, so back on nimbex    Pulm  #COVID ARDS  Intubated for acute hypoxic respiratory failure, worsening work of breathing  Proned daily 1/10- 1/14, stopped helping on ABG.  Currently on max vent settings PAC Rate 28/Inspiratory pressure 25/PEEP 15/FiO2 100%.  -d/c albuterol  -f/u ABGs    Cardiovascular  #hemodynamics  Requiring pressor support with levo and vasopressin  -Titrated as tolerated with MAP goal of 65  #Rapid afib  Rate controlled  Amio loaded, now on PO amio  -C/w Amio PO    GI  #diet  NGT in place  Have been having high residuals and was suctioned out 1L so feeds were held on 1/17  No significant pathology seen in abdominal xray  Patient likely has ileus due to nimbex use, which cannot be resolved despite bowel regimen.    Renal  #MELISSA  Baseline creatinine 0.8  Initially improving MELISSA with creatinine going from 2.62 to 1.66  Creatinine now uptrending to 2.2  #Volume overload  Net positive I/O hospital course with persistently net positive almost daily with bumex pushes  - Start Bumex drip  - Strict I/O monitoring  #Hyponatremia  Persistent hyponatremia  s/p 1x PO tolvaptan  s/p 500 cc 1.5% NS  s/p conivaptan push and conivaptan ggt    MSK  #Rhabdo  1/11: CK 1890  -replete electrolytes as needed    ID  #COVID19  - s/p decadron and remdesivir  - trend inflammatory markers    #new fevers, leukocytosis  s/p vanc by level (1/9-1/10) and zosyn (1/9-1/17)  MRSA negative  Blood cultures have been consistently negative.  Prior sputum showed few gram negative rods.  ID consult requested by family, saw patient on 1/13, thought to be due to COVID not bacterial infection  Fever may be due to medication reaction vs infection  - If persistently febile-> daily sputum cultures and bcx q48 hr    Heme-Onc  #DVT ppx  - c/w heparin ggt for afib rvr    Ongoing GOC discussion with family

## 2022-01-20 NOTE — PROGRESS NOTE ADULT - ASSESSMENT
INCOMPLETE    _________________________________________________________________________________________  ========>>  M E D I C A L   A T T E N D I N G    F O L L O W  U P  N O T E  <<=========  -----------------------------------------------------------------------------------------------------    - Patient seen and examined by me earlier today.   - In summary,  ALEXX BROWN is a 61y year old man admitted with SOB, fever..   - Patient intubated, sedated, paralyzed,  in the MICU, vented     pt on full vent support, medically paralyzed and on 100% FIO2, saturating in high 7s.. !        pt also NPO with NGT to suction as poor bowel function no BM, OGT to suction per RN    ==================>> REVIEW OF SYSTEM <<=================  unable to obtain   ==================>> PHYSICAL EXAM <<=================    GEN: sedated, vented, intubated.. NAD  HEENT: NCAT lines and tubes in place , + facial swelling   Neck: supple lines and tubes in place   CVS: S1S2 , regular  PULM: CTA B/L,  limited , + increased PEEP, 100 % FIO2, sat 88%  ABD.: soft. non distended  Extrem: intact pulses , mild LE edema      + kim with dark yellow urine                                ( Note written / Date of service 01-20-22 )    ==================>> MEDICATIONS <<====================    aMIOdarone    Tablet 200 milliGRAM(s) Oral every 24 hours  buMETAnide Infusion 1 mG/Hr IV Continuous <Continuous>  chlorhexidine 0.12% Liquid 15 milliLiter(s) Oral Mucosa every 12 hours  chlorhexidine 4% Liquid 1 Application(s) Topical <User Schedule>  cisatracurium Infusion 3 MICROgram(s)/kG/Min IV Continuous <Continuous>  conivaptan Infusion 0.833 mG/Hr IV Continuous <Continuous>  fentaNYL   Infusion..... 0.5 MICROgram(s)/kG/Hr IV Continuous <Continuous>  heparin  Infusion.  Unit(s)/Hr IV Continuous <Continuous>  metoprolol tartrate Injectable 2.5 milliGRAM(s) IV Push every 5 minutes  norepinephrine Infusion 0.05 MICROgram(s)/kG/Min IV Continuous <Continuous>  pantoprazole  Injectable 40 milliGRAM(s) IV Push daily  petrolatum Ophthalmic Ointment 1 Application(s) Both EYES daily  polyethylene glycol 3350 17 Gram(s) Oral two times a day  propofol Infusion 10 MICROgram(s)/kG/Min IV Continuous <Continuous>  senna 2 Tablet(s) Oral at bedtime  sodium chloride 1.5%. 500 milliLiter(s) IV Continuous <Continuous>  vasopressin Infusion 0.04 Unit(s)/Min IV Continuous <Continuous>    MEDICATIONS  (PRN):  heparin   Injectable 9500 Unit(s) IV Push every 6 hours PRN For aPTT less than 40  heparin   Injectable 4500 Unit(s) IV Push every 6 hours PRN For aPTT between 40 - 57    ___________  Active diet:  Diet, NPO:   Except Medications  ___________________    ==================>> VITAL SIGNS <<==================    Vital Signs Last 24 HrsT(C): 37.3 (01-20-22 @ 12:00)  T(F): 99.1 (01-20-22 @ 12:00), Max: 100.1 (01-19-22 @ 20:00)  HR: 115 (01-20-22 @ 15:00) (104 - 118)  BP: --  RR: 28 (01-20-22 @ 15:00) (28 - 28)  SpO2: 69% (01-20-22 @ 15:00) (69% - 88%)      CAPILLARY BLOOD GLUCOSE         ==================>> LAB AND IMAGING <<==================                        12.1   25.86 )-----------( 412      ( 20 Jan 2022 02:07 )             36.5        01-20    123<L>  |  86<L>  |  41<H>  ----------------------------<  141<H>  4.4   |  18<L>  |  2.41<H>    Ca    7.9<L>      20 Jan 2022 11:11  Phos  5.2     01-20  Mg     2.00     01-20    TPro  6.1  /  Alb  1.9<L>  /  TBili  1.2  /  DBili  x   /  AST  47<H>  /  ALT  28  /  AlkPhos  81  01-20    WBC count:   25.86 <<== ,  22.69 <<== ,  22.64 <<== ,  23.79 <<== ,  17.27 <<==   Hemoglobin:   12.1 <<==,  11.9 <<==,  12.1 <<==,  13.1 <<==,  13.0 <<==  platelets:  412 <==, 385 <==, 334 <==, 321 <==, 269 <==, 263 <==    Creatinine:  2.41  <<==, 2.33  <<==, 2.26  <<==, 2.22  <<==, 2.16  <<==, 2.10  <<==  Sodium:   123  <==, 120  <==, 121  <==, 122  <==, 123  <==, 122  <==, 122  <==       AST:          47 <== , 49 <== , 56 <== , 81 <== , 60 <==      ALT:        28  <== , 29  <== , 40  <== , 45  <== , 39  <==      AP:        81  <=, 73  <=, 77  <=, 90  <=, 70  <=     Bili:        1.2  <=, 1.3  <=, 1.4  <=, 1.5  <=, 1.6  <=    _______________________  C U L T U R E S :    Culture - Sputum (collected 19 Jan 2022 02:03)  Source: .Sputum Sputum  Gram Stain (19 Jan 2022 07:28):    Numerous polymorphonuclear leukocytes per low power field    No Squamous epithelial cells per low power field    No organisms seen per oil power field  Preliminary Report (19 Jan 2022 16:39):    Normal Respiratory Marifer present    Culture - Blood (collected 17 Jan 2022 20:47)  Source: .Blood Blood-Peripheral  Preliminary Report (18 Jan 2022 21:02):    No growth to date.    Culture - Blood (collected 17 Jan 2022 20:47)  Source: .Blood Blood-Peripheral  Preliminary Report (18 Jan 2022 21:02):    No growth to date.    Culture - Blood (collected 12 Jan 2022 13:15)  Source: .Blood Blood-Peripheral  Final Report (17 Jan 2022 17:00):    No Growth Final    Culture - Blood (collected 12 Jan 2022 13:00)  Source: .Blood Blood-Peripheral  Final Report (17 Jan 2022 17:00):    No Growth Final                             ( Note written / Date of service 01-20-22 )    ==================>> MEDICATIONS <<====================    aMIOdarone    Tablet 200 milliGRAM(s) Oral every 24 hours  buMETAnide Infusion 1 mG/Hr IV Continuous <Continuous>  chlorhexidine 0.12% Liquid 15 milliLiter(s) Oral Mucosa every 12 hours  chlorhexidine 4% Liquid 1 Application(s) Topical <User Schedule>  cisatracurium Infusion 3 MICROgram(s)/kG/Min IV Continuous <Continuous>  conivaptan Infusion 0.833 mG/Hr IV Continuous <Continuous>  fentaNYL   Infusion..... 0.5 MICROgram(s)/kG/Hr IV Continuous <Continuous>  heparin  Infusion.  Unit(s)/Hr IV Continuous <Continuous>  metoprolol tartrate Injectable 2.5 milliGRAM(s) IV Push every 5 minutes  norepinephrine Infusion 0.05 MICROgram(s)/kG/Min IV Continuous <Continuous>  pantoprazole  Injectable 40 milliGRAM(s) IV Push daily  petrolatum Ophthalmic Ointment 1 Application(s) Both EYES daily  polyethylene glycol 3350 17 Gram(s) Oral two times a day  propofol Infusion 10 MICROgram(s)/kG/Min IV Continuous <Continuous>  senna 2 Tablet(s) Oral at bedtime  sodium chloride 1.5%. 500 milliLiter(s) IV Continuous <Continuous>  vasopressin Infusion 0.04 Unit(s)/Min IV Continuous <Continuous>    MEDICATIONS  (PRN):  heparin   Injectable 9500 Unit(s) IV Push every 6 hours PRN For aPTT less than 40  heparin   Injectable 4500 Unit(s) IV Push every 6 hours PRN For aPTT between 40 - 57    ___________  Active diet:  Diet, NPO:   Except Medications  ___________________    ==================>> VITAL SIGNS <<==================    Vital Signs Last 24 HrsT(C): 37.3 (01-20-22 @ 12:00)  T(F): 99.1 (01-20-22 @ 12:00), Max: 100.1 (01-19-22 @ 20:00)  HR: 115 (01-20-22 @ 15:00) (104 - 118)  BP: --  RR: 28 (01-20-22 @ 15:00) (28 - 28)  SpO2: 69% (01-20-22 @ 15:00) (69% - 88%)      CAPILLARY BLOOD GLUCOSE         ==================>> LAB AND IMAGING <<==================                        12.1   25.86 )-----------( 412      ( 20 Jan 2022 02:07 )             36.5        01-20    123<L>  |  86<L>  |  41<H>  ----------------------------<  141<H>  4.4   |  18<L>  |  2.41<H>    Ca    7.9<L>      20 Jan 2022 11:11  Phos  5.2     01-20  Mg     2.00     01-20    TPro  6.1  /  Alb  1.9<L>  /  TBili  1.2  /  DBili  x   /  AST  47<H>  /  ALT  28  /  AlkPhos  81  01-20    WBC count:   25.86 <<== ,  22.69 <<== ,  22.64 <<== ,  23.79 <<== ,  17.27 <<==   Hemoglobin:   12.1 <<==,  11.9 <<==,  12.1 <<==,  13.1 <<==,  13.0 <<==  platelets:  412 <==, 385 <==, 334 <==, 321 <==, 269 <==, 263 <==    Creatinine:  2.41  <<==, 2.33  <<==, 2.26  <<==, 2.22  <<==, 2.16  <<==, 2.10  <<==  Sodium:   123  <==, 120  <==, 121  <==, 122  <==, 123  <==, 122  <==, 122  <==       AST:          47 <== , 49 <== , 56 <== , 81 <== , 60 <==      ALT:        28  <== , 29  <== , 40  <== , 45  <== , 39  <==      AP:        81  <=, 73  <=, 77  <=, 90  <=, 70  <=     Bili:        1.2  <=, 1.3  <=, 1.4  <=, 1.5  <=, 1.6  <=    _______________________  C U L T U R E S :    Culture - Sputum (collected 19 Jan 2022 02:03)  Source: .Sputum Sputum  Gram Stain (19 Jan 2022 07:28):    Numerous polymorphonuclear leukocytes per low power field    No Squamous epithelial cells per low power field    No organisms seen per oil power field  Preliminary Report (19 Jan 2022 16:39):    Normal Respiratory Marifer present    Culture - Blood (collected 17 Jan 2022 20:47)  Source: .Blood Blood-Peripheral  Preliminary Report (18 Jan 2022 21:02):    No growth to date.    Culture - Blood (collected 17 Jan 2022 20:47)  Source: .Blood Blood-Peripheral  Preliminary Report (18 Jan 2022 21:02):    No growth to date.    Culture - Blood (collected 12 Jan 2022 13:15)  Source: .Blood Blood-Peripheral  Final Report (17 Jan 2022 17:00):    No Growth Final    Culture - Blood (collected 12 Jan 2022 13:00)  Source: .Blood Blood-Peripheral  Final Report (17 Jan 2022 17:00):    No Growth Final            [appreciated bedside US showing Curly B lines, no PTX or effusion, normal LVEF ]    < from: Xray Chest 1 View- PORTABLE-Urgent (Xray Chest 1 View- PORTABLE-Urgent .) (01.16.22 @ 13:15) >  IMPRESSION: The heart size cannot be assessed. ET tube and NG tube appear   well-positioned. There is left lung base opacification compatible with   atelectasis and/or pneumonia with a small left pleural effusion. Mild   bilateral airspace disease is present without change.  < end of copied text >    ___________________________________________________________________________________  ===============>>  A S S E S S M E N T   A N D   P L A N <<===============  ------------------------------------------------------------------------------------------    · Assessment	  61 year old man with history of kidney stone presenting with fever, cough, SOB x 2-3d.   admitted for COVID     Problem/Plan - 1:  ·  Problem: Acute hypoxemic respiratory failure due to COVID-19 with Viral syndrome. >> ARDS  post Remdesivir and Decadron per hospital protocol  trend inflammatory markers   ICU care and mgmt appreciated >> prone per protocol     wean down Vent / O2  as able     appears pt has been requiring more and more o2 > now on full vent support with 100% O2   supportive care  nutrition, hydration  as able   vitamins / supplements   on full dose AC already  pt now on Bumax drip and Vaptan therapy for diuresis and hyponatremia  would consider DC Amio given lung disease'  would consider another course of steroids ( IV +/- inhaled)  IV albumin  as neede : monitor output    Continue Current medications otherwise and monitor.     ** Fever , leukocytosis  unclear etiology   pan cultures > follow   pt already post course of abx   leukocytosis increased > monitor   less likely PE as been on Lovenox 40 BID  antipyretics as needed  monitor     **MELISSA and transaminitis likely due to hemodynamic changes during code  with episodes of hypotension 2/2 propofol     overall improved     diuretics as needed      keep MAP >60      tend BMP closely     Avoid nephrotoxic medications.     **hyponatremia     Diuretics as able     nutrition va OGT  as able      on Vaptan     Problem/Plan - 2:  ·  Problem: AF + RVR > improved     appreciated cardio follow up     would agree to DC Amio as able     on AC     -GI/DVT Prophylaxis per protocol.  on heparin drip and protonix    overall prognosis guarded   pt full code     Critical care services provided.   ALEXX BROWN is in critical condition in the [facility].   I have personally and independently provided 35 minutes of critical care services for this patient, requiring high complexity decision making for the  medical conditions involving multiple system as noted.    I had a long discussion with ICU team as well     ___________________________  H. ABNER Knowles.  Pager: [phone]                 INCOMPLETE    _________________________________________________________________________________________  ========>>  M E D I C A L   A T T E N D I N G    F O L L O W  U P  N O T E  <<=========  -----------------------------------------------------------------------------------------------------    - Patient seen and examined by me earlier today.   - In summary,  ALEXX BROWN is a 61y year old man admitted with SOB, fever..   - Patient intubated, sedated, paralyzed,  in the MICU, vented     pt on full vent support, medically paralyzed and on 100% FIO2, saturating in high 70s.. !        pt also NPO with NGT to suction as poor bowel function no BM, OGT to suction     ==================>> REVIEW OF SYSTEM <<=================  unable to obtain   ==================>> PHYSICAL EXAM <<=================    GEN: sedated, vented, intubated.. NAD  HEENT: NCAT lines and tubes in place   Neck: supple lines and tubes in place   CVS: S1S2 , regular  PULM: CTA B/L,  limited , + PEEP 15, 100 % FIO2, sat 70s%  ABD.: soft. non distended  Extrem: intact pulses , mild LE edema      + kim with dark yellow urine                              ( Note written / Date of service 01-20-22 )    ==================>> MEDICATIONS <<====================    aMIOdarone    Tablet 200 milliGRAM(s) Oral every 24 hours  buMETAnide Infusion 1 mG/Hr IV Continuous <Continuous>  chlorhexidine 0.12% Liquid 15 milliLiter(s) Oral Mucosa every 12 hours  chlorhexidine 4% Liquid 1 Application(s) Topical <User Schedule>  cisatracurium Infusion 3 MICROgram(s)/kG/Min IV Continuous <Continuous>  conivaptan Infusion 0.833 mG/Hr IV Continuous <Continuous>  fentaNYL   Infusion..... 0.5 MICROgram(s)/kG/Hr IV Continuous <Continuous>  heparin  Infusion.  Unit(s)/Hr IV Continuous <Continuous>  metoprolol tartrate Injectable 2.5 milliGRAM(s) IV Push every 5 minutes  norepinephrine Infusion 0.05 MICROgram(s)/kG/Min IV Continuous <Continuous>  pantoprazole  Injectable 40 milliGRAM(s) IV Push daily  petrolatum Ophthalmic Ointment 1 Application(s) Both EYES daily  polyethylene glycol 3350 17 Gram(s) Oral two times a day  propofol Infusion 10 MICROgram(s)/kG/Min IV Continuous <Continuous>  senna 2 Tablet(s) Oral at bedtime  sodium chloride 1.5%. 500 milliLiter(s) IV Continuous <Continuous>  vasopressin Infusion 0.04 Unit(s)/Min IV Continuous <Continuous>    MEDICATIONS  (PRN):  heparin   Injectable 9500 Unit(s) IV Push every 6 hours PRN For aPTT less than 40  heparin   Injectable 4500 Unit(s) IV Push every 6 hours PRN For aPTT between 40 - 57    ___________  Active diet:  Diet, NPO:   Except Medications  ___________________    ==================>> VITAL SIGNS <<==================    Vital Signs Last 24 HrsT(C): 36.7 (01-20-22 @ 16:00)  T(F): 98.1 (01-20-22 @ 16:00), Max: 100.1 (01-19-22 @ 20:00)  HR: 116 (01-20-22 @ 18:00) (104 - 118)  BP: -- reviewed on tele   RR: 28 (01-20-22 @ 18:00) (28 - 28)  SpO2: 63% (01-20-22 @ 18:00) (63% - 88%)      ==================>> LAB AND IMAGING <<==================                        12.1   25.86 )-----------( 412      ( 20 Jan 2022 02:07 )             36.5        01-20    120<LL>  |  87<L>  |  42<H>  ----------------------------<  146<H>  4.5   |  16<L>  |  2.59<H>    Ca    7.7<L>      20 Jan 2022 14:58  Phos  5.2     01-20  Mg     2.00     01-20    TPro  6.1  /  Alb  1.9<L>  /  TBili  1.2  /  DBili  x   /  AST  47<H>  /  ALT  28  /  AlkPhos  81  01-20    WBC count:   25.86 <<== ,  22.69 <<== ,  22.64 <<== ,  23.79 <<== ,  17.27 <<==   Hemoglobin:   12.1 <<==,  11.9 <<==,  12.1 <<==,  13.1 <<==,  13.0 <<==  platelets:  412 <==, 385 <==, 334 <==, 321 <==, 269 <==, 263 <==    Creatinine:  2.59  <<==, 2.41  <<==, 2.33  <<==, 2.26  <<==, 2.22  <<==, 2.16  <<==  Sodium:   120  <==, 123  <==, 120  <==, 121  <==, 122  <==, 123  <==, 122  <==       AST:          47 <== , 49 <== , 56 <== , 81 <== , 60 <==      ALT:        28  <== , 29  <== , 40  <== , 45  <== , 39  <==      AP:        81  <=, 73  <=, 77  <=, 90  <=, 70  <=     Bili:        1.2  <=, 1.3  <=, 1.4  <=, 1.5  <=, 1.6  <=    _______________________  C U L T U R E S :    Culture - Sputum (collected 19 Jan 2022 00:29)  Source: .Sputum Sputum  Gram Stain (19 Jan 2022 07:28):    Numerous polymorphonuclear leukocytes per low power field    No Squamous epithelial cells per low power field    No organisms seen per oil power field  Final Report (20 Jan 2022 17:20):    Normal Respiratory Marifer present    Culture - Blood (collected 17 Jan 2022 20:47)  Source: .Blood Blood-Peripheral  Preliminary Report (18 Jan 2022 21:02):    No growth to date.    Culture - Blood (collected 17 Jan 2022 20:47)  Source: .Blood Blood-Peripheral  Preliminary Report (18 Jan 2022 21:02):    No growth to date.    Culture - Blood (collected 12 Jan 2022 13:15)  Source: .Blood Blood-Peripheral  Final Report (17 Jan 2022 17:00):    No Growth Final    Culture - Blood (collected 12 Jan 2022 13:00)  Source: .Blood Blood-Peripheral  Final Report (17 Jan 2022 17:00):    No Growth Final    ^^^ Inflammatory markers :  ^^^  C R P :          116.3 (01-07-22)  <<--, 131.9 (01-04-22)  <<--, 96.4 (01-02-22)  <<--  P C T :         1.43 (01-13-22) <<--, 0.15 (01-07-22) <<--, 0.13 (01-02-22) <<--    Ferritin :         1107 (01-07-22) <<--, 1058 (01-04-22) <<--, 890 (01-02-22) <<--    D-Dimer :          1926 (01-19-22) <<--, 1106 (01-16-22) <<--, 1179 (01-14-22) <<--, 3073 (01-11-22) <<--, 3352 (01-11-22) <<--      [appreciated bedside US showing Curly B lines, no PTX or effusion, normal LVEF ]    < from: Xray Chest 1 View- PORTABLE-Urgent (Xray Chest 1 View- PORTABLE-Urgent .) (01.16.22 @ 13:15) >  IMPRESSION: The heart size cannot be assessed. ET tube and NG tube appear   well-positioned. There is left lung base opacification compatible with   atelectasis and/or pneumonia with a small left pleural effusion. Mild   bilateral airspace disease is present without change.  < end of copied text >    ___________________________________________________________________________________  ===============>>  A S S E S S M E N T   A N D   P L A N <<===============  ------------------------------------------------------------------------------------------    · Assessment	  61 year old man with history of kidney stone presenting with fever, cough, SOB x 2-3d.   admitted for COVID     Problem/Plan - 1:  ·  Problem: Acute hypoxemic respiratory failure due to COVID-19 with Viral syndrome. >> ARDS  post Remdesivir and Decadron per hospital protocol  ICU care and mgmt appreciated      pt continues to deteriorate despite full vent support   supportive care  nutrition, hydration  as able   on full dose AC already  pt now on Bumax drip and Vaptan therapy for diuresis and hyponatremia  at this point steroids would not change outcome   IV albumin  as neede : monitor output    Continue Current medications otherwise and monitor.     ** Fever , leukocytosis  unclear etiology   pan cultures > follow   pt already post course of abx   leukocytosis increased > monitor   less likely PE as been on Lovenox 40 BID  antipyretics as needed  monitor     **MELISSA and transaminitis likely due to hemodynamic changes during code  with episodes of hypotension 2/2 propofol     overall improved     diuretics as needed      keep MAP >60      tend BMP closely     Avoid nephrotoxic medications.     **hyponatremia     Diuretics as able     nutrition va OGT  as able      on Vaptan     Problem/Plan - 2:  ·  Problem: AF + RVR > improved     appreciated cardio follow up     would agree to DC Amio as able     on AC     -GI/DVT Prophylaxis per protocol.  on heparin drip and protonix    overall prognosis poor   pt full code   discussed with pt's son re poor prognosis   ___________________________  H. ABNER Knowles.  Pager: [phone]     _________________________________________________________________________________________  ========>>  M E D I C A L   A T T E N D I N G    F O L L O W  U P  N O T E  <<=========  -----------------------------------------------------------------------------------------------------    - Patient seen and examined by me earlier today.   - In summary,  ALEXX BROWN is a 61y year old man admitted with SOB, fever..   - Patient intubated, sedated, paralyzed,  in the MICU, vented     pt on full vent support, medically paralyzed and on 100% FIO2, saturating in high 70s.. !        pt also NPO with NGT to suction as poor bowel function no BM, OGT to suction     ==================>> REVIEW OF SYSTEM <<=================  unable to obtain   ==================>> PHYSICAL EXAM <<=================    GEN: sedated, vented, intubated.. NAD  HEENT: NCAT lines and tubes in place   Neck: supple lines and tubes in place   CVS: S1S2 , regular  PULM: CTA B/L,  limited , + PEEP 15, 100 % FIO2, sat 70s%  ABD.: soft. non distended  Extrem: intact pulses , mild LE edema      + kim with dark yellow urine                              ( Note written / Date of service 01-20-22 )    ==================>> MEDICATIONS <<====================    aMIOdarone    Tablet 200 milliGRAM(s) Oral every 24 hours  buMETAnide Infusion 1 mG/Hr IV Continuous <Continuous>  chlorhexidine 0.12% Liquid 15 milliLiter(s) Oral Mucosa every 12 hours  chlorhexidine 4% Liquid 1 Application(s) Topical <User Schedule>  cisatracurium Infusion 3 MICROgram(s)/kG/Min IV Continuous <Continuous>  conivaptan Infusion 0.833 mG/Hr IV Continuous <Continuous>  fentaNYL   Infusion..... 0.5 MICROgram(s)/kG/Hr IV Continuous <Continuous>  heparin  Infusion.  Unit(s)/Hr IV Continuous <Continuous>  metoprolol tartrate Injectable 2.5 milliGRAM(s) IV Push every 5 minutes  norepinephrine Infusion 0.05 MICROgram(s)/kG/Min IV Continuous <Continuous>  pantoprazole  Injectable 40 milliGRAM(s) IV Push daily  petrolatum Ophthalmic Ointment 1 Application(s) Both EYES daily  polyethylene glycol 3350 17 Gram(s) Oral two times a day  propofol Infusion 10 MICROgram(s)/kG/Min IV Continuous <Continuous>  senna 2 Tablet(s) Oral at bedtime  sodium chloride 1.5%. 500 milliLiter(s) IV Continuous <Continuous>  vasopressin Infusion 0.04 Unit(s)/Min IV Continuous <Continuous>    MEDICATIONS  (PRN):  heparin   Injectable 9500 Unit(s) IV Push every 6 hours PRN For aPTT less than 40  heparin   Injectable 4500 Unit(s) IV Push every 6 hours PRN For aPTT between 40 - 57    ___________  Active diet:  Diet, NPO:   Except Medications  ___________________    ==================>> VITAL SIGNS <<==================    Vital Signs Last 24 HrsT(C): 36.7 (01-20-22 @ 16:00)  T(F): 98.1 (01-20-22 @ 16:00), Max: 100.1 (01-19-22 @ 20:00)  HR: 116 (01-20-22 @ 18:00) (104 - 118)  BP: -- reviewed on tele   RR: 28 (01-20-22 @ 18:00) (28 - 28)  SpO2: 63% (01-20-22 @ 18:00) (63% - 88%)      ==================>> LAB AND IMAGING <<==================                        12.1   25.86 )-----------( 412      ( 20 Jan 2022 02:07 )             36.5        01-20    120<LL>  |  87<L>  |  42<H>  ----------------------------<  146<H>  4.5   |  16<L>  |  2.59<H>    Ca    7.7<L>      20 Jan 2022 14:58  Phos  5.2     01-20  Mg     2.00     01-20    TPro  6.1  /  Alb  1.9<L>  /  TBili  1.2  /  DBili  x   /  AST  47<H>  /  ALT  28  /  AlkPhos  81  01-20    WBC count:   25.86 <<== ,  22.69 <<== ,  22.64 <<== ,  23.79 <<== ,  17.27 <<==   Hemoglobin:   12.1 <<==,  11.9 <<==,  12.1 <<==,  13.1 <<==,  13.0 <<==  platelets:  412 <==, 385 <==, 334 <==, 321 <==, 269 <==, 263 <==    Creatinine:  2.59  <<==, 2.41  <<==, 2.33  <<==, 2.26  <<==, 2.22  <<==, 2.16  <<==  Sodium:   120  <==, 123  <==, 120  <==, 121  <==, 122  <==, 123  <==, 122  <==       AST:          47 <== , 49 <== , 56 <== , 81 <== , 60 <==      ALT:        28  <== , 29  <== , 40  <== , 45  <== , 39  <==      AP:        81  <=, 73  <=, 77  <=, 90  <=, 70  <=     Bili:        1.2  <=, 1.3  <=, 1.4  <=, 1.5  <=, 1.6  <=    _______________________  C U L T U R E S :    Culture - Sputum (collected 19 Jan 2022 00:29)  Source: .Sputum Sputum  Gram Stain (19 Jan 2022 07:28):    Numerous polymorphonuclear leukocytes per low power field    No Squamous epithelial cells per low power field    No organisms seen per oil power field  Final Report (20 Jan 2022 17:20):    Normal Respiratory Marifer present    Culture - Blood (collected 17 Jan 2022 20:47)  Source: .Blood Blood-Peripheral  Preliminary Report (18 Jan 2022 21:02):    No growth to date.    Culture - Blood (collected 17 Jan 2022 20:47)  Source: .Blood Blood-Peripheral  Preliminary Report (18 Jan 2022 21:02):    No growth to date.    Culture - Blood (collected 12 Jan 2022 13:15)  Source: .Blood Blood-Peripheral  Final Report (17 Jan 2022 17:00):    No Growth Final    Culture - Blood (collected 12 Jan 2022 13:00)  Source: .Blood Blood-Peripheral  Final Report (17 Jan 2022 17:00):    No Growth Final    ^^^ Inflammatory markers :  ^^^  C R P :          116.3 (01-07-22)  <<--, 131.9 (01-04-22)  <<--, 96.4 (01-02-22)  <<--  P C T :         1.43 (01-13-22) <<--, 0.15 (01-07-22) <<--, 0.13 (01-02-22) <<--    Ferritin :         1107 (01-07-22) <<--, 1058 (01-04-22) <<--, 890 (01-02-22) <<--    D-Dimer :          1926 (01-19-22) <<--, 1106 (01-16-22) <<--, 1179 (01-14-22) <<--, 3073 (01-11-22) <<--, 3352 (01-11-22) <<--      [appreciated bedside US showing Curly B lines, no PTX or effusion, normal LVEF ]    < from: Xray Chest 1 View- PORTABLE-Urgent (Xray Chest 1 View- PORTABLE-Urgent .) (01.16.22 @ 13:15) >  IMPRESSION: The heart size cannot be assessed. ET tube and NG tube appear   well-positioned. There is left lung base opacification compatible with   atelectasis and/or pneumonia with a small left pleural effusion. Mild   bilateral airspace disease is present without change.  < end of copied text >    ___________________________________________________________________________________  ===============>>  A S S E S S M E N T   A N D   P L A N <<===============  ------------------------------------------------------------------------------------------    · Assessment	  61 year old man with history of kidney stone presenting with fever, cough, SOB x 2-3d.   admitted for COVID     Problem/Plan - 1:  ·  Problem: Acute hypoxemic respiratory failure due to COVID-19 with Viral syndrome. >> ARDS  post Remdesivir and Decadron per hospital protocol  ICU care and mgmt appreciated      pt continues to deteriorate despite full vent support   supportive care  nutrition, hydration  as able   on full dose AC already  pt now on Bumax drip and Vaptan therapy for diuresis and hyponatremia  at this point steroids would not change outcome   IV albumin  as neede : monitor output    Continue Current medications otherwise and monitor.     ** Fever , leukocytosis  unclear etiology   pan cultures > follow   pt already post course of abx   leukocytosis increased > monitor   less likely PE as been on Lovenox 40 BID  antipyretics as needed  monitor     **MELISSA and transaminitis likely due to hemodynamic changes during code  with episodes of hypotension 2/2 propofol     overall improved     diuretics as needed      keep MAP >60      tend BMP closely     Avoid nephrotoxic medications.     **hyponatremia     Diuretics as able     nutrition va OGT  as able      on Vaptan     Problem/Plan - 2:  ·  Problem: AF + RVR > improved     appreciated cardio follow up     would agree to DC Amio as able     on AC     -GI/DVT Prophylaxis per protocol.  on heparin drip and protonix    overall prognosis poor   pt full code   discussed with pt's son re poor prognosis   ___________________________  HGarrett Knowles D.O.  Pager: [phone]

## 2022-01-20 NOTE — PROGRESS NOTE ADULT - SUBJECTIVE AND OBJECTIVE BOX
Remains on Vasopressin and Levophed gtts  Remains on Nimbex, Fentanyl, and Propofol gtts  Now on Conivaptan gtt, as well as Bumex gtt    VITAL:  T(C): , Max: 37.8 (01-19-22 @ 20:00)  T(F): , Max: 100.1 (01-19-22 @ 20:00)  HR: 118 (01-20-22 @ 14:48)  BP: 85/51  RR: 28 (01-20-22 @ 14:00)  SpO2: 70% (01-20-22 @ 14:48)  urine output 2640cc/24h      PHYSICAL EXAM:  Constitutional: intubated/sedated; FiO2 100%  HEENT: (+)ETT/(+)NGT  Neck: Supple, No JVD  Respiratory: CTA-b/l  Cardiovascular: tachy s1s2  Gastrointestinal: BS+, soft, NT/ND  : (+)kim  Extremities: 2+ b/l LE edema  Neurological: tone WNL  Back: no CVAT b/l  Skin: No rashes, no nevi    LABS:                        12.1   25.86 )-----------( 412      ( 20 Jan 2022 02:07 )             36.5     Na(123)/K(4.4)/Cl(86)/HCO3(18)/BUN(41)/Cr(2.41)Glu(141)/Ca(7.9)/Mg(--)/PO4(--)    01-20 @ 11:11  Na(120)/K(4.1)/Cl(89)/HCO3(20)/BUN(40)/Cr(2.33)Glu(134)/Ca(7.5)/Mg(2.00)/PO4(5.2)    01-20 @ 06:56  Na(121)/K(4.0)/Cl(88)/HCO3(19)/BUN(38)/Cr(2.26)Glu(132)/Ca(7.6)/Mg(2.00)/PO4(5.4)    01-20 @ 02:07    Sodium, Random Urine: 35 mmol/L (01-20 @ 03:21)  Osmolality, Random Urine: 278 mosm/kg (01-20 @ 03:21)  Potassium, Random Urine: 25.2 mmol/L (01-20 @ 03:21)      IMPRESSION: 61M w/ nephrolithiasis, 12/28/21 p/w COVID PNA, c/b MELISSA and hyponatremia, c/b respiratory failure, shock, and hyponatremia    (1)Renal - nonoliguric MELISSA - likely components both from prerenal azotemia and ischemic ATN. Creatinine slowly uptrending. GFR now likely ~20-25ml/min    (2)Hyponatremia - unclear exact etiology - now improving, on Conivaptan gtt    (3)CV - in shock/on multiple pressors. On maximum FiO2 via vent; SpO2 down to 70-75% despite the 100% FiO2. Very poor overall prognosis      RECOMMEND:  (1)Conivaptan gtt as ordered  (2)Dose new meds for GFR 20-30ml/min (present dosing is acceptable)  (3)Favor no HD here as it would be medically futile. Discussed by phone ([phone]) with wife Laure and son Cade. I informed them that I agree it would be futile; also informed them that (despite his overall very poor prognosis), his kidneys at present are functioning well enough for him to need dialysis. They appreciate my input, and are happy to hear that he does not require dialysis at present. They further share that they would opt for dialysis if needed. Cade states that this decision is not up to the clinicians, but rather is up to them.      Tarik Kendrick MD  [facility]  Office: [phone]  Cell: [phone]

## 2022-01-21 NOTE — PROGRESS NOTE ADULT - ASSESSMENT
Assessment and Plan    62 yo M PMHx of nephrolithiasis intubated for acute hypoxic respiratory failure 2/2 COVID19 with ARDS, requiring pressor support, and with MELISSA, and A fib w/ RVR on amio loaded now on PO amiodarone.    Neuro   #mental status  sedated with propofol and fentanyl  paralyzed with nimbex  desaturated off nimbex, so back on nimbex    Pulm  #COVID ARDS  Intubated for acute hypoxic respiratory failure, worsening work of breathing  Proned daily 1/10- 1/14, stopped helping on ABG.  Currently on max vent settings PAC Rate 28/Inspiratory pressure 25/PEEP 15/FiO2 100%.  -d/c albuterol  -f/u ABGs  #Pneumothorax  Chest tube in place  -Monitor chest tubes    Cardiovascular  #hemodynamics  Requiring pressor support with levo and vasopressin  -Titrated as tolerated with MAP goal of 65  #Rapid afib  Rate controlled  Amio loaded, now on PO amio  -C/w Amio PO    GI  #diet  NGT in place  Have been having high residuals and was suctioned out 1L so feeds were held on 1/17  No significant pathology seen in abdominal xray  Patient likely has ileus due to nimbex use, which cannot be resolved despite bowel regimen.    Renal  #MELISSA  Baseline creatinine 0.8  Initially improving MELISSA with creatinine going from 2.62 to 1.66  Creatinine now uptrending to 2.2  #Volume overload  Net positive I/O hospital course with persistently net positive almost daily with bumex pushes  - Start Bumex drip  - Strict I/O monitoring  #Hyponatremia  Persistent hyponatremia  s/p 1x PO tolvaptan  s/p 500 cc 1.5% NS  s/p conivaptan push and conivaptan ggt    MSK  #Rhabdo  1/11: CK 1890  -replete electrolytes as needed    ID  #COVID19  - s/p decadron and remdesivir  - trend inflammatory markers    #new fevers, leukocytosis  s/p vanc by level (1/9-1/10) and zosyn (1/9-1/17)  MRSA negative  Blood cultures have been consistently negative.  Prior sputum showed few gram negative rods.  ID consult requested by family, saw patient on 1/13, thought to be due to COVID not bacterial infection  Fever may be due to medication reaction vs infection  - If persistently febile-> daily sputum cultures and bcx q48 hr    Heme-Onc  #DVT ppx  - c/w heparin ggt for afib rvr    Ongoing GOC discussion with family   Assessment and Plan    62 yo M PMHx of nephrolithiasis intubated for acute hypoxic respiratory failure 2/2 COVID19 with ARDS, requiring pressor support, and with MELISSA, and A fib w/ RVR on amio loaded now on PO amiodarone.    Neuro   #mental status  sedated with propofol and fentanyl  paralyzed with nimbex  desaturated off nimbex, so back on nimbex    Pulm  #COVID ARDS  Intubated for acute hypoxic respiratory failure, worsening work of breathing  Proned daily 1/10- 1/14, stopped helping on ABG.  Currently on max vent settings PAC Rate 28/Inspiratory pressure 25/PEEP 15/FiO2 100%.  PEEP was decreased to 11 after pneumo  -d/c albuterol  -f/u ABGs  #Pneumothorax  Chest tube in place  -Monitor chest tubes    Cardiovascular  #hemodynamics  Requiring pressor support with levo and vasopressin  -Titrated as tolerated with MAP goal of 65  #Rapid afib  Rate controlled  Amio loaded, now on PO amio  -C/w Amio PO    GI  #diet  NGT in place  Have been having high residuals and was suctioned out 1L so feeds were held on 1/17  No significant pathology seen in abdominal xray  Patient likely has ileus due to nimbex use, which cannot be resolved despite bowel regimen.    Renal  #MELISSA  Baseline creatinine 0.8  Initially improving MELISSA with creatinine going from 2.62 to 1.66  Creatinine now uptrending to 2.2  #Volume overload  Net positive I/O hospital course with persistently net positive almost daily with bumex pushes  - Start Bumex drip  - Strict I/O monitoring  #Hyponatremia  Persistent hyponatremia  s/p 1x PO tolvaptan  s/p 500 cc 1.5% NS  s/p conivaptan push and conivaptan ggt    MSK  #Rhabdo  1/11: CK 1890  -replete electrolytes as needed    ID  #COVID19  - s/p decadron and remdesivir  - trend inflammatory markers    #new fevers, leukocytosis  s/p vanc by level (1/9-1/10) and zosyn (1/9-1/17)  MRSA negative  Blood cultures have been consistently negative.  Prior sputum showed few gram negative rods.  ID consult requested by family, saw patient on 1/13, thought to be due to COVID not bacterial infection  Fever may be due to medication reaction vs infection  - If persistently febile-> daily sputum cultures and bcx q48 hr    Heme-Onc  #DVT ppx  - c/w heparin ggt for afib rvr    Ongoing GOC discussion with family

## 2022-01-21 NOTE — PROGRESS NOTE ADULT - SUBJECTIVE AND OBJECTIVE BOX
Sedated on Propofol, Fentanyl, and Nimbex  Remains on Vasopressin and Levophed gtts  Remains on fio2 100%    VITAL:  T(C): , Max: 37.3 (01-20-22 @ 12:00)  T(F): , Max: 99.1 (01-20-22 @ 12:00)  HR: 119 (01-21-22 @ 10:31)  BP:  109/60  RR: 28 (01-21-22 @ 10:00)  SpO2: 51% (01-21-22 @ 10:31)  urine output 855cc/24h    PHYSICAL EXAM:  Constitutional: intubated/sedated; FiO2 100%  HEENT: (+)ETT/(+)NGT  Neck: Supple, No JVD  Respiratory: CTA-b/l  Cardiovascular: tachy s1s2  Gastrointestinal: BS+, soft, NT/ND  : (+)kim  Extremities: 2+ b/l LE edema  Neurological: tone WNL  Back: no CVAT b/l  Skin: No rashes, no nevi    LABS:                        12.2   29.01 )-----------( 439      ( 21 Jan 2022 03:11 )             38.7     Na(122)/K(4.6)/Cl(89)/HCO3(20)/BUN(44)/Cr(2.92)Glu(145)/Ca(7.4)/Mg(2.10)/PO4(8.0)    01-21 @ 03:11  Na(122)/K(4.5)/Cl(88)/HCO3(17)/BUN(43)/Cr(2.70)Glu(136)/Ca(7.8)/Mg(--)/PO4(--)    01-20 @ 18:16  Na(120)/K(4.5)/Cl(87)/HCO3(16)/BUN(42)/Cr(2.59)Glu(146)/Ca(7.7)/Mg(--)/PO4(--)    01-20 @ 14:58  Na(123)/K(4.4)/Cl(86)/HCO3(18)/BUN(41)/Cr(2.41)Glu(141)/Ca(7.9)/Mg(--)/PO4(--)    01-20 @ 11:11  Na(120)/K(4.1)/Cl(89)/HCO3(20)/BUN(40)/Cr(2.33)Glu(134)/Ca(7.5)/Mg(2.00)/PO4(5.2)    01-20 @ 06:56  Na(121)/K(4.0)/Cl(88)/HCO3(19)/BUN(38)/Cr(2.26)Glu(132)/Ca(7.6)/Mg(2.00)/PO4(5.4)    01-20 @ 02:07  Na(122)/K(4.0)/Cl(88)/HCO3(21)/BUN(39)/Cr(2.22)Glu(133)/Ca(7.6)/Mg(2.00)/PO4(5.8)    01-19 @ 22:48  Na(123)/K(4.1)/Cl(90)/HCO3(18)/BUN(39)/Cr(2.16)Glu(126)/Ca(7.7)/Mg(--)/PO4(--)    01-19 @ 18:25  Na(122)/K(4.2)/Cl(88)/HCO3(18)/BUN(39)/Cr(2.10)Glu(136)/Ca(7.6)/Mg(--)/PO4(--)    01-19 @ 15:09  Na(122)/K(3.8)/Cl(89)/HCO3(20)/BUN(39)/Cr(2.20)Glu(125)/Ca(7.4)/Mg(1.90)/PO4(4.5)    01-19 @ 04:58      Sodium, Random Urine: 35 mmol/L (01-20 @ 03:21)  Osmolality, Random Urine: 278 mosm/kg (01-20 @ 03:21)  Potassium, Random Urine: 25.2 mmol/L (01-20 @ 03:21)    IMPRESSION: 61M w/ nephrolithiasis, 12/28/21 p/w COVID PNA, c/b MELISSA and hyponatremia, c/b respiratory failure, shock, and hyponatremia    (1)Renal - worsening MELISSA - borderline oliguric at this point - likely developing ischemic ATN in setting of hypoxia    (2)Hyponatremia - unclear exact etiology - low but stable over past couple days    (3)CV - in shock/on multiple pressors. On maximum FiO2 via vent; SpO2 down to 50-55% despite the 100% FiO2. Very poor overall prognosis      RECOMMEND:  (1)Dose new meds for GFR <15ml/min (present dosing is acceptable)  (2)Favor no HD here as it would be medically futile.             Tarik Kendrick MD  [facility]  Office: [phone]  Cell: [phone]

## 2022-01-21 NOTE — PROGRESS NOTE ADULT - SUBJECTIVE AND OBJECTIVE BOX
INTERVAL HPI/OVERNIGHT EVENTS: Patient desatted to 30s, and immediate CXR performed, demonstrating right pneumothorax. Chest tube was placed on right anterior chest and repeat imaging demonstrates resolution of pneumothorax.    SUBJECTIVE: Patient seen and examined at bedside. Intubated, sedated, paralyzed. Unable to obtain ROS due to mental status.    OBJECTIVE:    VITAL SIGNS:  ICU Vital Signs Last 24 Hrs  T(C): 37.2 (21 Jan 2022 08:00), Max: 37.2 (20 Jan 2022 20:00)  T(F): 99 (21 Jan 2022 08:00), Max: 99 (21 Jan 2022 08:00)  HR: 119 (21 Jan 2022 10:31) (114 - 159)  BP: --  BP(mean): --  ABP: 109/60 (21 Jan 2022 10:00) (81/49 - 109/62)  ABP(mean): 74 (21 Jan 2022 10:00) (58 - 75)  RR: 28 (21 Jan 2022 10:00) (22 - 28)  SpO2: 51% (21 Jan 2022 10:31) (51% - 76%)    Mode: AC/ CMV (Assist Control/ Continuous Mandatory Ventilation), RR (machine): 28, FiO2: 100, PEEP: 11, ITime: 0.8, MAP: 20, PC: 25, PIP: 37    01-20 @ 07:01 - 01-21 @ 07:00  --------------------------------------------------------  IN: 4091.2 mL / OUT: 1265 mL / NET: 2826.2 mL    01-21 @ 07:01 - 01-21 @ 13:02  --------------------------------------------------------  IN: 433.8 mL / OUT: 80 mL / NET: 353.8 mL      CAPILLARY BLOOD GLUCOSE      POCT Blood Glucose.: 113 mg/dL (20 Jan 2022 23:37)      PHYSICAL EXAM:    General: NAD  HEENT: NC/AT; PERRL, clear conjunctiva  Respiratory: CTA b/l  Cardiovascular: +S1/S2; RRR  Abdomen: soft, NT/ND; +BS x4  Extremities: WWP, 2+ peripheral pulses b/l; no LE edema  Skin: normal color and turgor; no rash  Neurological: AAOx0, no focal deficits    MEDICATIONS:  MEDICATIONS  (STANDING):  aMIOdarone    Tablet 200 milliGRAM(s) Oral every 24 hours  buMETAnide Infusion 1 mG/Hr (5 mL/Hr) IV Continuous <Continuous>  chlorhexidine 0.12% Liquid 15 milliLiter(s) Oral Mucosa every 12 hours  chlorhexidine 4% Liquid 1 Application(s) Topical <User Schedule>  cisatracurium Infusion 3 MICROgram(s)/kG/Min (21.1 mL/Hr) IV Continuous <Continuous>  fentaNYL   Infusion..... 0.5 MICROgram(s)/kG/Hr (1.17 mL/Hr) IV Continuous <Continuous>  heparin  Infusion.  Unit(s)/Hr (21 mL/Hr) IV Continuous <Continuous>  metoprolol tartrate Injectable 2.5 milliGRAM(s) IV Push every 5 minutes  norepinephrine Infusion 0.05 MICROgram(s)/kG/Min (5.49 mL/Hr) IV Continuous <Continuous>  pantoprazole  Injectable 40 milliGRAM(s) IV Push daily  petrolatum Ophthalmic Ointment 1 Application(s) Both EYES daily  polyethylene glycol 3350 17 Gram(s) Oral two times a day  propofol Infusion 10 MICROgram(s)/kG/Min (7.02 mL/Hr) IV Continuous <Continuous>  senna 2 Tablet(s) Oral at bedtime  vasopressin Infusion 0.04 Unit(s)/Min (2.4 mL/Hr) IV Continuous <Continuous>    MEDICATIONS  (PRN):  heparin   Injectable 9500 Unit(s) IV Push every 6 hours PRN For aPTT less than 40  heparin   Injectable 4500 Unit(s) IV Push every 6 hours PRN For aPTT between 40 - 57      ALLERGIES:  Allergies    Cipro (Rash)    Intolerances    eggs (Diarrhea)      LABS:                        12.2   29.01 )-----------( 439      ( 21 Jan 2022 03:11 )             38.7     01-21    122<L>  |  89<L>  |  44<H>  ----------------------------<  145<H>  4.6   |  20<L>  |  2.92<H>    Ca    7.4<L>      21 Jan 2022 03:11  Phos  8.0     01-21  Mg     2.10     01-21    TPro  6.1  /  Alb  1.8<L>  /  TBili  1.4<H>  /  DBili  x   /  AST  185<H>  /  ALT  53<H>  /  AlkPhos  99  01-21    PT/INR - ( 20 Jan 2022 02:07 )   PT: 12.5 sec;   INR: 1.10 ratio         PTT - ( 20 Jan 2022 02:07 )  PTT:62.6 sec      RADIOLOGY & ADDITIONAL TESTS: Reviewed. INTERVAL HPI/OVERNIGHT EVENTS: Patient desatted to 30s, and immediate CXR performed, demonstrating right pneumothorax. Chest tube was placed on right anterior chest and repeat imaging demonstrates resolution of pneumothorax.    SUBJECTIVE: Patient seen and examined at bedside. Intubated, sedated, paralyzed. Unable to obtain ROS due to mental status.    OBJECTIVE:    VITAL SIGNS:  ICU Vital Signs Last 24 Hrs  T(C): 37.2 (21 Jan 2022 08:00), Max: 37.2 (20 Jan 2022 20:00)  T(F): 99 (21 Jan 2022 08:00), Max: 99 (21 Jan 2022 08:00)  HR: 119 (21 Jan 2022 10:31) (114 - 159)  BP: --  BP(mean): --  ABP: 109/60 (21 Jan 2022 10:00) (81/49 - 109/62)  ABP(mean): 74 (21 Jan 2022 10:00) (58 - 75)  RR: 28 (21 Jan 2022 10:00) (22 - 28)  SpO2: 51% (21 Jan 2022 10:31) (51% - 76%)    Mode: AC/ CMV (Assist Control/ Continuous Mandatory Ventilation), RR (machine): 28, FiO2: 100, PEEP: 11, ITime: 0.8, MAP: 20, PC: 25, PIP: 37    01-20 @ 07:01 - 01-21 @ 07:00  --------------------------------------------------------  IN: 4091.2 mL / OUT: 1265 mL / NET: 2826.2 mL    01-21 @ 07:01 - 01-21 @ 13:02  --------------------------------------------------------  IN: 433.8 mL / OUT: 80 mL / NET: 353.8 mL      CAPILLARY BLOOD GLUCOSE      POCT Blood Glucose.: 113 mg/dL (20 Jan 2022 23:37)      PHYSICAL EXAM:    General: Intubated, sedated, with chest tube in place  HEENT: NC/AT; PERRL, clear conjunctiva  Respiratory: Chest tube in place  Cardiovascular: +S1/S2; RRR  Abdomen: soft, NT/ND; +BS x4  Extremities: WWP, 2+ peripheral pulses b/l; no LE edema  Skin: normal color and turgor; no rash  Neurological: AAOx0, no focal deficits    MEDICATIONS:  MEDICATIONS  (STANDING):  aMIOdarone    Tablet 200 milliGRAM(s) Oral every 24 hours  buMETAnide Infusion 1 mG/Hr (5 mL/Hr) IV Continuous <Continuous>  chlorhexidine 0.12% Liquid 15 milliLiter(s) Oral Mucosa every 12 hours  chlorhexidine 4% Liquid 1 Application(s) Topical <User Schedule>  cisatracurium Infusion 3 MICROgram(s)/kG/Min (21.1 mL/Hr) IV Continuous <Continuous>  fentaNYL   Infusion..... 0.5 MICROgram(s)/kG/Hr (1.17 mL/Hr) IV Continuous <Continuous>  heparin  Infusion.  Unit(s)/Hr (21 mL/Hr) IV Continuous <Continuous>  metoprolol tartrate Injectable 2.5 milliGRAM(s) IV Push every 5 minutes  norepinephrine Infusion 0.05 MICROgram(s)/kG/Min (5.49 mL/Hr) IV Continuous <Continuous>  pantoprazole  Injectable 40 milliGRAM(s) IV Push daily  petrolatum Ophthalmic Ointment 1 Application(s) Both EYES daily  polyethylene glycol 3350 17 Gram(s) Oral two times a day  propofol Infusion 10 MICROgram(s)/kG/Min (7.02 mL/Hr) IV Continuous <Continuous>  senna 2 Tablet(s) Oral at bedtime  vasopressin Infusion 0.04 Unit(s)/Min (2.4 mL/Hr) IV Continuous <Continuous>    MEDICATIONS  (PRN):  heparin   Injectable 9500 Unit(s) IV Push every 6 hours PRN For aPTT less than 40  heparin   Injectable 4500 Unit(s) IV Push every 6 hours PRN For aPTT between 40 - 57      ALLERGIES:  Allergies    Cipro (Rash)    Intolerances    eggs (Diarrhea)      LABS:                        12.2   29.01 )-----------( 439      ( 21 Jan 2022 03:11 )             38.7     01-21    122<L>  |  89<L>  |  44<H>  ----------------------------<  145<H>  4.6   |  20<L>  |  2.92<H>    Ca    7.4<L>      21 Jan 2022 03:11  Phos  8.0     01-21  Mg     2.10     01-21    TPro  6.1  /  Alb  1.8<L>  /  TBili  1.4<H>  /  DBili  x   /  AST  185<H>  /  ALT  53<H>  /  AlkPhos  99  01-21    PT/INR - ( 20 Jan 2022 02:07 )   PT: 12.5 sec;   INR: 1.10 ratio         PTT - ( 20 Jan 2022 02:07 )  PTT:62.6 sec      RADIOLOGY & ADDITIONAL TESTS: Reviewed.

## 2022-01-21 NOTE — CHART NOTE - NSCHARTNOTEFT_GEN_A_CORE
Received call from RN re: concern for no pulse    Pt seen and examined at bedside with no pulse.  Patient was immediately coded.  No ROSC was achieved. See code note.    Vitals  BP: 0  HR: 0  RR: 0  O2 Sat: 0      PHYSICAL EXAM:  HEENT: Pupils dilated and nonreactive to light  Constitutional: unresponsive to verbal/noxious stimuli  Respiratory: No air movement or breath sounds, no chest rise  Cardiovascular: No heart beat/rate  Vascular:  No peripheral pulses  Skin: pallor, decreased warmth      Assessment    Pt has . TOD: 2022 18:05    Plan  - Attending spoke with family  - Discharge note for  completed and sent attending for co-sign  - Discussed plan w/RN who is aware and will contact MD w/further concerns should they arise

## 2022-01-21 NOTE — CHART NOTE - NSCHARTNOTEFT_GEN_A_CORE
: Rossy (PGY-2), JACLYN (PGY-1), Dr. Stack    INDICATION: PTX    PROCEDURE:  [] LIMITED ECHO  [x] LIMITED CHEST  [] LIMITED RETROPERITONEAL  [] LIMITED ABDOMINAL  [] LIMITED DVT  [] NEEDLE GUIDANCE VASCULAR  [] NEEDLE GUIDANCE THORACENTESIS  [] NEEDLE GUIDANCE PARACENTESIS  [] NEEDLE GUIDANCE PERICARDIOCENTESIS  [] OTHER    FINDINGS:  - diffuse B-lines and lung sliding bilaterally. No pleural effusions.    INTERPRETATION:  - Resolving right-sided PTX. No left-side PTX noted : Rossy (PGY-2), JACLYN (PGY-1), Dr. Stack    INDICATION: PTX    PROCEDURE:  [] LIMITED ECHO  [x] LIMITED CHEST  [] LIMITED RETROPERITONEAL  [] LIMITED ABDOMINAL  [] LIMITED DVT  [] NEEDLE GUIDANCE VASCULAR  [] NEEDLE GUIDANCE THORACENTESIS  [] NEEDLE GUIDANCE PARACENTESIS  [] NEEDLE GUIDANCE PERICARDIOCENTESIS  [] OTHER    FINDINGS:  - diffuse B-lines and lung sliding bilaterally. No pleural effusions.    INTERPRETATION:  - Resolving right-sided PTX. No left-side PTX noted.    Attending note:  As above. At bedside for procedure.  SUSAN Stack DO, [facility]

## 2022-01-21 NOTE — PROGRESS NOTE ADULT - ASSESSMENT
_________________________________________________________________________________________  ========>>  M E D I C A L   A T T E N D I N G    F O L L O W  U P  N O T E  <<=========  -----------------------------------------------------------------------------------------------------    - Patient seen and examined by me earlier today.   - In summary,  ALEXX BROWN is a 61y year old man admitted with SOB, fever..   - Patient intubated, sedated, paralyzed,  in the MICU, vented     pt on full vent support, medically paralyzed and on 100% FIO2, saturating in high 50s today !        arrived to eval pt earlier, had just lost pulse, was being resuscitated..     ==================>> REVIEW OF SYSTEM <<=================  unable to obtain   ==================>> PHYSICAL EXAM <<=================    GEN: sedated, vented, intubated.. NAD  HEENT: NCAT lines and tubes in place   Neck: supple lines and tubes in place   CVS: S1S2 , regular  PULM: CTA B/L,  limited , + PEEP 15, 100 % FIO2, sat 70s%  ABD.: soft. non distended  Extrem: intact pulses , mild LE edema      + kim with dark yellow urine                              ( Note written / Date of service 01-21-22 )    ==================>> MEDICATIONS <<====================    aMIOdarone    Tablet 200 milliGRAM(s) Oral every 24 hours  buMETAnide Infusion 1 mG/Hr IV Continuous <Continuous>  chlorhexidine 0.12% Liquid 15 milliLiter(s) Oral Mucosa every 12 hours  chlorhexidine 4% Liquid 1 Application(s) Topical <User Schedule>  cisatracurium Infusion 3 MICROgram(s)/kG/Min IV Continuous <Continuous>  fentaNYL   Infusion..... 0.5 MICROgram(s)/kG/Hr IV Continuous <Continuous>  heparin  Infusion.  Unit(s)/Hr IV Continuous <Continuous>  metoprolol tartrate Injectable 2.5 milliGRAM(s) IV Push every 5 minutes  norepinephrine Infusion 0.05 MICROgram(s)/kG/Min IV Continuous <Continuous>  pantoprazole  Injectable 40 milliGRAM(s) IV Push daily  petrolatum Ophthalmic Ointment 1 Application(s) Both EYES daily  polyethylene glycol 3350 17 Gram(s) Oral two times a day  propofol Infusion 10 MICROgram(s)/kG/Min IV Continuous <Continuous>  senna 2 Tablet(s) Oral at bedtime  vasopressin Infusion 0.04 Unit(s)/Min IV Continuous <Continuous>    MEDICATIONS  (PRN):  heparin   Injectable 9500 Unit(s) IV Push every 6 hours PRN For aPTT less than 40  heparin   Injectable 4500 Unit(s) IV Push every 6 hours PRN For aPTT between 40 - 57    ___________  Active diet:  Diet, NPO:   Except Medications  ___________________    ==================>> VITAL SIGNS <<==================    Vital Signs Last 24 HrsT(C): 36.8 (01-21-22 @ 12:00)  T(F): 98.2 (01-21-22 @ 12:00), Max: 99 (01-21-22 @ 08:00)  HR: 60 (01-21-22 @ 17:25) (60 - 159)  BP: -- noted on tele   RR: 28 (01-21-22 @ 17:25) (24 - 28)  SpO2: 50% (01-21-22 @ 16:00) (50% - 76%)      POCT Blood Glucose.: 113 mg/dL (20 Jan 2022 23:37)     ==================>> LAB AND IMAGING <<==================                        12.2   29.01 )-----------( 439      ( 21 Jan 2022 03:11 )             38.7        01-21    122<L>  |  89<L>  |  44<H>  ----------------------------<  145<H>  4.6   |  20<L>  |  2.92<H>    Ca    7.4<L>      21 Jan 2022 03:11  Phos  8.0     01-21  Mg     2.10     01-21    TPro  6.1  /  Alb  1.8<L>  /  TBili  1.4<H>  /  DBili  x   /  AST  185<H>  /  ALT  53<H>  /  AlkPhos  99  01-21    WBC count:   29.01 <<== ,  25.86 <<== ,  22.69 <<== ,  22.64 <<== ,  23.79 <<==   Hemoglobin:   12.2 <<==,  12.1 <<==,  11.9 <<==,  12.1 <<==,  13.1 <<==  platelets:  439 <==, 412 <==, 385 <==, 334 <==, 321 <==, 269 <==    Creatinine:  2.92  <<==, 2.70  <<==, 2.59  <<==, 2.41  <<==, 2.33  <<==, 2.26  <<==  Sodium:   122  <==, 122  <==, 120  <==, 123  <==, 120  <==, 121  <==, 122  <==       AST:          185 <== , 54 <== , 47 <== , 49 <== , 56 <==      ALT:        53  <== , 29  <== , 28  <== , 29  <== , 40  <==      AP:        99  <=, 93  <=, 81  <=, 73  <=, 77  <=     Bili:        1.4  <=, 1.1  <=, 1.2  <=, 1.3  <=, 1.4  <=    ^^^ Inflammatory markers :  ^^^  C R P :          116.3 (01-07-22)  <<--, 131.9 (01-04-22)  <<--, 96.4 (01-02-22)  <<--  P C T :         1.43 (01-13-22) <<--, 0.15 (01-07-22) <<--, 0.13 (01-02-22) <<--    Ferritin :         1107 (01-07-22) <<--, 1058 (01-04-22) <<--, 890 (01-02-22) <<--    D-Dimer :          1926 (01-19-22) <<--, 1106 (01-16-22) <<--, 1179 (01-14-22) <<--, 3073 (01-11-22) <<--, 3352 (01-11-22) <<--    _______________________  C U L T U R E S :    Culture - Sputum (collected 19 Jan 2022 00:29)  Source: .Sputum Sputum  Gram Stain (19 Jan 2022 07:28):    Numerous polymorphonuclear leukocytes per low power field    No Squamous epithelial cells per low power field    No organisms seen per oil power field  Final Report (20 Jan 2022 17:20):    Normal Respiratory Marifer present    Culture - Blood (collected 17 Jan 2022 20:47)  Source: .Blood Blood-Peripheral  Preliminary Report (18 Jan 2022 21:02):    No growth to date.    Culture - Blood (collected 17 Jan 2022 20:47)  Source: .Blood Blood-Peripheral  Preliminary Report (18 Jan 2022 21:02):    No growth to date.    Culture - Blood (collected 12 Jan 2022 13:15)  Source: .Blood Blood-Peripheral  Final Report (17 Jan 2022 17:00):    No Growth Final    Culture - Blood (collected 12 Jan 2022 13:00)  Source: .Blood Blood-Peripheral  Final Report (17 Jan 2022 17:00):    No Growth Final    ___________________________________________________________________________________  ===============>>  A S S E S S M E N T   A N D   P L A N <<===============  ------------------------------------------------------------------------------------------    · Assessment	  61 year old man with history of kidney stone presenting with fever, cough, SOB x 2-3d.   admitted for COVID     Problem/Plan - 1:  ·  Problem: Acute hypoxemic respiratory failure due to COVID-19 with Viral syndrome. >> ARDS  post Remdesivir and Decadron per hospital protocol  ICU care and mgmt appreciated      pt continues to deteriorate despite full vent support   supportive care  nutrition, hydration  as able   on full dose AC already  pt on Bumax drip and Vaptan therapy for diuresis and hyponatremia  at this point steroids would not change outcome   IV albumin  as neede : monitor output    Continue Current medications otherwise and monitor.     **new Pneumothorax, post chest tube last PM    mgmt per ICU    improved on Xray     ** Fever , leukocytosis  unclear etiology   pan cultures > follow   pt already post course of abx   leukocytosis increased > monitor   less likely PE as been on Lovenox 40 BID  antipyretics as needed  monitor     **MELISSA and transaminitis likely due to hemodynamic changes during code  with episodes of hypotension 2/2 propofol     overall improved     diuretics as needed      keep MAP >60      tend BMP closely     Avoid nephrotoxic medications.     **hyponatremia     Diuretics as able     nutrition va OGT  as able      on Vaptan     Problem/Plan - 2:  ·  Problem: AF + RVR > improved     appreciated cardio follow up     would agree to DC Amio as able     on AC     -GI/DVT Prophylaxis per protocol.  on heparin drip and protonix    overall prognosis grave   ___________________________  H. ABNER Knowles.  Pager: [phone]

## 2022-01-21 NOTE — PROGRESS NOTE ADULT - REASON FOR ADMISSION
fever, SOB

## 2022-01-21 NOTE — CHART NOTE - NSCHARTNOTESELECT_GEN_ALL_CORE
Event Note
RRT/Event Note
Death Note/Event Note
Event Note
Follow Up/Nutrition Services
Nutrition Follow Up/Nutrition Services
POCUS
POCUS
POCUS/Event Note

## 2022-01-21 NOTE — PROCEDURE NOTE - NSINDICATIONS_GEN_A_CORE
arterial puncture to obtain ABG's/cannulation purposes/monitoring purposes
pneumothorax
critical illness/emergency venous access

## 2022-01-21 NOTE — PROGRESS NOTE ADULT - ATTENDING SUPERVISION STATEMENT
Resident
Resident/Fellow
Resident
Resident/Fellow
Resident
Resident
Resident/Fellow

## 2022-01-21 NOTE — DISCHARGE NOTE FOR THE EXPIRED PATIENT - HOSPITAL COURSE
Patient presented to the ED on 12/28/21. Patient was given remdesivir 12/28-1/1 and dex 12/28-1/6. Patient refused toci. Patient was reocmmended intubation on 1/2 but was initially declined by pateint and family. Patient had RRT called on 1/3 for removing bipap. Patient was intubated on 1/8 and taken to the MICU. Over MICU course, patient was febrile, with cultures repeatedly negative. Patient was given empiric course of zosyn and seen by ID. Patient ARDS worsened throughout the MICU course, requiring increasing pressor requirements as well as increasing oxygen requirements, sedated and paralyzed throughout the MICU course. Patient was getting proned daily from 1/8 to 1/14, which intially helped him with oxygen as per ABG. However, by 1/12-1/14, ABG had not been shown to have improvements by positional changes with proning, and patient had not been proned since. On 1/11, when patient was supinated at 4pm, patient became hypoxic with A fib with RVR. Patient was amio loaded and started on heparin ggt for the A fib with RVR and continued on PO amio. Patient hospital course was also complicated by fluid overload due to multiple pressor and sedative medications patient has been getting, for which he had been getting bumex pushes PRN to maintain urine output. Due to persistent net positive I/O, patient was started on a bumex drip on 1/19. Patient has also been having worsening hyponatremia, not responsive to 1x tolvaptan and 1x 500 cc of 1.5% NS. The tolvaptan may not have been effective because patient has been high residuals on feeds. On 1/19, patient was given 1x 20 mg conivaptan with 24 hour infusion of conivaptan. As for high residuals, patient also got portable abdominal xray. Patient showed persistent hyponatremia. Nimbex was held on 1/19 but patient wasn't able to tolerate mechanical ventilation without paralytic. Patient was put cinyd on nimbex drip. Over the rest of hospital course, patient showed worsening desaturation on abg despite maximal vent settings. Overnight of 1/20 to 1/21, patient suddenly desaturated to 30s on pulse ox and an chest xray was obtained that demonstrated a right sided pneumothorax. A chest tube was subsequently placed, heparin ggt was stopped, and repeat cxr demonstrate resolution of the pneumo with chest tube  in place. On 1/21, patient went into cardiac arrest and patient was coded and no Rosc was achieved. Please see code note for details.

## 2022-01-21 NOTE — PROGRESS NOTE ADULT - ATTENDING COMMENTS
62 yo M admitted for COVID ARDS, course complicated by MELISSA now requiring repeated diuretic administration, and A fib w/ RVR. Proning has been held due to lack of improvement in oxygenation and some facial trauma/ swelling. Oxygenation has not improved on current settings and he will be kept on sedatives, analgesia, and paralytics. Deemed not a candidate for ECMO by ECMO team. Positional changes (lateral decubitus) have also failed to improve oxygenation. Cultures obtained for fevers (has received course of abx finished on 1/15). Will c/w current management
62 yo M admitted for COVID ARDS, course complicated by MELISSA, and A fib w/ RVR. Proning has been held due to lack of improvement in oxygenation. Patient was briefly switched to VC/AC from PC/AC and despite acceptable peak pressures, had worsening hypercapnea. He was thus switched to PC/AC. He will be kept on sedatives, analgesia, and paralytics. Will wean as tolerated
Agree with above. Seen and examined with team on rounds. Critically ill on vent requiring frequent bedside visits. COVID ARDS with worsening hypoxemia and hypercapnia. Worsening MELISSA as well. Poor prgonosis for functional recovery. GOC discussions with the family are ongoing.
61 year old man with Acute respiratory failure with hypoxia and ARDS secondary to viral pneumonia from COVID infection currently proned for severe hypoxia. on Broad spectrum ABx for possible super imposed bacterial infection course also complicated by MELISSA.    - sedated and paralyzed for vent coordination  - Proned again overnight for profound hypoxia  - LFTs elevated but improving   - good urine output with slight improvement in electrolytes  - continue ABx  - vasopressor support titrate to keep MAP>65  - a-fib rate controlled with amiodarone  - follow up ABG and adjust vent as needed    prognosis guarded
61 year old man with Acute respiratory failure with hypoxia and ARDS secondary to viral pneumonia from COVID infection currently proned for severe hypoxia. on Broad spectrum ABx for possible super imposed bacterial infection course also complicated by MELISSA.    - sedated and paralyzed for vent coordination  - Proned again overnight for profound hypoxia  - minor elevation in LFTs will continue to monitor  - good urine output continue to monitor electrolytes  - continue ABx  - vasopressor support titrate to keep MAP>65  - follow up ABG and adjust vent as needed    prognosis guarded
61-year-old male with significant past medical history of nephrolithiasis comes into the hospital for shortness of breath secondary to COVID-19.  Patient was intubated on the floor for acute hypoxic respiratory failure refractory to noninvasive ventilation and high flow nasal cannula.  Patient is in severe ARDS. Will need to be paralyzed and proned. Will start broad spectrum antibiotics and draw blood cultures.     Prognsis is guarded.
Agree with above. Seen and examined with team on rounds. Critically ill on vent requiring frequent bedside visits. Severe COVID ARDS. MELISSA worsening, hyponatremia as well. Supportive care. Diuresis as tolerated. Gave conivaptan for hyponatremia. Close follow up of Na levels. On empiric abx therapy. Unclear source of fevers.
61-year-old male with significant past medical history of nephrolithiasis comes into the hospital for shortness of breath secondary to COVID-19.  Patient was intubated on the floor for acute hypoxic respiratory failure refractory to noninvasive ventilation and high flow nasal cannula.  Patient is in moderate to severe ARDS is currently prolonged.  Patient also has hypercapnia with optimal vent settings. Urine output is dropping creatinine is rising.  Patient appears to be going into multiorgan failure.  Will reassess cardiac status and lung status once patient is supine later today.  Continue broad-spectrum antibiotics.  Follow-up blood cultures.    At this time prognosis is guarded however as the patient is going to multiorgan failure will need to engage with family about overall poor prognosis.  Patient is not a candidate for ECMO at this time given the length of time that the patient was on the floors noninvasive ventilation and currently in multiorgan failure.
62 yo M admitted for COVID ARDS, course complicated by MELISSA now requiring repeated diuretic administration, and A fib w/ RVR. Proning has been held due to lack of improvement in oxygenation and some facial trauma/ swelling. Oxygenation has not improved on current settings and he will be kept on sedatives, analgesia, and paralytics. Will wean as tolerated.  Family requested evaluation by ECMO team. Likely not a candidate due to the duration of illness but will have ECMO team assess.
Agree with above. Seen and examined with team on rounds. Critically ill requiring frequent bedside visits. In multiorgan failure with MELISSA and liver failure along with severe COVID ARDS. Unable to do HD as he is requiring high levels of vasopressor support and will not tolerate it. Family at the bedside and very distraught. Explained again the overall poor prognosis.
Agree with above. Seen and examined with team on rounds. Critically ill requiring frequent bedside visits. Has MELISSA and severe ARDS on ventilator. Not an ECMO candidate given length of illness and renal failure. Continue supportive care and titration of vent settings as tolerated. Overall poor prognosis for functional recovery given severity of ARDS.
61 year old man with Acute respiratory failure with hypoxia and ARDS secondary to viral pneumonia from COVID infection currently proned for severe hypoxia. on Broad spectrum ABx for possible super imposed bacterial infection course also complicated by MELISSA.    - sedated and paralyzed for vent coordination  - on trickle feeds  - minor elevation in LFTs will continue to monitor  - food urine output but rising creatinine will continue to monitor electrolytes  - continue ABx  - vasopressor support titrate to keep MAP>65  - follow up ABG and adjust vent as needed    prognosis guarded

## 2022-01-21 NOTE — PROGRESS NOTE ADULT - PROVIDER SPECIALTY LIST ADULT
Cardiology
Internal Medicine
MICU
MICU
Nephrology
Cardiology
Internal Medicine
MICU
Cardiology
Internal Medicine
MICU
Nephrology
Internal Medicine
MICU

## 2022-01-21 NOTE — PROCEDURE NOTE - NSPROCDETAILS_GEN_ALL_CORE
Seldinger technique/dressing applied/secured in place/sterile dressing applied/supine position/percutaneous/thoracostomy tube placed percutaneously
location identified, draped/prepped, sterile technique used, needle inserted/introduced/positive blood return obtained via catheter/connected to a pressurized flush line/sutured in place/hemostasis with direct pressure, dressing applied/Seldinger technique/all materials/supplies accounted for at end of procedure
guidewire recovered/lumen(s) aspirated and flushed/sterile dressing applied/sterile technique, catheter placed/ultrasound guidance with use of sterile gel and probe cove

## 2022-01-22 LAB
CULTURE RESULTS: SIGNIFICANT CHANGE UP
CULTURE RESULTS: SIGNIFICANT CHANGE UP
SPECIMEN SOURCE: SIGNIFICANT CHANGE UP
SPECIMEN SOURCE: SIGNIFICANT CHANGE UP

## 2022-02-25 NOTE — PATIENT PROFILE ADULT - NSPROPTRIGHTCAREGIVER_GEN_A_NUR
Physical Therapy Discharge Summary Addendum:  Date: 2/25/2022  Total Number of Visits: 4  Referred by: Mitchel Lozano MD  Medical Diagnosis (from order):   Diagnosis Information      Diagnosis    724.2 (ICD-9-CM) - M54.50 (ICD-10-CM) - Low back pain                Pt did not show for last f/u appointment as scheduled. Had discussed with pt previously about this being a check in after 1 month of working on HEP. Pt had been instructed to only f/u if symptoms remained. At this time, will discharge pt from skilled PT.  Status of goals: per status in last daily treatment note     yes

## 2022-07-19 NOTE — CHART NOTE - NSCHARTNOTEFT_GEN_A_CORE
MEDICARE WELLNESS VISIT NOTE    HISTORY OF PRESENT ILLNESS:   Amna Millan presents for her First Annual Medicare Wellness Visit.   She has no current complaints or concerns.      Patient Care Team:  Sarwat Ann MD as PCP - General (Family Practice)        Patient Active Problem List   Diagnosis   • Severe bipolar affective disorder with psychosis (CMS/HCC)   • Schizoaffective disorder, bipolar type (CMS/HCC)   • Schizoaffective disorder, depressive type (CMS/[facility])   • PTSD (post-traumatic stress disorder)   • Tobacco use disorder   • Bipolar disorder with depression (CMS/[facility])         Past Medical History:   Diagnosis Date   • Anemia     pregnancy induced   • Bipolar disorder, unspecified (CMS/HCC)    • Learning disability    • Schizophrenia, unspecified (CMS/[facility])          Past Surgical History:   Procedure Laterality Date   •  section, low transverse  2018    had 3 in total          Social History     Tobacco Use   • Smoking status: Current Every Day Smoker     Packs/day: 0.50     Years: 10.00     Pack years: 5.00     Types: Cigarettes   • Smokeless tobacco: Never Used   Vaping Use   • Vaping Use: never used   Substance Use Topics   • Alcohol use: Not Currently     Comment: occ   • Drug use: No     Drug use:    Drug Use:    No              Family History   Problem Relation Age of Onset   • Depression Mother    • Diabetes Mother    • Schizophrenia Father    • Depression Father    • Anxiety disorder Father    • Asthma Son    • Allergic Rhinitis Son    • Asthma Son    • Allergic Rhinitis Son        Current Outpatient Medications   Medication Sig Dispense Refill   • ibuprofen (MOTRIN) 600 MG tablet Take 1 tablet by mouth every 8 hours as needed for Pain. 30 tablet 1   • metroNIDAZOLE (METROGEL-VAGINAL) 0.75 % vaginal gel Place 1 applicator vaginally daily. 70 g 0   • cyclobenzaprine (FLEXERIL) 10 MG tablet Take 1 tablet by mouth 3 times daily as needed for Muscle spasms. 30 tablet 0   •  I spoke with wife over the phone today for update.  Wife was very angry about the update about her 's gradual decline requiring increased PEEP and oxygenation and pressor requirements.  She was yelling about her 's condition and how he is not getting proned, which she believes is the cause of his gradual decline.  I explained that the MICU team has been monitoring her  frequently and has decided with the two attending physicians throughout the last week that proning does not improve her 's oxygen levels.  The wife is very angry about the situation and believes it is because we do not have enough manpower in the MICU.  She said that if her  is not proned today, this will be escalated to Tavon Marc and she will bring the news crew to the hospital.  She was also very upset that her  is not a candidate for ECMO. I explained to her that the ECMO attending personally said that her  is not a candidate for ECMO.  I explained to her that we are doing everything we can in terms of ventilatory support and pressor support to help her .  I also explained that the medical team (as per ICU attending physicians) believes that her and her family's requests for ECMO and prone positioning are not thought to be able to help her  medically, and that is the only reason these things are not being done.  She was still upset and will call back later. FLUoxetine (PROZAC) 20 MG capsule Take 20 mg by mouth daily.     • Bacillus Coagulans-Inulin (PROBIOTIC-PREBIOTIC) 1-250 BILLION-MG Cap Take 1 capsule by mouth daily. May sub for other similar medications per insurance coverage 30 capsule 11   • QUEtiapine (SEROQUEL) 50 MG tablet Take 1 tablet by mouth nightly. 30 tablet 1     No current facility-administered medications for this visit.        The following items on the Medicare Health Risk Assessment were found to be positive  1.) Do you have an Advance directive, living will, or power of  for health care document that contains your wishes for end of life care?: No     3.) During the past 4 weeks, how would you rate your health?: Fair     4.) During the past 4 weeks, what was the hardest physical activity you could do for at least 2 minutes?: Light     6 a.) How many servings of Fruits and Vegetables do you have each day ( 1 serving = 1 piece of fruit, 1/2 cup fruits or vegetables): None     6 b.) How many servings of High Fiber / Whole Grain Foods to you have each day ( 1 serving = 1 cup cold cereal, 1/2 cup cooked cereal, 1 slice bread): None     6 c.) How many servings of Fried or High Fat Foods do you have each day (1 serving = 1 [place], French Fries, chips, doughnut, fried chicken/fish): 3 per day     6 d.) How many servings of Sugar Sweetened Beverages do you have each day ( 1 serving = 1 can or 12 oz cup of sode or juice): 3 per day     8.) During the past 4 weeks, has your physical and emotional health limited your social activities with family, friends, neighbors, or other groups?: Extremely     10.) How often do you have trouble taking medicines the way you have been told to take them?: I seldom take my prescribed medications     11f.) Feeling stressed or overwhelmed: Often     11g.) Anger or frustration: Often     14.) During the past 4 weeks, was someone available to help if you needed and wanted help?: Yes, some         Vision and Hearing  screens:    Hearing Screening    125Hz 250Hz 500Hz 1000Hz 2000Hz 3000Hz 4000Hz 6000Hz 8000Hz   Right ear:   Pass Pass Pass  Pass     Left ear:   Pass Pass Pass  Pass        Visual Acuity Screening    Right eye Left eye Both eyes   Without correction: 20/20 20/20 20/20   With correction:          Advance Directive:   The patient has the following documents:  No Advance Directives on file. Patient offered documents.    Cognitive/Functional Status: Mini-Cog performed with score of 2    Opioid Review: Amna is not taking opioid medications.    Recent PHQ 2/9 Score:    PHQ 2:  Date Adult PHQ 2 Score Adult PHQ 2 Interpretation   5/13/2022 4 Further screening needed       PHQ 9:  Date Adult PHQ 9 Score Adult PHQ 9 Interpretation   5/13/2022 17 Moderately Severe Depression       DEPRESSION ASSESSMENT/PLAN:  Depression managed by psychiatry.      Body mass index is 33.75 kg/m².    BMI ASSESSMENT/PLAN:  Patient is obese.    Caloric restriction and Low carbohydrate diet        Needed Screening/Treatment:   Per orders.   Needed follow up:  None    See orders.   See Patient Instructions section.   Return in about 1 year (around 7/15/2023) for Medicare Wellness Visit.    [place] ambulatory encounter  FAMILY PRACTICE OFFICE VISIT    CHIEF COMPLAINT:    Chief Complaint   Patient presents with   • Medicare Wellness Visit   • STI Screening       SUBJECTIVE:  Amna Millan is a 36 year old female who presented requesting evaluation for STI Screening -     # vaginal discharge  Ongoing for about 2 weeks.   Denies odor  Does not notice color  Denies burning urination     Wants to be tested.   Sexually active with single partner    # bipolar disorder + depression  Schizoaffective disorder- bipolar type   Follows with psychiatrist - outpatient at [facility]    Preventive Care:    Not on contraception - declined.   Tobacco use- 7 cigarettes / day   Alcohol use- none  Substance - denies       Pap smear: 7/2020 - NIL HRHPV negative - next due  7/2025.   Mammogram: after 42   Immunizations: UTD         Review of systems:   All other systems are reviewed and are negative except as documented in the history of present illness.     OBJECTIVE:  PROBLEM LIST:   Patient Active Problem List   Diagnosis   • Severe bipolar affective disorder with psychosis (CMS/HCC)   • Schizoaffective disorder, bipolar type (CMS/HCC)   • Schizoaffective disorder, depressive type (CMS/HCC)   • PTSD (post-traumatic stress disorder)   • Tobacco use disorder   • Bipolar disorder with depression (CMS/HCC)       PAST HISTORIES:   I have reviewed the past medical history, family history, social history, medications and allergies listed in the medical record as obtained by my nursing staff and support staff and agree with their documentation.    PHYSICAL EXAM:   Visit Vitals  /72 (BP Location: LUE - Left upper extremity, Patient Position: Sitting, Cuff Size: Regular)   Pulse 64   Temp 97.3 °F (36.3 °C) (Temporal)   Resp 16   Ht 5' 1\" (1.549 m)   Wt 81 kg (178 lb 9.6 oz)   LMP 06/29/2022   SpO2 99%   BMI 33.75 kg/m²       General Appearance:  Alert, cooperative, no distress, appears stated age.  Head:  Normocephalic, without obvious abnormality, atraumatic.  Eyes:  Pupils equal, round and reactive to light, conjunctivae/corneas clear, extraocular movements intact   Ears:  Tympanic membranes and external ear canals normal, both ears.  Nose:  Nares normal, septum midline, mucosa normal   Throat:  Lips, mucosa, and tongue normal; teeth and gums normal.  Neck:  Supple, symmetrical, trachea midline, no adenopathy.  Thyroid has no enlargement/tenderness/nodules   Back:  Symmetric, no curvature, range of motion normal   Lungs:  Clear to auscultation bilaterally, respirations unlabored.     Heart:  Regular rate and rhythm, S1 and S2 normal, no murmur   Abdomen:  Soft, non-tender, bowel sounds active all four quadrants,  no masses   Extremities:  Atraumatic, no cyanosis or edema.     Skin:   Skin color, texture, turgor normal, no rashes or lesions.  Lymph Nodes:  Cervical, supraclavicular  nodes normal. Axilla with few small abscesses - non fluctuant + h/o hidradenitis   Neurologic:  Cranial nerves II-XII grossly intact, normal strength, sensation and reflexes throughout 2+ patellar   Breast Exam:  No asymmetry, masses or tenderness.  No nipple retraction or discharge.  Overlying skin without creasing, textural changes.   Genitalia:  External genitalia, bulbourethral and [place] glands without lesions.  Vaginal vault with scant secretions, no lesions.  Cervix without friability or motion tenderness.  Uterus not appreciably enlarged or tender.  Adnexa without masses or tenderness.    Rectal:  Normal tone, no masses or tenderness.    LAB RESULTS:   All pertinent laboratory results were reviewed.    ASSESSMENT & PLAN:   Amna was seen today for medicare wellness visit and sti screening.    Diagnoses and all orders for this visit:    Medicare annual wellness visit, initial  Discussed health maintenance, including regular aerobic exercise, low fat diet, and periodic exams.    -     ANNUAL WELLNESS VISIT INITIAL VISIT W PPS    Preventive measure  -     CBC WITH DIFFERENTIAL; Future  -     COMPREHENSIVE METABOLIC PANEL; Future  -     GLYCOHEMOGLOBIN; Future    Routine screening for STI (sexually transmitted infection)  -     RPR (RAPID PLASMA REAGIN) TRADITIONAL SYPHILIS SCREEN ALGORITHM; Future  -     HEPATITIS C ANTIBODY WITH REFLEX; Future  -     CHLAMYDIA/GONORRHEA BY NUCLEIC ACID AMPLIFICATION    Vaginal discharge     -     SWABONE VAGINITIS PANEL    Encounter for screening for human immunodeficiency virus (HIV)   -     HIV ANTIGEN/ANTIBODY SCREEN; Future    Schizoaffective disorder, bipolar type (CMS/HCC)  Bipolar disorder with depression (CMS/HCC)  -     SERVICE TO BEHAVIORAL HEALTH    Tobacco use disorder  Discussed quitting   approx 5 mins spent on counseling   Pt to consider     Hidradenitis  suppurativa    -     doxycycline hyclate (VIBRA-TABS) 100 MG tablet; Take 1 tablet by mouth in the morning and 1 tablet before bedtime. Do all this for 7 days.    Bacterial vaginosis  -     metroNIDAZOLE (FLAGYL) 500 MG tablet; Take 1 tablet by mouth in the morning and 1 tablet before bedtime. Do all this for 7 days.      Return in about 1 year (around 7/15/2023) for Medicare Wellness Visit.    Instructions provided as documented in the after visit summary.    The patient indicated understanding of the diagnosis and agreed with the plan of care.   MD Sarwat Yancey MD

## 2023-03-08 NOTE — ASSESSMENT
[FreeTextEntry1] : CT scan (soni) reviewed with pt and his wife- 4.8 cm left renal pelvis stone with at least 3 additional 8 to 100 mm stones in lower, mid. Sig left hydro, with thinning of parenchyma.\par \par I had long discussion with patient about their stones, and about options, risks, and benefits of all treatments.  Main options for definitive stone treatment include ESWL, URS, PCNL.  \par \par ESWL success best with smaller, less dense stones, and with short skin to stone distance and favorable location of the stone within the urinary tract, while URS is more successful treatment with multiple stones, more dense stones, or challenging body habitus or stone location.  PCNL is best option for larger, more dense and complex stones, and particularly those involving the lower pole.  Non-definitive stone treatment options for drainage, using either stents or nephrostomy, also reviewed: these are of lower immediate surgical risks, but incur multiple procedures to manage and may have their own complications and effects on quality of life.  Still, nephrostomy or nephroureteral catheter can allow maintenance of urinary system drainage without surgical risks, and management in office with exchanges (avoiding the anesthesia and testing which would be present with bilateral internal stent exchange).\par \par Risks of nontreatment with obstruction can lead to very high rate of renal function loss in stone-bearing kidney over the next months to years.\par \par In this patient's case, I recommend...left PCNL. Potential for second stage or mult access reviewed\par \par Risks/benefits/success/recovery expectations all reviewed at length, particularly with respect to patient's comorbidities, and inclusive of infection/sepsis, bleeding, need for secondary procedures or secondary stages such as embolization or open surgery, and even risks of death due to acute issues superimposed on comorbidities.\par \par Pt prefers to undergo: left PCNL\par \par Will schedule.\par \par Urine culture, PST appt to schedule once surgery scheduled.\par  Previously Declined (within the last year)

## 2024-08-14 NOTE — PATIENT PROFILE ADULT - FUNCTIONAL ASSESSMENT - BASIC MOBILITY SCORE.
Specialty Pharmacy Refill Coordination Note     Guilherme is a 48 y.o. male contacted today regarding refills of  2 specialty medication(s).    Reviewed and verified with patient:       Specialty medication(s) and dose(s) confirmed: yes    Refill Questions      Flowsheet Row Most Recent Value   Changes to allergies? No   Changes to medications? No   New conditions or infections since last clinic visit No   Unplanned office visit, urgent care, ED, or hospital admission in the last 4 weeks  No   How does patient/caregiver feel medication is working? Good   Financial problems or insurance changes  No   Since the previous refill, were any specialty medication doses or scheduled injections missed or delayed?  No   Does this patient require a clinical escalation to a pharmacist? No            Delivery Questions      Flowsheet Row Most Recent Value   Delivery method [facility]   Delivery address verified with patient/caregiver? Yes   Delivery address Home   Number of medications in delivery 2   Medication(s) being filled and delivered Gabapentin (Anticonvulsants - Misc.), Insulin Pen Needle   Doses left of specialty medications 1 week   Copay verified? Yes   Copay amount 0   Copay form of payment No copayment ($0)   Ship Date 08/15   Delivery Date 08/16   Signature Required No                   Follow-up: 0 day(s)     Lanny Wilkins, Pharmacy Technician  Specialty Pharmacy Technician        
24
